# Patient Record
Sex: FEMALE | Race: WHITE | NOT HISPANIC OR LATINO | URBAN - METROPOLITAN AREA
[De-identification: names, ages, dates, MRNs, and addresses within clinical notes are randomized per-mention and may not be internally consistent; named-entity substitution may affect disease eponyms.]

---

## 2017-06-25 ENCOUNTER — INPATIENT (INPATIENT)
Facility: HOSPITAL | Age: 82
LOS: 8 days | Discharge: HOME | DRG: 280 | End: 2017-07-04
Attending: INTERNAL MEDICINE | Admitting: INTERNAL MEDICINE
Payer: MEDICARE

## 2017-06-25 VITALS
SYSTOLIC BLOOD PRESSURE: 142 MMHG | RESPIRATION RATE: 24 BRPM | OXYGEN SATURATION: 92 % | DIASTOLIC BLOOD PRESSURE: 65 MMHG | HEART RATE: 82 BPM | TEMPERATURE: 98 F

## 2017-06-25 LAB
ALBUMIN SERPL ELPH-MCNC: 4.1 G/DL — SIGNIFICANT CHANGE UP (ref 3.3–5)
ALP SERPL-CCNC: 55 U/L — SIGNIFICANT CHANGE UP (ref 40–120)
ALT FLD-CCNC: 14 U/L RC — SIGNIFICANT CHANGE UP (ref 10–45)
ANION GAP SERPL CALC-SCNC: 13 MMOL/L — SIGNIFICANT CHANGE UP (ref 5–17)
APPEARANCE UR: CLEAR — SIGNIFICANT CHANGE UP
APTT BLD: 28 SEC — SIGNIFICANT CHANGE UP (ref 27.5–37.4)
AST SERPL-CCNC: 23 U/L — SIGNIFICANT CHANGE UP (ref 10–40)
BACTERIA # UR AUTO: ABNORMAL /HPF
BASOPHILS # BLD AUTO: 0 K/UL — SIGNIFICANT CHANGE UP (ref 0–0.2)
BASOPHILS NFR BLD AUTO: 0.1 % — SIGNIFICANT CHANGE UP (ref 0–2)
BILIRUB SERPL-MCNC: 0.3 MG/DL — SIGNIFICANT CHANGE UP (ref 0.2–1.2)
BILIRUB UR-MCNC: NEGATIVE — SIGNIFICANT CHANGE UP
BUN SERPL-MCNC: 19 MG/DL — SIGNIFICANT CHANGE UP (ref 7–23)
CALCIUM SERPL-MCNC: 9.2 MG/DL — SIGNIFICANT CHANGE UP (ref 8.4–10.5)
CHLORIDE SERPL-SCNC: 90 MMOL/L — LOW (ref 96–108)
CK MB CFR SERPL CALC: 7 NG/ML — HIGH (ref 0–3.8)
CK SERPL-CCNC: 86 U/L — SIGNIFICANT CHANGE UP (ref 25–170)
CO2 SERPL-SCNC: 29 MMOL/L — SIGNIFICANT CHANGE UP (ref 22–31)
COLOR SPEC: SIGNIFICANT CHANGE UP
CREAT SERPL-MCNC: 1.31 MG/DL — HIGH (ref 0.5–1.3)
DIFF PNL FLD: NEGATIVE — SIGNIFICANT CHANGE UP
EOSINOPHIL # BLD AUTO: 0 K/UL — SIGNIFICANT CHANGE UP (ref 0–0.5)
EOSINOPHIL NFR BLD AUTO: 0.2 % — SIGNIFICANT CHANGE UP (ref 0–6)
GAS PNL BLDV: SIGNIFICANT CHANGE UP
GLUCOSE SERPL-MCNC: 109 MG/DL — HIGH (ref 70–99)
GLUCOSE UR QL: NEGATIVE — SIGNIFICANT CHANGE UP
HCT VFR BLD CALC: 23.4 % — LOW (ref 34.5–45)
HGB BLD-MCNC: 8 G/DL — LOW (ref 11.5–15.5)
INR BLD: 0.91 RATIO — SIGNIFICANT CHANGE UP (ref 0.88–1.16)
KETONES UR-MCNC: NEGATIVE — SIGNIFICANT CHANGE UP
LEUKOCYTE ESTERASE UR-ACNC: ABNORMAL
LYMPHOCYTES # BLD AUTO: 0.9 K/UL — LOW (ref 1–3.3)
LYMPHOCYTES # BLD AUTO: 7 % — LOW (ref 13–44)
MCHC RBC-ENTMCNC: 26.7 PG — LOW (ref 27–34)
MCHC RBC-ENTMCNC: 34.1 GM/DL — SIGNIFICANT CHANGE UP (ref 32–36)
MCV RBC AUTO: 78.3 FL — LOW (ref 80–100)
MONOCYTES # BLD AUTO: 1 K/UL — HIGH (ref 0–0.9)
MONOCYTES NFR BLD AUTO: 7.9 % — SIGNIFICANT CHANGE UP (ref 2–14)
NEUTROPHILS # BLD AUTO: 11.2 K/UL — HIGH (ref 1.8–7.4)
NEUTROPHILS NFR BLD AUTO: 84.8 % — HIGH (ref 43–77)
NITRITE UR-MCNC: NEGATIVE — SIGNIFICANT CHANGE UP
PH UR: 7 — SIGNIFICANT CHANGE UP (ref 5–8)
PLATELET # BLD AUTO: 64 K/UL — LOW (ref 150–400)
POTASSIUM SERPL-MCNC: 5.2 MMOL/L — SIGNIFICANT CHANGE UP (ref 3.5–5.3)
POTASSIUM SERPL-SCNC: 5.2 MMOL/L — SIGNIFICANT CHANGE UP (ref 3.5–5.3)
PROT SERPL-MCNC: 6.8 G/DL — SIGNIFICANT CHANGE UP (ref 6–8.3)
PROT UR-MCNC: SIGNIFICANT CHANGE UP
PROTHROM AB SERPL-ACNC: 9.8 SEC — SIGNIFICANT CHANGE UP (ref 9.8–12.7)
RBC # BLD: 2.99 M/UL — LOW (ref 3.8–5.2)
RBC # FLD: 13.7 % — SIGNIFICANT CHANGE UP (ref 10.3–14.5)
RBC CASTS # UR COMP ASSIST: SIGNIFICANT CHANGE UP /HPF (ref 0–2)
SODIUM SERPL-SCNC: 132 MMOL/L — LOW (ref 135–145)
SP GR SPEC: 1.01 — SIGNIFICANT CHANGE UP (ref 1.01–1.02)
TROPONIN T SERPL-MCNC: 0.16 NG/ML — HIGH (ref 0–0.06)
UROBILINOGEN FLD QL: NEGATIVE — SIGNIFICANT CHANGE UP
WBC # BLD: 13.1 K/UL — HIGH (ref 3.8–10.5)
WBC # FLD AUTO: 13.1 K/UL — HIGH (ref 3.8–10.5)
WBC UR QL: SIGNIFICANT CHANGE UP /HPF (ref 0–5)

## 2017-06-25 PROCEDURE — 71010: CPT | Mod: 26

## 2017-06-25 PROCEDURE — 93010 ELECTROCARDIOGRAM REPORT: CPT

## 2017-06-25 PROCEDURE — 99284 EMERGENCY DEPT VISIT MOD MDM: CPT | Mod: 25,GC

## 2017-06-25 RX ORDER — ACETAMINOPHEN 500 MG
1000 TABLET ORAL ONCE
Qty: 0 | Refills: 0 | Status: COMPLETED | OUTPATIENT
Start: 2017-06-25 | End: 2017-06-25

## 2017-06-25 RX ADMIN — Medication 400 MILLIGRAM(S): at 22:06

## 2017-06-25 NOTE — ED PROVIDER NOTE - OBJECTIVE STATEMENT
89 year old female, past medical history A-fib (not on AC), HTN, Intubated in past for pna, SBO s/p resection, s/p cholecystectomy, s/p hysterectomy, presents to the ED for shortness of breath for 1 day. She had 2 episodes of shortness of breath, lasted 20-40 seconds, improved after taking Proventil. Occurred while at rest. No chest pain. No cough, congestion, rhinorrhea. Patient also reports abdominal pain for 5 months, on left side, non-radiating, 5/10, took 2 Advil which helped. No diarrhea, constipation, hematochezia, melena.   No fevers, chills, nausea, vomiting, hematemesis. As per aide patient not urinating as much as usual.     primary medical doctor and cardio: Eliot     speaks Farsi: declined  for son

## 2017-06-25 NOTE — ED PROVIDER NOTE - MEDICAL DECISION MAKING DETAILS
89 year old female, past medical history A-fib (not on AC), HTN, Intubated in past for pna, SBO s/p resection, s/p cholecystectomy, s/p hysterectomy, presents to the ED for shortness of breath for 1 day. endorses orthopnea. Rales on bilateral LL. Possible congestive heart failure. Will obtain labwork, EKG, CXR, urinalysis, bladder scan for retention given reported decreased urine.

## 2017-06-25 NOTE — ED ADULT NURSE NOTE - PLAN OF CARE
Position of comfort/Side rails/Call bell/Bedside visitors/Fall precautions/Explanation of exam/test/NPO

## 2017-06-25 NOTE — ED PROVIDER NOTE - PROGRESS NOTE DETAILS
AR[employer]:  Pt seems somewhat confused re: reason for presentation.  She denies abdominal pain to this provider but is insistent that she's had periods of SOB today.  PT has low level troponin.  Denies CP.  EKG unchanged.  Chief complaint is SOB.  On full exam pt has reproducible R calf TTP.  She is not anticoagulated.  CTA ordered for PE eval.  Pt is not in acute resp distress.  Planned CTA for PE eval, CT abd/pelvis for diffuse abdominal TTP on resident eval.  PT denies chest pain or any abdominal pain radiating to back.  Stable at time of signout pending CT's and probable admission for asthma exacerbation/NSTEMI if no PE findings. Spoke with on call doctor for Dr. Salcedo, admit to Dr. bolanos Patient with NSTEMI, shortness of breath now, wheezing on exam, will give duonebs, will call radiology to expedite ct reads. patient to be admitted to CCU2

## 2017-06-25 NOTE — ED PROVIDER NOTE - ATTENDING CONTRIBUTION TO CARE
Attending MD Clements.  Pt is a 89 yr old female with hx of afib not on anticoagulation and hx of intubation for PNA.  Pt had 2 episodes of mild SOB today that improved after taking Proventil.  Pt also endorses 5 mos L sided abdominal pian that is 5/10.  She took 2 advil which helped.  No diarrhea, constipation, fevers, chills.  No blood in stool/black stools.  Aide endorses reduced urination lately.  On exam pt is well appearing, elderly.  Vague bilateral rales to lower lung fields.  Diffusely tender abdomen without rebound/guarding.  PT is a non-smoker.  PT is on COPD medication (Proventil/montelukasT). Concern for CHF vs. asthma exacerbation. No sig LE edema.  No recent hosp/surg hx.  No CP/pleuritic CP/O2 sats 92% on room air.  No resp distress.

## 2017-06-25 NOTE — ED ADULT NURSE NOTE - OBJECTIVE STATEMENT
89 year old female presents to the ED complaining of SOB at rest that is relieved with Proventil for the last 1 day. Pt has Hx of afib and is not on anticoagulation. Pt denies CP, NVD. crackles auscultated bilaterally, IV placed, labs drawn, EKG completed and given to MD, Pt placed on cardiac monitor. Pt is full care, able to make needs known. Pt states she's had lower abdominal pain for the last few weeks that is relieved with Advil. Pt denies fever, chills, NVD, changes in bowel or bladder habits. Pt does not appear to be in respiratory distress though breathing is labored, VSS with 93% on RA, Pt placed on 3L O2, on pulse ox monitoring. Pt speaking clearly.

## 2017-06-25 NOTE — ED ADULT NURSE NOTE - PMH
Atrial fibrillation and flutter  No A/C  during hospitalization in april 2014  Cataract  right eye  Dysphagia    HTN - Hypertension    PNA (pneumonia)    Respiratory failure  in april 2014 was intubated  Small bowel obstruction  s/p resection in 2010  Thrombocytopenia  ITP

## 2017-06-26 DIAGNOSIS — I10 ESSENTIAL (PRIMARY) HYPERTENSION: ICD-10-CM

## 2017-06-26 DIAGNOSIS — D72.829 ELEVATED WHITE BLOOD CELL COUNT, UNSPECIFIED: ICD-10-CM

## 2017-06-26 DIAGNOSIS — D69.6 THROMBOCYTOPENIA, UNSPECIFIED: ICD-10-CM

## 2017-06-26 DIAGNOSIS — I48.91 UNSPECIFIED ATRIAL FIBRILLATION: ICD-10-CM

## 2017-06-26 DIAGNOSIS — R06.02 SHORTNESS OF BREATH: ICD-10-CM

## 2017-06-26 DIAGNOSIS — R13.10 DYSPHAGIA, UNSPECIFIED: ICD-10-CM

## 2017-06-26 DIAGNOSIS — E87.1 HYPO-OSMOLALITY AND HYPONATREMIA: ICD-10-CM

## 2017-06-26 DIAGNOSIS — E87.0 HYPEROSMOLALITY AND HYPERNATREMIA: ICD-10-CM

## 2017-06-26 DIAGNOSIS — K86.9 DISEASE OF PANCREAS, UNSPECIFIED: ICD-10-CM

## 2017-06-26 DIAGNOSIS — D64.9 ANEMIA, UNSPECIFIED: ICD-10-CM

## 2017-06-26 LAB
ALBUMIN SERPL ELPH-MCNC: 4.2 G/DL — SIGNIFICANT CHANGE UP (ref 3.3–5)
ALP SERPL-CCNC: 67 U/L — SIGNIFICANT CHANGE UP (ref 40–120)
ALT FLD-CCNC: 23 U/L RC — SIGNIFICANT CHANGE UP (ref 10–45)
ANION GAP SERPL CALC-SCNC: 11 MMOL/L — SIGNIFICANT CHANGE UP (ref 5–17)
ANION GAP SERPL CALC-SCNC: 17 MMOL/L — SIGNIFICANT CHANGE UP (ref 5–17)
APTT BLD: 48.9 SEC — HIGH (ref 27.5–37.4)
AST SERPL-CCNC: 33 U/L — SIGNIFICANT CHANGE UP (ref 10–40)
BASOPHILS # BLD AUTO: 0 K/UL — SIGNIFICANT CHANGE UP (ref 0–0.2)
BASOPHILS NFR BLD AUTO: 0 % — SIGNIFICANT CHANGE UP (ref 0–2)
BILIRUB SERPL-MCNC: 0.4 MG/DL — SIGNIFICANT CHANGE UP (ref 0.2–1.2)
BLD GP AB SCN SERPL QL: NEGATIVE — SIGNIFICANT CHANGE UP
BUN SERPL-MCNC: 17 MG/DL — SIGNIFICANT CHANGE UP (ref 7–23)
BUN SERPL-MCNC: 17 MG/DL — SIGNIFICANT CHANGE UP (ref 7–23)
CALCIUM SERPL-MCNC: 8.8 MG/DL — SIGNIFICANT CHANGE UP (ref 8.4–10.5)
CALCIUM SERPL-MCNC: 9.5 MG/DL — SIGNIFICANT CHANGE UP (ref 8.4–10.5)
CHLORIDE SERPL-SCNC: 86 MMOL/L — LOW (ref 96–108)
CHLORIDE SERPL-SCNC: 88 MMOL/L — LOW (ref 96–108)
CHLORIDE UR-SCNC: 92 MMOL/L — SIGNIFICANT CHANGE UP
CHOLEST SERPL-MCNC: 206 MG/DL — HIGH (ref 10–199)
CK MB CFR SERPL CALC: 6.7 NG/ML — HIGH (ref 0–3.8)
CK SERPL-CCNC: 92 U/L — SIGNIFICANT CHANGE UP (ref 25–170)
CO2 SERPL-SCNC: 29 MMOL/L — SIGNIFICANT CHANGE UP (ref 22–31)
CO2 SERPL-SCNC: 29 MMOL/L — SIGNIFICANT CHANGE UP (ref 22–31)
CREAT SERPL-MCNC: 1.16 MG/DL — SIGNIFICANT CHANGE UP (ref 0.5–1.3)
CREAT SERPL-MCNC: 1.18 MG/DL — SIGNIFICANT CHANGE UP (ref 0.5–1.3)
EOSINOPHIL # BLD AUTO: 0 K/UL — SIGNIFICANT CHANGE UP (ref 0–0.5)
EOSINOPHIL NFR BLD AUTO: 0.3 % — SIGNIFICANT CHANGE UP (ref 0–6)
FERRITIN SERPL-MCNC: 10 NG/ML — LOW (ref 15–150)
FOLATE SERPL-MCNC: >20 NG/ML — SIGNIFICANT CHANGE UP (ref 4.8–24.2)
GLUCOSE SERPL-MCNC: 109 MG/DL — HIGH (ref 70–99)
GLUCOSE SERPL-MCNC: 162 MG/DL — HIGH (ref 70–99)
HBA1C BLD-MCNC: 5.6 % — SIGNIFICANT CHANGE UP (ref 4–5.6)
HCT VFR BLD CALC: 25.3 % — LOW (ref 34.5–45)
HDLC SERPL-MCNC: 120 MG/DL — SIGNIFICANT CHANGE UP (ref 40–125)
HGB BLD-MCNC: 8.9 G/DL — LOW (ref 11.5–15.5)
INR BLD: 0.87 RATIO — LOW (ref 0.88–1.16)
IRON SATN MFR SERPL: 12 UG/DL — LOW (ref 30–160)
IRON SATN MFR SERPL: 4 % — LOW (ref 14–50)
LIPID PNL WITH DIRECT LDL SERPL: 76 MG/DL — SIGNIFICANT CHANGE UP
LYMPHOCYTES # BLD AUTO: 0.3 K/UL — LOW (ref 1–3.3)
LYMPHOCYTES # BLD AUTO: 2.6 % — LOW (ref 13–44)
MAGNESIUM SERPL-MCNC: 2.3 MG/DL — SIGNIFICANT CHANGE UP (ref 1.6–2.6)
MCHC RBC-ENTMCNC: 27.5 PG — SIGNIFICANT CHANGE UP (ref 27–34)
MCHC RBC-ENTMCNC: 35.1 GM/DL — SIGNIFICANT CHANGE UP (ref 32–36)
MCV RBC AUTO: 78.3 FL — LOW (ref 80–100)
MONOCYTES # BLD AUTO: 0.1 K/UL — SIGNIFICANT CHANGE UP (ref 0–0.9)
MONOCYTES NFR BLD AUTO: 0.9 % — LOW (ref 2–14)
NEUTROPHILS # BLD AUTO: 12.5 K/UL — HIGH (ref 1.8–7.4)
NEUTROPHILS NFR BLD AUTO: 96.2 % — HIGH (ref 43–77)
OSMOLALITY UR: 294 MOS/KG — LOW (ref 300–900)
PHOSPHATE SERPL-MCNC: 5.4 MG/DL — HIGH (ref 2.5–4.5)
PLATELET # BLD AUTO: 88 K/UL — LOW (ref 150–400)
POTASSIUM SERPL-MCNC: 4.2 MMOL/L — SIGNIFICANT CHANGE UP (ref 3.5–5.3)
POTASSIUM SERPL-MCNC: 5 MMOL/L — SIGNIFICANT CHANGE UP (ref 3.5–5.3)
POTASSIUM SERPL-SCNC: 4.2 MMOL/L — SIGNIFICANT CHANGE UP (ref 3.5–5.3)
POTASSIUM SERPL-SCNC: 5 MMOL/L — SIGNIFICANT CHANGE UP (ref 3.5–5.3)
PROT SERPL-MCNC: 7.4 G/DL — SIGNIFICANT CHANGE UP (ref 6–8.3)
PROTHROM AB SERPL-ACNC: 9.4 SEC — LOW (ref 9.8–12.7)
RBC # BLD: 3.23 M/UL — LOW (ref 3.8–5.2)
RBC # FLD: 14 % — SIGNIFICANT CHANGE UP (ref 10.3–14.5)
RH IG SCN BLD-IMP: POSITIVE — SIGNIFICANT CHANGE UP
SODIUM SERPL-SCNC: 128 MMOL/L — LOW (ref 135–145)
SODIUM SERPL-SCNC: 132 MMOL/L — LOW (ref 135–145)
SODIUM UR-SCNC: 81 MMOL/L — SIGNIFICANT CHANGE UP
TIBC SERPL-MCNC: 339 UG/DL — SIGNIFICANT CHANGE UP (ref 220–430)
TOTAL CHOLESTEROL/HDL RATIO MEASUREMENT: 1.7 RATIO — LOW (ref 3.3–7.1)
TRANSFERRIN SERPL-MCNC: 294 MG/DL — SIGNIFICANT CHANGE UP (ref 200–360)
TRIGL SERPL-MCNC: 49 MG/DL — SIGNIFICANT CHANGE UP (ref 10–149)
TROPONIN T SERPL-MCNC: 0.09 NG/ML — HIGH (ref 0–0.06)
TROPONIN T SERPL-MCNC: 0.13 NG/ML — HIGH (ref 0–0.06)
TSH SERPL-MCNC: 1.29 UIU/ML — SIGNIFICANT CHANGE UP (ref 0.27–4.2)
UIBC SERPL-MCNC: 327 UG/DL — SIGNIFICANT CHANGE UP (ref 110–370)
VIT B12 SERPL-MCNC: 665 PG/ML — SIGNIFICANT CHANGE UP (ref 243–894)
WBC # BLD: 13 K/UL — HIGH (ref 3.8–10.5)
WBC # FLD AUTO: 13 K/UL — HIGH (ref 3.8–10.5)

## 2017-06-26 PROCEDURE — 74177 CT ABD & PELVIS W/CONTRAST: CPT | Mod: 26

## 2017-06-26 PROCEDURE — 93306 TTE W/DOPPLER COMPLETE: CPT | Mod: 26

## 2017-06-26 PROCEDURE — 71275 CT ANGIOGRAPHY CHEST: CPT | Mod: 26

## 2017-06-26 PROCEDURE — 93010 ELECTROCARDIOGRAM REPORT: CPT

## 2017-06-26 PROCEDURE — 99223 1ST HOSP IP/OBS HIGH 75: CPT

## 2017-06-26 RX ORDER — HEPARIN SODIUM 5000 [USP'U]/ML
INJECTION INTRAVENOUS; SUBCUTANEOUS
Qty: 25000 | Refills: 0 | Status: DISCONTINUED | OUTPATIENT
Start: 2017-06-26 | End: 2017-06-26

## 2017-06-26 RX ORDER — HEPARIN SODIUM 5000 [USP'U]/ML
2700 INJECTION INTRAVENOUS; SUBCUTANEOUS EVERY 6 HOURS
Qty: 0 | Refills: 0 | Status: DISCONTINUED | OUTPATIENT
Start: 2017-06-26 | End: 2017-06-26

## 2017-06-26 RX ORDER — HEPARIN SODIUM 5000 [USP'U]/ML
2900 INJECTION INTRAVENOUS; SUBCUTANEOUS EVERY 6 HOURS
Qty: 0 | Refills: 0 | Status: DISCONTINUED | OUTPATIENT
Start: 2017-06-26 | End: 2017-06-26

## 2017-06-26 RX ORDER — PANTOPRAZOLE SODIUM 20 MG/1
40 TABLET, DELAYED RELEASE ORAL
Qty: 0 | Refills: 0 | Status: DISCONTINUED | OUTPATIENT
Start: 2017-06-26 | End: 2017-07-04

## 2017-06-26 RX ORDER — IPRATROPIUM/ALBUTEROL SULFATE 18-103MCG
3 AEROSOL WITH ADAPTER (GRAM) INHALATION ONCE
Qty: 0 | Refills: 0 | Status: COMPLETED | OUTPATIENT
Start: 2017-06-26 | End: 2017-06-26

## 2017-06-26 RX ORDER — ASPIRIN/CALCIUM CARB/MAGNESIUM 324 MG
325 TABLET ORAL ONCE
Qty: 0 | Refills: 0 | Status: DISCONTINUED | OUTPATIENT
Start: 2017-06-26 | End: 2017-06-26

## 2017-06-26 RX ORDER — FUROSEMIDE 40 MG
40 TABLET ORAL DAILY
Qty: 0 | Refills: 0 | Status: DISCONTINUED | OUTPATIENT
Start: 2017-06-26 | End: 2017-06-26

## 2017-06-26 RX ORDER — HEPARIN SODIUM 5000 [USP'U]/ML
2900 INJECTION INTRAVENOUS; SUBCUTANEOUS ONCE
Qty: 0 | Refills: 0 | Status: DISCONTINUED | OUTPATIENT
Start: 2017-06-26 | End: 2017-06-26

## 2017-06-26 RX ORDER — FUROSEMIDE 40 MG
40 TABLET ORAL
Qty: 0 | Refills: 0 | Status: DISCONTINUED | OUTPATIENT
Start: 2017-06-26 | End: 2017-06-28

## 2017-06-26 RX ORDER — HEPARIN SODIUM 5000 [USP'U]/ML
2700 INJECTION INTRAVENOUS; SUBCUTANEOUS ONCE
Qty: 0 | Refills: 0 | Status: COMPLETED | OUTPATIENT
Start: 2017-06-26 | End: 2017-06-26

## 2017-06-26 RX ORDER — PIPERACILLIN AND TAZOBACTAM 4; .5 G/20ML; G/20ML
2.25 INJECTION, POWDER, LYOPHILIZED, FOR SOLUTION INTRAVENOUS ONCE
Qty: 0 | Refills: 0 | Status: COMPLETED | OUTPATIENT
Start: 2017-06-26 | End: 2017-06-26

## 2017-06-26 RX ORDER — SUCRALFATE 1 G
1 TABLET ORAL THREE TIMES A DAY
Qty: 0 | Refills: 0 | Status: DISCONTINUED | OUTPATIENT
Start: 2017-06-26 | End: 2017-07-04

## 2017-06-26 RX ADMIN — Medication 125 MILLIGRAM(S): at 01:04

## 2017-06-26 RX ADMIN — Medication 40 MILLIGRAM(S): at 01:05

## 2017-06-26 RX ADMIN — HEPARIN SODIUM 500 UNIT(S)/HR: 5000 INJECTION INTRAVENOUS; SUBCUTANEOUS at 01:23

## 2017-06-26 RX ADMIN — Medication 40 MILLIGRAM(S): at 19:05

## 2017-06-26 RX ADMIN — Medication 1000 MILLIGRAM(S): at 01:06

## 2017-06-26 RX ADMIN — PIPERACILLIN AND TAZOBACTAM 200 GRAM(S): 4; .5 INJECTION, POWDER, LYOPHILIZED, FOR SOLUTION INTRAVENOUS at 13:32

## 2017-06-26 RX ADMIN — HEPARIN SODIUM 2700 UNIT(S): 5000 INJECTION INTRAVENOUS; SUBCUTANEOUS at 01:20

## 2017-06-26 RX ADMIN — Medication 3 MILLILITER(S): at 00:57

## 2017-06-26 RX ADMIN — Medication 40 MILLIGRAM(S): at 06:25

## 2017-06-26 RX ADMIN — Medication 1 GRAM(S): at 18:59

## 2017-06-26 NOTE — CONSULT NOTE ADULT - ASSESSMENT
89 F with hx of hyponatremia presenting with SOB and hyponatremia - creat 1.3 - now 1.7  suspect element of hypervolemic hyponatremia as dyspnea sig improved overnight  cant r/o SIADH  renal function improved despite IVF and IV contrast  SNa  stable  check urine lytes and osm  check TSH  fluid restrict 1 L  monitor I&O's, electrolytes and renal function

## 2017-06-26 NOTE — CHART NOTE - NSCHARTNOTEFT_GEN_A_CORE
====================  CCU NP note  ====================    SALTANAT JEWELLOOB  [id number]    ====================  SUMMARY: 89 yo F A fib/A flutter (not on AC), ? hx CHF, HTN, dysphagia (previously refused PEG), multiple admissions for PNA  (?aspiration related, req intubation for hypercapnic resp failure 2014), SBO (resection 2010), ITP on chronic prednisone, pancreatic lesion (prev declined further w/u), ? hx asthma, non ambulatory @ baseline, lives @ home w/ 24 HHA, per daughter forgetful (A, A, O 1-2 @ baseline), DNR/DNI presented to ED w/ SOB. CTA w/ pulm edema/pleural effusions and no PE. In ED, initially in no acute distress, respiratory status worsened w/ SOB/wheezing and patient placed on bipap, given duoneb x 3, 125 mg IVP solu medrol, 40 mg IVP lasix. w/ unmeasured UOP and improvement of symptoms and transferred to CCU2 for further management.  ====================    ====================  NEW EVENTS: In CCU2, placed on 40 mg IVP Lasix BID. Given 1 dose Zosyn given hx dysphagia/aspiration and elevated WBC (no infiltrate on CT chest). UA reviewed, BC ordered. Weaned off BiPAP sPO2 100% on nasal cannula.  TTE done this am and BLE dopplers ordered for RLE pain given chronic non-ambulatory state. Patient  w/ improving NEWTON in ED, suspect s/t ADHF. Patient noted to by hyponatremic, suspected s/t ADHF but urine studies ordered. Patient also noted to have microcytic anemia w/out s/s bleeding, anemia panel ordered.   GOC reviewed w/ family, daughter at bedside confirmed patient is DNR/DNI.   Pt transferred to [address] [place] [address] under Dr Robert Diaz.    ====================        ====================  VITALS (Last 12 hrs):  ====================    T(C): 36.4, Max: 36.7 (06-26 @ 04:48)  HR: 94 (81 - 101)  BP: 103/76 (103/76 - 176/70)  BP(mean): 85 (85 - 119)  RR: 18 (14 - 24)  SpO2: 100% (96% - 100%)  Wt(kg): --    TELEMETRY:    I&O's Summary  I & Os for 24h ending 26 Jun 2017 07:00  =============================================  IN: 0 ml / OUT: 550 ml / NET: -550 ml    I & Os for current day (as of 26 Jun 2017 13:08)  =============================================  IN: 0 ml / OUT: 700 ml / NET: -700 ml      ====================  PLAN:  ====================    HEALTH ISSUES - PROBLEM Dx:  Pancreatic mass: Pancreatic mass  Hyponatremia: Hyponatremia  Anemia: Anemia  Thrombocytopenia: Thrombocytopenia  Dysphagia: Dysphagia  Hypertension: Hypertension  Leukocytosis: Leukocytosis  Hypernatremia: Hypernatremia  Atrial fibrillation and flutter: Atrial fibrillation and flutter  Shortness of breath: Shortness of breath        HEALTH ISSUES - R/O PROBLEM Dx: ====================  CCU NP note  ====================    CARLOSANAT KATI  [id number]    ====================  SUMMARY: 89 yo F A fib/A flutter (not on AC), ? hx CHF, HTN, dysphagia (previously refused PEG), multiple admissions for PNA  (?aspiration related, req intubation for hypercapnic resp failure 2014), SBO (resection 2010), ITP on chronic prednisone, pancreatic lesion (prev declined further w/u), ? hx asthma, non ambulatory @ baseline, lives @ home w/ 24 HHA, per daughter forgetful (A, A, O 1-2 @ baseline), DNR/DNI presented to ED w/ SOB. CTA w/ pulm edema/pleural effusions and no PE. In ED, initially in no acute distress, respiratory status worsened w/ SOB/wheezing and patient placed on bipap, given duoneb x 3, 125 mg IVP solu medrol, 40 mg IVP lasix. w/ unmeasured UOP and improvement of symptoms and transferred to CCU2 for further management.  ====================    ====================  NEW EVENTS: In CCU2, GOC reviewed w/ family, daughter at bedside confirmed patient is DNR/DNI.   placed on 40 mg IVP Lasix BID. Given 1 dose Zosyn given hx dysphagia/aspiration and elevated WBC (no infiltrate on CT chest). UA reviewed, BC ordered. Weaned off BiPAP sPO2 100% on nasal cannula.  TTE done this am and BLE dopplers ordered for RLE pain given chronic non-ambulatory state. Patient  w/ improving NEWTON in ED, suspect s/t ADHF. Patient noted to by hyponatremic, suspected s/t ADHF but urine studies ordered. Patient also noted to have microcytic anemia w/out s/s bleeding, anemia panel ordered.   Pt transferred to [address] [place] [address] report to hospitalist - Dr Robert Diaz.    ====================    ====================  VITALS (Last 12 hrs):  ====================    T(C): 36.4, Max: 36.7 (06-26 @ 04:48)  HR: 94 (81 - 101)  BP: 103/76 (103/76 - 176/70)  BP(mean): 85 (85 - 119)  RR: 18 (14 - 24)  SpO2: 100% (96% - 100%)  Wt(kg): --    TELEMETRY: afib no events    I&O's Summary  I & Os for 24h ending 26 Jun 2017 07:00  =============================================  IN: 0 ml / OUT: 550 ml / NET: -550 ml    I & Os for current day (as of 26 Jun 2017 13:08)  =============================================  IN: 0 ml / OUT: 700 ml / NET: -700 ml      HEALTH ISSUES - PROBLEM Dx:  Pancreatic mass: Pancreatic mass  Hyponatremia: Hyponatremia  Anemia: Anemia  Thrombocytopenia: Thrombocytopenia  Dysphagia: Dysphagia  Hypertension: Hypertension  Leukocytosis: Leukocytosis  Hypernatremia: Hypernatremia  Atrial fibrillation and flutter: Atrial fibrillation and flutter  Shortness of breath: Shortness of breath        HEALTH ISSUES - R/O PROBLEM Dx:

## 2017-06-26 NOTE — PROGRESS NOTE ADULT - ATTENDING COMMENTS
Briefly,   89 yo F - h.o. ITP on Prednisone, HTN, Dsyphagia Refused PEG, SBO s/p Resection, Multiple Admissions for PNA, Advanced Dementia AOx1-2, AF/Flutter not on AC, CHF - p/w SOB found to be volume overloaded and improving with diuresis.    Problems  1. Advanced Dementia  2. AF/Flutter  3. CHF  4. SOB/Hypoxia    Recommendations  - Continue Lasix - likely transition to PO tomorrow   - TTE pending  - Monitor Lytes  - Holding AC given advanced dementia  - Ensure aspiration precautions - may be mild viral URI given multiple PNA admissions     Agree with above. IV diuresis. Likely able to transition to PO tomorrow.

## 2017-06-26 NOTE — ED ADULT NURSE REASSESSMENT NOTE - NS ED NURSE REASSESS COMMENT FT1
Pt placed on bipap, tolerating well. Heparin started with 2 RNs present. Pt medicated as ordered, + JVD.

## 2017-06-26 NOTE — CONSULT NOTE ADULT - SUBJECTIVE AND OBJECTIVE BOX
Patient is a 89y Female  being evaluated for                     HPI:  89 yo F A fib/A flutter, HTN, ITP on chronic prednisone,admitted with sob  Denies CP, palpitations, increased fluid intake, orthopnea, PND, syncope, near syncope, lightheadness, dizziness. Denies N/V/D, melena, hematemesis, hematochezia, coffee ground emesis, dysphagia.     Currently lying flat in bed no dyspnea or other complaints      PAST MEDICAL & SURGICAL HISTORY:  PNA (pneumonia)  Dysphagia  Thrombocytopenia: ITP  Atrial fibrillation and flutter: No A/C  during hospitalization in 2014  Respiratory failure: in 2014 was intubated  Cataract: right eye  Small bowel obstruction: s/p resection in   HTN - Hypertension  S/P cholecystectomy  H/O: Hysterectomy  S/P Small Bowel Resection      Allergies    eggs (Rash)  no drug allergy (Unknown)    Intolerances    vinegar sensitivity    family states that the patient shakes when she ingests vinegar (Other)      Social History:    FAMILY HISTORY:  No pertinent family history in first degree relatives  No significant family history      PHYSICAL EXAM:  T(F): 97.5, Max: 98.4 ( @ 20:13)  HR: 87  BP: 144/71  BP(mean): 92  RR: 16  SpO2: 96%  Wt(kg): --    Constitutional: no acute distress, thin elederly F  Head: NC AT  Mouth/Throat : clear and moist   Eyes: PERRL, no discharge, no icterus  Neck: No JVD, bruit, adenopathy   Respiratory: few rhonchi, no wheezes, rales, nl effort  Cardiovascular: regular rate, S1 and S2 no rub or gallop  Abd: : +BS, soft, NT , no rebound or guarding, no bruits  Musculoskeletal: tr edema no tenderness  Extremities: tr edema or cyanosis, palpable pulses      I and O's:  I & Os for 24h ending  @ 07:00  =============================================  IN: 0 ml / OUT: 550 ml / NET: -550 ml    I & Os for current day (as of  @ 10:07)  =============================================  IN: 0 ml / OUT: 500 ml / NET: -500 ml        Weight (kg): 42.9 ( @ 01:14)      REVIEW OF SYSTEMS  CONSTITUTIONAL: No weakness, fevers or chills  HENT : no sore throat  Eyes: no blurred vision or photphobia  RESPIRATORY: No cough, wheezing, hemoptysis; No shortness of breath  CARDIOVASCULAR: No chest pain or palpitations, orthopnea, PND  GI : no abd pains, nausea or vomiting, rectal bleeding, constipation, diarrhea, GERD, melena, n/v  GENITOURINARY: No dysuria, frequency or hematuria, flank pain, urgency  Musculoskeletal:  No back pain, joint pain, myalgias  Skin : no itching or rash  Neuro: No dizziness, focal weakness, HA  Endo/Heme: No polydipsia. No easy bruising  Psychiatric: No depression or memory loss  All other review of systems is negative unless indicated above.      MEDICATIONS  (STANDING):  piperacillin/tazobactam IVPB. 2.25Gram(s) IV Intermittent once  furosemide   Injectable 40milliGRAM(s) IV Push two times a day      LABS:  I & Os for 24h ending  @ 07:00  =============================================  IN: 0 ml / OUT: 550 ml / NET: -550 ml    I & Os for current day (as of  @ 10:07)  =============================================  IN: 0 ml / OUT: 500 ml / NET: -500 ml      Weight (kg): 42.9 ( @ 01:14)            CBC Full  -  ( 2017 06:51 )  WBC Count : 13.0 K/uL  Hemoglobin : 8.9 g/dL  Hematocrit : 25.3 %  Platelet Count - Automated : 88 K/uL  Mean Cell Volume : 78.3 fl  Mean Cell Hemoglobin : 27.5 pg  Mean Cell Hemoglobin Concentration : 35.1 gm/dL  Auto Neutrophil # : 12.5 K/uL  Auto Lymphocyte # : 0.3 K/uL  Auto Monocyte # : 0.1 K/uL  Auto Eosinophil # : 0.0 K/uL  Auto Basophil # : 0.0 K/uL  Auto Neutrophil % : 96.2 %  Auto Lymphocyte % : 2.6 %  Auto Monocyte % : 0.9 %  Auto Eosinophil % : 0.3 %  Auto Basophil % : 0.0 %        132<L>  |  86<L>  |  17  ----------------------------<  162<H>  4.2   |  29  |  1.18    Ca    9.5      2017 06:51  Phos  5.4       Mg     2.3         TPro  7.4  /  Alb  4.2  /  TBili  0.4  /  DBili  x   /  AST  33  /  ALT  23  /  AlkPhos  67        Urine Studies:  Urinalysis Basic - ( 2017 22:31 )    Color: PL Yellow / Appearance: Clear / S.015 / pH: x  Gluc: x / Ketone: Negative  / Bili: Negative / Urobili: Negative   Blood: x / Protein: Trace / Nitrite: Negative   Leuk Esterase: Trace / RBC: 0-2 /HPF / WBC 3-5 /HPF   Sq Epi: x / Non Sq Epi: x / Bacteria: Few /HPF

## 2017-06-26 NOTE — H&P ADULT - NSHPSOCIALHISTORY_GEN_ALL_CORE
, lives w/ 24 hr HHA   no reported ETOH or illicit drug use   bed/wheelchair bound, non ambulatory , lives w/ 24 hr HHA   no reported ETOH or illicit drug use   bed/wheelchair bound, non ambulatory    discussed code status w/ daughter at bedside. patient is DNR/DNI

## 2017-06-26 NOTE — H&P ADULT - PROBLEM SELECTOR PLAN 1
suspect ADHF. patient w/ pulm edema on CTA, clinically appears overloaded, BNP elevated. C/w diuresis w/ IV lasix, c/w bipap and wean as tolerated. Suspect troponin leak and mildly elevated CKMB d/t ADHF rather than ACS, trop down trending, hold off on hep gtt, DAPT @ this time.   w/ NEWTON, suspect s/t ADHF. Monitor Cr/strict I&Os w/ diuresis, UA unremarkable. Improved on repeat BMP in ED.   ? component of asthma, c/w duonebs as needed, clarify home asthma meds   patient w/ leukocytosis, but afebrile, no reports of cough, check RVP, given hx of dysphagia, will place on aspiration precautions, NPO while on Bipap, give 1 dose zosyn and check blood cultures.  will check dopplers as patient w/ RLE pain, bedbound. no PE on CTA suspect ADHF. patient w/ pulm edema on CTA, clinically appears overloaded, BNP elevated. C/w diuresis w/ IV lasix, c/w bipap and wean as tolerated. Suspect troponin leak and mildly elevated CKMB d/t ADHF rather than ACS, trop down trending, hold off on hep gtt, DAPT @ this time.   w/ NEWTON, suspect s/t ADHF. Monitor Cr/strict I&Os w/ diuresis, UA unremarkable. Improved on repeat BMP in ED.   ? component of asthma, c/w duonebs as needed, clarify home asthma meds   patient w/ leukocytosis, but afebrile, no reports of cough, given hx of dysphagia, will place on aspiration precautions, NPO while on Bipap, give 1 dose zosyn and check blood cultures.  will check dopplers as patient w/ RLE pain, bedbound. no PE on CTA Appears to be in ADHF, clinically appears overloaded, BNP elevated. C/w diuresis w/ IV lasix, c/w bipap and wean as tolerated. TTE in AM. Suspect troponin leak and mildly elevated CKMB d/t ADHF rather than ACS, trop down trending, hold off on hep gtt, DAPT @ this time.   w/ NEWTON, suspect s/t ADHF. Monitor Cr/strict I&Os w/ diuresis  ? component of asthma, c/w duonebs as needed, clarify home asthma meds   patient w/ leukocytosis, but afebrile, no reports of cough, given hx of dysphagia, will place on aspiration precautions, NPO while on Bipap, give 1 dose zosyn and check blood cultures.  will check dopplers as patient w/ RLE pain, bedbound. no PE on CTA

## 2017-06-26 NOTE — CONSULT NOTE ADULT - ASSESSMENT
pt w/ chf  hpervolemic hyponatremia  ckd  renal/ cards f/u pt w/ chf  hpervolemic hyponatremia  ckd  renal/ cards f/u  f/u labs  dvt proph  dnr/

## 2017-06-26 NOTE — CONSULT NOTE ADULT - SUBJECTIVE AND OBJECTIVE BOX
Patient is a 89y old  Female who presents with a chief complaint of SOB (2017 03:21)      INTERVAL HPI/OVERNIGHT EVENTS:    Medications:MEDICATIONS  (STANDING):  furosemide   Injectable 40milliGRAM(s) IV Push two times a day  diltiazem    Tablet 60milliGRAM(s) Oral every 8 hours  pantoprazole    Tablet 40milliGRAM(s) Oral before breakfast  sucralfate suspension 1Gram(s) Oral three times a day    MEDICATIONS  (PRN):      Allergies: Allergies    eggs (Rash)  no drug allergy (Unknown)    Intolerances    vinegar sensitivity    family states that the patient shakes when she ingests vinegar (Other)        FAMILY HISTORY:  No pertinent family history in first degree relatives  No significant family history        PAST MEDICAL & SURGICAL HISTORY:  PNA (pneumonia)  Dysphagia  Thrombocytopenia: ITP  Atrial fibrillation and flutter: No A/C  during hospitalization in 2014  Respiratory failure: in 2014 was intubated  Cataract: right eye  Small bowel obstruction: s/p resection in   HTN - Hypertension  S/P cholecystectomy  H/O: Hysterectomy  S/P Small Bowel Resection      REVIEW OF SYSTEMS:  CONSTITUTIONAL: No fever, weight loss, or fatigue  EYES: No eye pain, visual disturbances, or discharge  ENMT:  No difficulty hearing, tinnitus, vertigo; No sinus or throat pain  NECK: No pain or stiffness  BREASTS: No pain, masses, or nipple discharge  RESPIRATORY: No cough, wheezing, chills or hemoptysis; No shortness of breath  CARDIOVASCULAR: No chest pain, palpitations, dizziness, or leg swelling  GASTROINTESTINAL: No abdominal or epigastric pain. No nausea, vomiting, or hematemesis; No diarrhea or constipation. No melena or hematochezia.  GENITOURINARY: No dysuria, frequency, hematuria, or incontinence  NEUROLOGICAL: No headaches, memory loss, loss of strength, numbness, or tremors  SKIN: No itching, burning, rashes, or lesions   LYMPH NODES: No enlarged glands  ENDOCRINE: No heat or cold intolerance; No hair loss  MUSCULOSKELETAL: No joint pain or swelling; No muscle, back, or extremity pain  PSYCHIATRIC: No depression, anxiety, mood swings, or difficulty sleeping  HEME/LYMPH: No easy bruising, or bleeding gums  ALLERY AND IMMUNOLOGIC: No hives or eczema    T(C): 36.4, Max: 36.9 (06-25 @ 20:13)  HR: 93 (81 - 101)  BP: 129/72 (103/76 - 176/70)  RR: 18 (14 - 24)  SpO2: 100% (92% - 100%)  Wt(kg): --Vital Signs Last 24 Hrs  T(C): 36.4, Max: 36.9 (- @ 20:13)  T(F): 97.5, Max: 98.4 (-25 @ 20:13)  HR: 93 (81 - 101)  BP: 129/72 (103/76 - 176/70)  BP(mean): 90 (85 - 119)  RR: 18 (14 - 24)  SpO2: 100% (92% - 100%)  I&O's Summary  I & Os for 24h ending 2017 07:00  =============================================  IN: 0 ml / OUT: 550 ml / NET: -550 ml    I & Os for current day (as of 2017 13:44)  =============================================  IN: 100 ml / OUT: 700 ml / NET: -600 ml      PHYSICAL EXAM:  GENERAL: NAD, well-groomed, well-developed  HEAD:  Atraumatic, Normocephalic  EYES: EOMI, PERRLA, conjunctiva and sclera clear  ENMT: No tonsillar erythema, exudates, or enlargement; Moist mucous membranes, Good dentition, No lesions  NECK: Supple, No JVD, Normal thyroid  NERVOUS SYSTEM:  cn grossly intact  CHEST/LUNG: dec bs ;few rales  HEART: Regular rate and rhythm; No murmurs, rubs, or gallops  ABDOMEN: Soft, Nontender, Nondistended; Bowel sounds present  EXTREMITIES:  2+ Peripheral Pulses, No clubbing, cyanosis, or edema  LYMPH: No lymphadenopathy noted  SKIN: No rashes or lesions    Consultant(s) Notes Reviewed:  [x ] YES  [ ] NO  Care Discussed with Consultants/Other Providerscpk [ x] YES  [ ] NO    LABS:                    CBC Full  -  ( 2017 06:51 )  WBC Count : 13.0 K/uL  Hemoglobin : 8.9 g/dL  Hematocrit : 25.3 %  Platelet Count - Automated : 88 K/uL  Mean Cell Volume : 78.3 fl  Mean Cell Hemoglobin : 27.5 pg  Mean Cell Hemoglobin Concentration : 35.1 gm/dL  Auto Neutrophil # : 12.5 K/uL  Auto Lymphocyte # : 0.3 K/uL  Auto Monocyte # : 0.1 K/uL  Auto Eosinophil # : 0.0 K/uL  Auto Basophil # : 0.0 K/uL  Auto Neutrophil % : 96.2 %  Auto Lymphocyte % : 2.6 %  Auto Monocyte % : 0.9 %  Auto Eosinophil % : 0.3 %  Auto Basophil % : 0.0 %          132<L>  |  86<L>  |  17  ----------------------------<  162<H>  4.2   |  29  |  1.18    Ca    9.5      2017 06:51  Phos  5.4       Mg     2.3         TPro  7.4  /  Alb  4.2  /  TBili  0.4  /  DBili  x   /  AST  33  /  ALT  23  /  AlkPhos  67        Urinalysis Basic - ( 2017 22:31 )    Color: PL Yellow / Appearance: Clear / S.015 / pH: x  Gluc: x / Ketone: Negative  / Bili: Negative / Urobili: Negative   Blood: x / Protein: Trace / Nitrite: Negative   Leuk Esterase: Trace / RBC: 0-2 /HPF / WBC 3-5 /HPF   Sq Epi: x / Non Sq Epi: x / Bacteria: Few /HPF        PT/INR - ( 2017 06:51 )   PT: 9.4 sec;   INR: 0.87 ratio         PTT - ( 2017 06:51 )  PTT:48.9 sec  RADIOLOGY & ADDITIONAL TESTS:    Imaging Personally Reviewed:  [ ] YES  [ ] NO Patient is a 89y old  Female who presents with a chief complaint of SOB (2017 03:21)  pt improved from admission     INTERVAL HPI/OVERNIGHT EVENTS:    Medications:MEDICATIONS  (STANDING):  furosemide   Injectable 40milliGRAM(s) IV Push two times a day  diltiazem    Tablet 60milliGRAM(s) Oral every 8 hours  pantoprazole    Tablet 40milliGRAM(s) Oral before breakfast  sucralfate suspension 1Gram(s) Oral three times a day    MEDICATIONS  (PRN):      Allergies: Allergies    eggs (Rash)  no drug allergy (Unknown)    Intolerances    vinegar sensitivity    family states that the patient shakes when she ingests vinegar (Other)        FAMILY HISTORY:  No pertinent family history in first degree relatives  No significant family history        PAST MEDICAL & SURGICAL HISTORY:  PNA (pneumonia)  Dysphagia  Thrombocytopenia: ITP  Atrial fibrillation and flutter: No A/C  during hospitalization in 2014  Respiratory failure: in 2014 was intubated  Cataract: right eye  Small bowel obstruction: s/p resection in   HTN - Hypertension  S/P cholecystectomy  H/O: Hysterectomy  S/P Small Bowel Resection      REVIEW OF SYSTEMS:  CONSTITUTIONAL: No fever, weight loss, or fatigue  EYES: No eye pain, visual disturbances, or discharge  ENMT:  No difficulty hearing, tinnitus, vertigo; No sinus or throat pain  NECK: No pain or stiffness  BREASTS: No pain, masses, or nipple discharge  RESPIRATORY: dec    CARDIOVASCULAR: No chest pain, palpitations, dizziness, or leg swelling  GASTROINTESTINAL: No abdominal or epigastric pain. No nausea, vomiting, or hematemesis; No diarrhea or constipation. No melena or hematochezia.  GENITOURINARY: No dysuria, frequency, hematuria, or incontinence  NEUROLOGICAL: No headaches, memory loss, loss of strength, numbness, or tremors  SKIN: No itching, burning, rashes, or lesions   LYMPH NODES: No enlarged glands  ENDOCRINE: No heat or cold intolerance; No hair loss  MUSCULOSKELETAL: No joint pain or swelling; No muscle, back, or extremity pain  PSYCHIATRIC: No depression, anxiety, mood swings, or difficulty sleeping  HEME/LYMPH: No easy bruising, or bleeding gums  ALLERY AND IMMUNOLOGIC: No hives or eczema    T(C): 36.4, Max: 36.9 (06-25 @ 20:13)  HR: 93 (81 - 101)  BP: 129/72 (103/76 - 176/70)  RR: 18 (14 - 24)  SpO2: 100% (92% - 100%)  Wt(kg): --Vital Signs Last 24 Hrs  T(C): 36.4, Max: 36.9 (- @ 20:13)  T(F): 97.5, Max: 98.4 (- @ 20:13)  HR: 93 (81 - 101)  BP: 129/72 (103/76 - 176/70)  BP(mean): 90 (85 - 119)  RR: 18 (14 - 24)  SpO2: 100% (92% - 100%)  I&O's Summary  I & Os for 24h ending 2017 07:00  =============================================  IN: 0 ml / OUT: 550 ml / NET: -550 ml    I & Os for current day (as of 2017 13:44)  =============================================  IN: 100 ml / OUT: 700 ml / NET: -600 ml      PHYSICAL EXAM:  GENERAL: NAD, well-groomed, well-developed  HEAD:  Atraumatic, Normocephalic  EYES: EOMI, PERRLA, conjunctiva and sclera clear  ENMT: No tonsillar erythema, exudates, or enlargement; Moist mucous membranes, Good dentition, No lesions  NECK: Supple, No JVD, Normal thyroid  NERVOUS SYSTEM:  cn grossly intact  CHEST/LUNG: dec bs ;few rales  HEART: Regular rate and rhythm; No murmurs, rubs, or gallops  ABDOMEN: Soft, Nontender, Nondistended; Bowel sounds present  EXTREMITIES:  2+ Peripheral Pulses, No clubbing, cyanosis, or edema  LYMPH: No lymphadenopathy noted  SKIN: No rashes or lesions    Consultant(s) Notes Reviewed:  [x ] YES  [ ] NO  Care Discussed with Consultants/Other Providerscpk [ x] YES  [ ] NO    LABS:                    CBC Full  -  ( 2017 06:51 )  WBC Count : 13.0 K/uL  Hemoglobin : 8.9 g/dL  Hematocrit : 25.3 %  Platelet Count - Automated : 88 K/uL  Mean Cell Volume : 78.3 fl  Mean Cell Hemoglobin : 27.5 pg  Mean Cell Hemoglobin Concentration : 35.1 gm/dL  Auto Neutrophil # : 12.5 K/uL  Auto Lymphocyte # : 0.3 K/uL  Auto Monocyte # : 0.1 K/uL  Auto Eosinophil # : 0.0 K/uL  Auto Basophil # : 0.0 K/uL  Auto Neutrophil % : 96.2 %  Auto Lymphocyte % : 2.6 %  Auto Monocyte % : 0.9 %  Auto Eosinophil % : 0.3 %  Auto Basophil % : 0.0 %          132<L>  |  86<L>  |  17  ----------------------------<  162<H>  4.2   |  29  |  1.18    Ca    9.5      2017 06:51  Phos  5.4       Mg     2.3         TPro  7.4  /  Alb  4.2  /  TBili  0.4  /  DBili  x   /  AST  33  /  ALT  23  /  AlkPhos  67        Urinalysis Basic - ( 2017 22:31 )    Color: PL Yellow / Appearance: Clear / S.015 / pH: x  Gluc: x / Ketone: Negative  / Bili: Negative / Urobili: Negative   Blood: x / Protein: Trace / Nitrite: Negative   Leuk Esterase: Trace / RBC: 0-2 /HPF / WBC 3-5 /HPF   Sq Epi: x / Non Sq Epi: x / Bacteria: Few /HPF        PT/INR - ( 2017 06:51 )   PT: 9.4 sec;   INR: 0.87 ratio         PTT - ( 2017 06:51 )  PTT:48.9 sec  RADIOLOGY & ADDITIONAL TESTS:    Imaging Personally Reviewed:  [ ] YES  [ ] NO

## 2017-06-26 NOTE — H&P ADULT - PROBLEM SELECTOR PLAN 2
currently rate controlled  per previous documentation not on AC after discussion risks/benefits w/ family   clarify home medications

## 2017-06-26 NOTE — PROGRESS NOTE ADULT - PROBLEM SELECTOR PLAN 1
C/w diuresis w/ IV lasix,   c/w bipap and wean as tolerated.   TTE done f/u results  Monitor Creat on diuretics   Monitor I&Os

## 2017-06-26 NOTE — H&P ADULT - NSHPREVIEWOFSYSTEMS_GEN_ALL_CORE
General:   HEENT:   Cardiovascular:   Respiratory:  Gastrointestinal:   Genitourinary:   Integumentary:   Muscoloskeletal:   Hematologic/Lymphatic:   Endocrine:   Neurological:   Psychiatric: General: no weight changes, sick contacts, recent illnesses   HEENT: no sore throats, rhinorrhea, sinus congestion  Cardiovascular: see HPI. no hx CAD  Respiratory: no chronic dyspnea, no cough,no productive cough   Gastrointestinal: no N/V/D  Genitourinary: no dysuria, hesitancy. per aide, decreased UOP  Integumentary: chronic BLE discoloration   Muscoloskeletal: chronic arthralgias d/t OA   Hematologic/Lymphatic: ITP hx, chronic thrombocytopenia   Endocrine: no hx DM   Neurological: no lightheadedness/dizziness/syncope/near syncope, no changes in vision or hearing   Psychiatric:no history anxiety/depression

## 2017-06-26 NOTE — H&P ADULT - PROBLEM SELECTOR PLAN 6
NPO while on bipap  place on dysphagia diet/aspiration precautions NPO while on bipap  place on dysphagia diet/aspiration precautions  per previous documentation, patient's family accepted aspiration risk and wished to comfort feed patient

## 2017-06-26 NOTE — ED ADULT NURSE REASSESSMENT NOTE - NS ED NURSE REASSESS COMMENT FT1
Pt to be transferred to the PICU, assisted Pt with bedpan, VSS, afebrile, report given to Shekhar MANJARREZ.

## 2017-06-26 NOTE — H&P ADULT - ASSESSMENT
89 yo F A fib/A flutter (not on AC), ? hx CHF (no previous echo in [place]/[employer] but treated in past for ADHF), HTN, dysphagia (previously refused PEG), multiple admissions for PNA  (?aspiration related, req intubation for hypercapnic resp failure 2014), SBO (resection 2010), ITP on chronic prednisone, pancreatic lesion (prev declined further w/u), ? hx asthma, non ambulatory @ baseline (wheel chair/bed bound), lives @ home w/ 24 HHA, per daughter forgetful (A, A, O 1-2 @ baseline) p/w SOB, appears to be in ADHF, ? component of asthma. 89 yo F A fib/A flutter (not on AC), ? hx CHF (no previous echo in [place]/[facility] but treated in past for ADHF), HTN, dysphagia (previously refused PEG), multiple admissions for PNA  (?aspiration related, req intubation for hypercapnic resp failure 2014), SBO (resection 2010), ITP on chronic prednisone, pancreatic lesion (prev declined further w/u), ? hx asthma, non ambulatory @ baseline (wheel chair/bed bound), lives @ home w/ 24 HHA, per daughter forgetful (A, A, O 1-2 @ baseline) p/w SOB, appears to be in ADHF, ? component of asthma. Placed on Bipap in ED as respiratory status worsened. Currently denies SOB 89 yo F A fib/A flutter, ? hx CHF, HTN, dysphagia, multiple admissions for PNA, SBO (resection 2010), ITP on chronic prednisone, pancreatic lesion, ? hx asthma, non ambulatory @ baseline, per daughter forgetful (A, A, O 1-2 @ baseline) p/w SOB/ADHF, ? component of asthma. Placed on Bipap in ED d/t worsened respiratory status. Improved after 40 mg IVP lasix w/ unmeasured UOP. Currently denies SOB

## 2017-06-26 NOTE — PROGRESS NOTE ADULT - SUBJECTIVE AND OBJECTIVE BOX
Admission date:  CHIEF COMPLAINT:  HPI:  89 yo F A fib/A flutter (not on AC), ? hx CHF (no previous echo in [place]/[facility] but treated in past for ADHF), HTN, dysphagia (previously refused PEG), multiple admissions for PNA  (?aspiration related, req intubation for hypercapnic resp failure ), SBO (resection ), ITP on chronic prednisone, pancreatic lesion (prev declined further w/u), ? hx asthma, non ambulatory @ baseline (wheel chair/bed bound), lives @ home w/ 24 HHA, per daughter forgetful (A, A, O 1-2 @ baseline). Patient is poor historian. History obtained from patient, daughter, chart review, speaking w/ ER provider. P/w 2 episodes of SOB @ rest today lasting 20-40 sec, better w/ proventil. Per aide, patient noted to be urinating less. Patient reports L sided abd pain 5/10 x 5 mos, better today w/ advil.  Denies CP, palpitations, increased fluid intake, orthopnea, PND, syncope, near syncope, lightheadness, dizziness. Denies N/V/D, melena, hematemesis, hematochezia, coffee ground emesis, dysphagia.     In ED, initially in no acute distress, respiratory status worsened w/ SOB/wheezing and patient placed on bipap, given duoneb x 3, 125 mg IVP solu medrol, 40 mg IVP lasix. At time of my exam, patient appears comfortable but volume overloaded on exam. VSS (2017 03:21)    INTERVAL HISTORY:    REVIEW OF SYSTEMS: Denies xxxxxx; all others negative    MEDICATIONS  (STANDING):  piperacillin/tazobactam IVPB. 2.25Gram(s) IV Intermittent once  furosemide   Injectable 40milliGRAM(s) IV Push two times a day    MEDICATIONS  (PRN):      Objective:  ICU Vital Signs Last 24 Hrs  T(C): 36.7, Max: 36.9 (- @ 20:13)  T(F): 98, Max: 98.4 (- @ 20:13)  HR: 81 (81 - 92)  BP: 135/78 (135/78 - 176/70)  BP(mean): 95 (95 - 119)  RR: 22 (22 - 24)  SpO2: 100% (92% - 100%)      I & Os for current day (as of  @ 08:00)  =============================================  IN: 0 ml / OUT: 550 ml / NET: -550 ml    Daily     Daily     PHYSICAL EXAM:      Constitutional:    HEENT:    Respiratory:    Cardiovascular:    Gastrointestinal:    Genitourinary:    Extremities:    Vascular:    Neurological:    Skin:    Lymph Nodes:    Musculoskeletal:    Psychiatric:          TELEMETRY:     EKG:     IMAGIN.9    13.0  )-----------( 88       ( 2017 06:51 )             25.3         132<L>  |  86<L>  |  17  ----------------------------<  162<H>  4.2   |  29  |  1.18    Ca    9.5      2017 06:51  Phos  5.4       Mg     2.3         TPro  7.4  /  Alb  4.2  /  TBili  0.4  /  DBili  x   /  AST  33  /  ALT  23  /  AlkPhos  67      LIVER FUNCTIONS - ( 2017 06:51 )  Alb: 4.2 g/dL / Pro: 7.4 g/dL / ALK PHOS: 67 U/L / ALT: 23 U/L RC / AST: 33 U/L / GGT: x           PT/INR - ( 2017 06:51 )   PT: 9.4 sec;   INR: 0.87 ratio         PTT - ( 2017 06:51 )  PTT:48.9 sec  Creatine Kinase, Serum: 92 U/L ( @ 06:51)  Troponin T, Serum: 0.13 ng/mL ( @ 01:14)  Creatine Kinase, Serum: 86 U/L ( @ 21:47)  CKMB Units: 7.0 ng/mL ( @ 21:47)  Troponin T, Serum: 0.16 ng/mL ( @ 21:47)    Urinalysis Basic - ( 2017 22:31 )    Color: PL Yellow / Appearance: Clear / S.015 / pH: x  Gluc: x / Ketone: Negative  / Bili: Negative / Urobili: Negative   Blood: x / Protein: Trace / Nitrite: Negative   Leuk Esterase: Trace / RBC: 0-2 /HPF / WBC 3-5 /HPF   Sq Epi: x / Non Sq Epi: x / Bacteria: Few /HPF        HEALTH ISSUES - PROBLEM Dx:  Pancreatic mass: Pancreatic mass  Hyponatremia: Hyponatremia  Anemia: Anemia  Thrombocytopenia: Thrombocytopenia  Dysphagia: Dysphagia  Hypertension: Hypertension  Leukocytosis: Leukocytosis  Hypernatremia: Hypernatremia  Atrial fibrillation and flutter: Atrial fibrillation and flutter  Shortness of breath: Shortness of breath Admission date:  CHIEF COMPLAINT:  HPI:  89 yo F A fib/A flutter (not on AC), ? hx CHF (no previous echo in [place]/[facility] but treated in past for ADHF), HTN, dysphagia (previously refused PEG), multiple admissions for PNA  (?aspiration related, req intubation for hypercapnic resp failure ), SBO (resection ), ITP on chronic prednisone, pancreatic lesion (prev declined further w/u), ? hx asthma, non ambulatory @ baseline (wheel chair/bed bound), lives @ home w/ 24 HHA, per daughter forgetful (A, A, O 1-2 @ baseline). Patient is poor historian. History obtained from patient, daughter, chart review, speaking w/ ER provider. P/w 2 episodes of SOB @ rest today lasting 20-40 sec, better w/ proventil. Per aide, patient noted to be urinating less. Patient reports L sided abd pain 5/10 x 5 mos, better today w/ advil.  Denies CP, palpitations, increased fluid intake, orthopnea, PND, syncope, near syncope, lightheadness, dizziness. Denies N/V/D, melena, hematemesis, hematochezia, coffee ground emesis, dysphagia.     In ED, initially in no acute distress, respiratory status worsened w/ SOB/wheezing and patient placed on bipap, given duoneb x 3, 125 mg IVP solu medrol, 40 mg IVP lasix. At time of my exam, patient appears comfortable but volume overloaded on exam. VSS (2017 03:21)    INTERVAL HISTORY:    REVIEW OF SYSTEMS: Denies xxxxxx; all others negative    MEDICATIONS  (STANDING):  piperacillin/tazobactam IVPB. 2.25Gram(s) IV Intermittent once  furosemide   Injectable 40milliGRAM(s) IV Push two times a day    MEDICATIONS  (PRN):      Objective:  ICU Vital Signs Last 24 Hrs  T(C): 36.7, Max: 36.9 (- @ 20:13)  T(F): 98, Max: 98.4 (- @ 20:13)  HR: 81 (81 - 92)  BP: 135/78 (135/78 - 176/70)  BP(mean): 95 (95 - 119)  RR: 22 (22 - 24)  SpO2: 100% (92% - 100%)      I & Os for current day (as of  @ 08:00)  =============================================  IN: 0 ml / OUT: 550 ml / NET: -550 ml    Daily     Daily     PHYSICAL EXAM:    Constitutional: frail thin BiPAP initially then removed NAD on nasal cannula    HEENT: NC /AT; moist oral mucosa    Respiratory: regular unlabored; crackles bases bilat    Cardiovascular: irreg/irreg; S1, S2 sys murmur; trace bilat edema LE    Gastrointestinal: soft ND NT + bowel sounds    Genitourinary: voiding on own    Extremities: SMYTH equally, RLE tender to palpation    Vascular: warm peripherally; + pulses    Neurological: A & O; mood and affect appropriate    Skin: no cyanosis or rash      TELEMETRY: a fib    EKG:     IMAGIN.9    13.0  )-----------( 88       ( 2017 06:51 )             25.3         132<L>  |  86<L>  |  17  ----------------------------<  162<H>  4.2   |  29  |  1.18    Ca    9.5      2017 06:51  Phos  5.4       Mg     2.3         TPro  7.4  /  Alb  4.2  /  TBili  0.4  /  DBili  x   /  AST  33  /  ALT  23  /  AlkPhos  67      LIVER FUNCTIONS - ( 2017 06:51 )  Alb: 4.2 g/dL / Pro: 7.4 g/dL / ALK PHOS: 67 U/L / ALT: 23 U/L RC / AST: 33 U/L / GGT: x           PT/INR - ( 2017 06:51 )   PT: 9.4 sec;   INR: 0.87 ratio         PTT - ( 2017 06:51 )  PTT:48.9 sec  Creatine Kinase, Serum: 92 U/L ( @ 06:51)  Troponin T, Serum: 0.13 ng/mL ( @ 01:14)  Creatine Kinase, Serum: 86 U/L ( @ 21:47)  CKMB Units: 7.0 ng/mL ( @ 21:47)  Troponin T, Serum: 0.16 ng/mL ( @ 21:47)    Urinalysis Basic - ( 2017 22:31 )    Color: PL Yellow / Appearance: Clear / S.015 / pH: x  Gluc: x / Ketone: Negative  / Bili: Negative / Urobili: Negative   Blood: x / Protein: Trace / Nitrite: Negative   Leuk Esterase: Trace / RBC: 0-2 /HPF / WBC 3-5 /HPF   Sq Epi: x / Non Sq Epi: x / Bacteria: Few /HPF        HEALTH ISSUES - PROBLEM Dx:  Pancreatic mass: Pancreatic mass  Hyponatremia: Hyponatremia  Anemia: Anemia  Thrombocytopenia: Thrombocytopenia  Dysphagia: Dysphagia  Hypertension: Hypertension  Leukocytosis: Leukocytosis  Hypernatremia: Hypernatremia  Atrial fibrillation and flutter: Atrial fibrillation and flutter  Shortness of breath: Shortness of breath Admission date:  CHIEF COMPLAINT:  HPI:  89 yo F A fib/A flutter (not on AC), ? hx CHF (no previous echo in [place]/[facility] but treated in past for ADHF), HTN, dysphagia (previously refused PEG), multiple admissions for PNA  (?aspiration related, req intubation for hypercapnic resp failure ), SBO (resection ), ITP on chronic prednisone, pancreatic lesion (prev declined further w/u), ? hx asthma, non ambulatory @ baseline (wheel chair/bed bound), lives @ home w/ 24 HHA, per daughter forgetful (A, A, O 1-2 @ baseline). Patient is poor historian. History obtained from patient, daughter, chart review, speaking w/ ER provider. P/w 2 episodes of SOB @ rest today lasting 20-40 sec, better w/ proventil. Per aide, patient noted to be urinating less. Patient reports L sided abd pain 5/10 x 5 mos, better today w/ advil.  Denies CP, palpitations, increased fluid intake, orthopnea, PND, syncope, near syncope, lightheadness, dizziness. Denies N/V/D, melena, hematemesis, hematochezia, coffee ground emesis, dysphagia.     In ED, initially in no acute distress, respiratory status worsened w/ SOB/wheezing and patient placed on bipap, given duoneb x 3, 125 mg IVP solu medrol, 40 mg IVP lasix. At time of my exam, patient appears comfortable but volume overloaded on exam. VSS (2017 03:21)    INTERVAL HISTORY: On BiPAP initially weaned to nasal cannula SpO2 100% Resp reg unlabored    REVIEW OF SYSTEMS: Denies chest pain, palpitations, N, V; all others negative    MEDICATIONS  (STANDING):  piperacillin/tazobactam IVPB. 2.25Gram(s) IV Intermittent once  furosemide   Injectable 40milliGRAM(s) IV Push two times a day    MEDICATIONS  (PRN):      Objective:  ICU Vital Signs Last 24 Hrs  T(C): 36.7, Max: 36.9 (06- @ 20:13)  T(F): 98, Max: 98.4 (- @ 20:13)  HR: 81 (81 - 92)  BP: 135/78 (135/78 - 176/70)  BP(mean): 95 (95 - 119)  RR: 22 (22 - 24)  SpO2: 100% (92% - 100%)      I & Os for current day (as of  @ 08:00)  =============================================  IN: 0 ml / OUT: 550 ml / NET: -550 ml    Daily     Daily     PHYSICAL EXAM:    Constitutional: frail thin BiPAP initially then removed NAD on nasal cannula    HEENT: NC /AT; moist oral mucosa    Respiratory: regular unlabored; crackles bases bilat    Cardiovascular: irreg/irreg; S1, S2 sys murmur; trace bilat edema LE    Gastrointestinal: soft ND NT + bowel sounds    Genitourinary: voiding on own    Extremities: SMYTH equally, RLE tender to palpation    Vascular: warm peripherally; + pulses    Neurological: A & O; mood and affect appropriate    Skin: no cyanosis or rash      TELEMETRY: a fib                            8.9    13.0  )-----------( 88       ( 2017 06:51 )             25.3         132<L>  |  86<L>  |  17  ----------------------------<  162<H>  4.2   |  29  |  1.18    Ca    9.5      2017 06:51  Phos  5.4       Mg     2.3         TPro  7.4  /  Alb  4.2  /  TBili  0.4  /  DBili  x   /  AST  33  /  ALT  23  /  AlkPhos  67      LIVER FUNCTIONS - ( 2017 06:51 )  Alb: 4.2 g/dL / Pro: 7.4 g/dL / ALK PHOS: 67 U/L / ALT: 23 U/L RC / AST: 33 U/L / GGT: x           PT/INR - ( 2017 06:51 )   PT: 9.4 sec;   INR: 0.87 ratio         PTT - ( 2017 06:51 )  PTT:48.9 sec  Creatine Kinase, Serum: 92 U/L ( @ 06:51)  Troponin T, Serum: 0.13 ng/mL ( @ 01:14)  Creatine Kinase, Serum: 86 U/L ( @ 21:47)  CKMB Units: 7.0 ng/mL ( @ 21:47)  Troponin T, Serum: 0.16 ng/mL ( @ 21:47)    Urinalysis Basic - ( 2017 22:31 )    Color: PL Yellow / Appearance: Clear / S.015 / pH: x  Gluc: x / Ketone: Negative  / Bili: Negative / Urobili: Negative   Blood: x / Protein: Trace / Nitrite: Negative   Leuk Esterase: Trace / RBC: 0-2 /HPF / WBC 3-5 /HPF   Sq Epi: x / Non Sq Epi: x / Bacteria: Few /HPF        HEALTH ISSUES - PROBLEM Dx:  Pancreatic mass: Pancreatic mass  Hyponatremia: Hyponatremia  Anemia: Anemia  Thrombocytopenia: Thrombocytopenia  Dysphagia: Dysphagia  Hypertension: Hypertension  Leukocytosis: Leukocytosis  Hypernatremia: Hypernatremia  Atrial fibrillation and flutter: Atrial fibrillation and flutter  Shortness of breath: Shortness of breath Admission date:   CHIEF COMPLAINT: SOB  HPI:  89 yo F A fib/A flutter (not on AC), ? hx CHF (no previous echo in [place]/[facility] but treated in past for ADHF), HTN, dysphagia (previously refused PEG), multiple admissions for PNA  (?aspiration related, req intubation for hypercapnic resp failure ), SBO (resection ), ITP on chronic prednisone, pancreatic lesion (prev declined further w/u), ? hx asthma, non ambulatory @ baseline (wheel chair/bed bound), lives @ home w/ 24 HHA, per daughter forgetful (A, A, O 1-2 @ baseline). Patient is poor historian. History obtained from patient, daughter, chart review, speaking w/ ER provider. P/w 2 episodes of SOB @ rest today lasting 20-40 sec, better w/ proventil. Per aide, patient noted to be urinating less. Patient reports L sided abd pain 5/10 x 5 mos, better today w/ advil.  Denies CP, palpitations, increased fluid intake, orthopnea, PND, syncope, near syncope, lightheadness, dizziness. Denies N/V/D, melena, hematemesis, hematochezia, coffee ground emesis, dysphagia.     In ED, initially in no acute distress, respiratory status worsened w/ SOB/wheezing and patient placed on bipap, given duoneb x 3, 125 mg IVP solu medrol, 40 mg IVP lasix. At time of my exam, patient appears comfortable but volume overloaded on exam. VSS (2017 03:21)    INTERVAL HISTORY: On BiPAP initially weaned to nasal cannula SpO2 100% Resp reg unlabored    REVIEW OF SYSTEMS: Denies chest pain, palpitations, N, V; all others negative    MEDICATIONS  (STANDING):  piperacillin/tazobactam IVPB. 2.25Gram(s) IV Intermittent once  furosemide   Injectable 40milliGRAM(s) IV Push two times a day    MEDICATIONS  (PRN):      Objective:  ICU Vital Signs Last 24 Hrs  T(C): 36.7, Max: 36.9 (- @ 20:13)  T(F): 98, Max: 98.4 (- @ 20:13)  HR: 81 (81 - 92)  BP: 135/78 (135/78 - 176/70)  BP(mean): 95 (95 - 119)  RR: 22 (22 - 24)  SpO2: 100% (92% - 100%)      I & Os for current day (as of  @ 08:00)  =============================================  IN: 0 ml / OUT: 550 ml / NET: -550 ml    Daily     Daily     PHYSICAL EXAM:    Constitutional: frail thin BiPAP initially then removed NAD on nasal cannula    HEENT: NC /AT; moist oral mucosa    Respiratory: regular unlabored; crackles bases bilat    Cardiovascular: irreg/irreg; S1, S2 sys murmur; trace bilat edema LE    Gastrointestinal: soft ND NT + bowel sounds    Genitourinary: voiding on own    Extremities: SMYTH equally, RLE tender to palpation    Vascular: warm peripherally; + pulses    Neurological: A & O; mood and affect appropriate    Skin: no cyanosis or rash      TELEMETRY: a fib                            8.9    13.0  )-----------( 88       ( 2017 06:51 )             25.3         132<L>  |  86<L>  |  17  ----------------------------<  162<H>  4.2   |  29  |  1.18    Ca    9.5      2017 06:51  Phos  5.4       Mg     2.3         TPro  7.4  /  Alb  4.2  /  TBili  0.4  /  DBili  x   /  AST  33  /  ALT  23  /  AlkPhos  67      LIVER FUNCTIONS - ( 2017 06:51 )  Alb: 4.2 g/dL / Pro: 7.4 g/dL / ALK PHOS: 67 U/L / ALT: 23 U/L RC / AST: 33 U/L / GGT: x           PT/INR - ( 2017 06:51 )   PT: 9.4 sec;   INR: 0.87 ratio         PTT - ( 2017 06:51 )  PTT:48.9 sec  Creatine Kinase, Serum: 92 U/L ( @ 06:51)  Troponin T, Serum: 0.13 ng/mL ( @ 01:14)  Creatine Kinase, Serum: 86 U/L ( @ 21:47)  CKMB Units: 7.0 ng/mL ( @ 21:47)  Troponin T, Serum: 0.16 ng/mL ( @ 21:47)    Urinalysis Basic - ( 2017 22:31 )    Color: PL Yellow / Appearance: Clear / S.015 / pH: x  Gluc: x / Ketone: Negative  / Bili: Negative / Urobili: Negative   Blood: x / Protein: Trace / Nitrite: Negative   Leuk Esterase: Trace / RBC: 0-2 /HPF / WBC 3-5 /HPF   Sq Epi: x / Non Sq Epi: x / Bacteria: Few /HPF        HEALTH ISSUES - PROBLEM Dx:  Pancreatic mass: Pancreatic mass  Hyponatremia: Hyponatremia  Anemia: Anemia  Thrombocytopenia: Thrombocytopenia  Dysphagia: Dysphagia  Hypertension: Hypertension  Leukocytosis: Leukocytosis  Hypernatremia: Hypernatremia  Atrial fibrillation and flutter: Atrial fibrillation and flutter  Shortness of breath: Shortness of breath

## 2017-06-26 NOTE — H&P ADULT - PROBLEM SELECTOR PLAN 3
microcytic anemia  appears worsened from labs March 2016, no s/s active bleeding, trend H&H, anemia panel

## 2017-06-26 NOTE — PROGRESS NOTE ADULT - ASSESSMENT
89 yo F A fib/A flutter (not on AC), hx CHF, HTN, dysphagia (previously refused PEG), multiple admissions for PNA  SBO (resection 2010), ITP on chronic prednisone, pancreatic lesion (prev declined further w/u), ? hx asthma, non ambulatory @ baseline (wheel chair/bed bound), lives @ home w/ 24 HHA, per daughter forgetful (A, A, O 1-2 @ baseline). P/w 2 episodes of SOB @ rest today lasting 20-40 sec, better w/ proventil. Per aide, patient noted to be urinating less.

## 2017-06-26 NOTE — H&P ADULT - NSHPLABSRESULTS_GEN_ALL_CORE
Vital Signs Last 24 Hrs  T(C): 36.9, Max: 36.9 (06-25 @ 20:13)  T(F): 98.4, Max: 98.4 (06-25 @ 20:13)  HR: 87 (82 - 87)  BP: 165/81 (142/65 - 165/81)  BP(mean): --  RR: 22 (22 - 24)  SpO2: 100% (92% - 100%) (92% RA, 100% on Bipap)    EKG: Vital Signs Last 24 Hrs  T(C): 36.9, Max: 36.9 (06-25 @ 20:13)  T(F): 98.4, Max: 98.4 (06-25 @ 20:13)  HR: 87 (82 - 87)  BP: 165/81 (142/65 - 165/81)  BP(mean): --  RR: 22 (22 - 24)  SpO2: 100% (92% - 100%) (92% RA, 100% on Bipap)    EKG:     TELEMETRY:    CTA CAP 6/26: No pulmonary embolism. Limited evaluation of   the segmental and subsegmental pulmonary arteries in the lower lobes   bilaterally.  Mild pulmonary edema and small bilateral pleural effusions. Nodular   branching opacities in the periphery of the upper lobes and right middle   lobe likely representing mucoid impacted small airways.  Severe atrophy of the pancreatic body and tail with interval increase in   the size of a peripherally calcified, lobulated cystic lesion, which may   represent a mucinous cystic neoplasm, IPMN, and less likely serous   cystadenoma. MRI of the pancreas may be obtained for further evaluation. Vital Signs Last 24 Hrs  T(C): 36.9, Max: 36.9 (06-25 @ 20:13)  T(F): 98.4, Max: 98.4 (06-25 @ 20:13)  HR: 87 (82 - 87)  BP: 165/81 (142/65 - 165/81)  BP(mean): --  RR: 22 (22 - 24)  SpO2: 100% (92% - 100%) (92% RA, 100% on Bipap)    EKG: sinus, 1st degree    CTA CAP 6/26: No pulmonary embolism. Limited evaluation of   the segmental and subsegmental pulmonary arteries in the lower lobes   bilaterally.  Mild pulmonary edema and small bilateral pleural effusions. Nodular   branching opacities in the periphery of the upper lobes and right middle   lobe likely representing mucoid impacted small airways.  Severe atrophy of the pancreatic body and tail with interval increase in   the size of a peripherally calcified, lobulated cystic lesion, which may   represent a mucinous cystic neoplasm, IPMN, and less likely serous   cystadenoma. MRI of the pancreas may be obtained for further evaluation.

## 2017-06-26 NOTE — H&P ADULT - HISTORY OF PRESENT ILLNESS
89 yo F A fib/A flutter (not on AC), ? hx CHF (no previous echo in [place]/[facility] but treated in past for ADHF), HTN, dysphagia (previously refused PEG), multiple admissions for PNA  (?aspiration related, req intubation for hypercapnic resp failure 2014), SBO (resection 2010), ITP on chronic prednisone, pancreatic lesion (prev declined further w/u), ? hx asthma, non ambulatory @ baseline (wheel chair/bed bound), lives @ home w/ 24 HHA, per daughter forgetful (A, A, O 1-2 @ baseline). P/w 2 episodes of SOB @ rest today lasting 20-40 sec, better w/ proventil. Per aide, patient noted to be urinating less. Patient reports L sided abd pain 5/10 x 5 mos, better today w/ advil.  Denies CP, palpitations, increased fluid intake, orthopnea, PND, syncope, near syncope, lightheadness, dizziness. Denies N/V/D, melena, hematemesis, hematochezia, coffee ground emesis, dysphagia.     In ED, initially in no acute distress, respiratory status worsened and patient placed on bipap, given duoneb x 3, 125 mg IVP solu medrol, 40 mg IVP lasix. At time of my exam, patient appears comfortable but volume overloaded on exam. 89 yo F A fib/A flutter (not on AC), ? hx CHF (no previous echo in [place]/[facility] but treated in past for ADHF), HTN, dysphagia (previously refused PEG), multiple admissions for PNA  (?aspiration related, req intubation for hypercapnic resp failure 2014), SBO (resection 2010), ITP on chronic prednisone, pancreatic lesion (prev declined further w/u), ? hx asthma, non ambulatory @ baseline (wheel chair/bed bound), lives @ home w/ 24 HHA, per daughter forgetful (A, A, O 1-2 @ baseline). Patient is poor historian, daughter appears to be inconsistent historian as well. History obtained from patient, daughter, chart review, speaking w/ ER provider. P/w 2 episodes of SOB @ rest today lasting 20-40 sec, better w/ proventil. Per aide, patient noted to be urinating less. Patient reports L sided abd pain 5/10 x 5 mos, better today w/ advil.  Denies CP, palpitations, increased fluid intake, orthopnea, PND, syncope, near syncope, lightheadness, dizziness. Denies N/V/D, melena, hematemesis, hematochezia, coffee ground emesis, dysphagia.     In ED, initially in no acute distress, respiratory status worsened and patient placed on bipap, given duoneb x 3, 125 mg IVP solu medrol, 40 mg IVP lasix. At time of my exam, patient appears comfortable but volume overloaded on exam. VSS 89 yo F A fib/A flutter (not on AC), ? hx CHF (no previous echo in [place]/[facility] but treated in past for ADHF), HTN, dysphagia (previously refused PEG), multiple admissions for PNA  (?aspiration related, req intubation for hypercapnic resp failure 2014), SBO (resection 2010), ITP on chronic prednisone, pancreatic lesion (prev declined further w/u), ? hx asthma, non ambulatory @ baseline (wheel chair/bed bound), lives @ home w/ 24 HHA, per daughter forgetful (A, A, O 1-2 @ baseline). Patient is poor historian, daughter appears to be inconsistent historian as well. History obtained from patient, daughter, chart review, speaking w/ ER provider. P/w 2 episodes of SOB @ rest today lasting 20-40 sec, better w/ proventil. Per aide, patient noted to be urinating less. Patient reports L sided abd pain 5/10 x 5 mos, better today w/ advil.  Denies CP, palpitations, increased fluid intake, orthopnea, PND, syncope, near syncope, lightheadness, dizziness. Denies N/V/D, melena, hematemesis, hematochezia, coffee ground emesis, dysphagia.     In ED, initially in no acute distress, respiratory status worsened w/ SOB/wheezing and patient placed on bipap, given duoneb x 3, 125 mg IVP solu medrol, 40 mg IVP lasix. At time of my exam, patient appears comfortable but volume overloaded on exam. VSS 89 yo F A fib/A flutter (not on AC), ? hx CHF (no previous echo in [place]/[facility] but treated in past for ADHF), HTN, dysphagia (previously refused PEG), multiple admissions for PNA  (?aspiration related, req intubation for hypercapnic resp failure 2014), SBO (resection 2010), ITP on chronic prednisone, pancreatic lesion (prev declined further w/u), ? hx asthma, non ambulatory @ baseline (wheel chair/bed bound), lives @ home w/ 24 HHA, per daughter forgetful (A, A, O 1-2 @ baseline). Patient is poor historian. History obtained from patient, daughter, chart review, speaking w/ ER provider. P/w 2 episodes of SOB @ rest today lasting 20-40 sec, better w/ proventil. Per aide, patient noted to be urinating less. Patient reports L sided abd pain 5/10 x 5 mos, better today w/ advil.  Denies CP, palpitations, increased fluid intake, orthopnea, PND, syncope, near syncope, lightheadness, dizziness. Denies N/V/D, melena, hematemesis, hematochezia, coffee ground emesis, dysphagia.     In ED, initially in no acute distress, respiratory status worsened w/ SOB/wheezing and patient placed on bipap, given duoneb x 3, 125 mg IVP solu medrol, 40 mg IVP lasix. At time of my exam, patient appears comfortable but volume overloaded on exam. VSS

## 2017-06-26 NOTE — H&P ADULT - NSHPPHYSICALEXAM_GEN_ALL_CORE
General:   HEENT:   Cardiovascular:   Respiratory:  Gastrointestinal:   Genitourinary:   Integumentary:   Muscoloskeletal:   Hematologic/Lymphatic:   Endocrine:   Neurological:   Psychiatric: General: frail appearing, NAD   HEENT: oral mucosa moist. head atraumatic/normocephalic. trachea midline.  Cardiovascular: S1, S2, systolic murmur. irregularly irregular. pulses 2+ upper and lower extremities bilaterally. trace BLE edema. + JVD.   Respiratory: b/l rales at bases. Good rate and effort. no distress   Gastrointestinal: soft, non tender, non distended, normoactive bowel sounds all 4 quadrants   Genitourinary: no CV angle tenderness. Void in ED not saved   Integumentary: chronic discoloration BLE  Muscoloskeletal: capable of moving all 4 extremities, RLE tender to palpation   Neurological: PERRL, answers questions, no focal neurological deficits   Psychiatric: mood and affect appropriate

## 2017-06-27 ENCOUNTER — TRANSCRIPTION ENCOUNTER (OUTPATIENT)
Age: 82
End: 2017-06-27

## 2017-06-27 LAB
-  AMIKACIN: SIGNIFICANT CHANGE UP
-  AMPICILLIN/SULBACTAM: SIGNIFICANT CHANGE UP
-  AMPICILLIN: SIGNIFICANT CHANGE UP
-  AZTREONAM: SIGNIFICANT CHANGE UP
-  CEFAZOLIN: SIGNIFICANT CHANGE UP
-  CEFEPIME: SIGNIFICANT CHANGE UP
-  CEFOXITIN: SIGNIFICANT CHANGE UP
-  CEFTAZIDIME: SIGNIFICANT CHANGE UP
-  CEFTRIAXONE: SIGNIFICANT CHANGE UP
-  CIPROFLOXACIN: SIGNIFICANT CHANGE UP
-  ERTAPENEM: SIGNIFICANT CHANGE UP
-  GENTAMICIN: SIGNIFICANT CHANGE UP
-  IMIPENEM: SIGNIFICANT CHANGE UP
-  LEVOFLOXACIN: SIGNIFICANT CHANGE UP
-  MEROPENEM: SIGNIFICANT CHANGE UP
-  NITROFURANTOIN: SIGNIFICANT CHANGE UP
-  PIPERACILLIN/TAZOBACTAM: SIGNIFICANT CHANGE UP
-  TOBRAMYCIN: SIGNIFICANT CHANGE UP
-  TRIMETHOPRIM/SULFAMETHOXAZOLE: SIGNIFICANT CHANGE UP
ALBUMIN SERPL ELPH-MCNC: 3.7 G/DL — SIGNIFICANT CHANGE UP (ref 3.3–5)
ALP SERPL-CCNC: 63 U/L — SIGNIFICANT CHANGE UP (ref 40–120)
ALT FLD-CCNC: 13 U/L — SIGNIFICANT CHANGE UP (ref 10–45)
ANION GAP SERPL CALC-SCNC: 15 MMOL/L — SIGNIFICANT CHANGE UP (ref 5–17)
AST SERPL-CCNC: 26 U/L — SIGNIFICANT CHANGE UP (ref 10–40)
BILIRUB SERPL-MCNC: 0.4 MG/DL — SIGNIFICANT CHANGE UP (ref 0.2–1.2)
BUN SERPL-MCNC: 25 MG/DL — HIGH (ref 7–23)
CALCIUM SERPL-MCNC: 8.6 MG/DL — SIGNIFICANT CHANGE UP (ref 8.4–10.5)
CHLORIDE SERPL-SCNC: 86 MMOL/L — LOW (ref 96–108)
CO2 SERPL-SCNC: 32 MMOL/L — HIGH (ref 22–31)
CREAT SERPL-MCNC: 1.17 MG/DL — SIGNIFICANT CHANGE UP (ref 0.5–1.3)
CULTURE RESULTS: SIGNIFICANT CHANGE UP
GLUCOSE SERPL-MCNC: 122 MG/DL — HIGH (ref 70–99)
HCT VFR BLD CALC: 22.6 % — LOW (ref 34.5–45)
HGB BLD-MCNC: 8.3 G/DL — LOW (ref 11.5–15.5)
MAGNESIUM SERPL-MCNC: 2.1 MG/DL — SIGNIFICANT CHANGE UP (ref 1.6–2.6)
MCHC RBC-ENTMCNC: 30.6 PG — SIGNIFICANT CHANGE UP (ref 27–34)
MCHC RBC-ENTMCNC: 36.7 GM/DL — HIGH (ref 32–36)
MCV RBC AUTO: 83.4 FL — SIGNIFICANT CHANGE UP (ref 80–100)
METHOD TYPE: SIGNIFICANT CHANGE UP
ORGANISM # SPEC MICROSCOPIC CNT: SIGNIFICANT CHANGE UP
ORGANISM # SPEC MICROSCOPIC CNT: SIGNIFICANT CHANGE UP
PHOSPHATE SERPL-MCNC: 4.5 MG/DL — SIGNIFICANT CHANGE UP (ref 2.5–4.5)
PLATELET # BLD AUTO: 57 K/UL — LOW (ref 150–400)
POTASSIUM SERPL-MCNC: 3.6 MMOL/L — SIGNIFICANT CHANGE UP (ref 3.5–5.3)
POTASSIUM SERPL-SCNC: 3.6 MMOL/L — SIGNIFICANT CHANGE UP (ref 3.5–5.3)
PROT SERPL-MCNC: 6.8 G/DL — SIGNIFICANT CHANGE UP (ref 6–8.3)
RBC # BLD: 2.71 M/UL — LOW (ref 3.8–5.2)
RBC # FLD: 15.7 % — HIGH (ref 10.3–14.5)
SODIUM SERPL-SCNC: 133 MMOL/L — LOW (ref 135–145)
SPECIMEN SOURCE: SIGNIFICANT CHANGE UP
WBC # BLD: 20.85 K/UL — HIGH (ref 3.8–10.5)
WBC # FLD AUTO: 20.85 K/UL — HIGH (ref 3.8–10.5)

## 2017-06-27 RX ORDER — CEFTRIAXONE 500 MG/1
1 INJECTION, POWDER, FOR SOLUTION INTRAMUSCULAR; INTRAVENOUS EVERY 24 HOURS
Qty: 0 | Refills: 0 | Status: DISCONTINUED | OUTPATIENT
Start: 2017-06-28 | End: 2017-06-30

## 2017-06-27 RX ORDER — CEFTRIAXONE 500 MG/1
INJECTION, POWDER, FOR SOLUTION INTRAMUSCULAR; INTRAVENOUS
Qty: 0 | Refills: 0 | Status: DISCONTINUED | OUTPATIENT
Start: 2017-06-27 | End: 2017-06-30

## 2017-06-27 RX ORDER — ACETAMINOPHEN 500 MG
650 TABLET ORAL ONCE
Qty: 0 | Refills: 0 | Status: COMPLETED | OUTPATIENT
Start: 2017-06-27 | End: 2017-06-27

## 2017-06-27 RX ORDER — CEFTRIAXONE 500 MG/1
1 INJECTION, POWDER, FOR SOLUTION INTRAMUSCULAR; INTRAVENOUS ONCE
Qty: 0 | Refills: 0 | Status: COMPLETED | OUTPATIENT
Start: 2017-06-27 | End: 2017-06-27

## 2017-06-27 RX ORDER — HEPARIN SODIUM 5000 [USP'U]/ML
5000 INJECTION INTRAVENOUS; SUBCUTANEOUS EVERY 8 HOURS
Qty: 0 | Refills: 0 | Status: DISCONTINUED | OUTPATIENT
Start: 2017-06-27 | End: 2017-06-27

## 2017-06-27 RX ORDER — IPRATROPIUM/ALBUTEROL SULFATE 18-103MCG
3 AEROSOL WITH ADAPTER (GRAM) INHALATION EVERY 6 HOURS
Qty: 0 | Refills: 0 | Status: DISCONTINUED | OUTPATIENT
Start: 2017-06-27 | End: 2017-07-04

## 2017-06-27 RX ADMIN — Medication 7.5 MILLIGRAM(S): at 05:17

## 2017-06-27 RX ADMIN — Medication 1 GRAM(S): at 22:46

## 2017-06-27 RX ADMIN — Medication 40 MILLIGRAM(S): at 05:16

## 2017-06-27 RX ADMIN — Medication 650 MILLIGRAM(S): at 08:00

## 2017-06-27 RX ADMIN — Medication 40 MILLIGRAM(S): at 18:01

## 2017-06-27 RX ADMIN — Medication 1 GRAM(S): at 05:17

## 2017-06-27 RX ADMIN — CEFTRIAXONE 100 GRAM(S): 500 INJECTION, POWDER, FOR SOLUTION INTRAMUSCULAR; INTRAVENOUS at 18:08

## 2017-06-27 RX ADMIN — Medication 650 MILLIGRAM(S): at 07:33

## 2017-06-27 RX ADMIN — Medication 1 GRAM(S): at 13:11

## 2017-06-27 RX ADMIN — PANTOPRAZOLE SODIUM 40 MILLIGRAM(S): 20 TABLET, DELAYED RELEASE ORAL at 05:33

## 2017-06-27 NOTE — PROGRESS NOTE ADULT - ASSESSMENT
.89 yo F as above:  1. SOB - likely multifactorial, CHF + COPD, improving, c/w Lasix per Cardio, I/O, monitor Cr, Duoneb PRN, PT  2. A fib - rate controlled, not on AC  3. NEWTON - resolved  4. Hyponatremia - improving, plan per Renal  5. Pancreatic mass  - known to family

## 2017-06-27 NOTE — PROGRESS NOTE ADULT - ASSESSMENT
89 F with hx of hyponatremia presenting with SOB and hyponatremia  suspect element of hypervolemic hyponatremia / sodium improving   elevated creatinine on admission- improving  monitor renal function after IV contrast study, non olgiuric   continue lasix, change to po as per medicine  monitor lytes/ renal function   avoid hypotonic IV fluids/ fluid restrict 1 L/ day

## 2017-06-27 NOTE — DISCHARGE NOTE ADULT - CONDITION (STATED IN TERMS THAT PERMIT A SPECIFIC MEASURABLE COMPARISON WITH CONDITION ON ADMISSION):
Pt stable for discharge home. Presenting conditions resolved with treatment; medically stable for discharge home

## 2017-06-27 NOTE — DISCHARGE NOTE ADULT - CARE PROVIDER_API CALL
Jens Salcedo), Internal Medicine; Nephrology  [address] [facility] [facility] [address]  [place] [address]  Phone: [phone]  Fax: [phone] Jens Salcedo), Internal Medicine; Nephrology  [address] [facility] [address]  [place] [address]  Phone: [phone]  Fax: [phone]    Mejia Crook), Hematology; Internal Medicine; Medical Oncology  [address] [facility] [address]  [place] [address]  Phone: [phone]  Fax: [phone]

## 2017-06-27 NOTE — DISCHARGE NOTE ADULT - PATIENT PORTAL LINK FT
“You can access the [employer] Patient Portal, offered by [facility], by registering with the following website: [email or web address]”

## 2017-06-27 NOTE — DISCHARGE NOTE ADULT - ADDITIONAL INSTRUCTIONS
Followup with your Primary care doctor within one week Followup with your Primary Care MD, Doctor Salcedo within one week after discharge from hospital.  Gavin to schedule appointment. Follow up with your Primary Care MD, Doctor Salcedo within one week after discharge from hospital.  Follow up with Dr. Crook  within one week after discharge from hospital. You will need Complete Blood Count (CBC) every week to monitor your Platelet.   Call  to schedule appointments. Follow up with Dr. Crook  in one week for repeat blood work.   Follow up with your primary healthcare provider(s) as instructed above.

## 2017-06-27 NOTE — DISCHARGE NOTE ADULT - PLAN OF CARE
Weigh yourself daily.  If you gain 3lbs in 3 days, or 5lbs in a week call your Health Care Provider.  Do not eat or drink foods containing more than 2000mg of salt (sodium) in your diet every day.  Call your Health Care Provider if you have any swelling or increased swelling in your feet, ankles, and/or stomach.  Take all of your medication as directed.  If you become dizzy call your Health Care Provider. Continue with Cardizem as prescribed Condition resolved History of ITP- continue with steroids Condition ---------------------------------- Resolved. HOME CARE INSTRUCTIONS  f you were prescribed antibiotics, take them exactly as your caregiver instructs you. Finish the medication even if you feel better after you have only taken some of the medication.  Drink enough water and fluids to keep your urine clear or pale yellow.  Avoid caffeine, tea, and carbonated beverages. They tend to irritate your bladder.  Empty your bladder often. Avoid holding urine for long periods of time.  Empty your bladder before and after sexual intercourse.  After a bowel movement, women should cleanse from front to back. Use each tissue only once.  SEEK MEDICAL CARE IF:  You have back pain.  You develop a fever.  Your symptoms do not begin to resolve within 3 days.  SEEK IMMEDIATE MEDICAL CARE IF:  You have severe back pain or lower abdominal pain.  You develop chills.  You have nausea or vomiting.  You have continued burning or discomfort with urination. Atrial fibrillation is the most common heart rhythm problem.  The condition puts you at risk for has stroke and heart attack  It helps if you control your blood pressure, not drink more than 1-2 alcohol drinks per day, cut down on caffeine, getting treatment for over active thyroid gland, and get regular exercise  Call your doctor if you feel your heart racing or beating unusually, chest tightness or pain, lightheaded, faint, shortness of breath especially with exercise  It is important to take your heart medication as prescribed  You may be on anticoagulation which is very important to take as directed - you may need blood work to monitor drug levels  Call your Cardiologist for any bleeding or call 911 to go to the closest Emergency Room for severe bleeding.  Continue with Cardizem as prescribed History of ITP- continue with steroids. Condition improving.   Follow up with your PMD within 1 week after discharge. Symptoms resolved. Antibiotics on hold - No further signs of UTI.   Drink enough water and fluids to keep your urine clear or pale yellow.  Avoid caffeine, tea, and carbonated beverages. They tend to irritate your bladder.  Empty your bladder often. Avoid holding urine for long periods of time.  After a bowel movement, women should cleanse from front to back. Use each tissue only once.  SEEK MEDICAL CARE IF:  You have back pain.  You develop a fever.  Your symptoms do not begin to resolve within 3 days.  SEEK IMMEDIATE MEDICAL CARE IF:  You have severe back pain or lower abdominal pain.  You develop chills.  You have nausea or vomiting.  You have continued burning or discomfort with urination. History of ITP- continue with steroids.  Follow up with Dr. Crook within 1 week to have CBC blood work scheduled every week. Weigh yourself daily.  If you gain 3lbs in 3 days, or 5lbs in a week call your Health Care Provider.  Do not eat or drink foods containing more than 2000mg of salt (sodium) in your diet every day.  Call your Health Care Provider if you have any swelling or increased swelling in your feet, ankles, and/or stomach.  Take all of your medication as directed.  If you become dizzy call your Health Care Provider.  Use your Oxygen when necessary if you become short of breath. Condition resolved. Continue Cardizem as prescribed to control your heart rate.  Atrial fibrillation is the most common heart rhythm problem that puts you at risk for a stroke or heart attack  It helps if you control your blood pressure, not drink more than 1-2 alcohol drinks per day, cut down on caffeine, getting treatment for over active thyroid gland, and get regular exercise  Call your doctor if you feel your heart racing or beating unusually, chest tightness or pain, lightheaded, faint, shortness of breath especially with exercise  It is important to take your heart medication as prescribed  Follow up with your primary healthcare provider for a 1-2 week follow up appointment. Adequate cardiac output and able to breathe comfortably You have completed antibiotics treatment.   Follow up with your healthcare provider in one week. Call for an appointment.   Drink at least 1-2 liters of water daily.  If you develop a fever, burning on urination, difficulty voiding, call your healthcare provider. Continue with steroid as prescribed.  Follow up with Dr. Crook within 1 week to have your blood count done.  Call for appointment. Condition resolved but continue fluid restriction of 1 Liter daily. You have completed antibiotics treatment.   Follow up with your healthcare provider in one week. Call for an appointment.   If you develop a fever, burning on urination, difficulty voiding, call your healthcare provider.

## 2017-06-27 NOTE — DISCHARGE NOTE ADULT - SECONDARY DIAGNOSIS.
UTI (urinary tract infection) Hyponatremia Atrial fibrillation and flutter Thrombocytopenia NEWTON (acute kidney injury)

## 2017-06-27 NOTE — DISCHARGE NOTE ADULT - MEDICATION SUMMARY - MEDICATIONS TO TAKE
I will START or STAY ON the medications listed below when I get home from the hospital:    predniSONE 2.5 mg oral tablet  -- 3 tab(s) by mouth once a day  -- Indication: For Ideopathic Tthrombocytopenia Purpura    acetaminophen 325 mg oral tablet  -- 2 tab(s) by mouth every 6 hours, As needed, Mild Pain (1 - 3)  -- Indication: For Pain    dilTIAZem 60 mg oral tablet  -- 1 tab(s) by mouth every 8 hours  -- Indication: For Atrial fibrillation and flutter    furosemide 20 mg oral tablet  -- 1 tab(s) by mouth once a day  -- Indication: For Shortness of breath    dicyclomine 10 mg oral capsule  -- 1 cap(s) by mouth 2 times a day (before meals)  -- Indication: For Irritable Bowel     ferrous sulfate 325 mg oral tablet  -- 1 tab(s) by mouth 3 times a day  -- Check with your doctor before becoming pregnant.  Do not chew, break, or crush.  May discolor urine or feces.    -- Indication: For Supplement    montelukast 5 mg oral tablet, chewable  -- 1 tab(s) by mouth once a day (at bedtime)  -- Indication: For Shortness of breath    sucralfate 1 g/10 mL oral suspension  -- 10 milliliter(s) by mouth 3 times a day  -- Indication: For Stomach acid    pantoprazole 40 mg oral delayed release tablet  -- 1 tab(s) by mouth once a day (before a meal)  -- Indication: For Stomach acid I will START or STAY ON the medications listed below when I get home from the hospital:    predniSONE 2.5 mg oral tablet  -- 3 tab(s) by mouth once a day  -- Indication: For Ideopathic Tthrombocytopenia Purpura    acetaminophen 325 mg oral tablet  -- 2 tab(s) by mouth every 6 hours, As needed, Mild Pain (1 - 3)  -- Indication: For Pain    dilTIAZem 60 mg oral tablet  -- 1 tab(s) by mouth every 8 hours  -- Indication: For Atrial fibrillation and flutter    furosemide 20 mg oral tablet  -- 1 tab(s) by mouth once a day  -- Indication: For CHF (congestive heart failure)    dicyclomine 10 mg oral capsule  -- 1 cap(s) by mouth 2 times a day (before meals)  -- Indication: For Prevents or treats ulcer    ferrous sulfate 325 mg oral tablet  -- 1 tab(s) by mouth 3 times a day  -- Check with your doctor before becoming pregnant.  Do not chew, break, or crush.  May discolor urine or feces.    -- Indication: For Supplement    montelukast 5 mg oral tablet, chewable  -- 1 tab(s) by mouth once a day (at bedtime)  -- Indication: For Seasonal allergies/Asthma    sucralfate 1 g/10 mL oral suspension  -- 10 milliliter(s) by mouth 3 times a day  -- Indication: For Prevent or treat stomach ulcer    pantoprazole 40 mg oral delayed release tablet  -- 1 tab(s) by mouth once a day (before a meal)  -- Indication: For Reflux/Indigestion

## 2017-06-27 NOTE — PROGRESS NOTE ADULT - SUBJECTIVE AND OBJECTIVE BOX
Patient is a 89y Female  being evaluated for                 who reports no complaints overnight.        PHYSICAL EXAM:  Vitals:  T(F): 98 (17 @ 08:02), Max: 98.1 (17 @ 14:01)  HR: 80 (17 @ 08:02)  BP: 104/60 (17 @ 08:02)  BP(mean): 90 (17 @ 13:00)  RR: 18 (17 @ 08:02)  SpO2: 94% (17 @ 08:02)  Wt(kg): --  Constitutional: no acute distress  HEENT:  NC, ext ears nl, oropharynx clear,  nose nl  Neck: No JVD, bruit, adenopathy or thyromegaly  Eyes: PERRL, no discharge, no icterus  Respiratory: cta/p bilat, no wheezes, rales, nl effort  Cardiovascular: regular rate, S1 and S2 no rub or gallop  Abd: : BS+, soft, NT , no rebound or guarding, no bruits  Extremities: no edema or cyanosis, palpable pulses  Neurological: A/O x 3, nl coordination and tone    I and O's:     @ 07:01  -   @ 07:00  --------------------------------------------------------  IN: 780 mL / OUT: 1275 mL / NET: -495 mL          Weight (kg): 42.9 ( @ 01:14)    REVIEW OF SYSTEMS:  CONSTITUTIONAL: No weakness, fevers or chills  RESPIRATORY: No cough, wheezing, hemoptysis; No shortness of breath  CARDIOVASCULAR: No chest pain or palpitations  GI : no abd pains, nausea or vomiting  GENITOURINARY: No dysuria, frequency or hematuria  All other review of systems is negative unless indicated above.    Allergies    eggs (Rash)  no drug allergy (Unknown)    Intolerances    vinegar sensitivity    family states that the patient shakes when she ingests vinegar (Other)      MEDICATIONS  (STANDING):  furosemide   Injectable 40 milliGRAM(s) IV Push two times a day  diltiazem    Tablet 60 milliGRAM(s) Oral every 8 hours  pantoprazole    Tablet 40 milliGRAM(s) Oral before breakfast  predniSONE   Tablet 7.5 milliGRAM(s) Oral daily  sucralfate suspension 1 Gram(s) Oral three times a day      LABS:  CBC Full  -  ( 2017 06:51 )  WBC Count : 13.0 K/uL  Hemoglobin : 8.9 g/dL  Hematocrit : 25.3 %  Platelet Count - Automated : 88 K/uL  Mean Cell Volume : 78.3 fl  Mean Cell Hemoglobin : 27.5 pg  Mean Cell Hemoglobin Concentration : 35.1 gm/dL  Auto Neutrophil # : 12.5 K/uL  Auto Lymphocyte # : 0.3 K/uL  Auto Monocyte # : 0.1 K/uL  Auto Eosinophil # : 0.0 K/uL  Auto Basophil # : 0.0 K/uL  Auto Neutrophil % : 96.2 %  Auto Lymphocyte % : 2.6 %  Auto Monocyte % : 0.9 %  Auto Eosinophil % : 0.3 %  Auto Basophil % : 0.0 %        132<L>  |  86<L>  |  17  ----------------------------<  162<H>  4.2   |  29  |  1.18    Ca    9.5      2017 06:51  Phos  5.4       Mg     2.3         TPro  7.4  /  Alb  4.2  /  TBili  0.4  /  DBili  x   /  AST  33  /  ALT  23  /  AlkPhos  67        Urine Studies:  Urinalysis Basic - ( 2017 22:31 )    Color: x / Appearance: Clear / S.015 / pH: x  Gluc: x / Ketone: Negative  / Bili: Negative / Urobili: Negative   Blood: x / Protein: Trace / Nitrite: Negative   Leuk Esterase: Trace / RBC: 0-2 /HPF / WBC 3-5 /HPF   Sq Epi: x / Non Sq Epi: x / Bacteria: Few /HPF        Urine chemistry:   Urine Na: Sodium, Random Urine: 81 mmol/L ( @ 11:59)    Urine Creatinine:   Urine Protein/Cr ratio:  Urine K:   Urine Osm: Osmolality, Random Urine: 294 mos/kg ( @ 11:59)          Imp:  89y Female Patient is a 89y Female  being evaluated for  hyponatremia  no specific complaints  less SOB        PHYSICAL EXAM:  Vitals:  T(F): 98 (17 @ 08:02), Max: 98.1 (17 @ 14:01)  HR: 80 (17 @ 08:02)  BP: 104/60 (17 @ 08:02)  BP(mean): 90 (17 @ 13:00)  RR: 18 (17 @ 08:02)  SpO2: 94% (17 @ 08:02)  Wt(kg): --  Constitutional: no acute distress  HEENT:  NC, ext ears nl, oropharynx clear,  nose nl  Neck: No JVD, bruit, adenopathy or thyromegaly  Eyes: PERRL, no discharge, no icterus  Respiratory: cta/p bilat, no wheezes, rales, nl effort  Cardiovascular: regular rate, S1 and S2 no rub or gallop  Abd: : BS+, soft, NT , no rebound or guarding, no bruits  Extremities: no edema or cyanosis, palpable pulses  Neurological:  nl coordination and tone    I and O's:     @ 07:01  -   @ 07:00  --------------------------------------------------------  IN: 780 mL / OUT: 1275 mL / NET: -495 mL          Weight (kg): 42.9 ( @ 01:14)    REVIEW OF SYSTEMS:  CONSTITUTIONAL: No weakness, fevers or chills  RESPIRATORY: No cough, wheezing, hemoptysis; No shortness of breath  CARDIOVASCULAR: No chest pain or palpitations  GI : no abd pains, nausea or vomiting  GENITOURINARY: No dysuria, frequency or hematuria  All other review of systems is negative unless indicated above.    Allergies    eggs (Rash)  no drug allergy (Unknown)    Intolerances    vinegar sensitivity    family states that the patient shakes when she ingests vinegar (Other)      MEDICATIONS  (STANDING):  furosemide   Injectable 40 milliGRAM(s) IV Push two times a day  diltiazem    Tablet 60 milliGRAM(s) Oral every 8 hours  pantoprazole    Tablet 40 milliGRAM(s) Oral before breakfast  predniSONE   Tablet 7.5 milliGRAM(s) Oral daily  sucralfate suspension 1 Gram(s) Oral three times a day      LABS:  CBC Full  -  ( 2017 06:51 )  WBC Count : 13.0 K/uL  Hemoglobin : 8.9 g/dL  Hematocrit : 25.3 %  Platelet Count - Automated : 88 K/uL  Mean Cell Volume : 78.3 fl  Mean Cell Hemoglobin : 27.5 pg  Mean Cell Hemoglobin Concentration : 35.1 gm/dL  Auto Neutrophil # : 12.5 K/uL  Auto Lymphocyte # : 0.3 K/uL  Auto Monocyte # : 0.1 K/uL  Auto Eosinophil # : 0.0 K/uL  Auto Basophil # : 0.0 K/uL  Auto Neutrophil % : 96.2 %  Auto Lymphocyte % : 2.6 %  Auto Monocyte % : 0.9 %  Auto Eosinophil % : 0.3 %  Auto Basophil % : 0.0 %        132<L>  |  86<L>  |  17  ----------------------------<  162<H>  4.2   |  29  |  1.18    Ca    9.5      2017 06:51  Phos  5.4       Mg     2.3         TPro  7.4  /  Alb  4.2  /  TBili  0.4  /  DBili  x   /  AST  33  /  ALT  23  /  AlkPhos  67        Urine Studies:  Urinalysis Basic - ( 2017 22:31 )    Color: x / Appearance: Clear / S.015 / pH: x  Gluc: x / Ketone: Negative  / Bili: Negative / Urobili: Negative   Blood: x / Protein: Trace / Nitrite: Negative   Leuk Esterase: Trace / RBC: 0-2 /HPF / WBC 3-5 /HPF   Sq Epi: x / Non Sq Epi: x / Bacteria: Few /HPF        Urine chemistry:   Urine Na: Sodium, Random Urine: 81 mmol/L ( @ 11:59)    Urine Creatinine:   Urine Protein/Cr ratio:  Urine K:   Urine Osm: Osmolality, Random Urine: 294 mos/kg ( @ 11:59)          Imp:  89y Female

## 2017-06-27 NOTE — DISCHARGE NOTE ADULT - CARE PLAN
Principal Discharge DX:	CHF (congestive heart failure)  Instructions for follow-up, activity and diet:	Weigh yourself daily.  If you gain 3lbs in 3 days, or 5lbs in a week call your Health Care Provider.  Do not eat or drink foods containing more than 2000mg of salt (sodium) in your diet every day.  Call your Health Care Provider if you have any swelling or increased swelling in your feet, ankles, and/or stomach.  Take all of your medication as directed.  If you become dizzy call your Health Care Provider.  Secondary Diagnosis:	UTI (urinary tract infection)  Secondary Diagnosis:	Hyponatremia  Secondary Diagnosis:	Atrial fibrillation and flutter  Instructions for follow-up, activity and diet:	Continue with Cardizem as prescribed  Secondary Diagnosis:	Thrombocytopenia  Secondary Diagnosis:	NEWTON (acute kidney injury)  Instructions for follow-up, activity and diet:	Condition resolved Principal Discharge DX:	CHF (congestive heart failure)  Instructions for follow-up, activity and diet:	Weigh yourself daily.  If you gain 3lbs in 3 days, or 5lbs in a week call your Health Care Provider.  Do not eat or drink foods containing more than 2000mg of salt (sodium) in your diet every day.  Call your Health Care Provider if you have any swelling or increased swelling in your feet, ankles, and/or stomach.  Take all of your medication as directed.  If you become dizzy call your Health Care Provider.  Secondary Diagnosis:	UTI (urinary tract infection)  Secondary Diagnosis:	Hyponatremia  Secondary Diagnosis:	Atrial fibrillation and flutter  Instructions for follow-up, activity and diet:	Continue with Cardizem as prescribed  Secondary Diagnosis:	Thrombocytopenia  Instructions for follow-up, activity and diet:	History of ITP- continue with steroids  Secondary Diagnosis:	NEWTON (acute kidney injury)  Instructions for follow-up, activity and diet:	Condition ---------------------------------- Principal Discharge DX:	CHF (congestive heart failure)  Instructions for follow-up, activity and diet:	Weigh yourself daily.  If you gain 3lbs in 3 days, or 5lbs in a week call your Health Care Provider.  Do not eat or drink foods containing more than 2000mg of salt (sodium) in your diet every day.  Call your Health Care Provider if you have any swelling or increased swelling in your feet, ankles, and/or stomach.  Take all of your medication as directed.  If you become dizzy call your Health Care Provider.  Secondary Diagnosis:	UTI (urinary tract infection)  Goal:	Resolved.  Instructions for follow-up, activity and diet:	HOME CARE INSTRUCTIONS  f you were prescribed antibiotics, take them exactly as your caregiver instructs you. Finish the medication even if you feel better after you have only taken some of the medication.  Drink enough water and fluids to keep your urine clear or pale yellow.  Avoid caffeine, tea, and carbonated beverages. They tend to irritate your bladder.  Empty your bladder often. Avoid holding urine for long periods of time.  Empty your bladder before and after sexual intercourse.  After a bowel movement, women should cleanse from front to back. Use each tissue only once.  SEEK MEDICAL CARE IF:  You have back pain.  You develop a fever.  Your symptoms do not begin to resolve within 3 days.  SEEK IMMEDIATE MEDICAL CARE IF:  You have severe back pain or lower abdominal pain.  You develop chills.  You have nausea or vomiting.  You have continued burning or discomfort with urination.  Secondary Diagnosis:	Hyponatremia  Goal:	Resolved.  Secondary Diagnosis:	Atrial fibrillation and flutter  Instructions for follow-up, activity and diet:	Atrial fibrillation is the most common heart rhythm problem.  The condition puts you at risk for has stroke and heart attack  It helps if you control your blood pressure, not drink more than 1-2 alcohol drinks per day, cut down on caffeine, getting treatment for over active thyroid gland, and get regular exercise  Call your doctor if you feel your heart racing or beating unusually, chest tightness or pain, lightheaded, faint, shortness of breath especially with exercise  It is important to take your heart medication as prescribed  You may be on anticoagulation which is very important to take as directed - you may need blood work to monitor drug levels  Call your Cardiologist for any bleeding or call 911 to go to the closest Emergency Room for severe bleeding.  Continue with Cardizem as prescribed  Secondary Diagnosis:	Thrombocytopenia  Instructions for follow-up, activity and diet:	History of ITP- continue with steroids.  Secondary Diagnosis:	NEWTON (acute kidney injury)  Instructions for follow-up, activity and diet:	Condition improving.   Follow up with your PMD within 1 week after discharge. Principal Discharge DX:	CHF (congestive heart failure)  Goal:	Symptoms resolved.  Instructions for follow-up, activity and diet:	Weigh yourself daily.  If you gain 3lbs in 3 days, or 5lbs in a week call your Health Care Provider.  Do not eat or drink foods containing more than 2000mg of salt (sodium) in your diet every day.  Call your Health Care Provider if you have any swelling or increased swelling in your feet, ankles, and/or stomach.  Take all of your medication as directed.  If you become dizzy call your Health Care Provider.  Secondary Diagnosis:	UTI (urinary tract infection)  Goal:	Resolved.  Instructions for follow-up, activity and diet:	Antibiotics on hold - No further signs of UTI.   Drink enough water and fluids to keep your urine clear or pale yellow.  Avoid caffeine, tea, and carbonated beverages. They tend to irritate your bladder.  Empty your bladder often. Avoid holding urine for long periods of time.  After a bowel movement, women should cleanse from front to back. Use each tissue only once.  SEEK MEDICAL CARE IF:  You have back pain.  You develop a fever.  Your symptoms do not begin to resolve within 3 days.  SEEK IMMEDIATE MEDICAL CARE IF:  You have severe back pain or lower abdominal pain.  You develop chills.  You have nausea or vomiting.  You have continued burning or discomfort with urination.  Secondary Diagnosis:	Hyponatremia  Goal:	Resolved.  Secondary Diagnosis:	Atrial fibrillation and flutter  Instructions for follow-up, activity and diet:	Atrial fibrillation is the most common heart rhythm problem.  The condition puts you at risk for has stroke and heart attack  It helps if you control your blood pressure, not drink more than 1-2 alcohol drinks per day, cut down on caffeine, getting treatment for over active thyroid gland, and get regular exercise  Call your doctor if you feel your heart racing or beating unusually, chest tightness or pain, lightheaded, faint, shortness of breath especially with exercise  It is important to take your heart medication as prescribed  You may be on anticoagulation which is very important to take as directed - you may need blood work to monitor drug levels  Call your Cardiologist for any bleeding or call 911 to go to the closest Emergency Room for severe bleeding.  Continue with Cardizem as prescribed  Secondary Diagnosis:	Thrombocytopenia  Instructions for follow-up, activity and diet:	History of ITP- continue with steroids.  Follow up with Dr. Crook within 1 week to have CBC blood work scheduled every week.  Secondary Diagnosis:	NEWTON (acute kidney injury)  Instructions for follow-up, activity and diet:	Condition improving.   Follow up with your PMD within 1 week after discharge. Principal Discharge DX:	CHF (congestive heart failure)  Goal:	Symptoms resolved.  Instructions for follow-up, activity and diet:	Weigh yourself daily.  If you gain 3lbs in 3 days, or 5lbs in a week call your Health Care Provider.  Do not eat or drink foods containing more than 2000mg of salt (sodium) in your diet every day.  Call your Health Care Provider if you have any swelling or increased swelling in your feet, ankles, and/or stomach.  Take all of your medication as directed.  If you become dizzy call your Health Care Provider.  Use your Oxygen when necessary if you become short of breath.  Secondary Diagnosis:	UTI (urinary tract infection)  Goal:	Resolved.  Instructions for follow-up, activity and diet:	Antibiotics on hold - No further signs of UTI.   Drink enough water and fluids to keep your urine clear or pale yellow.  Avoid caffeine, tea, and carbonated beverages. They tend to irritate your bladder.  Empty your bladder often. Avoid holding urine for long periods of time.  After a bowel movement, women should cleanse from front to back. Use each tissue only once.  SEEK MEDICAL CARE IF:  You have back pain.  You develop a fever.  Your symptoms do not begin to resolve within 3 days.  SEEK IMMEDIATE MEDICAL CARE IF:  You have severe back pain or lower abdominal pain.  You develop chills.  You have nausea or vomiting.  You have continued burning or discomfort with urination.  Secondary Diagnosis:	Hyponatremia  Goal:	Resolved.  Secondary Diagnosis:	Atrial fibrillation and flutter  Instructions for follow-up, activity and diet:	Atrial fibrillation is the most common heart rhythm problem.  The condition puts you at risk for has stroke and heart attack  It helps if you control your blood pressure, not drink more than 1-2 alcohol drinks per day, cut down on caffeine, getting treatment for over active thyroid gland, and get regular exercise  Call your doctor if you feel your heart racing or beating unusually, chest tightness or pain, lightheaded, faint, shortness of breath especially with exercise  It is important to take your heart medication as prescribed  You may be on anticoagulation which is very important to take as directed - you may need blood work to monitor drug levels  Call your Cardiologist for any bleeding or call 911 to go to the closest Emergency Room for severe bleeding.  Continue with Cardizem as prescribed  Secondary Diagnosis:	Thrombocytopenia  Instructions for follow-up, activity and diet:	History of ITP- continue with steroids.  Follow up with Dr. Crook within 1 week to have CBC blood work scheduled every week.  Secondary Diagnosis:	NEWTON (acute kidney injury)  Instructions for follow-up, activity and diet:	Condition improving.   Follow up with your PMD within 1 week after discharge. Principal Discharge DX:	CHF (congestive heart failure)  Goal:	Adequate cardiac output and able to breathe comfortably  Instructions for follow-up, activity and diet:	Weigh yourself daily.  If you gain 3lbs in 3 days, or 5lbs in a week call your Health Care Provider.  Do not eat or drink foods containing more than 2000mg of salt (sodium) in your diet every day.  Call your Health Care Provider if you have any swelling or increased swelling in your feet, ankles, and/or stomach.  Take all of your medication as directed.  If you become dizzy call your Health Care Provider.  Use your Oxygen when necessary if you become short of breath.  Secondary Diagnosis:	UTI (urinary tract infection)  Instructions for follow-up, activity and diet:	You have completed antibiotics treatment.   Follow up with your healthcare provider in one week. Call for an appointment.   Drink at least 1-2 liters of water daily.  If you develop a fever, burning on urination, difficulty voiding, call your healthcare provider.  Secondary Diagnosis:	Hyponatremia  Instructions for follow-up, activity and diet:	Condition resolved.  Secondary Diagnosis:	Atrial fibrillation and flutter  Instructions for follow-up, activity and diet:	Continue Cardizem as prescribed to control your heart rate.  Atrial fibrillation is the most common heart rhythm problem that puts you at risk for a stroke or heart attack  It helps if you control your blood pressure, not drink more than 1-2 alcohol drinks per day, cut down on caffeine, getting treatment for over active thyroid gland, and get regular exercise  Call your doctor if you feel your heart racing or beating unusually, chest tightness or pain, lightheaded, faint, shortness of breath especially with exercise  It is important to take your heart medication as prescribed  Follow up with your primary healthcare provider for a 1-2 week follow up appointment.  Secondary Diagnosis:	Thrombocytopenia  Instructions for follow-up, activity and diet:	Continue with steroid as prescribed.  Follow up with Dr. Crook within 1 week to have your blood count done.  Call for appointment.  Secondary Diagnosis:	NEWTON (acute kidney injury)  Instructions for follow-up, activity and diet:	Condition resolved. Principal Discharge DX:	CHF (congestive heart failure)  Goal:	Adequate cardiac output and able to breathe comfortably  Instructions for follow-up, activity and diet:	Weigh yourself daily.  If you gain 3lbs in 3 days, or 5lbs in a week call your Health Care Provider.  Do not eat or drink foods containing more than 2000mg of salt (sodium) in your diet every day.  Call your Health Care Provider if you have any swelling or increased swelling in your feet, ankles, and/or stomach.  Take all of your medication as directed.  If you become dizzy call your Health Care Provider.  Use your Oxygen when necessary if you become short of breath.  Secondary Diagnosis:	UTI (urinary tract infection)  Instructions for follow-up, activity and diet:	You have completed antibiotics treatment.   Follow up with your healthcare provider in one week. Call for an appointment.   If you develop a fever, burning on urination, difficulty voiding, call your healthcare provider.  Secondary Diagnosis:	Hyponatremia  Instructions for follow-up, activity and diet:	Condition resolved but continue fluid restriction of 1 Liter daily.  Secondary Diagnosis:	Atrial fibrillation and flutter  Instructions for follow-up, activity and diet:	Continue Cardizem as prescribed to control your heart rate.  Atrial fibrillation is the most common heart rhythm problem that puts you at risk for a stroke or heart attack  It helps if you control your blood pressure, not drink more than 1-2 alcohol drinks per day, cut down on caffeine, getting treatment for over active thyroid gland, and get regular exercise  Call your doctor if you feel your heart racing or beating unusually, chest tightness or pain, lightheaded, faint, shortness of breath especially with exercise  It is important to take your heart medication as prescribed  Follow up with your primary healthcare provider for a 1-2 week follow up appointment.  Secondary Diagnosis:	Thrombocytopenia  Instructions for follow-up, activity and diet:	Continue with steroid as prescribed.  Follow up with Dr. Crook within 1 week to have your blood count done.  Call for appointment.  Secondary Diagnosis:	NEWTON (acute kidney injury)  Instructions for follow-up, activity and diet:	Condition resolved.

## 2017-06-27 NOTE — DISCHARGE NOTE ADULT - INSTRUCTIONS
[place]  Regular diet  1000ml fluid restriction [place]  Regular diet No restrictions 1 Liter fluid restriction daily

## 2017-06-27 NOTE — DISCHARGE NOTE ADULT - HOME CARE AGENCY
HOME [employer]  RESUMPTION OF CARE 7/4 11A.M  HOME AIDE TO MEET PATIENT IN HOSPITAL AND TRANSPORT WITH PATIENT

## 2017-06-27 NOTE — PROGRESS NOTE ADULT - SUBJECTIVE AND OBJECTIVE BOX
CHIEF COMPLAINT:Patient is a 89y old  Female who presents with a chief complaint of SOB (26 Jun 2017 03:21)    	  Interval history: no new complaints      Allergies:  eggs (Rash)  no drug allergy (Unknown)  vinegar sensitivity    family states that the patient shakes when she ingests vinegar (Other)      PAST MEDICAL & SURGICAL HISTORY:  PNA (pneumonia)  Dysphagia  Thrombocytopenia: ITP  Atrial fibrillation and flutter: No A/C  during hospitalization in april 2014  Respiratory failure: in april 2014 was intubated  Cataract: right eye  Small bowel obstruction: s/p resection in 2010  HTN - Hypertension  S/P cholecystectomy  H/O: Hysterectomy  S/P Small Bowel Resection      FAMILY HISTORY:  No pertinent family history in first degree relatives      REVIEW OF SYSTEMS:  CONSTITUTIONAL: No fever, weight loss, or fatigue  EYES: No eye pain, visual disturbances, or discharge  NECK: No pain or stiffness  RESPIRATORY: No cough or wheezing, no shortness of breath  CARDIOVASCULAR: No chest pain, palpitations, dizziness, or leg swelling  GASTROINTESTINAL: No abdominal or epigastric pain. No nausea, vomiting, diarrhea or constipation  GENITOURINARY: No dysuria, urinary frequency or urgency, no hematuria  NEUROLOGICAL: No headaches, memory loss, loss of strength, numbness, or tremors  SKIN: No itching, burning, rashes, or lesions   MUSCULOSKELETAL: chronic diffuse body pains    Medications:  MEDICATIONS  (STANDING):  furosemide   Injectable 40 milliGRAM(s) IV Push two times a day  diltiazem    Tablet 60 milliGRAM(s) Oral every 8 hours  pantoprazole    Tablet 40 milliGRAM(s) Oral before breakfast  predniSONE   Tablet 7.5 milliGRAM(s) Oral daily  sucralfate suspension 1 Gram(s) Oral three times a day    MEDICATIONS  (PRN):    	    PHYSICAL EXAM:  T(C): 36.7 (06-27-17 @ 08:02), Max: 36.7 (06-26-17 @ 16:28)  HR: 80 (06-27-17 @ 08:02) (78 - 93)  BP: 104/60 (06-27-17 @ 08:02) (100/60 - 120/71)  RR: 18 (06-27-17 @ 08:02) (16 - 18)  SpO2: 96% (06-27-17 @ 14:09) (94% - 98%)  Wt(kg): --  I&O's Summary    26 Jun 2017 07:01  -  27 Jun 2017 07:00  --------------------------------------------------------  IN: 780 mL / OUT: 1275 mL / NET: -495 mL    27 Jun 2017 07:01  -  27 Jun 2017 14:13  --------------------------------------------------------  IN: 360 mL / OUT: 150 mL / NET: 210 mL        Appearance: NAD, frail	  HEENT:   NCAT, PERRL, EOMI	  Lymphatic: No lymphadenopathy  Cardiovascular: Normal S1 S2, RRR  Respiratory: minimal basilar crackles  Psychiatry: A & O x 3, Mood & affect appropriate  Gastrointestinal:  Soft, Non-tender, + BS  Skin: No rashes, No ecchymoses, No cyanosis	  Neurologic: Non-focal  Extremities: Normal range of motion, No clubbing, cyanosis or edema    	  LABS:	 	    CARDIAC MARKERS:  CARDIAC MARKERS ( 26 Jun 2017 06:51 )  x     / 0.09 ng/mL / 92 U/L / x     / 6.7 ng/mL  CARDIAC MARKERS ( 26 Jun 2017 01:14 )  x     / 0.13 ng/mL / x     / x     / x      CARDIAC MARKERS ( 25 Jun 2017 21:47 )  x     / 0.16 ng/mL / 86 U/L / x     / 7.0 ng/mL                                8.3    20.85 )-----------( 57       ( 27 Jun 2017 07:41 )             22.6     06-27    133<L>  |  86<L>  |  25<H>  ----------------------------<  122<H>  3.6   |  32<H>  |  1.17    Ca    8.6      27 Jun 2017 07:21  Phos  4.5     06-27  Mg     2.1     06-27    TPro  6.8  /  Alb  3.7  /  TBili  0.4  /  DBili  x   /  AST  26  /  ALT  13  /  AlkPhos  63  06-27    proBNP:   Lipid Profile:   HgA1c:   TSH:

## 2017-06-27 NOTE — DISCHARGE NOTE ADULT - HOSPITAL COURSE
md Attending MD to complete. *** TO BE COMPLETED BY ATTENDING PHYSICIAN *** 89 yo F A fib/A flutter (not on AC), ? hx CHF, HTN, dysphagia (previously refused PEG), multiple admissions for PNA  (?aspiration related, req intubation for hypercapnic resp failure 2014), SBO (resection 2010), ITP on chronic prednisone, pancreatic lesion (prev declined further w/u), ? hx asthma, non ambulatory @ baseline, lives @ home w/ 24 HHA, per daughter forgetful (A, A, O 1-2 @ baseline), DNR/DNI presented to ED w/ SOB. CTA w/ pulm edema/pleural effusions and no PE. In ED, initially in no acute distress, respiratory status worsened w/ SOB/wheezing and patient placed on bipap, given duoneb x 3, 125 mg IVP solu medrol, 40 mg IVP lasix. w/ unmeasured UOP and improvement of symptoms and transferred to CCU2 for further management.    6/27: cont /w prednisone 7.5 daily          seen by renal for hpervolemic hyponatremia- na improving(133) today          cont furosemide for chf          plt- 57           urine cx: ecoli- ceftriaxone started  6/28: Plt 25- h/o ITP- on chronic prednisone- seen by heme - ARCELIA- iron started           Cr trending up- furosemide dose changed           Duplex:     6/29	* BLE venous doppler negative   	* C/w ceftriaxone for uti  6/30	d/c Abx as per ID  7/3: 	For d/c home on 7/4 with 24 hr HHA.

## 2017-06-28 DIAGNOSIS — D64.9 ANEMIA, UNSPECIFIED: ICD-10-CM

## 2017-06-28 LAB
ANION GAP SERPL CALC-SCNC: 14 MMOL/L — SIGNIFICANT CHANGE UP (ref 5–17)
ANISOCYTOSIS BLD QL: SLIGHT — SIGNIFICANT CHANGE UP
BUN SERPL-MCNC: 45 MG/DL — HIGH (ref 7–23)
CALCIUM SERPL-MCNC: 8.8 MG/DL — SIGNIFICANT CHANGE UP (ref 8.4–10.5)
CHLORIDE SERPL-SCNC: 90 MMOL/L — LOW (ref 96–108)
CO2 SERPL-SCNC: 32 MMOL/L — HIGH (ref 22–31)
CREAT SERPL-MCNC: 1.55 MG/DL — HIGH (ref 0.5–1.3)
ELLIPTOCYTES BLD QL SMEAR: SIGNIFICANT CHANGE UP
EOSINOPHIL NFR BLD AUTO: 3 % — SIGNIFICANT CHANGE UP (ref 0–6)
GLUCOSE SERPL-MCNC: 94 MG/DL — SIGNIFICANT CHANGE UP (ref 70–99)
HCT VFR BLD CALC: 23.4 % — LOW (ref 34.5–45)
HGB BLD-MCNC: 8.5 G/DL — LOW (ref 11.5–15.5)
HYPOCHROMIA BLD QL: SLIGHT — SIGNIFICANT CHANGE UP
LYMPHOCYTES # BLD AUTO: 3 % — LOW (ref 13–44)
MCHC RBC-ENTMCNC: 29.6 PG — SIGNIFICANT CHANGE UP (ref 27–34)
MCHC RBC-ENTMCNC: 36.3 GM/DL — HIGH (ref 32–36)
MCV RBC AUTO: 81.5 FL — SIGNIFICANT CHANGE UP (ref 80–100)
MONOCYTES NFR BLD AUTO: 3 % — SIGNIFICANT CHANGE UP (ref 2–14)
NEUTROPHILS NFR BLD AUTO: 91 % — HIGH (ref 43–77)
PLAT MORPH BLD: NORMAL — SIGNIFICANT CHANGE UP
PLATELET # BLD AUTO: 25 K/UL — LOW (ref 150–400)
POIKILOCYTOSIS BLD QL AUTO: SIGNIFICANT CHANGE UP
POLYCHROMASIA BLD QL SMEAR: SLIGHT — SIGNIFICANT CHANGE UP
POTASSIUM SERPL-MCNC: 3.1 MMOL/L — LOW (ref 3.5–5.3)
POTASSIUM SERPL-SCNC: 3.1 MMOL/L — LOW (ref 3.5–5.3)
RBC # BLD: 2.87 M/UL — LOW (ref 3.8–5.2)
RBC # FLD: 15.1 % — HIGH (ref 10.3–14.5)
RBC BLD AUTO: ABNORMAL
ROULEAUX BLD QL SMEAR: PRESENT
SCHISTOCYTES BLD QL AUTO: SLIGHT — SIGNIFICANT CHANGE UP
SODIUM SERPL-SCNC: 136 MMOL/L — SIGNIFICANT CHANGE UP (ref 135–145)
WBC # BLD: 17.45 K/UL — HIGH (ref 3.8–10.5)
WBC # FLD AUTO: 17.45 K/UL — HIGH (ref 3.8–10.5)

## 2017-06-28 RX ORDER — FERROUS SULFATE 325(65) MG
325 TABLET ORAL DAILY
Qty: 0 | Refills: 0 | Status: DISCONTINUED | OUTPATIENT
Start: 2017-06-28 | End: 2017-07-04

## 2017-06-28 RX ORDER — FUROSEMIDE 40 MG
40 TABLET ORAL DAILY
Qty: 0 | Refills: 0 | Status: DISCONTINUED | OUTPATIENT
Start: 2017-06-28 | End: 2017-07-02

## 2017-06-28 RX ORDER — ACETAMINOPHEN 500 MG
650 TABLET ORAL EVERY 6 HOURS
Qty: 0 | Refills: 0 | Status: DISCONTINUED | OUTPATIENT
Start: 2017-06-28 | End: 2017-07-04

## 2017-06-28 RX ORDER — POTASSIUM CHLORIDE 20 MEQ
40 PACKET (EA) ORAL ONCE
Qty: 0 | Refills: 0 | Status: COMPLETED | OUTPATIENT
Start: 2017-06-28 | End: 2017-06-28

## 2017-06-28 RX ADMIN — Medication 40 MILLIGRAM(S): at 06:59

## 2017-06-28 RX ADMIN — Medication 650 MILLIGRAM(S): at 11:30

## 2017-06-28 RX ADMIN — Medication 1 GRAM(S): at 06:59

## 2017-06-28 RX ADMIN — PANTOPRAZOLE SODIUM 40 MILLIGRAM(S): 20 TABLET, DELAYED RELEASE ORAL at 06:59

## 2017-06-28 RX ADMIN — CEFTRIAXONE 100 GRAM(S): 500 INJECTION, POWDER, FOR SOLUTION INTRAMUSCULAR; INTRAVENOUS at 17:21

## 2017-06-28 RX ADMIN — Medication 1 GRAM(S): at 13:58

## 2017-06-28 RX ADMIN — Medication 40 MILLIEQUIVALENT(S): at 10:45

## 2017-06-28 RX ADMIN — Medication 7.5 MILLIGRAM(S): at 06:59

## 2017-06-28 RX ADMIN — Medication 325 MILLIGRAM(S): at 13:58

## 2017-06-28 RX ADMIN — Medication 1 GRAM(S): at 22:08

## 2017-06-28 RX ADMIN — Medication 650 MILLIGRAM(S): at 12:00

## 2017-06-28 NOTE — PROGRESS NOTE ADULT - SUBJECTIVE AND OBJECTIVE BOX
Patient is a 89y Female  being evaluated for  hyponatremia  no specific complaints          PHYSICAL EXAM:  Vital Signs Last 24 Hrs  T(C): 36.8 (2017 06:54), Max: 36.8 (2017 06:54)  T(F): 98.2 (2017 06:54), Max: 98.2 (2017 06:54)  HR: 81 (2017 06:54) (80 - 98)  BP: 100/64 (2017 06:54) (100/64 - 133/74)  BP(mean): --  RR: 18 (2017 06:54) (18 - 18)  SpO2: 94% (2017 06:54) (94% - 99%)  Constitutional: no acute distress  Respiratory: cta/p bilat, no wheezes, rales, nl effort  Cardiovascular: regular rate, S1 and S2 no rub or gallop  Abd: : BS+, soft, NT , no rebound or guarding, no bruits  Extremities: no edema or cyanosis, palpable pulses      I and O's:     @ 07:01  -   @ 07:00  --------------------------------------------------------  IN: 780 mL / OUT: 1275 mL / NET: -495 mL          Weight (kg): 42.9 ( @ 01:14)    REVIEW OF SYSTEMS:  CONSTITUTIONAL: No weakness, fevers or chills  RESPIRATORY: No cough, wheezing, hemoptysis; No shortness of breath  CARDIOVASCULAR: No chest pain or palpitations  GI : no abd pains, nausea or vomiting  GENITOURINARY: No dysuria, frequency or hematuria  All other review of systems is negative unless indicated above.    Allergies    eggs (Rash)  no drug allergy (Unknown)    Intolerances    vinegar sensitivity    family states that the patient shakes when she ingests vinegar (Other)      MEDICATIONS  (STANDING):  furosemide   Injectable 40 milliGRAM(s) IV Push two times a day  diltiazem    Tablet 60 milliGRAM(s) Oral every 8 hours  pantoprazole    Tablet 40 milliGRAM(s) Oral before breakfast  predniSONE   Tablet 7.5 milliGRAM(s) Oral daily  sucralfate suspension 1 Gram(s) Oral three times a day      LABS:  CBC Full  -  ( 2017 06:51 )  WBC Count : 13.0 K/uL  Hemoglobin : 8.9 g/dL  Hematocrit : 25.3 %  Platelet Count - Automated : 88 K/uL  Mean Cell Volume : 78.3 fl  Mean Cell Hemoglobin : 27.5 pg  Mean Cell Hemoglobin Concentration : 35.1 gm/dL  Auto Neutrophil # : 12.5 K/uL  Auto Lymphocyte # : 0.3 K/uL  Auto Monocyte # : 0.1 K/uL  Auto Eosinophil # : 0.0 K/uL  Auto Basophil # : 0.0 K/uL  Auto Neutrophil % : 96.2 %  Auto Lymphocyte % : 2.6 %  Auto Monocyte % : 0.9 %  Auto Eosinophil % : 0.3 %  Auto Basophil % : 0.0 %        132<L>  |  86<L>  |  17  ----------------------------<  162<H>  4.2   |  29  |  1.18    Ca    9.5      2017 06:51  Phos  5.4       Mg     2.3         TPro  7.4  /  Alb  4.2  /  TBili  0.4  /  DBili  x   /  AST  33  /  ALT  23  /  AlkPhos  67        Urine Studies:  Urinalysis Basic - ( 2017 22:31 )    Color: x / Appearance: Clear / S.015 / pH: x  Gluc: x / Ketone: Negative  / Bili: Negative / Urobili: Negative   Blood: x / Protein: Trace / Nitrite: Negative   Leuk Esterase: Trace / RBC: 0-2 /HPF / WBC 3-5 /HPF   Sq Epi: x / Non Sq Epi: x / Bacteria: Few /HPF        Urine chemistry:   Urine Na: Sodium, Random Urine: 81 mmol/L ( @ 11:59)    Urine Creatinine:   Urine Protein/Cr ratio:  Urine K:   Urine Osm: Osmolality, Random Urine: 294 mos/kg ( @ 11:59)        136  |  90<L>  |  45<H>  ----------------------------<  94  3.1<L>   |  32<H>  |  1.55<H>    Ca    8.8      2017 06:04  Phos  4.5       Mg     2.1         TPro  6.8  /  Alb  3.7  /  TBili  0.4  /  DBili  x   /  AST  26  /  ALT  13  /  AlkPhos  63          Imp:  89y Female

## 2017-06-28 NOTE — PROGRESS NOTE ADULT - SUBJECTIVE AND OBJECTIVE BOX
CHIEF COMPLAINT:Patient is a 89y old  Female who presents with a chief complaint of SOB (26 Jun 2017 03:21)    	  Interval history: no new complaints      Allergies:  eggs (Rash)  no drug allergy (Unknown)  vinegar sensitivity    family states that the patient shakes when she ingests vinegar (Other)      PAST MEDICAL & SURGICAL HISTORY:  PNA (pneumonia)  Dysphagia  Thrombocytopenia: ITP  Atrial fibrillation and flutter: No A/C  during hospitalization in april 2014  Respiratory failure: in april 2014 was intubated  Cataract: right eye  Small bowel obstruction: s/p resection in 2010  HTN - Hypertension  S/P cholecystectomy  H/O: Hysterectomy  S/P Small Bowel Resection      FAMILY HISTORY:  No pertinent family history in first degree relatives      REVIEW OF SYSTEMS:  CONSTITUTIONAL: No fever, weight loss, or fatigue  EYES: No eye pain, visual disturbances, or discharge  NECK: No pain or stiffness  RESPIRATORY: No cough or wheezing, no shortness of breath  CARDIOVASCULAR: No chest pain, palpitations, dizziness, or leg swelling  GASTROINTESTINAL: No abdominal or epigastric pain. No nausea, vomiting, diarrhea or constipation  GENITOURINARY: No dysuria, urinary frequency or urgency, no hematuria  NEUROLOGICAL: No headaches, memory loss, loss of strength, numbness, or tremors  SKIN: No itching, burning, rashes, or lesions   MUSCULOSKELETAL: chronic diffuse body pains    Medications:  MEDICATIONS  (STANDING):  diltiazem    Tablet 60 milliGRAM(s) Oral every 8 hours  pantoprazole    Tablet 40 milliGRAM(s) Oral before breakfast  predniSONE   Tablet 7.5 milliGRAM(s) Oral daily  sucralfate suspension 1 Gram(s) Oral three times a day  cefTRIAXone   IVPB   IV Intermittent   cefTRIAXone   IVPB 1 Gram(s) IV Intermittent every 24 hours  furosemide    Tablet 40 milliGRAM(s) Oral daily  ferrous    sulfate 325 milliGRAM(s) Oral daily    MEDICATIONS  (PRN):  ALBUTerol/ipratropium for Nebulization 3 milliLiter(s) Nebulizer every 6 hours PRN Shortness of Breath and/or Wheezing  acetaminophen   Tablet. 650 milliGRAM(s) Oral every 6 hours PRN Mild Pain (1 - 3)    	    PHYSICAL EXAM:  T(C): 36.7 (06-28-17 @ 09:36), Max: 36.8 (06-28-17 @ 06:54)  HR: 85 (06-28-17 @ 10:54) (80 - 98)  BP: 101/62 (06-28-17 @ 09:36) (100/64 - 133/74)  RR: 18 (06-28-17 @ 09:36) (18 - 18)  SpO2: 96% (06-28-17 @ 10:54) (94% - 99%)  Wt(kg): --  I&O's Summary    27 Jun 2017 07:01  -  28 Jun 2017 07:00  --------------------------------------------------------  IN: 890 mL / OUT: 1350 mL / NET: -460 mL    28 Jun 2017 07:01  -  28 Jun 2017 14:30  --------------------------------------------------------  IN: 240 mL / OUT: 400 mL / NET: -160 mL      Appearance: NAD, frail	  HEENT:   NCAT, PERRL, EOMI	  Lymphatic: No lymphadenopathy  Cardiovascular: Normal S1 S2, RRR  Respiratory: CTA BL  Psychiatry: A & O x 2-3, Mood & affect appropriate  Gastrointestinal:  Soft, Non-tender, + BS  Skin: No rashes, No ecchymoses, No cyanosis	  Neurologic: Non-focal  Extremities: Normal range of motion, No clubbing, cyanosis or edema    	  LABS:	 	                          8.5    17.45 )-----------( 25       ( 28 Jun 2017 07:17 )             23.4     06-28    136  |  90<L>  |  45<H>  ----------------------------<  94  3.1<L>   |  32<H>  |  1.55<H>    Ca    8.8      28 Jun 2017 06:04  Phos  4.5     06-27  Mg     2.1     06-27    TPro  6.8  /  Alb  3.7  /  TBili  0.4  /  DBili  x   /  AST  26  /  ALT  13  /  AlkPhos  63  06-27

## 2017-06-28 NOTE — PROGRESS NOTE ADULT - ASSESSMENT
89 F with hx of hyponatremia presenting with SOB and hyponatremia  suspect element of hypervolemic hyponatremia / sodium improving   elevated creatinine on admission- improving  monitor renal function after IV contrast study, non olgiuric   continue current rx per medicine

## 2017-06-28 NOTE — CONSULT NOTE ADULT - PROBLEM SELECTOR RECOMMENDATION 9
chronic ITP - called core lab for smear review to make sure no clumping   ct current dose of prednisone  daily cbc monitoring - prn increase prednisone dose if platelets ct to drop  prn platelet transfusion if overt bleeding

## 2017-06-28 NOTE — CONSULT NOTE ADULT - SUBJECTIVE AND OBJECTIVE BOX
Patient is a 89y old  Female who presents with a chief complaint of SOB (27 Jun 2017 18:03)      HPI:  89 yo F A fib/A flutter (not on AC), ? hx CHF (no previous echo in [place]/[employer] but treated in past for ADHF), HTN, dysphagia (previously refused PEG), multiple admissions for PNA  (?aspiration related, req intubation for hypercapnic resp failure 2014), SBO (resection 2010), ITP on chronic prednisone, pancreatic lesion (prev declined further w/u), ? hx asthma, non ambulatory @ baseline (wheel chair/bed bound), lives @ home w/ 24 HHA, per daughter forgetful (A, A, O 1-2 @ baseline). Patient is poor historian. History obtained from patient, daughter, chart review, speaking w/ ER provider. P/w 2 episodes of SOB @ rest today lasting 20-40 sec, better w/ proventil. Per aide, patient noted to be urinating less. Patient reports L sided abd pain 5/10 x 5 mos, better today w/ advil.  Denies CP, palpitations, increased fluid intake, orthopnea, PND, syncope, near syncope, lightheadness, dizziness. Denies N/V/D, melena, hematemesis, hematochezia, coffee ground emesis, dysphagia.     In ED, initially in no acute distress, respiratory status worsened w/ SOB/wheezing and patient placed on bipap, given duoneb x 3, 125 mg IVP solu medrol, 40 mg IVP lasix. At time of my exam, patient appears comfortable but volume overloaded on exam. VSS (26 Jun 2017 03:21)       ROS:  Negative except for:    PAST MEDICAL & SURGICAL HISTORY:  PNA (pneumonia)  Dysphagia  Thrombocytopenia: ITP  Atrial fibrillation and flutter: No A/C  during hospitalization in april 2014  Respiratory failure: in april 2014 was intubated  Cataract: right eye  Small bowel obstruction: s/p resection in 2010  HTN - Hypertension  S/P cholecystectomy  H/O: Hysterectomy  S/P Small Bowel Resection      SOCIAL HISTORY:    FAMILY HISTORY:  No pertinent family history in first degree relatives      MEDICATIONS  (STANDING):  diltiazem    Tablet 60 milliGRAM(s) Oral every 8 hours  pantoprazole    Tablet 40 milliGRAM(s) Oral before breakfast  predniSONE   Tablet 7.5 milliGRAM(s) Oral daily  sucralfate suspension 1 Gram(s) Oral three times a day  cefTRIAXone   IVPB   IV Intermittent   cefTRIAXone   IVPB 1 Gram(s) IV Intermittent every 24 hours  furosemide    Tablet 40 milliGRAM(s) Oral daily    MEDICATIONS  (PRN):  ALBUTerol/ipratropium for Nebulization 3 milliLiter(s) Nebulizer every 6 hours PRN Shortness of Breath and/or Wheezing  acetaminophen   Tablet. 650 milliGRAM(s) Oral every 6 hours PRN Mild Pain (1 - 3)      Allergies    eggs (Rash)  no drug allergy (Unknown)    Intolerances    vinegar sensitivity    family states that the patient shakes when she ingests vinegar (Other)      Vital Signs Last 24 Hrs  T(C): 36.7 (28 Jun 2017 09:36), Max: 36.8 (28 Jun 2017 06:54)  T(F): 98 (28 Jun 2017 09:36), Max: 98.2 (28 Jun 2017 06:54)  HR: 85 (28 Jun 2017 09:36) (80 - 98)  BP: 101/62 (28 Jun 2017 09:36) (100/64 - 133/74)  BP(mean): --  RR: 18 (28 Jun 2017 09:36) (18 - 18)  SpO2: 95% (28 Jun 2017 09:36) (94% - 99%)    REVIEW OF SYSTEMS:    CONSTITUTIONAL: No weakness, fevers or chills  EYES/ENT: No visual changes;  No vertigo or throat pain   NECK: No pain or stiffness  RESPIRATORY: No cough, wheezing, hemoptysis; No shortness of breath  CARDIOVASCULAR: No chest pain or palpitations  GASTROINTESTINAL: No abdominal or epigastric pain. No nausea, vomiting, or hematemesis; No diarrhea or constipation. No melena or hematochezia.  GENITOURINARY: No dysuria, frequency or hematuria  NEUROLOGICAL: No numbness or weakness  SKIN: No itching, burning, rashes, or lesions     PHYSICAL EXAM  General: adult in NAD  HEENT: clear oropharynx, anicteric sclera, pink conjunctiva  Neck: supple  CV: normal S1/S2 with no murmur rubs or gallops  Lungs: positive air movement b/l ant lungs,clear to auscultation, no wheezes, no rales  Abdomen: soft non-tender non-distended, no hepatosplenomegaly  Ext: no clubbing cyanosis or edema  Skin: no rashes and no petechiae  Neuro: alert and oriented X 4, no focal deficits      LABS:                          8.5    17.45 )-----------( 25       ( 28 Jun 2017 07:17 )             23.4         Mean Cell Volume : 81.5 fl  Mean Cell Hemoglobin : 29.6 pg  Mean Cell Hemoglobin Concentration : 36.3 gm/dL      Iron - Total Binding Capacity.: 339 ug/dL (06-26 @ 07:32)  Ferritin, Serum: 10.0 ng/mL (06-26 @ 07:29)  Folate, Serum: >20.0 ng/mL (06-26 @ 07:29)  Vitamin B12, Serum: 665 pg/mL (06-26 @ 07:29)    06-28    136  |  90<L>  |  45<H>  ----------------------------<  94  3.1<L>   |  32<H>  |  1.55<H>    Ca    8.8      28 Jun 2017 06:04  Phos  4.5     06-27  Mg     2.1     06-27    TPro  6.8  /  Alb  3.7  /  TBili  0.4  /  DBili  x   /  AST  26  /  ALT  13  /  AlkPhos  63  06-27      < from: CT Angio Chest w/ IV Cont (06.26.17 @ 00:08) >    EXAM:  CT ABDOMEN AND PELVIS OC IC                          EXAM:  CT ANGIO CHEST (W)AW IC                            PROCEDURE DATE:  06/26/2017            INTERPRETATION:  CLINICAL INFORMATION: Shortness of breath and diffuse   abdominal pain.    COMPARISON: CTA chest performed March 14, 2016. CT abdomen pelvis   performed January 21, 2016.    PROCEDURE:   CT Angiography of the chest followed by CT of the abdomen and pelvis.  90 ml of Omnipaque 350 was injected intravenously. 10 ml were discarded.  Sagittal and coronal reformats were performed as well as 3D (MIP)   reconstructions.    FINDINGS:    CHEST:     LUNGS AND LARGE AIRWAYS: Evaluation is limited secondary to respiratory   motion artifact. Patent central airways. There are ground glass opacities   in the right lower lobe. Smooth intralobular septal thickening reflective   of interstitial edema. Nodular branching opacities in the peripheral   upper and right middle lobes likely represent mucoid impacted small   airways. A rightupper lobe calcified granuloma is unchanged.  PLEURA: Small bilateral pleural effusions.  VESSELS: There is no intraluminal filling defect in the central, main,   lobar, and segmental pulmonary arteries to suggest pulmonary embolism.   There is limited evaluation of the segmental and subsegmental pulmonary   arteries in the lower lobes bilaterally. Atherosclerotic calcifications.   < from: Transthoracic Echocardiogram (06.26.17 @ 10:04) >  Normal caliber thoracic aorta.  HEART: Heart size is enlarged. Aortic valve and coronary artery   calcifications. Nopericardial effusion.  MEDIASTINUM AND CLEM: No lymphadenopathy.  CHEST WALL AND LOWER NECK: Within normal limits.    ABDOMEN AND PELVIS:    Multiple images are degraded secondary to motion.    LIVER: Within normal limits.  BILE DUCTS: Normal caliber.  GALLBLADDER: Cholecystectomy.  SPLEEN: Within normal limits.  PANCREAS: Severe atrophy of the distal body and tail the pancreas with   interval increase in the size of a peripherally calcified, macrocystic,   lobulated lesion which measures approximately 7.7 x 2.6 cm (3, 29).  ADRENALS: Within normal limits.  KIDNEYS/URETERS: Left renal cysts and additional bilateral subcentimeter   hypodensities which are too small to characterize.  Culture - Blood (06.26.17 @ 07:50)    Specimen Source: .Blood Blood    Culture Results:   No growth to date.      BLADDER: Within normal limits.  REPRODUCTIVE ORGANS: Hysterectomy.    BOWEL: No bowel obstruction or bowel wall thickening.. Colonic   diverticulosis.  PERITONEUM: No ascites ascites or pneumoperitoneum. No mesenteric   adenopathy.  VESSELS:  Severe atherosclerotic calcifications. Normal caliber abdominal   aorta.  RETROPERITONEUM: No lymphadenopathy.    ABDOMINAL WALL: Within normal limits.  BONES: Degenerative changes an S-shaped scoliosis of the spine. Status   post L5 kyphoplasty. Unchanged compression fracture deformity of L3.    IMPRESSION:    Motion degraded examination. No pulmonary embolism. Limited evaluation of   the segmental and subsegmental pulmonary arteries in the lower lobes   bilaterally.    Mild pulmonary edema and small bilateral pleural effusions. Nodular   branching opacities in the periphery of the upper lobes and right middle   lobe likely representing mucoid impacted small airways.    Severe atrophy of the pancreatic body and tail with interval increase in   the size of a peripherally calcified, lobulated cystic lesion, which may   represent a mucinous cystic neoplasm, IPMN, and less likely serous   cystadenoma. MRI of the pancreas may be obtained for further evaluation.      Transthoracic Echocardiogram (06.26.17 @ 10:04) >    Patient name: CARLOS PARIKHMARTA  Conclusions:  Technically difficult study.  1. Aortic valve not well visualized; appears calcified.  Peak transaortic valve gradient equals 49 mm Hg, mean  transaortic valve gradient equals 27 mm Hg, aortic valve  velocity time integral equals 62 cm, consistent with  moderate aortic stenosis. Suboptimal imaging precludes  accurate assessment of LVOT diameter. Unable to accurately  calculate aortic valve area by continuity. No aortic valve  regurgitation seen.  2. Normal left ventricular internal dimensions and wall  thicknesses.  3. Endocardium not well visualized; grossly hyperdynamic  left ventricular systolic function.  4. The right ventricle is not well visualized.    Culture - Blood (06.26.17 @ 07:50)    Specimen Source: .Blood Blood    Culture Results:   No growth to date.    Culture - Urine (06.26.17 @ 00:58)    -  Amikacin: S <=8    -  Ampicillin: S 4    -  Ampicillin/Sulbactam: S <=4/2    -  Ertapenem: S <=0.5    -  Tobramycin: S <=2    -  Cefazolin: S <=2    -  Ceftriaxone: S <=1    -  Gentamicin: S <=1    -  Nitrofurantoin: S <=32    -  Piperacillin/Tazobactam: S <=8    -  Aztreonam: S <=4    -  Cefepime: S <=2    -  Ceftazidime: S <=1    -  Ciprofloxacin: S <=0.5    -  Cefoxitin: S <=4    -  Imipenem: S <=1    -  Levofloxacin: S <=1    -  Meropenem: S <=1    -  Trimethoprim/Sulfamethoxazole: S <=0.5/9.5    Specimen Source: .Urine Catheterized    Culture Results:   >100,000 CFU/ml Escherichia coli    Organism Identification: Escherichia coli    Organism: Escherichia coli    Method Type: RACHEL    Serum Pro-Brain Natriuretic Peptide (06.25.17 @ 21:47)    Serum Pro-Brain Natriuretic Peptide: 4195 Patient is a 89y old  Female who presents with a chief complaint of SOB (27 Jun 2017 18:03)      HPI:  89 yo F A fib/A flutter , ? hx CHF , HTN, dysphagia (previously refused PEG), multiple admissions for PNA  (?aspiration related, req intubation for hypercapnic resp failure 2014), SBO (resection 2010), ITP on chronic prednisone, pancreatic lesion (prev declined further w/u),  non ambulatory @ baseline (wheel chair/bed bound), lives @ home w/ 24 HHA, per daughter forgetful (A, A, O 1-2 @ baseline). Patient is poor historian. Patient admitted following 2 episodes of SOB, urinating less, L sided abd pain 5/10 .In ED, initially in no acute distress, respiratory status worsened w/ SOB/wheezing and patient placed on bipap, given duoneb x 3, 125 mg IVP solu medrol, 40 mg IVP lasix. CBC in er showed wbc 13.1,  Hgb 8 and platelet count 64K - labs today -  wbc 17.4,  Hgb 8.5 and platelet count 27K. Patient placed on ceftriaxone for E.Coli UTI and is being treated for fluid overload   Denies CP, palpitations, increased fluid intake, orthopnea, PND, syncope, near syncope, lightheadness, dizziness. Denies N/V/D, melena, hematemesis, hematochezia, coffee ground emesis, dysphagia.          ROS:  Negative except for:    PAST MEDICAL & SURGICAL HISTORY:  PNA (pneumonia)  Dysphagia  Thrombocytopenia: ITP  Atrial fibrillation and flutter: No A/C  during hospitalization in april 2014  Respiratory failure: in april 2014 was intubated  Cataract: right eye  Small bowel obstruction: s/p resection in 2010  HTN - Hypertension  S/P cholecystectomy  H/O: Hysterectomy  S/P Small Bowel Resection  iron deficiency  pancreatic mass  H.pylori  LSCS    SOCIAL HISTORY:lives with daughter, no substanse abuse    FAMILY HISTORY:  Mother dm, asthma, father - asthma      MEDICATIONS  (STANDING):  diltiazem    Tablet 60 milliGRAM(s) Oral every 8 hours  pantoprazole    Tablet 40 milliGRAM(s) Oral before breakfast  predniSONE   Tablet 7.5 milliGRAM(s) Oral daily  sucralfate suspension 1 Gram(s) Oral three times a day  cefTRIAXone   IVPB   IV Intermittent   cefTRIAXone   IVPB 1 Gram(s) IV Intermittent every 24 hours  furosemide    Tablet 40 milliGRAM(s) Oral daily    MEDICATIONS  (PRN):  ALBUTerol/ipratropium for Nebulization 3 milliLiter(s) Nebulizer every 6 hours PRN Shortness of Breath and/or Wheezing  acetaminophen   Tablet. 650 milliGRAM(s) Oral every 6 hours PRN Mild Pain (1 - 3)      Allergies    eggs (Rash)  no drug allergy (Unknown)    Intolerances    vinegar sensitivity    family states that the patient shakes when she ingests vinegar (Other)      Vital Signs Last 24 Hrs  T(C): 36.7 (28 Jun 2017 09:36), Max: 36.8 (28 Jun 2017 06:54)  T(F): 98 (28 Jun 2017 09:36), Max: 98.2 (28 Jun 2017 06:54)  HR: 85 (28 Jun 2017 09:36) (80 - 98)  BP: 101/62 (28 Jun 2017 09:36) (100/64 - 133/74)  BP(mean): --  RR: 18 (28 Jun 2017 09:36) (18 - 18)  SpO2: 95% (28 Jun 2017 09:36) (94% - 99%)    REVIEW OF SYSTEMS:    CONSTITUTIONAL: No weakness, fevers or chills  EYES/ENT: No visual changes;  No vertigo or throat pain   NECK: No pain or stiffness  RESPIRATORY: No cough, wheezing, hemoptysis; No shortness of breath  CARDIOVASCULAR: No chest pain or palpitations  GASTROINTESTINAL: No abdominal or epigastric pain. No nausea, vomiting, or hematemesis; No diarrhea or constipation. No melena or hematochezia.  GENITOURINARY: No dysuria, frequency or hematuria  NEUROLOGICAL: No numbness or weakness  SKIN: No itching, burning, rashes, or lesions     PHYSICAL EXAM  General: adult in NAD  HEENT: clear oropharynx, anicteric sclera, pink conjunctiva  Neck: supple  CV: normal S1/S2 with no murmur rubs or gallops  Lungs: positive air movement b/l ant lungs,clear to auscultation, no wheezes, no rales  Abdomen: soft non-tender non-distended, no hepatosplenomegaly  Ext: no clubbing cyanosis or edema  Skin: no rashes and no petechiae  Neuro: alert and oriented X 4, no focal deficits      LABS:                          8.5    17.45 )-----------( 25       ( 28 Jun 2017 07:17 )             23.4         Mean Cell Volume : 81.5 fl  Mean Cell Hemoglobin : 29.6 pg  Mean Cell Hemoglobin Concentration : 36.3 gm/dL      Iron - Total Binding Capacity.: 339 ug/dL (06-26 @ 07:32)  Ferritin, Serum: 10.0 ng/mL (06-26 @ 07:29)  Folate, Serum: >20.0 ng/mL (06-26 @ 07:29)  Vitamin B12, Serum: 665 pg/mL (06-26 @ 07:29)    06-28    136  |  90<L>  |  45<H>  ----------------------------<  94  3.1<L>   |  32<H>  |  1.55<H>    Ca    8.8      28 Jun 2017 06:04  Phos  4.5     06-27  Mg     2.1     06-27    TPro  6.8  /  Alb  3.7  /  TBili  0.4  /  DBili  x   /  AST  26  /  ALT  13  /  AlkPhos  63  06-27      < from: CT Angio Chest w/ IV Cont (06.26.17 @ 00:08) >    EXAM:  CT ABDOMEN AND PELVIS OC IC                          EXAM:  CT ANGIO CHEST (W)AW IC                            PROCEDURE DATE:  06/26/2017            INTERPRETATION:  CLINICAL INFORMATION: Shortness of breath and diffuse   abdominal pain.    COMPARISON: CTA chest performed March 14, 2016. CT abdomen pelvis   performed January 21, 2016.    PROCEDURE:   CT Angiography of the chest followed by CT of the abdomen and pelvis.  90 ml of Omnipaque 350 was injected intravenously. 10 ml were discarded.  Sagittal and coronal reformats were performed as well as 3D (MIP)   reconstructions.    FINDINGS:    CHEST:     LUNGS AND LARGE AIRWAYS: Evaluation is limited secondary to respiratory   motion artifact. Patent central airways. There are ground glass opacities   in the right lower lobe. Smooth intralobular septal thickening reflective   of interstitial edema. Nodular branching opacities in the peripheral   upper and right middle lobes likely represent mucoid impacted small   airways. A rightupper lobe calcified granuloma is unchanged.  PLEURA: Small bilateral pleural effusions.  VESSELS: There is no intraluminal filling defect in the central, main,   lobar, and segmental pulmonary arteries to suggest pulmonary embolism.   There is limited evaluation of the segmental and subsegmental pulmonary   arteries in the lower lobes bilaterally. Atherosclerotic calcifications.   < from: Transthoracic Echocardiogram (06.26.17 @ 10:04) >  Normal caliber thoracic aorta.  HEART: Heart size is enlarged. Aortic valve and coronary artery   calcifications. Nopericardial effusion.  MEDIASTINUM AND CLEM: No lymphadenopathy.  CHEST WALL AND LOWER NECK: Within normal limits.    ABDOMEN AND PELVIS:    Multiple images are degraded secondary to motion.    LIVER: Within normal limits.  BILE DUCTS: Normal caliber.  GALLBLADDER: Cholecystectomy.  SPLEEN: Within normal limits.  PANCREAS: Severe atrophy of the distal body and tail the pancreas with   interval increase in the size of a peripherally calcified, macrocystic,   lobulated lesion which measures approximately 7.7 x 2.6 cm (3, 29).  ADRENALS: Within normal limits.  KIDNEYS/URETERS: Left renal cysts and additional bilateral subcentimeter   hypodensities which are too small to characterize.  Culture - Blood (06.26.17 @ 07:50)    Specimen Source: .Blood Blood    Culture Results:   No growth to date.      BLADDER: Within normal limits.  REPRODUCTIVE ORGANS: Hysterectomy.    BOWEL: No bowel obstruction or bowel wall thickening.. Colonic   diverticulosis.  PERITONEUM: No ascites ascites or pneumoperitoneum. No mesenteric   adenopathy.  VESSELS:  Severe atherosclerotic calcifications. Normal caliber abdominal   aorta.  RETROPERITONEUM: No lymphadenopathy.    ABDOMINAL WALL: Within normal limits.  BONES: Degenerative changes an S-shaped scoliosis of the spine. Status   post L5 kyphoplasty. Unchanged compression fracture deformity of L3.    IMPRESSION:    Motion degraded examination. No pulmonary embolism. Limited evaluation of   the segmental and subsegmental pulmonary arteries in the lower lobes   bilaterally.    Mild pulmonary edema and small bilateral pleural effusions. Nodular   branching opacities in the periphery of the upper lobes and right middle   lobe likely representing mucoid impacted small airways.    Severe atrophy of the pancreatic body and tail with interval increase in   the size of a peripherally calcified, lobulated cystic lesion, which may   represent a mucinous cystic neoplasm, IPMN, and less likely serous   cystadenoma. MRI of the pancreas may be obtained for further evaluation.      Transthoracic Echocardiogram (06.26.17 @ 10:04) >    Patient name: BILL PARIKH  Conclusions:  Technically difficult study.  1. Aortic valve not well visualized; appears calcified.  Peak transaortic valve gradient equals 49 mm Hg, mean  transaortic valve gradient equals 27 mm Hg, aortic valve  velocity time integral equals 62 cm, consistent with  moderate aortic stenosis. Suboptimal imaging precludes  accurate assessment of LVOT diameter. Unable to accurately  calculate aortic valve area by continuity. No aortic valve  regurgitation seen.  2. Normal left ventricular internal dimensions and wall  thicknesses.  3. Endocardium not well visualized; grossly hyperdynamic  left ventricular systolic function.  4. The right ventricle is not well visualized.    Culture - Blood (06.26.17 @ 07:50)    Specimen Source: .Blood Blood    Culture Results:   No growth to date.    Culture - Urine (06.26.17 @ 00:58)    -  Amikacin: S <=8    -  Ampicillin: S 4    -  Ampicillin/Sulbactam: S <=4/2    -  Ertapenem: S <=0.5    -  Tobramycin: S <=2    -  Cefazolin: S <=2    -  Ceftriaxone: S <=1    -  Gentamicin: S <=1    -  Nitrofurantoin: S <=32    -  Piperacillin/Tazobactam: S <=8    -  Aztreonam: S <=4    -  Cefepime: S <=2    -  Ceftazidime: S <=1    -  Ciprofloxacin: S <=0.5    -  Cefoxitin: S <=4    -  Imipenem: S <=1    -  Levofloxacin: S <=1    -  Meropenem: S <=1    -  Trimethoprim/Sulfamethoxazole: S <=0.5/9.5    Specimen Source: .Urine Catheterized    Culture Results:   >100,000 CFU/ml Escherichia coli    Organism Identification: Escherichia coli    Organism: Escherichia coli    Method Type: RACHEL    Serum Pro-Brain Natriuretic Peptide (06.25.17 @ 21:47)    Serum Pro-Brain Natriuretic Peptide: 4195 Patient is a 89y old  Female who presents with a chief complaint of SOB (27 Jun 2017 18:03)      HPI:  89 yo F A fib/A flutter , ? hx CHF , HTN, dysphagia (previously refused PEG), multiple admissions for PNA  (?aspiration related, req intubation for hypercapnic resp failure 2014), SBO (resection 2010), ITP on chronic prednisone, pancreatic lesion (prev declined further w/u),  non ambulatory @ baseline (wheel chair/bed bound), lives @ home w/ 24 HHA, per daughter forgetful (A, A, O 1-2 @ baseline). Patient is poor historian. Patient admitted following 2 episodes of SOB, urinating less, L sided abd pain 5/10 .In ED, initially in no acute distress, respiratory status worsened w/ SOB/wheezing and patient placed on bipap, given duoneb x 3, 125 mg IVP solu medrol, 40 mg IVP lasix. CBC in er showed wbc 13.1,  Hgb 8 and platelet count 64K - labs today -  wbc 17.4,  Hgb 8.5 and platelet count 27K. Patient placed on ceftriaxone for E.Coli UTI and is being treated for fluid overload.   Patient c/o mild epigastric discomfort. C/o heaviness on breathing. No expectoration.   Denies CP, palpitations, increased fluid intake, orthopnea,  syncope, near syncope, lightheadness, dizziness. Denies N/V/D, melena, hematemesis, hematochezia, coffee ground emesis, dysphagia.        REVIEW OF SYSTEMS:    CONSTITUTIONAL: No  fevers or chills. weakness +  EYES/ENT: No visual changes;  No vertigo or throat pain   NECK: No pain or stiffness  RESPIRATORY: No wheezing, hemoptysis; Cough, shortness of breath +  CARDIOVASCULAR: No chest pain or palpitations  GASTROINTESTINAL:  No nausea, vomiting, or hematemesis; No diarrhea or constipation. No melena or hematochezia.abdominal, epigastric pain +  GENITOURINARY: No dysuria, frequency or hematuria  NEUROLOGICAL: No numbness or weakness  SKIN: No itching, burning, rashes, or lesions         PAST MEDICAL & SURGICAL HISTORY:  PNA (pneumonia)  Dysphagia  Thrombocytopenia: ITP  Atrial fibrillation and flutter: No A/C  during hospitalization in april 2014  Respiratory failure: in april 2014 was intubated  Cataract: right eye  Small bowel obstruction: s/p resection in 2010  HTN - Hypertension  S/P cholecystectomy  H/O: Hysterectomy  S/P Small Bowel Resection  iron deficiency  pancreatic mass  H.pylori  LSCS    SOCIAL HISTORY:lives with daughter, no substanse abuse    FAMILY HISTORY:  Mother dm, asthma, father - asthma      MEDICATIONS  (STANDING):  diltiazem    Tablet 60 milliGRAM(s) Oral every 8 hours  pantoprazole    Tablet 40 milliGRAM(s) Oral before breakfast  predniSONE   Tablet 7.5 milliGRAM(s) Oral daily  sucralfate suspension 1 Gram(s) Oral three times a day  cefTRIAXone   IVPB   IV Intermittent   cefTRIAXone   IVPB 1 Gram(s) IV Intermittent every 24 hours  furosemide    Tablet 40 milliGRAM(s) Oral daily    MEDICATIONS  (PRN):  ALBUTerol/ipratropium for Nebulization 3 milliLiter(s) Nebulizer every 6 hours PRN Shortness of Breath and/or Wheezing  acetaminophen   Tablet. 650 milliGRAM(s) Oral every 6 hours PRN Mild Pain (1 - 3)      Allergies    eggs (Rash)  no drug allergy (Unknown)    Intolerances    vinegar sensitivity    family states that the patient shakes when she ingests vinegar (Other)      Vital Signs Last 24 Hrs  T(C): 36.7 (28 Jun 2017 09:36), Max: 36.8 (28 Jun 2017 06:54)  T(F): 98 (28 Jun 2017 09:36), Max: 98.2 (28 Jun 2017 06:54)  HR: 85 (28 Jun 2017 09:36) (80 - 98)  BP: 101/62 (28 Jun 2017 09:36) (100/64 - 133/74)  BP(mean): --  RR: 18 (28 Jun 2017 09:36) (18 - 18)  SpO2: 95% (28 Jun 2017 09:36) (94% - 99%)      PHYSICAL EXAM  General: adult in NAD  HEENT: clear oropharynx, anicteric sclera, pink conjunctiva  Neck: supple  CV: normal S1/S2 with no murmur rubs or gallops  Lungs: positive air movement b/l ant lungs,clear to auscultation, no wheezes, no rales  Abdomen: soft non-tender non-distended, no hepatosplenomegaly  Ext: no clubbing cyanosis or edema  Skin: no rashes and no petechiae  Neuro: alert and oriented X 4, no focal deficits      LABS:                          8.5    17.45 )-----------( 25       ( 28 Jun 2017 07:17 )             23.4         Mean Cell Volume : 81.5 fl  Mean Cell Hemoglobin : 29.6 pg  Mean Cell Hemoglobin Concentration : 36.3 gm/dL      Iron - Total Binding Capacity.: 339 ug/dL (06-26 @ 07:32)  Ferritin, Serum: 10.0 ng/mL (06-26 @ 07:29)  Folate, Serum: >20.0 ng/mL (06-26 @ 07:29)  Vitamin B12, Serum: 665 pg/mL (06-26 @ 07:29)    06-28    136  |  90<L>  |  45<H>  ----------------------------<  94  3.1<L>   |  32<H>  |  1.55<H>    Ca    8.8      28 Jun 2017 06:04  Phos  4.5     06-27  Mg     2.1     06-27    TPro  6.8  /  Alb  3.7  /  TBili  0.4  /  DBili  x   /  AST  26  /  ALT  13  /  AlkPhos  63  06-27      < from: CT Angio Chest w/ IV Cont (06.26.17 @ 00:08) >    EXAM:  CT ABDOMEN AND PELVIS OC IC                          EXAM:  CT ANGIO CHEST (W)AW IC                            PROCEDURE DATE:  06/26/2017            INTERPRETATION:  CLINICAL INFORMATION: Shortness of breath and diffuse   abdominal pain.    COMPARISON: CTA chest performed March 14, 2016. CT abdomen pelvis   performed January 21, 2016.    PROCEDURE:   CT Angiography of the chest followed by CT of the abdomen and pelvis.  90 ml of Omnipaque 350 was injected intravenously. 10 ml were discarded.  Sagittal and coronal reformats were performed as well as 3D (MIP)   reconstructions.    FINDINGS:    CHEST:     LUNGS AND LARGE AIRWAYS: Evaluation is limited secondary to respiratory   motion artifact. Patent central airways. There are ground glass opacities   in the right lower lobe. Smooth intralobular septal thickening reflective   of interstitial edema. Nodular branching opacities in the peripheral   upper and right middle lobes likely represent mucoid impacted small   airways. A rightupper lobe calcified granuloma is unchanged.  PLEURA: Small bilateral pleural effusions.  VESSELS: There is no intraluminal filling defect in the central, main,   lobar, and segmental pulmonary arteries to suggest pulmonary embolism.   There is limited evaluation of the segmental and subsegmental pulmonary   arteries in the lower lobes bilaterally. Atherosclerotic calcifications.   < from: Transthoracic Echocardiogram (06.26.17 @ 10:04) >  Normal caliber thoracic aorta.  HEART: Heart size is enlarged. Aortic valve and coronary artery   calcifications. Nopericardial effusion.  MEDIASTINUM AND CLEM: No lymphadenopathy.  CHEST WALL AND LOWER NECK: Within normal limits.    ABDOMEN AND PELVIS:    Multiple images are degraded secondary to motion.    LIVER: Within normal limits.  BILE DUCTS: Normal caliber.  GALLBLADDER: Cholecystectomy.  SPLEEN: Within normal limits.  PANCREAS: Severe atrophy of the distal body and tail the pancreas with   interval increase in the size of a peripherally calcified, macrocystic,   lobulated lesion which measures approximately 7.7 x 2.6 cm (3, 29).  ADRENALS: Within normal limits.  KIDNEYS/URETERS: Left renal cysts and additional bilateral subcentimeter   hypodensities which are too small to characterize.  Culture - Blood (06.26.17 @ 07:50)    Specimen Source: .Blood Blood    Culture Results:   No growth to date.      BLADDER: Within normal limits.  REPRODUCTIVE ORGANS: Hysterectomy.    BOWEL: No bowel obstruction or bowel wall thickening.. Colonic   diverticulosis.  PERITONEUM: No ascites ascites or pneumoperitoneum. No mesenteric   adenopathy.  VESSELS:  Severe atherosclerotic calcifications. Normal caliber abdominal   aorta.  RETROPERITONEUM: No lymphadenopathy.    ABDOMINAL WALL: Within normal limits.  BONES: Degenerative changes an S-shaped scoliosis of the spine. Status   post L5 kyphoplasty. Unchanged compression fracture deformity of L3.    IMPRESSION:    Motion degraded examination. No pulmonary embolism. Limited evaluation of   the segmental and subsegmental pulmonary arteries in the lower lobes   bilaterally.    Mild pulmonary edema and small bilateral pleural effusions. Nodular   branching opacities in the periphery of the upper lobes and right middle   lobe likely representing mucoid impacted small airways.    Severe atrophy of the pancreatic body and tail with interval increase in   the size of a peripherally calcified, lobulated cystic lesion, which may   represent a mucinous cystic neoplasm, IPMN, and less likely serous   cystadenoma. MRI of the pancreas may be obtained for further evaluation.      Transthoracic Echocardiogram (06.26.17 @ 10:04) >    Patient name: BILL PARIKH  Conclusions:  Technically difficult study.  1. Aortic valve not well visualized; appears calcified.  Peak transaortic valve gradient equals 49 mm Hg, mean  transaortic valve gradient equals 27 mm Hg, aortic valve  velocity time integral equals 62 cm, consistent with  moderate aortic stenosis. Suboptimal imaging precludes  accurate assessment of LVOT diameter. Unable to accurately  calculate aortic valve area by continuity. No aortic valve  regurgitation seen.  2. Normal left ventricular internal dimensions and wall  thicknesses.  3. Endocardium not well visualized; grossly hyperdynamic  left ventricular systolic function.  4. The right ventricle is not well visualized.    Culture - Blood (06.26.17 @ 07:50)    Specimen Source: .Blood Blood    Culture Results:   No growth to date.    Culture - Urine (06.26.17 @ 00:58)    -  Amikacin: S <=8    -  Ampicillin: S 4    -  Ampicillin/Sulbactam: S <=4/2    -  Ertapenem: S <=0.5    -  Tobramycin: S <=2    -  Cefazolin: S <=2    -  Ceftriaxone: S <=1    -  Gentamicin: S <=1    -  Nitrofurantoin: S <=32    -  Piperacillin/Tazobactam: S <=8    -  Aztreonam: S <=4    -  Cefepime: S <=2    -  Ceftazidime: S <=1    -  Ciprofloxacin: S <=0.5    -  Cefoxitin: S <=4    -  Imipenem: S <=1    -  Levofloxacin: S <=1    -  Meropenem: S <=1    -  Trimethoprim/Sulfamethoxazole: S <=0.5/9.5    Specimen Source: .Urine Catheterized    Culture Results:   >100,000 CFU/ml Escherichia coli    Organism Identification: Escherichia coli    Organism: Escherichia coli    Method Type: RACHEL    Serum Pro-Brain Natriuretic Peptide (06.25.17 @ 21:47)    Serum Pro-Brain Natriuretic Peptide: 4195

## 2017-06-28 NOTE — PROVIDER CONTACT NOTE (OTHER) - ACTION/TREATMENT ORDERED:
NP to review patients chart. NP to review patients chart.  pt K replaced during the day. Provider addressed PLT, see provider note.

## 2017-06-28 NOTE — CONSULT NOTE ADULT - PROBLEM SELECTOR RECOMMENDATION 3
chronic iron deficiency - was on po iron in past - may resume to improve hgb and help with chf c/o  other option is to give iv venofer in hospital

## 2017-06-28 NOTE — PROGRESS NOTE ADULT - ASSESSMENT
89 yo F as above:  1. SOB - likely multifactorial, CHF + COPD, improved, change Lasix to PO, f/u Cardio, I/O, monitor Cr, Duoneb PRN, PT  2. A fib - rate controlled, not on AC  3. NEWTON - Cr bump likely 2/2 diuresis, will change to PO Lasix  4. Hyponatremia - improving, plan per Renal  5. Pancreatic mass  - known to family, no intervention  6. Chronic anemia - restart PO FeSO4  7. ITP - daily CBC, c/w current dose steroids, f/u Heme  8. UTI - pan-sensitive E Coli, c/w Ceftriaxone for now, will switch to PO upon discharge, total 7 day course

## 2017-06-29 DIAGNOSIS — N39.0 URINARY TRACT INFECTION, SITE NOT SPECIFIED: ICD-10-CM

## 2017-06-29 LAB
ANION GAP SERPL CALC-SCNC: 10 MMOL/L — SIGNIFICANT CHANGE UP (ref 5–17)
BUN SERPL-MCNC: 52 MG/DL — HIGH (ref 7–23)
CALCIUM SERPL-MCNC: 9.1 MG/DL — SIGNIFICANT CHANGE UP (ref 8.4–10.5)
CHLORIDE SERPL-SCNC: 96 MMOL/L — SIGNIFICANT CHANGE UP (ref 96–108)
CO2 SERPL-SCNC: 33 MMOL/L — HIGH (ref 22–31)
CREAT SERPL-MCNC: 1.41 MG/DL — HIGH (ref 0.5–1.3)
GLUCOSE SERPL-MCNC: 96 MG/DL — SIGNIFICANT CHANGE UP (ref 70–99)
HCT VFR BLD CALC: 23.7 % — LOW (ref 34.5–45)
HGB BLD-MCNC: 8.4 G/DL — LOW (ref 11.5–15.5)
MAGNESIUM SERPL-MCNC: 2.4 MG/DL — SIGNIFICANT CHANGE UP (ref 1.6–2.6)
MCHC RBC-ENTMCNC: 30 PG — SIGNIFICANT CHANGE UP (ref 27–34)
MCHC RBC-ENTMCNC: 35.4 GM/DL — SIGNIFICANT CHANGE UP (ref 32–36)
MCV RBC AUTO: 84.6 FL — SIGNIFICANT CHANGE UP (ref 80–100)
PLATELET # BLD AUTO: 22 K/UL — LOW (ref 150–400)
POTASSIUM SERPL-MCNC: 3.6 MMOL/L — SIGNIFICANT CHANGE UP (ref 3.5–5.3)
POTASSIUM SERPL-SCNC: 3.6 MMOL/L — SIGNIFICANT CHANGE UP (ref 3.5–5.3)
RBC # BLD: 2.8 M/UL — LOW (ref 3.8–5.2)
RBC # FLD: 15.6 % — HIGH (ref 10.3–14.5)
SODIUM SERPL-SCNC: 139 MMOL/L — SIGNIFICANT CHANGE UP (ref 135–145)
WBC # BLD: 14.63 K/UL — HIGH (ref 3.8–10.5)
WBC # FLD AUTO: 14.63 K/UL — HIGH (ref 3.8–10.5)

## 2017-06-29 PROCEDURE — 93970 EXTREMITY STUDY: CPT | Mod: 26

## 2017-06-29 RX ADMIN — Medication 650 MILLIGRAM(S): at 18:54

## 2017-06-29 RX ADMIN — Medication 7.5 MILLIGRAM(S): at 05:24

## 2017-06-29 RX ADMIN — Medication 40 MILLIGRAM(S): at 05:24

## 2017-06-29 RX ADMIN — CEFTRIAXONE 100 GRAM(S): 500 INJECTION, POWDER, FOR SOLUTION INTRAMUSCULAR; INTRAVENOUS at 17:49

## 2017-06-29 RX ADMIN — Medication 325 MILLIGRAM(S): at 12:54

## 2017-06-29 RX ADMIN — PANTOPRAZOLE SODIUM 40 MILLIGRAM(S): 20 TABLET, DELAYED RELEASE ORAL at 05:24

## 2017-06-29 RX ADMIN — Medication 1 GRAM(S): at 21:46

## 2017-06-29 RX ADMIN — Medication 650 MILLIGRAM(S): at 19:10

## 2017-06-29 RX ADMIN — Medication 1 GRAM(S): at 13:56

## 2017-06-29 RX ADMIN — Medication 1 GRAM(S): at 05:24

## 2017-06-29 NOTE — PROGRESS NOTE ADULT - SUBJECTIVE AND OBJECTIVE BOX
Patient is a 89y Female  being evaluated for   NEWTON, hyponatremia  awake and responsive, no distress      PHYSICAL EXAM:  Vitals:T(C): 36.5 (06-29-17 @ 05:16), Max: 36.7 (06-28-17 @ 09:36)  HR: 76 (06-29-17 @ 05:16) (70 - 85)  BP: 106/65 (06-29-17 @ 05:16) (101/62 - 116/58)  RR: 18 (06-29-17 @ 05:16) (18 - 18)  SpO2: 95% (06-29-17 @ 05:16) (95% - 99%)  Wt(kg): --    General: no acute distress  HEENT:  NC, ext ears nl, oropharynx clear,  nose nl  Neck: No JVD, bruit, adenopathy or thyromegaly  Eyes: PERRL, no discharge, no icterus  Respiratory: dec bs bases bilat, no wheezes, nl effort  Cardiovascular: regular rate, S1 and S2 no rub or gallop  Abd: : BS+, soft, NT , no rebound or guarding, no bruits  Extremities: no edema, no cyanosis, palpable pulses    I and O's:  06-28 @ 07:01  -  06-29 @ 07:00  --------------------------------------------------------  IN: 730 mL / OUT: 900 mL / NET: -170 mL      Allergies    eggs (Rash)  no drug allergy (Unknown)    Intolerances    vinegar sensitivity    family states that the patient shakes when she ingests vinegar (Other)        MEDICATIONS  (STANDING):  diltiazem    Tablet 60 milliGRAM(s) Oral every 8 hours  pantoprazole    Tablet 40 milliGRAM(s) Oral before breakfast  predniSONE   Tablet 7.5 milliGRAM(s) Oral daily  sucralfate suspension 1 Gram(s) Oral three times a day  cefTRIAXone   IVPB   IV Intermittent   cefTRIAXone   IVPB 1 Gram(s) IV Intermittent every 24 hours  furosemide    Tablet 40 milliGRAM(s) Oral daily  ferrous    sulfate 325 milliGRAM(s) Oral daily      Sodium,Serum 139    06-29 @ 05:45  Sodium,Serum 136    06-28 @ 06:04  Sodium,Serum 133    06-27 @ 07:21  Sodium,Serum 132    06-26 @ 06:51  Sodium,Serum 128    06-26 @ 01:14  Sodium,Serum 132    06-25 @ 21:47    Potassium,Serum 3.6    06-29 @ 05:45  Potassium,Serum 3.1    06-28 @ 06:04  Potassium,Serum 3.6    06-27 @ 07:21  Potassium,Serum 4.2    06-26 @ 06:51  Potassium,Serum 5.0    06-26 @ 01:14  Potassium,Serum 5.2    06-25 @ 21:47    Chloride,Serum 96    06-29 @ 05:45  Chloride,Serum 90    06-28 @ 06:04  Chloride,Serum 86    06-27 @ 07:21  Chloride,Serum 86    06-26 @ 06:51  Chloride,Serum 88    06-26 @ 01:14  Chloride,Serum 90    06-25 @ 21:47    CO2, Serum 33    06-29 @ 05:45  CO2, Serum 32    06-28 @ 06:04  CO2, Serum 32    06-27 @ 07:21  CO2, Serum 29    06-26 @ 06:51  CO2, Serum 29    06-26 @ 01:14  CO2, Serum 29    06-25 @ 21:47    BUN 52    06-29 @ 05:45  BUN 45    06-28 @ 06:04  BUN 25    06-27 @ 07:21  BUN 17    06-26 @ 06:51  BUN 17    06-26 @ 01:14  BUN 19    06-25 @ 21:47    Creatinine, Serum 1.41    06-29 @ 05:45  Creatinine, Serum 1.55    06-28 @ 06:04  Creatinine, Serum 1.17    06-27 @ 07:21  Creatinine, Serum 1.18    06-26 @ 06:51  Creatinine, Serum 1.16    06-26 @ 01:14  Creatinine, Serum 1.31    06-25 @ 21:47      06-29    139  |  96  |  52<H>  ----------------------------<  96  3.6   |  33<H>  |  1.41<H>    Ca    9.1      29 Jun 2017 05:45  Mg     2.4     06-29                              8.4    14.63 )-----------( 22       ( 29 Jun 2017 07:08 )             23.7

## 2017-06-29 NOTE — PROGRESS NOTE ADULT - SUBJECTIVE AND OBJECTIVE BOX
Patient is a 89y old  Female who presents with a chief complaint of SOB (27 Jun 2017 18:03)      HPI:  89 yo F A fib/A flutter , ? hx CHF , HTN, dysphagia (previously refused PEG), multiple admissions for PNA  (?aspiration related, req intubation for hypercapnic resp failure 2014), SBO (resection 2010), ITP on chronic prednisone, pancreatic lesion (prev declined further w/u),  non ambulatory @ baseline (wheel chair/bed bound), lives @ home w/ 24 HHA, per daughter forgetful (A, A, O 1-2 @ baseline). Patient is poor historian. Patient admitted following 2 episodes of SOB, urinating less, L sided abd pain 5/10 .In ED, initially in no acute distress, respiratory status worsened w/ SOB/wheezing and patient placed on bipap, given duoneb x 3, 125 mg IVP solu medrol, 40 mg IVP lasix. CBC in er showed wbc 13.1,  Hgb 8 and platelet count 64K - labs today -  wbc 17.4,  Hgb 8.5 and platelet count 27K. Patient placed on ceftriaxone for E.Coli UTI and is being treated for fluid overload.   Patient c/o epigastric discomfort better. C/o heaviness on breathing better. No expectoration. Appetite stable. No overt bleeding c/o   Denies CP, palpitations, increased fluid intake, orthopnea,  syncope, near syncope, lightheadness, dizziness. Denies N/V/D, melena, hematemesis, hematochezia, coffee ground emesis, dysphagia.       REVIEW OF SYSTEMS:    CONSTITUTIONAL: No  fevers or chills. weakness +  EYES/ENT: No visual changes;  No vertigo or throat pain   NECK: No pain or stiffness  RESPIRATORY: No wheezing, hemoptysis; Cough, shortness of breath +  CARDIOVASCULAR: No chest pain or palpitations  GASTROINTESTINAL:  No nausea, vomiting, or hematemesis; No diarrhea or constipation. No melena or hematochezia.abdominal, epigastric pain +  GENITOURINARY: No dysuria, frequency or hematuria  NEUROLOGICAL: No numbness or weakness  SKIN: No itching, burning, rashes, or lesions . Bruising +      PHYSICAL EXAM  General: adult in NAD  HEENT: clear oropharynx, anicteric sclera, pink conjunctiva  Neck: supple  CV: normal S1/S2 with no murmur rubs or gallops  Lungs: decreased BS b/l on auscultation, no wheezes, no rales  Abdomen: soft non-tender non-distended, no hepatosplenomegaly  Ext: no clubbing cyanosis or edema  Skin: no rashes and no petechiae, bruising on extremities +  Neuro: alert and oriented X 4, no focal deficits        MEDICATIONS  (STANDING):  diltiazem    Tablet 60 milliGRAM(s) Oral every 8 hours  pantoprazole    Tablet 40 milliGRAM(s) Oral before breakfast  predniSONE   Tablet 7.5 milliGRAM(s) Oral daily  sucralfate suspension 1 Gram(s) Oral three times a day  cefTRIAXone   IVPB   IV Intermittent   cefTRIAXone   IVPB 1 Gram(s) IV Intermittent every 24 hours  furosemide    Tablet 40 milliGRAM(s) Oral daily  ferrous    sulfate 325 milliGRAM(s) Oral daily    MEDICATIONS  (PRN):  ALBUTerol/ipratropium for Nebulization 3 milliLiter(s) Nebulizer every 6 hours PRN Shortness of Breath and/or Wheezing  acetaminophen   Tablet. 650 milliGRAM(s) Oral every 6 hours PRN Mild Pain (1 - 3)      Allergies    eggs (Rash)  no drug allergy (Unknown)    Intolerances    vinegar sensitivity    family states that the patient shakes when she ingests vinegar (Other)      Vital Signs Last 24 Hrs  T(C): 36.6 (29 Jun 2017 08:26), Max: 36.6 (28 Jun 2017 16:50)  T(F): 97.8 (29 Jun 2017 08:26), Max: 97.9 (28 Jun 2017 16:50)  HR: 70 (29 Jun 2017 08:26) (70 - 81)  BP: 105/61 (29 Jun 2017 08:26) (102/67 - 116/58)  BP(mean): --  RR: 18 (29 Jun 2017 08:26) (18 - 18)  SpO2: 96% (29 Jun 2017 08:26) (95% - 99%)      LABS:                          8.4    14.63 )-----------( 22       ( 29 Jun 2017 07:08 )             23.7         Mean Cell Volume : 84.6 fl  Mean Cell Hemoglobin : 30.0 pg  Mean Cell Hemoglobin Concentration : 35.4 gm/dL    Serial CBC's  06-29 @ 07:08  Hct-23.7 / Hgb-8.4 / Plat-22 / RBC-2.80 / WBC-14.63      06-29    139  |  96  |  52<H>  ----------------------------<  96  3.6   |  33<H>  |  1.41<H>    Ca    9.1      29 Jun 2017 05:45  Mg     2.4     06-29            WBC Count: 14.63 K/uL (06-29 @ 07:08)  Hemoglobin: 8.4 g/dL (06-29 @ 07:08)  Hematocrit: 23.7 % (06-29 @ 07:08)  Platelet Count - Automated: 22 K/uL (06-29 @ 07:08)  WBC Count: 17.45 K/uL (06-28 @ 07:17)  Hemoglobin: 8.5 g/dL (06-28 @ 07:17)    < from: CT Angio Chest w/ IV Cont (06.26.17 @ 00:08) >    EXAM:  CT ABDOMEN AND PELVIS OC IC                          EXAM:  CT ANGIO CHEST (W)AW IC                            PROCEDURE DATE:  06/26/2017            INTERPRETATION:  CLINICAL INFORMATION: Shortness of breath and diffuse   abdominal pain.    COMPARISON: CTA chest performed March 14, 2016. CT abdomen pelvis   performed January 21, 2016.    PROCEDURE:   CT Angiography of the chest followed by CT of the abdomen and pelvis.  90 ml of Omnipaque 350 was injected intravenously. 10 ml were discarded.  Sagittal and coronal reformats were performed as well as 3D (MIP)   reconstructions.    FINDINGS:    CHEST:     LUNGS AND LARGE AIRWAYS: Evaluation is limited secondary to respiratory   motion artifact. Patent central airways. There are ground glass opacities   in the right lower lobe. Smooth intralobular septal thickening reflective   of interstitial edema. Nodular branching opacities in the peripheral   upper and right middle lobes likely represent mucoid impacted small   airways. A rightupper lobe calcified granuloma is unchanged.  PLEURA: Small bilateral pleural effusions.  VESSELS: There is no intraluminal filling defect in the central, main,   lobar, and segmental pulmonary arteries to suggest pulmonary embolism.   There is limited evaluation of the segmental and subsegmental pulmonary   arteries in the lower lobes bilaterally. Atherosclerotic calcifications.   < from: Transthoracic Echocardiogram (06.26.17 @ 10:04) >  Normal caliber thoracic aorta.  HEART: Heart size is enlarged. Aortic valve and coronary artery   calcifications. Nopericardial effusion.  MEDIASTINUM AND CLEM: No lymphadenopathy.  CHEST WALL AND LOWER NECK: Within normal limits.    ABDOMEN AND PELVIS:    Multiple images are degraded secondary to motion.    LIVER: Within normal limits.  BILE DUCTS: Normal caliber.  GALLBLADDER: Cholecystectomy.  SPLEEN: Within normal limits.  PANCREAS: Severe atrophy of the distal body and tail the pancreas with   interval increase in the size of a peripherally calcified, macrocystic,   lobulated lesion which measures approximately 7.7 x 2.6 cm (3, 29).  ADRENALS: Within normal limits.  KIDNEYS/URETERS: Left renal cysts and additional bilateral subcentimeter   hypodensities which are too small to characterize.  Culture - Blood (06.26.17 @ 07:50)    Specimen Source: .Blood Blood    Culture Results:   No growth to date.      BLADDER: Within normal limits.  REPRODUCTIVE ORGANS: Hysterectomy.    BOWEL: No bowel obstruction or bowel wall thickening.. Colonic   diverticulosis.  PERITONEUM: No ascites ascites or pneumoperitoneum. No mesenteric   adenopathy.  VESSELS:  Severe atherosclerotic calcifications. Normal caliber abdominal   aorta.  RETROPERITONEUM: No lymphadenopathy.    ABDOMINAL WALL: Within normal limits.  BONES: Degenerative changes an S-shaped scoliosis of the spine. Status   post L5 kyphoplasty. Unchanged compression fracture deformity of L3.    IMPRESSION:    Motion degraded examination. No pulmonary embolism. Limited evaluation of   the segmental and subsegmental pulmonary arteries in the lower lobes   bilaterally.    Mild pulmonary edema and small bilateral pleural effusions. Nodular   branching opacities in the periphery of the upper lobes and right middle   lobe likely representing mucoid impacted small airways.    Severe atrophy of the pancreatic body and tail with interval increase in   the size of a peripherally calcified, lobulated cystic lesion, which may   represent a mucinous cystic neoplasm, IPMN, and less likely serous   cystadenoma. MRI of the pancreas may be obtained for further evaluation.      Transthoracic Echocardiogram (06.26.17 @ 10:04) >    Patient name: BILL PARIKH  Conclusions:  Technically difficult study.  1. Aortic valve not well visualized; appears calcified.  Peak transaortic valve gradient equals 49 mm Hg, mean  transaortic valve gradient equals 27 mm Hg, aortic valve  velocity time integral equals 62 cm, consistent with  moderate aortic stenosis. Suboptimal imaging precludes  accurate assessment of LVOT diameter. Unable to accurately  calculate aortic valve area by continuity. No aortic valve  regurgitation seen.  2. Normal left ventricular internal dimensions and wall  thicknesses.  3. Endocardium not well visualized; grossly hyperdynamic  left ventricular systolic function.  4. The right ventricle is not well visualized.    Culture - Blood (06.26.17 @ 07:50)    Specimen Source: .Blood Blood    Culture Results:   No growth to date.    Culture - Urine (06.26.17 @ 00:58)    -  Amikacin: S <=8    -  Ampicillin: S 4    -  Ampicillin/Sulbactam: S <=4/2    -  Ertapenem: S <=0.5    -  Tobramycin: S <=2    -  Cefazolin: S <=2    -  Ceftriaxone: S <=1    -  Gentamicin: S <=1    -  Nitrofurantoin: S <=32    -  Piperacillin/Tazobactam: S <=8    -  Aztreonam: S <=4    -  Cefepime: S <=2    -  Ceftazidime: S <=1    -  Ciprofloxacin: S <=0.5    -  Cefoxitin: S <=4    -  Imipenem: S <=1    -  Levofloxacin: S <=1    -  Meropenem: S <=1    -  Trimethoprim/Sulfamethoxazole: S <=0.5/9.5    Specimen Source: .Urine Catheterized    Culture Results:   >100,000 CFU/ml Escherichia coli    Organism Identification: Escherichia coli    Organism: Escherichia coli    Method Type: RACHEL    Serum Pro-Brain Natriuretic Peptide (06.25.17 @ 21:47)    Serum Pro-Brain Natriuretic Peptide: 4195

## 2017-06-29 NOTE — PROGRESS NOTE ADULT - ASSESSMENT
Imp-  89y Female 89 F with hx of hyponatremia   Na imrpoving   NEWTON likely due to contrast and diuretics  monitor I&O's, electrolytes and renal function with lasix

## 2017-06-29 NOTE — PROGRESS NOTE ADULT - SUBJECTIVE AND OBJECTIVE BOX
CHIEF COMPLAINT:Patient is a 89y old  Female who presents with a chief complaint of SOB (26 Jun 2017 03:21)    	  Interval history: no new complaints, SOB resolved      Allergies:  eggs (Rash)  no drug allergy (Unknown)  vinegar sensitivity    family states that the patient shakes when she ingests vinegar (Other)      PAST MEDICAL & SURGICAL HISTORY:  PNA (pneumonia)  Dysphagia  Thrombocytopenia: ITP  Atrial fibrillation and flutter: No A/C  during hospitalization in april 2014  Respiratory failure: in april 2014 was intubated  Cataract: right eye  Small bowel obstruction: s/p resection in 2010  HTN - Hypertension  S/P cholecystectomy  H/O: Hysterectomy  S/P Small Bowel Resection      FAMILY HISTORY:  No pertinent family history in first degree relatives      REVIEW OF SYSTEMS:  CONSTITUTIONAL: No fever, weight loss, or fatigue  EYES: No eye pain, visual disturbances, or discharge  NECK: No pain or stiffness  RESPIRATORY: No cough or wheezing, no shortness of breath  CARDIOVASCULAR: No chest pain, palpitations, dizziness, or leg swelling  GASTROINTESTINAL: No abdominal or epigastric pain. No nausea, vomiting, diarrhea or constipation  GENITOURINARY: No dysuria, urinary frequency or urgency, no hematuria  NEUROLOGICAL: No headaches, memory loss, loss of strength, numbness, or tremors  SKIN: No itching, burning, rashes, or lesions   MUSCULOSKELETAL: chronic diffuse body pains  Medications:  MEDICATIONS  (STANDING):  diltiazem    Tablet 60 milliGRAM(s) Oral every 8 hours  pantoprazole    Tablet 40 milliGRAM(s) Oral before breakfast  predniSONE   Tablet 7.5 milliGRAM(s) Oral daily  sucralfate suspension 1 Gram(s) Oral three times a day  cefTRIAXone   IVPB   IV Intermittent   cefTRIAXone   IVPB 1 Gram(s) IV Intermittent every 24 hours  furosemide    Tablet 40 milliGRAM(s) Oral daily  ferrous    sulfate 325 milliGRAM(s) Oral daily    MEDICATIONS  (PRN):  ALBUTerol/ipratropium for Nebulization 3 milliLiter(s) Nebulizer every 6 hours PRN Shortness of Breath and/or Wheezing  acetaminophen   Tablet. 650 milliGRAM(s) Oral every 6 hours PRN Mild Pain (1 - 3)    	    PHYSICAL EXAM:  T(C): 36.6 (06-29-17 @ 08:26), Max: 36.6 (06-28-17 @ 16:50)  HR: 70 (06-29-17 @ 08:26) (70 - 81)  BP: 105/61 (06-29-17 @ 08:26) (102/67 - 116/58)  RR: 18 (06-29-17 @ 08:26) (18 - 18)  SpO2: 96% (06-29-17 @ 08:26) (95% - 99%)  Wt(kg): --  I&O's Summary    28 Jun 2017 07:01  -  29 Jun 2017 07:00  --------------------------------------------------------  IN: 730 mL / OUT: 900 mL / NET: -170 mL    29 Jun 2017 07:01  -  29 Jun 2017 14:14  --------------------------------------------------------  IN: 120 mL / OUT: 250 mL / NET: -130 mL        Appearance: NAD, frail	  HEENT:   NCAT, PERRL, EOMI	  Lymphatic: No lymphadenopathy  Cardiovascular: Normal S1 S2, RRR  Respiratory: CTA BL  Psychiatry: A & O x 2-3, Mood & affect appropriate  Gastrointestinal:  Soft, Non-tender, + BS  Skin: No rashes, No ecchymoses, No cyanosis	  Neurologic: Non-focal  Extremities: Normal range of motion, No clubbing, cyanosis or edema      	  LABS:	 	    CARDIAC MARKERS:                                8.4    14.63 )-----------( 22       ( 29 Jun 2017 07:08 )             23.7     06-29    139  |  96  |  52<H>  ----------------------------<  96  3.6   |  33<H>  |  1.41<H>    Ca    9.1      29 Jun 2017 05:45  Mg     2.4     06-29      proBNP:   Lipid Profile:   HgA1c:   TSH:

## 2017-06-29 NOTE — PROGRESS NOTE ADULT - ASSESSMENT
89 yo F as above:  1. SOB - likely multifactorial, CHF + COPD, resolved now, c/w PO Lasix, f/u Cardio, I/O, monitor Cr, Duoneb PRN, PT  2. A fib - rate controlled, not on AC  3. NEWTON - Cr bump likely 2/2 diuresis, now improving  4. Hyponatremia - improving, plan per Renal  5. Pancreatic mass  - known to family, no intervention  6. Chronic anemia - restart PO FeSO4  7. ITP - daily CBC, increase steroids as plt thending down  8. UTI - pan-sensitive E Coli, c/w Ceftriaxone for now, will switch to PO upon discharge, total 7 day course 89 yo F as above:  1. SOB - likely multifactorial, CHF + COPD, resolved now, c/w PO Lasix, f/u Cardio, I/O, monitor Cr, Duoneb PRN, PT  2. A fib - rate controlled, not on AC  3. NEWTON - Cr bump likely 2/2 diuresis, now improving  4. Hyponatremia - improving, plan per Renal  5. Pancreatic mass  - known to family, no intervention  6. Chronic anemia - restart PO FeSO4  7. ITP - daily CBC, increase steroids if plt thending down tomorrow AM  8. UTI - pan-sensitive E Coli, c/w Ceftriaxone for now, will switch to PO upon discharge, total 7 day course

## 2017-06-29 NOTE — PROGRESS NOTE ADULT - PROBLEM SELECTOR PLAN 1
Mild decline in platelet count  Ct to monitor on current prednisone dose in absence of active bleeding  Prn platelet transfusion if ative bleeding  if platelet count cts to decline in am - will increase prednisone dose

## 2017-06-30 DIAGNOSIS — J96.01 ACUTE RESPIRATORY FAILURE WITH HYPOXIA: ICD-10-CM

## 2017-06-30 DIAGNOSIS — R82.79 OTHER ABNORMAL FINDINGS ON MICROBIOLOGICAL EXAMINATION OF URINE: ICD-10-CM

## 2017-06-30 LAB
ANION GAP SERPL CALC-SCNC: 14 MMOL/L — SIGNIFICANT CHANGE UP (ref 5–17)
BUN SERPL-MCNC: 55 MG/DL — HIGH (ref 7–23)
CALCIUM SERPL-MCNC: 9.4 MG/DL — SIGNIFICANT CHANGE UP (ref 8.4–10.5)
CHLORIDE SERPL-SCNC: 96 MMOL/L — SIGNIFICANT CHANGE UP (ref 96–108)
CO2 SERPL-SCNC: 31 MMOL/L — SIGNIFICANT CHANGE UP (ref 22–31)
CREAT SERPL-MCNC: 1.35 MG/DL — HIGH (ref 0.5–1.3)
GLUCOSE SERPL-MCNC: 101 MG/DL — HIGH (ref 70–99)
HCT VFR BLD CALC: 24.7 % — LOW (ref 34.5–45)
HGB BLD-MCNC: 8.9 G/DL — LOW (ref 11.5–15.5)
MCHC RBC-ENTMCNC: 30.6 PG — SIGNIFICANT CHANGE UP (ref 27–34)
MCHC RBC-ENTMCNC: 36 GM/DL — SIGNIFICANT CHANGE UP (ref 32–36)
MCV RBC AUTO: 84.9 FL — SIGNIFICANT CHANGE UP (ref 80–100)
PLATELET # BLD AUTO: 35 K/UL — LOW (ref 150–400)
POTASSIUM SERPL-MCNC: 3.7 MMOL/L — SIGNIFICANT CHANGE UP (ref 3.5–5.3)
POTASSIUM SERPL-SCNC: 3.7 MMOL/L — SIGNIFICANT CHANGE UP (ref 3.5–5.3)
RBC # BLD: 2.91 M/UL — LOW (ref 3.8–5.2)
RBC # FLD: 15.8 % — HIGH (ref 10.3–14.5)
SODIUM SERPL-SCNC: 141 MMOL/L — SIGNIFICANT CHANGE UP (ref 135–145)
WBC # BLD: 15.27 K/UL — HIGH (ref 3.8–10.5)
WBC # FLD AUTO: 15.27 K/UL — HIGH (ref 3.8–10.5)

## 2017-06-30 RX ADMIN — Medication 1 GRAM(S): at 13:53

## 2017-06-30 RX ADMIN — Medication 40 MILLIGRAM(S): at 05:41

## 2017-06-30 RX ADMIN — Medication 1 GRAM(S): at 21:33

## 2017-06-30 RX ADMIN — Medication 1 GRAM(S): at 05:41

## 2017-06-30 RX ADMIN — PANTOPRAZOLE SODIUM 40 MILLIGRAM(S): 20 TABLET, DELAYED RELEASE ORAL at 05:42

## 2017-06-30 RX ADMIN — Medication 325 MILLIGRAM(S): at 13:52

## 2017-06-30 RX ADMIN — Medication 7.5 MILLIGRAM(S): at 05:42

## 2017-06-30 NOTE — PROGRESS NOTE ADULT - ASSESSMENT
Imp-  89y Female 89 F with hx of hyponatremia   Na improving   NEWTON likely due to contrast and diuretics  monitor I&O's, electrolytes and renal function with lasix

## 2017-06-30 NOTE — CONSULT NOTE ADULT - SUBJECTIVE AND OBJECTIVE BOX
Consultation Requested by: Robert bolanos  Reason for consultation: UTI    Patient is a 89y old  Female who presents with a chief complaint of SOB (27 Jun 2017 18:03)    HPI:  89 yo F A fib/A flutter (not on AC), ? hx CHF (no previous echo in [place]/[facility] but treated in past for ADHF), HTN, dysphagia (previously refused PEG), multiple admissions for PNA  (?aspiration related, req intubation for hypercapnic resp failure 2014), SBO (resection 2010), ITP on chronic prednisone, pancreatic lesion (prev declined further w/u), ? hx asthma, non ambulatory @ baseline (wheel chair/bed bound), lives @ home w/ 24 HHA, per daughter forgetful (A, A, O 1-2 @ baseline). Patient is poor historian. History obtained from patient, daughter, chart review, speaking w/ ER provider. P/w  SOB @ rest which improved  w/ proventil. Per aide, patient noted to be urinating less. Patient reports L sided abd pain 5/10l.  Denies CP, palpitations, increased fluid intake, orthopnea, PND, syncope, near syncope, lightheadness, dizziness. Denies N/V/D, melena, hematemesis, hematochezia, coffee ground emesis, dysphagia.     In ED, initially in no acute distress, respiratory status worsened w/ SOB/wheezing and patient placed on bipap, given duoneb x 3, 125 mg IVP solu medrol, 40 mg IVP lasix.    Patient admitted for Hypoxic resp failure likely 2/2 CHF. Ct chest/abd pelvis was suggestive of Mucoid impaction of RML and large pancreatic cystic lesion likely IPMN. Was started on treatment for UTI with IV CTX as urine cultures positive for pansensitive Klebsiella      REVIEW OF SYSTEMS  All review of systems negative, except for those marked:  General:           [x] Abnormal: soreness of whole body  	            [] Night Sweats		[] Fever		[] Weight Loss  Pulmonary/Cough:	[x] Abnormal: SOB, improved  Cardiac/Chest Pain:	[] Abnormal:  Gastrointestinal:	[] Abnormal:  Eyes:		[] Abnormal:  ENT:		[] Abnormal:  :		[] Abnormal:  Musculoskeletal	[] Abnormal:  Endocrine:	[] Abnormal:  Lymph Nodes:	[] Abnormal:  Neuro:		[] Abnormal:  Skin:		[x] Abnormal: Leg swelling  Allergy/Immune:	[] Abnormal:  Psychiatric:	[] Abnormal:  [] All other review of systems negative  [] Unable to obtain (explain):    Recent Ill Contacts:	[] No	[] Yes:  Recent Travel History:	[] No	[] Yes:  Recent Animal/Insect Exposure/Tick Bites:	[] No	[] Yes:    Allergies    eggs (Rash)  no drug allergy (Unknown)    Intolerances    vinegar sensitivity    family states that the patient shakes when she ingests vinegar (Other)    Antimicrobials:  cefTRIAXone   IVPB   IV Intermittent   cefTRIAXone   IVPB 1 Gram(s) IV Intermittent every 24 hours      Other Medications:  diltiazem    Tablet 60 milliGRAM(s) Oral every 8 hours  pantoprazole    Tablet 40 milliGRAM(s) Oral before breakfast  predniSONE   Tablet 7.5 milliGRAM(s) Oral daily  sucralfate suspension 1 Gram(s) Oral three times a day  ALBUTerol/ipratropium for Nebulization 3 milliLiter(s) Nebulizer every 6 hours PRN  furosemide    Tablet 40 milliGRAM(s) Oral daily  acetaminophen   Tablet. 650 milliGRAM(s) Oral every 6 hours PRN  ferrous    sulfate 325 milliGRAM(s) Oral daily      FAMILY HISTORY:  No pertinent family history in first degree relatives    PAST MEDICAL & SURGICAL HISTORY:  PNA (pneumonia)  Dysphagia  Thrombocytopenia: ITP  Atrial fibrillation and flutter: No A/C  during hospitalization in april 2014  Respiratory failure: in april 2014 was intubated  Cataract: right eye  Small bowel obstruction: s/p resection in 2010  HTN - Hypertension  S/P cholecystectomy  H/O: Hysterectomy  S/P Small Bowel Resection    SOCIAL HISTORY:  Wheel chair bound  Non smoker    Vital Signs Last 24 Hrs  T(C): 36.6 (30 Jun 2017 11:03), Max: 36.7 (29 Jun 2017 19:47)  T(F): 97.8 (30 Jun 2017 11:03), Max: 98.1 (29 Jun 2017 19:47)  HR: 59 (30 Jun 2017 11:03) (59 - 82)  BP: 124/53 (30 Jun 2017 11:03) (100/63 - 146/81)  BP(mean): --  RR: 18 (30 Jun 2017 11:03) (18 - 18)  SpO2: 99% (30 Jun 2017 11:03) (92% - 100%)    PHYSICAL EXAM  All physical exam findings normal, except for those marked:  General:            Normal: alert, no respiratory distress  .		[x] Abnormal: thin built  Eyes		Normal: no conjunctival injection, no discharge,  .		[] Abnormal:  ENT:		Normal: no pharyngeal erythema or exudates  .		[] Abnormal:  Neck		Normal: supple, full range of motion, no nuchal rigidity  .		[] Abnormal:  Lymph Nodes	Normal: normal size and consistency, non-tender  .		[] Abnormal:  Cardiovascular	Normal: regular rate and variability; Normal S1, S2; No murmur  .		[] Abnormal:  Respiratory	Normal: no wheezing or crackles, bilateral audible breath sounds  .		[x] Abnormal: Minimal wheezing, on nasal canula for oxygenation  Abdominal	Normal: soft; non-distended; non-tender; no hepatosplenomegaly or masses  .		[] Abnormal:  		Normal: normal external genitalia, no rash  .		[] Abnormal:  Extremities	Normal: FROM x4, no cyanosis or edema, symmetric pulses  .		[] Abnormal:  Skin		Normal: skin intact and not indurated; no rash, no desquamation  .		[x] Abnormal: mild pitting edema  Neurologic	 Normal: alert, oriented as age-appropriate, moves all extremities,  .		[] Abnormal:  Musculoskeletal	Normal: no joint swelling, erythema, or tenderness;  .		[] Abnormal    Lab Results:                        8.9    15.27 )-----------( 35       ( 30 Jun 2017 07:20 )             24.7     06-30    141  |  96  |  55<H>  ----------------------------<  101<H>  3.7   |  31  |  1.35<H>    Ca    9.4      30 Jun 2017 06:02  Mg     2.4     06-29    RADIOLOGY  CT CHEST/ABD/PELVIS  Motion degraded examination. No pulmonary embolism. Limited evaluation of   the segmental and subsegmental pulmonary arteries in the lower lobes   bilaterally.    Mild pulmonary edema and small bilateral pleural effusions. Nodular   branching opacities in the periphery of the upper lobes and right middle   lobe likely representing mucoid impacted small airways.    Severe atrophy of the pancreatic body and tail with interval increase in   the size of a peripherally calcified, lobulated cystic lesion, which may   represent a mucinous cystic neoplasm, IPMN, and less likely serous   cystadenoma. MRI of the pancreas may be obtained for further evaluation          MICROBIOLOGY    Culture - Blood (06.26.17 @ 07:50)  Specimen Source: .Blood Blood  Culture Results: No growth to date.      Culture - Urine (06.26.17 @ 00:58)  E coli--pan sensitive Consultation Requested by: Robert bolanos  Reason for consultation: UTI    Patient is a 89y old  Female who presents with a chief complaint of SOB (27 Jun 2017 18:03)    HPI:  89 yo F A fib/A flutter (not on AC),  CHF (no previous echo in [place]/[facility] but treated in past for ADHF), HTN, dysphagia (previously refused PEG), multiple admissions for PNA  (?aspiration related, req intubation for hypercapnic resp failure 2014), SBO (resection 2010), ITP on chronic prednisone, pancreatic lesion (prev declined further w/u), ? hx asthma, non ambulatory @ baseline (wheel chair/bed bound), lives @ home w/ 24 HHA, per daughter forgetful (A, A, O 1-2 @ baseline). Patient is poor historian. History obtained from patient, daughter, chart review, speaking w/ ER provider. P/w  SOB @ rest which improved  w/ proventil. Per aide, patient noted to be urinating less. Patient reports L sided abd pain 5/10l.  Denies CP, palpitations, increased fluid intake, orthopnea, PND, syncope, near syncope, lightheadness, dizziness. Denies N/V/D, melena, hematemesis, hematochezia, coffee ground emesis, dysphagia.     In ED, initially in no acute distress, respiratory status worsened w/ SOB/wheezing and patient placed on bipap, given duoneb x 3, 125 mg IVP solu medrol, 40 mg IVP lasix.    Patient admitted for Hypoxic resp failure likely 2/2 CHF. Ct chest/abd pelvis was suggestive of Mucoid impaction of RML and large pancreatic cystic lesion likely IPMN. Was started on treatment for UTI with IV CTX as urine cultures positive for pansensitive Klebsiella      REVIEW OF SYSTEMS  All review of systems negative, except for those marked:  General:           [x] Abnormal: soreness of whole body  	            [] Night Sweats		[] Fever		[] Weight Loss  Pulmonary/Cough:	[x] Abnormal: SOB, improved  Cardiac/Chest Pain:	[] Abnormal:  Gastrointestinal:	[] Abnormal:  Eyes:		[] Abnormal:  ENT:		[] Abnormal:  :		[] Abnormal:  Musculoskeletal	[] Abnormal:  Endocrine:	[] Abnormal:  Lymph Nodes:	[] Abnormal:  Neuro:		[] Abnormal:  Skin:		[x] Abnormal: Leg swelling  Allergy/Immune:	[] Abnormal:  Psychiatric:	[] Abnormal:  [] All other review of systems negative  [] Unable to obtain (explain):    Recent Ill Contacts:	[] No	[] Yes:  Recent Travel History:	[] No	[] Yes:  Recent Animal/Insect Exposure/Tick Bites:	[] No	[] Yes:    Allergies    eggs (Rash)  no drug allergy (Unknown)    Intolerances    vinegar sensitivity    family states that the patient shakes when she ingests vinegar (Other)    Antimicrobials:  cefTRIAXone   IVPB   IV Intermittent   cefTRIAXone   IVPB 1 Gram(s) IV Intermittent every 24 hours      Other Medications:  diltiazem    Tablet 60 milliGRAM(s) Oral every 8 hours  pantoprazole    Tablet 40 milliGRAM(s) Oral before breakfast  predniSONE   Tablet 7.5 milliGRAM(s) Oral daily  sucralfate suspension 1 Gram(s) Oral three times a day  ALBUTerol/ipratropium for Nebulization 3 milliLiter(s) Nebulizer every 6 hours PRN  furosemide    Tablet 40 milliGRAM(s) Oral daily  acetaminophen   Tablet. 650 milliGRAM(s) Oral every 6 hours PRN  ferrous    sulfate 325 milliGRAM(s) Oral daily      FAMILY HISTORY:  No pertinent family history in first degree relatives    PAST MEDICAL & SURGICAL HISTORY:  PNA (pneumonia)  Dysphagia  Thrombocytopenia: ITP  Atrial fibrillation and flutter: No A/C  during hospitalization in april 2014  Respiratory failure: in april 2014 was intubated  Cataract: right eye  Small bowel obstruction: s/p resection in 2010  HTN - Hypertension  S/P cholecystectomy  H/O: Hysterectomy  S/P Small Bowel Resection    SOCIAL HISTORY:  Wheel chair bound  Non smoker    Vital Signs Last 24 Hrs  T(C): 36.6 (30 Jun 2017 11:03), Max: 36.7 (29 Jun 2017 19:47)  T(F): 97.8 (30 Jun 2017 11:03), Max: 98.1 (29 Jun 2017 19:47)  HR: 59 (30 Jun 2017 11:03) (59 - 82)  BP: 124/53 (30 Jun 2017 11:03) (100/63 - 146/81)  BP(mean): --  RR: 18 (30 Jun 2017 11:03) (18 - 18)  SpO2: 99% (30 Jun 2017 11:03) (92% - 100%)    PHYSICAL EXAM  All physical exam findings normal, except for those marked:  General:            Normal: alert, no respiratory distress  .		[x] Abnormal: thin built  Eyes		Normal: no conjunctival injection, no discharge,  .		[] Abnormal:  ENT:		Normal: no pharyngeal erythema or exudates  .		[] Abnormal:  Neck		Normal: supple, full range of motion, no nuchal rigidity  .		[] Abnormal:  Lymph Nodes	Normal: normal size and consistency, non-tender  .		[] Abnormal:  Cardiovascular	Normal: regular rate and variability; Normal S1, S2; No murmur  .		[] Abnormal:  Respiratory	Normal: no wheezing or crackles, bilateral audible breath sounds  .		[x] Abnormal: Minimal wheezing, on nasal canula for oxygenation  Abdominal	Normal: soft; non-distended; non-tender; no hepatosplenomegaly or masses  .		[] Abnormal:  		Normal: normal external genitalia, no rash  .		[] Abnormal:  Extremities	Normal: FROM x4, no cyanosis or edema, symmetric pulses  .		[] Abnormal:  Skin		Normal: skin intact and not indurated; no rash, no desquamation  .		[x] Abnormal: mild pitting edema  Neurologic	 Normal: alert, oriented as age-appropriate, moves all extremities,  .		[] Abnormal:  Musculoskeletal	Normal: no joint swelling, erythema, or tenderness;  .		[] Abnormal    Lab Results:                        8.9    15.27 )-----------( 35       ( 30 Jun 2017 07:20 )             24.7     06-30    141  |  96  |  55<H>  ----------------------------<  101<H>  3.7   |  31  |  1.35<H>    Ca    9.4      30 Jun 2017 06:02  Mg     2.4     06-29    RADIOLOGY  CT CHEST/ABD/PELVIS  Motion degraded examination. No pulmonary embolism. Limited evaluation of   the segmental and subsegmental pulmonary arteries in the lower lobes   bilaterally.    Mild pulmonary edema and small bilateral pleural effusions. Nodular   branching opacities in the periphery of the upper lobes and right middle   lobe likely representing mucoid impacted small airways.    Severe atrophy of the pancreatic body and tail with interval increase in   the size of a peripherally calcified, lobulated cystic lesion, which may   represent a mucinous cystic neoplasm, IPMN, and less likely serous   cystadenoma. MRI of the pancreas may be obtained for further evaluation          MICROBIOLOGY    Culture - Blood (06.26.17 @ 07:50)  Specimen Source: .Blood Blood  Culture Results: No growth to date.      Culture - Urine (06.26.17 @ 00:58)  E coli--pan sensitive Consultation Requested by: Robert bolanos  Reason for consultation: UTI    Patient is a 89y old  Female who presents with a chief complaint of SOB (27 Jun 2017 18:03)    HPI:  89 yo F A fib/A flutter (not on AC),  CHF (no previous echo in [place]/[facility] but treated in past for ADHF), HTN, dysphagia (previously refused PEG), multiple admissions for PNA  (?aspiration related, req intubation for hypercapnic resp failure 2014), SBO (resection 2010), ITP on chronic prednisone, pancreatic lesion (prev declined further w/u), ? hx asthma, non ambulatory @ baseline (wheel chair/bed bound), lives @ home w/ 24 HHA, per daughter forgetful (A, A, O 1-2 @ baseline). Patient is poor historian. History obtained from patient, daughter, chart review, speaking w/ ER provider. P/w  SOB @ rest which improved  w/ proventil. Per aide, patient noted to be urinating less. Patient reports L sided abd pain 5/10l.  Denies CP, palpitations, increased fluid intake, orthopnea, PND, syncope, near syncope, lightheadness, dizziness. Denies N/V/D, melena, hematemesis, hematochezia, coffee ground emesis, dysphagia.     In ED, initially in no acute distress, respiratory status worsened w/ SOB/wheezing and patient placed on bipap, given duoneb x 3, 125 mg IVP solu medrol, 40 mg IVP lasix.    Patient admitted for Hypoxic resp failure likely 2/2 CHF. Ct chest/abd pelvis was suggestive of Mucoid impaction of RML and large pancreatic cystic lesion likely IPMN. Was started on treatment for UTI with IV CTX as urine cultures positive for pansensitive Klebsiella      REVIEW OF SYSTEMS  All review of systems negative, except for those marked:  General:           [x] Abnormal: soreness of whole body  	            [] Night Sweats		[] Fever		[] Weight Loss  Pulmonary        [x] Abnormal: SOB, improved  Cardiac  	[] Abnormal:  Gastrointestinal:	[] Abnormal:  Eyes:		[] Abnormal:  ENT:		[] Abnormal:  :		[] Abnormal:  Musculoskeletal	[] Abnormal:  Endocrine:	[] Abnormal:  Lymph Nodes:	[] Abnormal:  Neuro:		[] Abnormal:  Skin:		[x] Abnormal: Leg swelling  Allergy/Immune:	[] Abnormal:  Psychiatric:	[] Abnormal:  [] All other review of systems negative  [] Unable to obtain (explain):    Recent Ill Contacts:	[] No	[] Yes:  Recent Travel History:	[] No	[] Yes:  Recent Animal/Insect Exposure/Tick Bites:	[] No	[] Yes:    Allergies    eggs (Rash)  no drug allergy (Unknown)    Intolerances    vinegar sensitivity family states that the patient shakes when she ingests vinegar (Other)    Antimicrobials:  cefTRIAXone   IVPB   IV Intermittent   cefTRIAXone   IVPB 1 Gram(s) IV Intermittent every 24 hours      Other Medications:  diltiazem    Tablet 60 milliGRAM(s) Oral every 8 hours  pantoprazole    Tablet 40 milliGRAM(s) Oral before breakfast  predniSONE   Tablet 7.5 milliGRAM(s) Oral daily  sucralfate suspension 1 Gram(s) Oral three times a day  ALBUTerol/ipratropium for Nebulization 3 milliLiter(s) Nebulizer every 6 hours PRN  furosemide    Tablet 40 milliGRAM(s) Oral daily  acetaminophen   Tablet. 650 milliGRAM(s) Oral every 6 hours PRN  ferrous    sulfate 325 milliGRAM(s) Oral daily      FAMILY HISTORY:  No pertinent family history in first degree relatives    PAST MEDICAL & SURGICAL HISTORY:  PNA (pneumonia)  Dysphagia  Thrombocytopenia: ITP  Atrial fibrillation and flutter: No A/C  during hospitalization in april 2014  Respiratory failure: in april 2014 was intubated  Cataract: right eye  Small bowel obstruction: s/p resection in 2010  HTN - Hypertension  S/P cholecystectomy  H/O: Hysterectomy  S/P Small Bowel Resection    SOCIAL HISTORY:  Wheel chair bound  Non smoker    Vital Signs Last 24 Hrs  T(C): 36.6 (30 Jun 2017 11:03), Max: 36.7 (29 Jun 2017 19:47)  T(F): 97.8 (30 Jun 2017 11:03), Max: 98.1 (29 Jun 2017 19:47)  HR: 59 (30 Jun 2017 11:03) (59 - 82)  BP: 124/53 (30 Jun 2017 11:03) (100/63 - 146/81)  BP(mean): --  RR: 18 (30 Jun 2017 11:03) (18 - 18)  SpO2: 99% (30 Jun 2017 11:03) (92% - 100%)    PHYSICAL EXAM  All physical exam findings normal, except for those marked:  General:            Normal: alert, no respiratory distress  .		[x] Abnormal: thin built  Eyes		Normal: no conjunctival injection, no discharge,  .		[] Abnormal:  ENT:		Normal: no pharyngeal erythema or exudates  .		[] Abnormal:  Neck		Normal: supple, full range of motion, no nuchal rigidity  .		[] Abnormal:  Lymph Nodes	Normal: normal size and consistency, non-tender  .		[] Abnormal:  Cardiovascular	Normal: regular rate and variability; Normal S1, S2; No murmur  .		[] Abnormal:  Respiratory	Normal: no wheezing or crackles, bilateral audible breath sounds  .		[x] Abnormal: Minimal wheezing, on nasal canula for oxygenation  Abdominal	Normal: soft; non-distended; non-tender; no hepatosplenomegaly or masses  .		[] Abnormal:  		Normal: normal external genitalia, no rash  .		[] Abnormal:  Extremities	Normal: FROM x4, no cyanosis or edema, symmetric pulses  .		[] Abnormal:  Skin		Normal: skin intact and not indurated; no rash, no desquamation  .		[x] Abnormal: mild pitting edema  Neurologic	 Normal: alert, oriented as age-appropriate, moves all extremities,  .		[] Abnormal:  Musculoskeletal	Normal: no joint swelling, erythema, or tenderness;  .		[] Abnormal    Lab Results:                        8.9    15.27 )-----------( 35       ( 30 Jun 2017 07:20 )             24.7     06-30    141  |  96  |  55<H>  ----------------------------<  101<H>  3.7   |  31  |  1.35<H>    Ca    9.4      30 Jun 2017 06:02  Mg     2.4     06-29    RADIOLOGY  CT CHEST/ABD/PELVIS  Motion degraded examination. No pulmonary embolism. Limited evaluation of   the segmental and subsegmental pulmonary arteries in the lower lobes   bilaterally.    Mild pulmonary edema and small bilateral pleural effusions. Nodular   branching opacities in the periphery of the upper lobes and right middle   lobe likely representing mucoid impacted small airways.    Severe atrophy of the pancreatic body and tail with interval increase in   the size of a peripherally calcified, lobulated cystic lesion, which may   represent a mucinous cystic neoplasm, IPMN, and less likely serous   cystadenoma. MRI of the pancreas may be obtained for further evaluation          MICROBIOLOGY    Culture - Blood (06.26.17 @ 07:50)  Specimen Source: .Blood Blood  Culture Results: No growth to date.      Culture - Urine (06.26.17 @ 00:58)  E coli--pan sensitive

## 2017-06-30 NOTE — PROGRESS NOTE ADULT - ASSESSMENT
89 yo F A fib/A flutter (not on AC), CHF , HTN, dysphagia (previously refused PEG), multiple admissions for PNA  (?aspiration related, req intubation for hypercapnic resp failure), SBO (resection 2010), ITP on chronic prednisone p/w few hours of SOB. Was noted to be in resp failure requiring BIPAP likely 2/2 CHF exacerbation. CXR and CT chest more concerning for CHF. Improving on diuresis for CHF. Had leukocytosis up to 20, which likely was related to steroid use and is already trending down. Also getting treated for positive urine culture for E coli, although UA was not suggestive of infection with <10 WBC in urine and trace LE. Doubt she has UTI 89 yo F A fib/A flutter (not on AC), CHF , HTN, dysphagia (previously refused PEG), multiple admissions for PNA  (?aspiration related, req intubation for hypercapnic resp failure), SBO (resection 2010), ITP on chronic prednisone p/w few hours of SOB. Was noted to be in resp failure requiring BIPAP likely 2/2 CHF exacerbation. CXR and CT chest more concerning for CHF. Improving on diuresis for CHF. Had leukocytosis up to 20, which likely was related to steroid use and is already trending down. Also getting treated for positive urine culture for E coli, although UA was not suggestive of infection with <10 WBC in urine and trace LE. Doubt she has UTI. Has developed mild NEWTON likely 2/2 contrast use and diuresis.

## 2017-06-30 NOTE — PROGRESS NOTE ADULT - PROBLEM SELECTOR PLAN 1
Mild improvement in platelet count  Ct to monitor on current prednisone dose in absence of active bleeding  Prn platelet transfusion if active bleeding

## 2017-06-30 NOTE — PROGRESS NOTE ADULT - ASSESSMENT
89 yo Female  as above:  1. SOB - likely multifactorial, CHF + COPD, resolved now, c/w PO Lasix, f/u Cardio, I/O, monitor Cr, Duoneb PRN, PT  2. A fib - rate controlled, not on AC  3. NEWTON - Cr bump likely 2/2 diuresis, now improving  4. Hyponatremia - improving, plan per Renal  5. Pancreatic mass  - known to family, no intervention  6. Chronic anemia - restart PO FeSO4  7. ITP - daily CBC, increase steroids if plt trending down   8. UTI - pan-sensitive E Coli, c/w Ceftriaxone for now, id eval  pt

## 2017-06-30 NOTE — PROGRESS NOTE ADULT - SUBJECTIVE AND OBJECTIVE BOX
Patient is a 89y old  Female who presents with a chief complaint of SOB (27 Jun 2017 18:03)      HPI:  89 yo F A fib/A flutter , ? hx CHF , HTN, dysphagia (previously refused PEG), multiple admissions for PNA  (?aspiration related, req intubation for hypercapnic resp failure 2014), SBO (resection 2010), ITP on chronic prednisone, pancreatic lesion (prev declined further w/u),  non ambulatory @ baseline (wheel chair/bed bound), lives @ home w/ 24 HHA, per daughter forgetful (A, A, O 1-2 @ baseline). Patient is poor historian. Patient admitted following 2 episodes of SOB, urinating less, L sided abd pain 5/10 .In ED, initially in no acute distress, respiratory status worsened w/ SOB/wheezing and patient placed on bipap, given duoneb x 3, 125 mg IVP solu medrol, 40 mg IVP lasix. CBC in er showed wbc 13.1,  Hgb 8 and platelet count 64K - labs today -  wbc 17.4,  Hgb 8.5 and platelet count 27K. Patient placed on ceftriaxone for E.Coli UTI and is being treated for fluid overload.   Patient c/o epigastric discomfort better. C/o heaviness on breathing better. c/o discomfort rt LE .No expectoration. Appetite stable. No overt bleeding c/o   Denies CP, palpitations, increased fluid intake, orthopnea,  syncope, near syncope, lightheadness, dizziness. Denies N/V/D, melena, hematemesis, hematochezia, coffee ground emesis, dysphagia.       REVIEW OF SYSTEMS:    CONSTITUTIONAL: No  fevers or chills. weakness +  EYES/ENT: No visual changes;  No vertigo or throat pain   NECK: No pain or stiffness  RESPIRATORY: No wheezing, hemoptysis; Cough, shortness of breath +  CARDIOVASCULAR: No chest pain or palpitations  GASTROINTESTINAL:  No nausea, vomiting, or hematemesis; No diarrhea or constipation. No melena or hematochezia.abdominal, epigastric pain +  GENITOURINARY: No dysuria, frequency or hematuria  NEUROLOGICAL: No numbness or weakness  SKIN: No itching, burning, rashes, or lesions . Bruising +      PHYSICAL EXAM  General: adult in NAD  HEENT: clear oropharynx, anicteric sclera, pink conjunctiva  Neck: supple  CV: normal S1/S2 with no murmur rubs or gallops  Lungs: decreased BS b/l on auscultation, no wheezes, no rales  Abdomen: soft non-tender non-distended, no hepatosplenomegaly  Ext: no clubbing cyanosis or edema  Skin: no rashes and no petechiae, bruising on extremities +  Neuro: alert and oriented X 4, no focal deficits        MEDICATIONS  (STANDING):  diltiazem    Tablet 60 milliGRAM(s) Oral every 8 hours  pantoprazole    Tablet 40 milliGRAM(s) Oral before breakfast  predniSONE   Tablet 7.5 milliGRAM(s) Oral daily  sucralfate suspension 1 Gram(s) Oral three times a day  furosemide    Tablet 40 milliGRAM(s) Oral daily  ferrous    sulfate 325 milliGRAM(s) Oral daily    MEDICATIONS  (PRN):  ALBUTerol/ipratropium for Nebulization 3 milliLiter(s) Nebulizer every 6 hours PRN Shortness of Breath and/or Wheezing  acetaminophen   Tablet. 650 milliGRAM(s) Oral every 6 hours PRN Mild Pain (1 - 3)      Allergies    eggs (Rash)  no drug allergy (Unknown)    Intolerances    vinegar sensitivity    family states that the patient shakes when she ingests vinegar (Other)      Vital Signs Last 24 Hrs  T(C): 37.3 (30 Jun 2017 12:27), Max: 37.3 (30 Jun 2017 12:27)  T(F): 99.1 (30 Jun 2017 12:27), Max: 99.1 (30 Jun 2017 12:27)  HR: 76 (30 Jun 2017 12:27) (59 - 82)  BP: 109/51 (30 Jun 2017 12:27) (109/51 - 146/81)  BP(mean): --  RR: 18 (30 Jun 2017 12:27) (18 - 18)  SpO2: 98% (30 Jun 2017 12:27) (92% - 100%)      LABS:                          8.9    15.27 )-----------( 35       ( 30 Jun 2017 07:20 )             24.7         Mean Cell Volume : 84.9 fl  Mean Cell Hemoglobin : 30.6 pg  Mean Cell Hemoglobin Concentration : 36.0 gm/dL      06-30    141  |  96  |  55<H>  ----------------------------<  101<H>  3.7   |  31  |  1.35<H>    Ca    9.4      30 Jun 2017 06:02  Mg     2.4     06-29      < from: CT Angio Chest w/ IV Cont (06.26.17 @ 00:08) >    EXAM:  CT ABDOMEN AND PELVIS OC IC                          EXAM:  CT ANGIO CHEST (W)AW IC                            PROCEDURE DATE:  06/26/2017            INTERPRETATION:  CLINICAL INFORMATION: Shortness of breath and diffuse   abdominal pain.    COMPARISON: CTA chest performed March 14, 2016. CT abdomen pelvis   performed January 21, 2016.    PROCEDURE:   CT Angiography of the chest followed by CT of the abdomen and pelvis.  90 ml of Omnipaque 350 was injected intravenously. 10 ml were discarded.  Sagittal and coronal reformats were performed as well as 3D (MIP)   reconstructions.    FINDINGS:    CHEST:     LUNGS AND LARGE AIRWAYS: Evaluation is limited secondary to respiratory   motion artifact. Patent central airways. There are ground glass opacities   in the right lower lobe. Smooth intralobular septal thickening reflective   of interstitial edema. Nodular branching opacities in the peripheral   upper and right middle lobes likely represent mucoid impacted small   airways. A rightupper lobe calcified granuloma is unchanged.  PLEURA: Small bilateral pleural effusions.  VESSELS: There is no intraluminal filling defect in the central, main,   lobar, and segmental pulmonary arteries to suggest pulmonary embolism.   There is limited evaluation of the segmental and subsegmental pulmonary   arteries in the lower lobes bilaterally. Atherosclerotic calcifications.   < from: Transthoracic Echocardiogram (06.26.17 @ 10:04) >  Normal caliber thoracic aorta.  HEART: Heart size is enlarged. Aortic valve and coronary artery   calcifications. Nopericardial effusion.  MEDIASTINUM AND CLEM: No lymphadenopathy.  CHEST WALL AND LOWER NECK: Within normal limits.    ABDOMEN AND PELVIS:    Multiple images are degraded secondary to motion.    LIVER: Within normal limits.  BILE DUCTS: Normal caliber.  GALLBLADDER: Cholecystectomy.  SPLEEN: Within normal limits.  PANCREAS: Severe atrophy of the distal body and tail the pancreas with   interval increase in the size of a peripherally calcified, macrocystic,   lobulated lesion which measures approximately 7.7 x 2.6 cm (3, 29).  ADRENALS: Within normal limits.  KIDNEYS/URETERS: Left renal cysts and additional bilateral subcentimeter   hypodensities which are too small to characterize.  Culture - Blood (06.26.17 @ 07:50)    Specimen Source: .Blood Blood    Culture Results:   No growth to date.      BLADDER: Within normal limits.  REPRODUCTIVE ORGANS: Hysterectomy.    BOWEL: No bowel obstruction or bowel wall thickening.. Colonic   diverticulosis.  PERITONEUM: No ascites ascites or pneumoperitoneum. No mesenteric   adenopathy.  VESSELS:  Severe atherosclerotic calcifications. Normal caliber abdominal   aorta.  RETROPERITONEUM: No lymphadenopathy.    ABDOMINAL WALL: Within normal limits.  BONES: Degenerative changes an S-shaped scoliosis of the spine. Status   post L5 kyphoplasty. Unchanged compression fracture deformity of L3.    IMPRESSION:    Motion degraded examination. No pulmonary embolism. Limited evaluation of   the segmental and subsegmental pulmonary arteries in the lower lobes   bilaterally.    Mild pulmonary edema and small bilateral pleural effusions. Nodular   branching opacities in the periphery of the upper lobes and right middle   lobe likely representing mucoid impacted small airways.    Severe atrophy of the pancreatic body and tail with interval increase in   the size of a peripherally calcified, lobulated cystic lesion, which may   represent a mucinous cystic neoplasm, IPMN, and less likely serous   cystadenoma. MRI of the pancreas may be obtained for further evaluation.      Transthoracic Echocardiogram (06.26.17 @ 10:04) >    Patient name: BILL PARIKH  Conclusions:  Technically difficult study.  1. Aortic valve not well visualized; appears calcified.  Peak transaortic valve gradient equals 49 mm Hg, mean  transaortic valve gradient equals 27 mm Hg, aortic valve  velocity time integral equals 62 cm, consistent with  moderate aortic stenosis. Suboptimal imaging precludes  accurate assessment of LVOT diameter. Unable to accurately  calculate aortic valve area by continuity. No aortic valve  regurgitation seen.  2. Normal left ventricular internal dimensions and wall  thicknesses.  3. Endocardium not well visualized; grossly hyperdynamic  left ventricular systolic function.  4. The right ventricle is not well visualized.    Culture - Blood (06.26.17 @ 07:50)    Specimen Source: .Blood Blood    Culture Results:   No growth to date.    Culture - Urine (06.26.17 @ 00:58)    -  Amikacin: S <=8    -  Ampicillin: S 4    -  Ampicillin/Sulbactam: S <=4/2    -  Ertapenem: S <=0.5    -  Tobramycin: S <=2    -  Cefazolin: S <=2    -  Ceftriaxone: S <=1    -  Gentamicin: S <=1    -  Nitrofurantoin: S <=32    -  Piperacillin/Tazobactam: S <=8    -  Aztreonam: S <=4    -  Cefepime: S <=2    -  Ceftazidime: S <=1    -  Ciprofloxacin: S <=0.5    -  Cefoxitin: S <=4    -  Imipenem: S <=1    -  Levofloxacin: S <=1    -  Meropenem: S <=1    -  Trimethoprim/Sulfamethoxazole: S <=0.5/9.5    Specimen Source: .Urine Catheterized    Culture Results:   >100,000 CFU/ml Escherichia coli    Organism Identification: Escherichia coli    Organism: Escherichia coli    Method Type: RACHEL    Serum Pro-Brain Natriuretic Peptide (06.25.17 @ 21:47)    Serum Pro-Brain Natriuretic Peptide: 4195

## 2017-06-30 NOTE — PROGRESS NOTE ADULT - PROBLEM SELECTOR PLAN 1
-Fu blood cultures  -Patient w/o clinical signs/symptoms of sepsis  -Would not treat for asymptomatic bacteruria  -May Dc IV abx -Fu blood cultures  -Patient w/o clinical signs/symptoms of sepsis  -Would not treat for asymptomatic bacteruria  -May Dc IV abx  -Leukocytosis is trending down--is likely related to steroid use

## 2017-06-30 NOTE — PHYSICAL THERAPY INITIAL EVALUATION ADULT - PERTINENT HX OF CURRENT PROBLEM, REHAB EVAL
89 yo F A fib/A flutter, ? hx CHF, HTN, dysphagia, multiple admissions for PNA, SBO (resection 2010), ITP on chronic prednisone, pancreatic lesion, ? hx asthma, non ambulatory @ baseline, per daughter forgetful (A, A, O 1-2 @ baseline) p/w SOB/ADHF, ? component of asthma. Placed on Bipap in ED d/t worsened respiratory status. Improved after 40 mg IVP lasix w/ unmeasured UOP. Currently denies SOB

## 2017-06-30 NOTE — CONSULT NOTE ADULT - PROBLEM SELECTOR RECOMMENDATION 2
-Likely related to CHF and COPD/asthma  -CT non revealing for pneumonia  -Would hold off on further abx.
ct cardiac mangement

## 2017-06-30 NOTE — PROGRESS NOTE ADULT - SUBJECTIVE AND OBJECTIVE BOX
Patient is a 89y Female       PHYSICAL EXAM:  Vital Signs Last 24 Hrs  T(C): 36.6 (30 Jun 2017 04:46), Max: 36.7 (29 Jun 2017 19:47)  T(F): 97.8 (30 Jun 2017 04:46), Max: 98.1 (29 Jun 2017 19:47)  HR: 69 (30 Jun 2017 04:46) (66 - 82)  BP: 122/71 (30 Jun 2017 04:46) (100/63 - 146/81)  BP(mean): --  RR: 18 (30 Jun 2017 04:46) (18 - 18)  SpO2: 99% (30 Jun 2017 04:46) (92% - 100%)  General: no acute distress  HEENT:  NC, ext ears nl, oropharynx clear,  nose nl  Neck: No JVD, bruit, adenopathy or thyromegaly  Eyes: PERRL, no discharge, no icterus  Respiratory: dec bs bases bilat, no wheezes, nl effort  Cardiovascular: regular rate, S1 and S2 no rub or gallop  Abd: : BS+, soft, NT , no rebound or guarding, no bruits  Extremities: no edema, no cyanosis, palpable pulses    I and O's:  06-28 @ 07:01  -  06-29 @ 07:00  --------------------------------------------------------  IN: 730 mL / OUT: 900 mL / NET: -170 mL      Allergies    eggs (Rash)  no drug allergy (Unknown)    Intolerances    vinegar sensitivity    family states that the patient shakes when she ingests vinegar (Other)        MEDICATIONS  (STANDING):  diltiazem    Tablet 60 milliGRAM(s) Oral every 8 hours  pantoprazole    Tablet 40 milliGRAM(s) Oral before breakfast  predniSONE   Tablet 7.5 milliGRAM(s) Oral daily  sucralfate suspension 1 Gram(s) Oral three times a day  cefTRIAXone   IVPB   IV Intermittent   cefTRIAXone   IVPB 1 Gram(s) IV Intermittent every 24 hours  furosemide    Tablet 40 milliGRAM(s) Oral daily  ferrous    sulfate 325 milliGRAM(s) Oral daily      Sodium,Serum 139    06-29 @ 05:45  Sodium,Serum 136    06-28 @ 06:04  Sodium,Serum 133    06-27 @ 07:21  Sodium,Serum 132    06-26 @ 06:51  Sodium,Serum 128    06-26 @ 01:14  Sodium,Serum 132    06-25 @ 21:47    Potassium,Serum 3.6    06-29 @ 05:45  Potassium,Serum 3.1    06-28 @ 06:04  Potassium,Serum 3.6    06-27 @ 07:21  Potassium,Serum 4.2    06-26 @ 06:51  Potassium,Serum 5.0    06-26 @ 01:14  Potassium,Serum 5.2    06-25 @ 21:47    Chloride,Serum 96    06-29 @ 05:45  Chloride,Serum 90    06-28 @ 06:04  Chloride,Serum 86    06-27 @ 07:21  Chloride,Serum 86    06-26 @ 06:51  Chloride,Serum 88    06-26 @ 01:14  Chloride,Serum 90    06-25 @ 21:47    CO2, Serum 33    06-29 @ 05:45  CO2, Serum 32    06-28 @ 06:04  CO2, Serum 32    06-27 @ 07:21  CO2, Serum 29    06-26 @ 06:51  CO2, Serum 29    06-26 @ 01:14  CO2, Serum 29    06-25 @ 21:47    BUN 52    06-29 @ 05:45  BUN 45    06-28 @ 06:04  BUN 25    06-27 @ 07:21  BUN 17    06-26 @ 06:51  BUN 17    06-26 @ 01:14  BUN 19    06-25 @ 21:47    Creatinine, Serum 1.41    06-29 @ 05:45  Creatinine, Serum 1.55    06-28 @ 06:04  Creatinine, Serum 1.17    06-27 @ 07:21  Creatinine, Serum 1.18    06-26 @ 06:51  Creatinine, Serum 1.16    06-26 @ 01:14  Creatinine, Serum 1.31    06-25 @ 21:47      06-29    139  |  96  |  52<H>  ----------------------------<  96  3.6   |  33<H>  |  1.41<H>    Ca    9.1      29 Jun 2017 05:45  Mg     2.4     06-29 06-30    141  |  96  |  55<H>  ----------------------------<  101<H>  3.7   |  31  |  1.35<H>    Ca    9.4      30 Jun 2017 06:02  Mg     2.4     06-29                              8.4    14.63 )-----------( 22       ( 29 Jun 2017 07:08 )             23.7

## 2017-06-30 NOTE — CONSULT NOTE ADULT - ASSESSMENT
89 yo F A fib/A flutter (not on AC), CHF , HTN, dysphagia (previously refused PEG), multiple admissions for PNA  (?aspiration related, req intubation for hypercapnic resp failure), SBO (resection 2010), ITP on chronic prednisone p/w few hours of SOB. Was noted to be in resp failure requiring BIPAP likely 2/2 CHF exacerbation. CXR and CT chest more concerning for CHF. Improving on diuresis for CHF. Had leukocytosis up to 20, which likely was related to steroid use and is already trending down. Also getting treated for positive urine culture for E coli, although UA was not suggestive of infection with <10 WBC in urine and trace LE. Doubt she has UTI. Has developed mild NEWTON likely 2/2 contrast use and diuresis.

## 2017-06-30 NOTE — PROGRESS NOTE ADULT - SUBJECTIVE AND OBJECTIVE BOX
CHIEF COMPLAINT:Patient is a 89y old  Female who presents with a chief complaint of SOB (27 Jun 2017 18:03)    	  pt c/o generalized aches       PAST MEDICAL & SURGICAL HISTORY:  PNA (pneumonia)  Dysphagia  Thrombocytopenia: ITP  Atrial fibrillation and flutter: No A/C  during hospitalization in april 2014  Respiratory failure: in april 2014 was intubated  Cataract: right eye  Small bowel obstruction: s/p resection in 2010  HTN - Hypertension  S/P cholecystectomy  H/O: Hysterectomy  S/P Small Bowel Resection          REVIEW OF SYSTEMS:  CONSTITUTIONAL: some fatigue  EYES: No eye pain, visual disturbances, or discharge  NECK: No pain or stiffness  RESPIRATORY: No cough, wheezing, chills or hemoptysis; No Shortness of Breath  CARDIOVASCULAR: No chest pain, palpitations, passing out, dizziness, or leg swelling  GASTROINTESTINAL: No abdominal or epigastric pain. No nausea, vomiting, or hematemesis; No diarrhea or constipation. No melena or hematochezia.  GENITOURINARY: No dysuria, frequency, hematuria, or incontinence  NEUROLOGICAL: No headaches, memory loss, loss of strength, numbness, or tremors  SKIN: No itching, burning, rashes, or lesions   LYMPH Nodes: No enlarged glands  ENDOCRINE: No heat or cold intolerance; No hair loss  MUSCULOSKELETAL: No joint pain or swelling; No muscle, back, or extremity pain    Medications:  MEDICATIONS  (STANDING):  diltiazem    Tablet 60 milliGRAM(s) Oral every 8 hours  pantoprazole    Tablet 40 milliGRAM(s) Oral before breakfast  predniSONE   Tablet 7.5 milliGRAM(s) Oral daily  sucralfate suspension 1 Gram(s) Oral three times a day  cefTRIAXone   IVPB   IV Intermittent   cefTRIAXone   IVPB 1 Gram(s) IV Intermittent every 24 hours  furosemide    Tablet 40 milliGRAM(s) Oral daily  ferrous    sulfate 325 milliGRAM(s) Oral daily    MEDICATIONS  (PRN):  ALBUTerol/ipratropium for Nebulization 3 milliLiter(s) Nebulizer every 6 hours PRN Shortness of Breath and/or Wheezing  acetaminophen   Tablet. 650 milliGRAM(s) Oral every 6 hours PRN Mild Pain (1 - 3)    	    PHYSICAL EXAM:  T(C): 36.6 (06-30-17 @ 11:03), Max: 36.7 (06-29-17 @ 19:47)  HR: 59 (06-30-17 @ 11:03) (59 - 82)  BP: 124/53 (06-30-17 @ 11:03) (100/63 - 146/81)  RR: 18 (06-30-17 @ 11:03) (18 - 18)  SpO2: 99% (06-30-17 @ 11:03) (92% - 100%)  Wt(kg): --  I&O's Summary    29 Jun 2017 07:01  -  30 Jun 2017 07:00  --------------------------------------------------------  IN: 600 mL / OUT: 550 mL / NET: 50 mL    30 Jun 2017 07:01  -  30 Jun 2017 11:26  --------------------------------------------------------  IN: 240 mL / OUT: 400 mL / NET: -160 mL        Appearance: Normal	  HEENT:   Normal oral mucosa, PERRL, EOMI	  Lymphatic: No lymphadenopathy  Cardiovascular: Normal S1 S2, No JVD, No murmurs, No edema  Respiratory: Lungs clear to auscultation	  Psychiatry: A & O x 3, Mood & affect appropriate  Gastrointestinal:  Soft, Non-tender, + BS	  Skin: No rashes, No ecchymoses, No cyanosis	  Neurologic: Non-focal from   Extremities: Normal range of motion, No clubbing, cyanosis or edema  Vascular: Peripheral pulses palpable 2+ bilaterally    TELEMETRY: 	    ECG:  	  RADIOLOGY:  OTHER: 	  	  LABS:	 	    CARDIAC MARKERS:                                8.9    15.27 )-----------( 35       ( 30 Jun 2017 07:20 )             24.7     06-30    141  |  96  |  55<H>  ----------------------------<  101<H>  3.7   |  31  |  1.35<H>    Ca    9.4      30 Jun 2017 06:02  Mg     2.4     06-29      proBNP:   Lipid Profile:   HgA1c:   TSH:

## 2017-06-30 NOTE — PROGRESS NOTE ADULT - SUBJECTIVE AND OBJECTIVE BOX
Consultation Requested by:    Patient is a 89y old  Female who presents with a chief complaint of SOB (27 Jun 2017 18:03)    HPI:  89 yo F A fib/A flutter (not on AC), ? hx CHF (no previous echo in [place]/[facility] but treated in past for ADHF), HTN, dysphagia (previously refused PEG), multiple admissions for PNA  (?aspiration related, req intubation for hypercapnic resp failure 2014), SBO (resection 2010), ITP on chronic prednisone, pancreatic lesion (prev declined further w/u), ? hx asthma, non ambulatory @ baseline (wheel chair/bed bound), lives @ home w/ 24 HHA, per daughter forgetful (A, A, O 1-2 @ baseline). Patient is poor historian. History obtained from patient, daughter, chart review, speaking w/ ER provider. P/w  SOB @ rest which improved  w/ proventil. Per aide, patient noted to be urinating less. Patient reports L sided abd pain 5/10l.  Denies CP, palpitations, increased fluid intake, orthopnea, PND, syncope, near syncope, lightheadness, dizziness. Denies N/V/D, melena, hematemesis, hematochezia, coffee ground emesis, dysphagia.     In ED, initially in no acute distress, respiratory status worsened w/ SOB/wheezing and patient placed on bipap, given duoneb x 3, 125 mg IVP solu medrol, 40 mg IVP lasix.    Patient admitted for Hypoxic resp failure likely 2/2 CHF. Ct chest/abd pelvis was suggestive of Mucoid impaction of RML and large pancreatic cystic lesion likely IPMN. Was started on treatment for UTI with IV CTX as urine cultures positive for pansensitive Klebsiella      REVIEW OF SYSTEMS  All review of systems negative, except for those marked:  General:           [] Abnormal:  	                    [] Night Sweats		[] Fever		[] Weight Loss  Pulmonary/Cough:	[] Abnormal:  Cardiac/Chest Pain:	[] Abnormal:  Gastrointestinal:	[] Abnormal:  Eyes:		[] Abnormal:  ENT:		[] Abnormal:  :		[] Abnormal:  Musculoskeletal	[] Abnormal:  Endocrine:	[] Abnormal:  Lymph Nodes:	[] Abnormal:  Neuro:		[] Abnormal:  Skin:		[] Abnormal:  Allergy/Immune:	[] Abnormal:  Psychiatric:	[] Abnormal:  [] All other review of systems negative  [] Unable to obtain (explain):    Recent Ill Contacts:	[] No	[] Yes:  Recent Travel History:	[] No	[] Yes:  Recent Animal/Insect Exposure/Tick Bites:	[] No	[] Yes:    Allergies    eggs (Rash)  no drug allergy (Unknown)    Intolerances    vinegar sensitivity    family states that the patient shakes when she ingests vinegar (Other)    Antimicrobials:  cefTRIAXone   IVPB   IV Intermittent   cefTRIAXone   IVPB 1 Gram(s) IV Intermittent every 24 hours      Other Medications:  diltiazem    Tablet 60 milliGRAM(s) Oral every 8 hours  pantoprazole    Tablet 40 milliGRAM(s) Oral before breakfast  predniSONE   Tablet 7.5 milliGRAM(s) Oral daily  sucralfate suspension 1 Gram(s) Oral three times a day  ALBUTerol/ipratropium for Nebulization 3 milliLiter(s) Nebulizer every 6 hours PRN  furosemide    Tablet 40 milliGRAM(s) Oral daily  acetaminophen   Tablet. 650 milliGRAM(s) Oral every 6 hours PRN  ferrous    sulfate 325 milliGRAM(s) Oral daily      FAMILY HISTORY:  No pertinent family history in first degree relatives    PAST MEDICAL & SURGICAL HISTORY:  PNA (pneumonia)  Dysphagia  Thrombocytopenia: ITP  Atrial fibrillation and flutter: No A/C  during hospitalization in april 2014  Respiratory failure: in april 2014 was intubated  Cataract: right eye  Small bowel obstruction: s/p resection in 2010  HTN - Hypertension  S/P cholecystectomy  H/O: Hysterectomy  S/P Small Bowel Resection    SOCIAL HISTORY:  Wheel chair bound      Vital Signs Last 24 Hrs  T(C): 36.6 (30 Jun 2017 11:03), Max: 36.7 (29 Jun 2017 19:47)  T(F): 97.8 (30 Jun 2017 11:03), Max: 98.1 (29 Jun 2017 19:47)  HR: 59 (30 Jun 2017 11:03) (59 - 82)  BP: 124/53 (30 Jun 2017 11:03) (100/63 - 146/81)  BP(mean): --  RR: 18 (30 Jun 2017 11:03) (18 - 18)  SpO2: 99% (30 Jun 2017 11:03) (92% - 100%)    PHYSICAL EXAM  All physical exam findings normal, except for those marked:  General:            Normal: alert, no respiratory distress  .		[] Abnormal:  Eyes		Normal: no conjunctival injection, no discharge,  .		[] Abnormal:  ENT:		Normal: no pharyngeal erythema or exudates  .		[] Abnormal:  Neck		Normal: supple, full range of motion, no nuchal rigidity  .		[] Abnormal:  Lymph Nodes	Normal: normal size and consistency, non-tender  .		[] Abnormal:  Cardiovascular	Normal: regular rate and variability; Normal S1, S2; No murmur  .		[] Abnormal:  Respiratory	Normal: no wheezing or crackles, bilateral audible breath sounds  .		[] Abnormal:  Abdominal	Normal: soft; non-distended; non-tender; no hepatosplenomegaly or masses  .		[] Abnormal:  		Normal: normal external genitalia, no rash  .		[] Abnormal:  Extremities	Normal: FROM x4, no cyanosis or edema, symmetric pulses  .		[] Abnormal:  Skin		Normal: skin intact and not indurated; no rash, no desquamation  .		[] Abnormal:  Neurologic	 Normal: alert, oriented as age-appropriate, moves all extremities,  .		[] Abnormal:  Musculoskeletal	Normal: no joint swelling, erythema, or tenderness;  .		[] Abnormal    Respiratory Support:		[] No	[] Yes:  Vasoactive medication infusion:	[] No	[] Yes:  Venous catheters:		[] No	[] Yes:  Bladder catheter:		[] No	[] Yes:  Other catheters or tubes:	[] No	[] Yes:    Lab Results:                        8.9    15.27 )-----------( 35       ( 30 Jun 2017 07:20 )             24.7     06-30    141  |  96  |  55<H>  ----------------------------<  101<H>  3.7   |  31  |  1.35<H>    Ca    9.4      30 Jun 2017 06:02  Mg     2.4     06-29    RADIOLOGY  CT CHEST/ABD/PELVIS  Motion degraded examination. No pulmonary embolism. Limited evaluation of   the segmental and subsegmental pulmonary arteries in the lower lobes   bilaterally.    Mild pulmonary edema and small bilateral pleural effusions. Nodular   branching opacities in the periphery of the upper lobes and right middle   lobe likely representing mucoid impacted small airways.    Severe atrophy of the pancreatic body and tail with interval increase in   the size of a peripherally calcified, lobulated cystic lesion, which may   represent a mucinous cystic neoplasm, IPMN, and less likely serous   cystadenoma. MRI of the pancreas may be obtained for further evaluation          MICROBIOLOGY    Culture - Blood (06.26.17 @ 07:50)  Specimen Source: .Blood Blood  Culture Results: No growth to date.      Culture - Urine (06.26.17 @ 00:58)  E coli--pan sensitive Consultation Requested by: Robert bolanos  Reason for consultation: UTI    Patient is a 89y old  Female who presents with a chief complaint of SOB (27 Jun 2017 18:03)    HPI:  89 yo F A fib/A flutter (not on AC), ? hx CHF (no previous echo in [place]/[facility] but treated in past for ADHF), HTN, dysphagia (previously refused PEG), multiple admissions for PNA  (?aspiration related, req intubation for hypercapnic resp failure 2014), SBO (resection 2010), ITP on chronic prednisone, pancreatic lesion (prev declined further w/u), ? hx asthma, non ambulatory @ baseline (wheel chair/bed bound), lives @ home w/ 24 HHA, per daughter forgetful (A, A, O 1-2 @ baseline). Patient is poor historian. History obtained from patient, daughter, chart review, speaking w/ ER provider. P/w  SOB @ rest which improved  w/ proventil. Per aide, patient noted to be urinating less. Patient reports L sided abd pain 5/10l.  Denies CP, palpitations, increased fluid intake, orthopnea, PND, syncope, near syncope, lightheadness, dizziness. Denies N/V/D, melena, hematemesis, hematochezia, coffee ground emesis, dysphagia.     In ED, initially in no acute distress, respiratory status worsened w/ SOB/wheezing and patient placed on bipap, given duoneb x 3, 125 mg IVP solu medrol, 40 mg IVP lasix.    Patient admitted for Hypoxic resp failure likely 2/2 CHF. Ct chest/abd pelvis was suggestive of Mucoid impaction of RML and large pancreatic cystic lesion likely IPMN. Was started on treatment for UTI with IV CTX as urine cultures positive for pansensitive Klebsiella      REVIEW OF SYSTEMS  All review of systems negative, except for those marked:  General:           [] Abnormal:  	                    [] Night Sweats		[] Fever		[] Weight Loss  Pulmonary/Cough:	[] Abnormal:  Cardiac/Chest Pain:	[] Abnormal:  Gastrointestinal:	[] Abnormal:  Eyes:		[] Abnormal:  ENT:		[] Abnormal:  :		[] Abnormal:  Musculoskeletal	[] Abnormal:  Endocrine:	[] Abnormal:  Lymph Nodes:	[] Abnormal:  Neuro:		[] Abnormal:  Skin:		[] Abnormal:  Allergy/Immune:	[] Abnormal:  Psychiatric:	[] Abnormal:  [] All other review of systems negative  [] Unable to obtain (explain):    Recent Ill Contacts:	[] No	[] Yes:  Recent Travel History:	[] No	[] Yes:  Recent Animal/Insect Exposure/Tick Bites:	[] No	[] Yes:    Allergies    eggs (Rash)  no drug allergy (Unknown)    Intolerances    vinegar sensitivity    family states that the patient shakes when she ingests vinegar (Other)    Antimicrobials:  cefTRIAXone   IVPB   IV Intermittent   cefTRIAXone   IVPB 1 Gram(s) IV Intermittent every 24 hours      Other Medications:  diltiazem    Tablet 60 milliGRAM(s) Oral every 8 hours  pantoprazole    Tablet 40 milliGRAM(s) Oral before breakfast  predniSONE   Tablet 7.5 milliGRAM(s) Oral daily  sucralfate suspension 1 Gram(s) Oral three times a day  ALBUTerol/ipratropium for Nebulization 3 milliLiter(s) Nebulizer every 6 hours PRN  furosemide    Tablet 40 milliGRAM(s) Oral daily  acetaminophen   Tablet. 650 milliGRAM(s) Oral every 6 hours PRN  ferrous    sulfate 325 milliGRAM(s) Oral daily      FAMILY HISTORY:  No pertinent family history in first degree relatives    PAST MEDICAL & SURGICAL HISTORY:  PNA (pneumonia)  Dysphagia  Thrombocytopenia: ITP  Atrial fibrillation and flutter: No A/C  during hospitalization in april 2014  Respiratory failure: in april 2014 was intubated  Cataract: right eye  Small bowel obstruction: s/p resection in 2010  HTN - Hypertension  S/P cholecystectomy  H/O: Hysterectomy  S/P Small Bowel Resection    SOCIAL HISTORY:  Wheel chair bound      Vital Signs Last 24 Hrs  T(C): 36.6 (30 Jun 2017 11:03), Max: 36.7 (29 Jun 2017 19:47)  T(F): 97.8 (30 Jun 2017 11:03), Max: 98.1 (29 Jun 2017 19:47)  HR: 59 (30 Jun 2017 11:03) (59 - 82)  BP: 124/53 (30 Jun 2017 11:03) (100/63 - 146/81)  BP(mean): --  RR: 18 (30 Jun 2017 11:03) (18 - 18)  SpO2: 99% (30 Jun 2017 11:03) (92% - 100%)    PHYSICAL EXAM  All physical exam findings normal, except for those marked:  General:            Normal: alert, no respiratory distress  .		[] Abnormal:  Eyes		Normal: no conjunctival injection, no discharge,  .		[] Abnormal:  ENT:		Normal: no pharyngeal erythema or exudates  .		[] Abnormal:  Neck		Normal: supple, full range of motion, no nuchal rigidity  .		[] Abnormal:  Lymph Nodes	Normal: normal size and consistency, non-tender  .		[] Abnormal:  Cardiovascular	Normal: regular rate and variability; Normal S1, S2; No murmur  .		[] Abnormal:  Respiratory	Normal: no wheezing or crackles, bilateral audible breath sounds  .		[] Abnormal:  Abdominal	Normal: soft; non-distended; non-tender; no hepatosplenomegaly or masses  .		[] Abnormal:  		Normal: normal external genitalia, no rash  .		[] Abnormal:  Extremities	Normal: FROM x4, no cyanosis or edema, symmetric pulses  .		[] Abnormal:  Skin		Normal: skin intact and not indurated; no rash, no desquamation  .		[] Abnormal:  Neurologic	 Normal: alert, oriented as age-appropriate, moves all extremities,  .		[] Abnormal:  Musculoskeletal	Normal: no joint swelling, erythema, or tenderness;  .		[] Abnormal    Respiratory Support:		[] No	[] Yes:  Vasoactive medication infusion:	[] No	[] Yes:  Venous catheters:		[] No	[] Yes:  Bladder catheter:		[] No	[] Yes:  Other catheters or tubes:	[] No	[] Yes:    Lab Results:                        8.9    15.27 )-----------( 35       ( 30 Jun 2017 07:20 )             24.7     06-30    141  |  96  |  55<H>  ----------------------------<  101<H>  3.7   |  31  |  1.35<H>    Ca    9.4      30 Jun 2017 06:02  Mg     2.4     06-29    RADIOLOGY  CT CHEST/ABD/PELVIS  Motion degraded examination. No pulmonary embolism. Limited evaluation of   the segmental and subsegmental pulmonary arteries in the lower lobes   bilaterally.    Mild pulmonary edema and small bilateral pleural effusions. Nodular   branching opacities in the periphery of the upper lobes and right middle   lobe likely representing mucoid impacted small airways.    Severe atrophy of the pancreatic body and tail with interval increase in   the size of a peripherally calcified, lobulated cystic lesion, which may   represent a mucinous cystic neoplasm, IPMN, and less likely serous   cystadenoma. MRI of the pancreas may be obtained for further evaluation          MICROBIOLOGY    Culture - Blood (06.26.17 @ 07:50)  Specimen Source: .Blood Blood  Culture Results: No growth to date.      Culture - Urine (06.26.17 @ 00:58)  E coli--pan sensitive Consultation Requested by: Robert bolanos  Reason for consultation: UTI    Patient is a 89y old  Female who presents with a chief complaint of SOB (27 Jun 2017 18:03)    HPI:  89 yo F A fib/A flutter (not on AC), ? hx CHF (no previous echo in [place]/[facility] but treated in past for ADHF), HTN, dysphagia (previously refused PEG), multiple admissions for PNA  (?aspiration related, req intubation for hypercapnic resp failure 2014), SBO (resection 2010), ITP on chronic prednisone, pancreatic lesion (prev declined further w/u), ? hx asthma, non ambulatory @ baseline (wheel chair/bed bound), lives @ home w/ 24 HHA, per daughter forgetful (A, A, O 1-2 @ baseline). Patient is poor historian. History obtained from patient, daughter, chart review, speaking w/ ER provider. P/w  SOB @ rest which improved  w/ proventil. Per aide, patient noted to be urinating less. Patient reports L sided abd pain 5/10l.  Denies CP, palpitations, increased fluid intake, orthopnea, PND, syncope, near syncope, lightheadness, dizziness. Denies N/V/D, melena, hematemesis, hematochezia, coffee ground emesis, dysphagia.     In ED, initially in no acute distress, respiratory status worsened w/ SOB/wheezing and patient placed on bipap, given duoneb x 3, 125 mg IVP solu medrol, 40 mg IVP lasix.    Patient admitted for Hypoxic resp failure likely 2/2 CHF. Ct chest/abd pelvis was suggestive of Mucoid impaction of RML and large pancreatic cystic lesion likely IPMN. Was started on treatment for UTI with IV CTX as urine cultures positive for pansensitive Klebsiella      REVIEW OF SYSTEMS  All review of systems negative, except for those marked:  General:           [x] Abnormal: soreness of whole body  	            [] Night Sweats		[] Fever		[] Weight Loss  Pulmonary/Cough:	[x] Abnormal: SOB, improved  Cardiac/Chest Pain:	[] Abnormal:  Gastrointestinal:	[] Abnormal:  Eyes:		[] Abnormal:  ENT:		[] Abnormal:  :		[] Abnormal:  Musculoskeletal	[] Abnormal:  Endocrine:	[] Abnormal:  Lymph Nodes:	[] Abnormal:  Neuro:		[] Abnormal:  Skin:		[x] Abnormal: Leg swelling  Allergy/Immune:	[] Abnormal:  Psychiatric:	[] Abnormal:  [] All other review of systems negative  [] Unable to obtain (explain):    Recent Ill Contacts:	[] No	[] Yes:  Recent Travel History:	[] No	[] Yes:  Recent Animal/Insect Exposure/Tick Bites:	[] No	[] Yes:    Allergies    eggs (Rash)  no drug allergy (Unknown)    Intolerances    vinegar sensitivity    family states that the patient shakes when she ingests vinegar (Other)    Antimicrobials:  cefTRIAXone   IVPB   IV Intermittent   cefTRIAXone   IVPB 1 Gram(s) IV Intermittent every 24 hours      Other Medications:  diltiazem    Tablet 60 milliGRAM(s) Oral every 8 hours  pantoprazole    Tablet 40 milliGRAM(s) Oral before breakfast  predniSONE   Tablet 7.5 milliGRAM(s) Oral daily  sucralfate suspension 1 Gram(s) Oral three times a day  ALBUTerol/ipratropium for Nebulization 3 milliLiter(s) Nebulizer every 6 hours PRN  furosemide    Tablet 40 milliGRAM(s) Oral daily  acetaminophen   Tablet. 650 milliGRAM(s) Oral every 6 hours PRN  ferrous    sulfate 325 milliGRAM(s) Oral daily      FAMILY HISTORY:  No pertinent family history in first degree relatives    PAST MEDICAL & SURGICAL HISTORY:  PNA (pneumonia)  Dysphagia  Thrombocytopenia: ITP  Atrial fibrillation and flutter: No A/C  during hospitalization in april 2014  Respiratory failure: in april 2014 was intubated  Cataract: right eye  Small bowel obstruction: s/p resection in 2010  HTN - Hypertension  S/P cholecystectomy  H/O: Hysterectomy  S/P Small Bowel Resection    SOCIAL HISTORY:  Wheel chair bound  Non smoker    Vital Signs Last 24 Hrs  T(C): 36.6 (30 Jun 2017 11:03), Max: 36.7 (29 Jun 2017 19:47)  T(F): 97.8 (30 Jun 2017 11:03), Max: 98.1 (29 Jun 2017 19:47)  HR: 59 (30 Jun 2017 11:03) (59 - 82)  BP: 124/53 (30 Jun 2017 11:03) (100/63 - 146/81)  BP(mean): --  RR: 18 (30 Jun 2017 11:03) (18 - 18)  SpO2: 99% (30 Jun 2017 11:03) (92% - 100%)    PHYSICAL EXAM  All physical exam findings normal, except for those marked:  General:            Normal: alert, no respiratory distress  .		[x] Abnormal: thin built  Eyes		Normal: no conjunctival injection, no discharge,  .		[] Abnormal:  ENT:		Normal: no pharyngeal erythema or exudates  .		[] Abnormal:  Neck		Normal: supple, full range of motion, no nuchal rigidity  .		[] Abnormal:  Lymph Nodes	Normal: normal size and consistency, non-tender  .		[] Abnormal:  Cardiovascular	Normal: regular rate and variability; Normal S1, S2; No murmur  .		[] Abnormal:  Respiratory	Normal: no wheezing or crackles, bilateral audible breath sounds  .		[x] Abnormal: Minimal wheezing, on nasal canula for oxygenation  Abdominal	Normal: soft; non-distended; non-tender; no hepatosplenomegaly or masses  .		[] Abnormal:  		Normal: normal external genitalia, no rash  .		[] Abnormal:  Extremities	Normal: FROM x4, no cyanosis or edema, symmetric pulses  .		[] Abnormal:  Skin		Normal: skin intact and not indurated; no rash, no desquamation  .		[x] Abnormal: mild pitting edema  Neurologic	 Normal: alert, oriented as age-appropriate, moves all extremities,  .		[] Abnormal:  Musculoskeletal	Normal: no joint swelling, erythema, or tenderness;  .		[] Abnormal    Lab Results:                        8.9    15.27 )-----------( 35       ( 30 Jun 2017 07:20 )             24.7     06-30    141  |  96  |  55<H>  ----------------------------<  101<H>  3.7   |  31  |  1.35<H>    Ca    9.4      30 Jun 2017 06:02  Mg     2.4     06-29    RADIOLOGY  CT CHEST/ABD/PELVIS  Motion degraded examination. No pulmonary embolism. Limited evaluation of   the segmental and subsegmental pulmonary arteries in the lower lobes   bilaterally.    Mild pulmonary edema and small bilateral pleural effusions. Nodular   branching opacities in the periphery of the upper lobes and right middle   lobe likely representing mucoid impacted small airways.    Severe atrophy of the pancreatic body and tail with interval increase in   the size of a peripherally calcified, lobulated cystic lesion, which may   represent a mucinous cystic neoplasm, IPMN, and less likely serous   cystadenoma. MRI of the pancreas may be obtained for further evaluation          MICROBIOLOGY    Culture - Blood (06.26.17 @ 07:50)  Specimen Source: .Blood Blood  Culture Results: No growth to date.      Culture - Urine (06.26.17 @ 00:58)  E coli--pan sensitive

## 2017-06-30 NOTE — CONSULT NOTE ADULT - PROBLEM SELECTOR RECOMMENDATION 9
-Fu blood cultures  -Patient w/o clinical signs/symptoms of sepsis  -Would not treat for asymptomatic bacteruria  -May Dc IV abx  -Leukocytosis is trending down--is likely related to steroid use -Fu blood cultures  -Patient w/o clinical signs/symptoms of sepsis  -Would not treat asymptomatic bacteruria  -May Dc IV abx  -Leukocytosis is trending down--is likely related to steroid use

## 2017-06-30 NOTE — CONSULT NOTE ADULT - ATTENDING COMMENTS
pt seen and examined and discussed with fellow  no evidence of pna and no symptoms of UTI despite culture   I think we can dc antibiotics and treat for mild chf.   thanks

## 2017-07-01 LAB
ANION GAP SERPL CALC-SCNC: 15 MMOL/L — SIGNIFICANT CHANGE UP (ref 5–17)
BUN SERPL-MCNC: 46 MG/DL — HIGH (ref 7–23)
CALCIUM SERPL-MCNC: 9.2 MG/DL — SIGNIFICANT CHANGE UP (ref 8.4–10.5)
CHLORIDE SERPL-SCNC: 97 MMOL/L — SIGNIFICANT CHANGE UP (ref 96–108)
CO2 SERPL-SCNC: 31 MMOL/L — SIGNIFICANT CHANGE UP (ref 22–31)
CREAT SERPL-MCNC: 1.37 MG/DL — HIGH (ref 0.5–1.3)
CULTURE RESULTS: SIGNIFICANT CHANGE UP
GLUCOSE SERPL-MCNC: 80 MG/DL — SIGNIFICANT CHANGE UP (ref 70–99)
HCT VFR BLD CALC: 22.5 % — LOW (ref 34.5–45)
HGB BLD-MCNC: 8.1 G/DL — LOW (ref 11.5–15.5)
MCHC RBC-ENTMCNC: 31.4 PG — SIGNIFICANT CHANGE UP (ref 27–34)
MCHC RBC-ENTMCNC: 36 GM/DL — SIGNIFICANT CHANGE UP (ref 32–36)
MCV RBC AUTO: 87.2 FL — SIGNIFICANT CHANGE UP (ref 80–100)
PLATELET # BLD AUTO: 44 K/UL — LOW (ref 150–400)
POTASSIUM SERPL-MCNC: 3.6 MMOL/L — SIGNIFICANT CHANGE UP (ref 3.5–5.3)
POTASSIUM SERPL-SCNC: 3.6 MMOL/L — SIGNIFICANT CHANGE UP (ref 3.5–5.3)
RBC # BLD: 2.58 M/UL — LOW (ref 3.8–5.2)
RBC # FLD: 16.3 % — HIGH (ref 10.3–14.5)
SODIUM SERPL-SCNC: 143 MMOL/L — SIGNIFICANT CHANGE UP (ref 135–145)
SPECIMEN SOURCE: SIGNIFICANT CHANGE UP
WBC # BLD: 13.19 K/UL — HIGH (ref 3.8–10.5)
WBC # FLD AUTO: 13.19 K/UL — HIGH (ref 3.8–10.5)

## 2017-07-01 RX ADMIN — Medication 40 MILLIGRAM(S): at 05:10

## 2017-07-01 RX ADMIN — Medication 325 MILLIGRAM(S): at 11:18

## 2017-07-01 RX ADMIN — Medication 1 GRAM(S): at 21:06

## 2017-07-01 RX ADMIN — PANTOPRAZOLE SODIUM 40 MILLIGRAM(S): 20 TABLET, DELAYED RELEASE ORAL at 05:10

## 2017-07-01 RX ADMIN — Medication 1 GRAM(S): at 05:10

## 2017-07-01 RX ADMIN — Medication 1 GRAM(S): at 13:26

## 2017-07-01 RX ADMIN — Medication 7.5 MILLIGRAM(S): at 05:10

## 2017-07-01 NOTE — PROGRESS NOTE ADULT - PROBLEM SELECTOR PLAN 1
continued improvement in platelet count  Ct to monitor on current prednisone dose in absence of active bleeding  Prn platelet transfusion if active bleeding

## 2017-07-01 NOTE — PROGRESS NOTE ADULT - SUBJECTIVE AND OBJECTIVE BOX
Patient is a 89y old  Female who presents with a chief complaint of SOB (27 Jun 2017 18:03)      HPI:  89 yo F A fib/A flutter , ? hx CHF , HTN, dysphagia (previously refused PEG), multiple admissions for PNA  (?aspiration related, req intubation for hypercapnic resp failure 2014), SBO (resection 2010), ITP on chronic prednisone, pancreatic lesion (prev declined further w/u),  non ambulatory @ baseline (wheel chair/bed bound), lives @ home w/ 24 HHA, per daughter forgetful (A, A, O 1-2 @ baseline). Patient is poor historian. Patient admitted following 2 episodes of SOB, urinating less, L sided abd pain 5/10 .In ED, initially in no acute distress, respiratory status worsened w/ SOB/wheezing and patient placed on bipap, given duoneb x 3, 125 mg IVP solu medrol, 40 mg IVP lasix. CBC in er showed wbc 13.1,  Hgb 8 and platelet count 64K - labs today -  wbc 17.4,  Hgb 8.5 and platelet count 27K. Patient placed on ceftriaxone for E.Coli UTI and is being treated for fluid overload.   Patient denies epigastric/abdominal discomfort. C/o heaviness on breathing better. c/o discomfort rt LE .No expectoration. Appetite stable. No overt bleeding c/o. No dysuria.   Denies CP, palpitations, increased fluid intake, orthopnea,  syncope, near syncope, lightheadness, dizziness. Denies N/V/D, melena, hematemesis, hematochezia, coffee ground emesis, dysphagia.       REVIEW OF SYSTEMS:    CONSTITUTIONAL: No  fevers or chills. weakness +  EYES/ENT: No visual changes;  No vertigo or throat pain   NECK: No pain or stiffness  RESPIRATORY: No wheezing, hemoptysis; Cough, shortness of breath +  CARDIOVASCULAR: No chest pain or palpitations  GASTROINTESTINAL:  No nausea, vomiting, or hematemesis; No diarrhea or constipation. No melena or hematochezia.abdominal, epigastric pain +  GENITOURINARY: No dysuria, frequency or hematuria  NEUROLOGICAL: No numbness or weakness  SKIN: No itching, burning, rashes, or lesions . Bruising +      PHYSICAL EXAM  General: adult in NAD  HEENT: clear oropharynx, anicteric sclera, pink conjunctiva  Neck: supple  CV: normal S1/S2 with no murmur rubs or gallops  Lungs: decreased BS b/l on auscultation, no wheezes, no rales  Abdomen: soft non-tender non-distended, no hepatosplenomegaly  Ext: no clubbing cyanosis or edema  Skin: no rashes and no petechiae, bruising on extremities +  Neuro: alert and oriented X 4, no focal deficits        MEDICATIONS  (STANDING):  diltiazem    Tablet 60 milliGRAM(s) Oral every 8 hours  pantoprazole    Tablet 40 milliGRAM(s) Oral before breakfast  predniSONE   Tablet 7.5 milliGRAM(s) Oral daily  sucralfate suspension 1 Gram(s) Oral three times a day  furosemide    Tablet 40 milliGRAM(s) Oral daily  ferrous    sulfate 325 milliGRAM(s) Oral daily    MEDICATIONS  (PRN):  ALBUTerol/ipratropium for Nebulization 3 milliLiter(s) Nebulizer every 6 hours PRN Shortness of Breath and/or Wheezing  acetaminophen   Tablet. 650 milliGRAM(s) Oral every 6 hours PRN Mild Pain (1 - 3)      Allergies    eggs (Rash)  no drug allergy (Unknown)    Intolerances    vinegar sensitivity    family states that the patient shakes when she ingests vinegar (Other)        MEDICATIONS  (STANDING):  diltiazem    Tablet 60 milliGRAM(s) Oral every 8 hours  pantoprazole    Tablet 40 milliGRAM(s) Oral before breakfast  predniSONE   Tablet 7.5 milliGRAM(s) Oral daily  sucralfate suspension 1 Gram(s) Oral three times a day  furosemide    Tablet 40 milliGRAM(s) Oral daily  ferrous    sulfate 325 milliGRAM(s) Oral daily    MEDICATIONS  (PRN):  ALBUTerol/ipratropium for Nebulization 3 milliLiter(s) Nebulizer every 6 hours PRN Shortness of Breath and/or Wheezing  acetaminophen   Tablet. 650 milliGRAM(s) Oral every 6 hours PRN Mild Pain (1 - 3)      Allergies    eggs (Rash)  no drug allergy (Unknown)    Intolerances    vinegar sensitivity    family states that the patient shakes when she ingests vinegar (Other)      Vital Signs Last 24 Hrs  T(C): 36.3 (01 Jul 2017 16:59), Max: 36.6 (30 Jun 2017 23:33)  T(F): 97.4 (01 Jul 2017 16:59), Max: 97.9 (30 Jun 2017 23:33)  HR: 70 (01 Jul 2017 16:59) (62 - 86)  BP: 98/55 (01 Jul 2017 16:59) (98/55 - 117/62)  BP(mean): --  RR: 18 (01 Jul 2017 12:02) (18 - 18)  SpO2: 95% (01 Jul 2017 12:02) (94% - 100%)      LABS:                          8.1    13.19 )-----------( 44       ( 01 Jul 2017 08:58 )             22.5         Mean Cell Volume : 87.2 fl  Mean Cell Hemoglobin : 31.4 pg  Mean Cell Hemoglobin Concentration : 36.0 gm/dL  Auto Neutrophil # : x  Auto Lymphocyte # : x  Auto Monocyte # : x  Auto Eosinophil # : x  Auto Basophil # : x  Auto Neutrophil % : x  Auto Lymphocyte % : x  Auto Monocyte % : x  Auto Eosinophil % : x  Auto Basophil % : x    Serial CBC's  07-01 @ 08:58  Hct-22.5 / Hgb-8.1 / Plat-44 / RBC-2.58 / WBC-13.19          Serial CBC's  06-30 @ 07:20  Hct-24.7 / Hgb-8.9 / Plat-35 / RBC-2.91 / WBC-15.27          Serial CBC's  06-29 @ 07:08  Hct-23.7 / Hgb-8.4 / Plat-22 / RBC-2.80 / WBC-14.63          Serial CBC's  06-28 @ 07:17  Hct-23.4 / Hgb-8.5 / Plat-25 / RBC-2.87 / WBC-17.45            07-01    143  |  97  |  46<H>  ----------------------------<  80  3.6   |  31  |  1.37<H>    Ca    9.2      01 Jul 2017 09:12    Culture - Blood (06.26.17 @ 07:50)    Specimen Source: .Blood Blood    Culture Results:   No growth at 5 days.      Culture - Urine (06.26.17 @ 00:58)    -  Amikacin: S <=8    -  Ampicillin: S 4    -  Ampicillin/Sulbactam: S <=4/2    -  Ertapenem: S <=0.5    -  Tobramycin: S <=2    -  Cefazolin: S <=2    -  Ceftriaxone: S <=1    -  Gentamicin: S <=1    -  Nitrofurantoin: S <=32    -  Piperacillin/Tazobactam: S <=8    -  Aztreonam: S <=4    -  Cefepime: S <=2    -  Ceftazidime: S <=1    -  Ciprofloxacin: S <=0.5    -  Cefoxitin: S <=4    -  Imipenem: S <=1    -  Levofloxacin: S <=1    -  Meropenem: S <=1    -  Trimethoprim/Sulfamethoxazole: S <=0.5/9.5    Specimen Source: .Urine Catheterized    Culture Results:   >100,000 CFU/ml Escherichia coli    Organism Identification: Escherichia coli    Organism: Escherichia coli    Method Type: RACHEL    Serum Pro-Brain Natriuretic Peptide (06.25.17 @ 21:47)    Serum Pro-Brain Natriuretic Peptide: 4195

## 2017-07-01 NOTE — PROGRESS NOTE ADULT - ASSESSMENT
89 yo Female  as above:  1. SOB - likely multifactorial, CHF + COPD, resolved now, c/w PO Lasix, f/u Cardio, I/O, monitor Cr, Duoneb PRN, PT  2. A fib - rate controlled, not on AC  3. NEWTON - Cr bump likely 2/2 diuresis, now improving  4. Hyponatremia - improving, plan per Renal  5. Pancreatic mass  - known to family, no intervention  6. Chronic anemia - restart PO FeSO4  7. ITP - daily CBC, increase steroids if plt trending down   asymptomatic bacturia no need for abs at present  pt

## 2017-07-01 NOTE — PROGRESS NOTE ADULT - SUBJECTIVE AND OBJECTIVE BOX
CHIEF COMPLAINT:Patient is a 89y old  Female who presents with a chief complaint of SOB (27 Jun 2017 18:03)    	  no new complaints       PAST MEDICAL & SURGICAL HISTORY:  PNA (pneumonia)  Dysphagia  Thrombocytopenia: ITP  Atrial fibrillation and flutter: No A/C  during hospitalization in april 2014  Respiratory failure: in april 2014 was intubated  Cataract: right eye  Small bowel obstruction: s/p resection in 2010  HTN - Hypertension  S/P cholecystectomy  H/O: Hysterectomy  S/P Small Bowel Resection          REVIEW OF SYSTEMS:  CONSTITUTIONAL: No fever, weight loss, or fatigue  EYES: No eye pain, visual disturbances, or discharge  NECK: No pain or stiffness  RESPIRATORY: No cough, wheezing, chills or hemoptysis; No Shortness of Breath  CARDIOVASCULAR: No chest pain, palpitations, passing out, dizziness, or leg swelling  GASTROINTESTINAL: No abdominal or epigastric pain. No nausea, vomiting, or hematemesis; No diarrhea or constipation. No melena or hematochezia.  GENITOURINARY: No dysuria, frequency, hematuria, or incontinence  NEUROLOGICAL: No headaches, memory loss, loss of strength, numbness, or tremors  SKIN: No itching, burning, rashes, or lesions   LYMPH Nodes: No enlarged glands  ENDOCRINE: No heat or cold intolerance; No hair loss  MUSCULOSKELETAL: No joint pain or swelling; No muscle, back, or extremity pain    Medications:  MEDICATIONS  (STANDING):  diltiazem    Tablet 60 milliGRAM(s) Oral every 8 hours  pantoprazole    Tablet 40 milliGRAM(s) Oral before breakfast  predniSONE   Tablet 7.5 milliGRAM(s) Oral daily  sucralfate suspension 1 Gram(s) Oral three times a day  furosemide    Tablet 40 milliGRAM(s) Oral daily  ferrous    sulfate 325 milliGRAM(s) Oral daily    MEDICATIONS  (PRN):  ALBUTerol/ipratropium for Nebulization 3 milliLiter(s) Nebulizer every 6 hours PRN Shortness of Breath and/or Wheezing  acetaminophen   Tablet. 650 milliGRAM(s) Oral every 6 hours PRN Mild Pain (1 - 3)    	    PHYSICAL EXAM:  T(C): 36.5 (07-01-17 @ 05:02), Max: 37.3 (06-30-17 @ 12:27)  HR: 78 (07-01-17 @ 05:02) (59 - 80)  BP: 111/63 (07-01-17 @ 05:02) (109/51 - 130/71)  RR: 18 (07-01-17 @ 05:02) (18 - 18)  SpO2: 94% (07-01-17 @ 05:02) (94% - 100%)  Wt(kg): --  I&O's Summary    30 Jun 2017 07:01  -  01 Jul 2017 07:00  --------------------------------------------------------  IN: 680 mL / OUT: 1350 mL / NET: -670 mL    01 Jul 2017 07:01  -  01 Jul 2017 10:06  --------------------------------------------------------  IN: 0 mL / OUT: 180 mL / NET: -180 mL        Appearance: Normal	  HEENT:   Normal oral mucosa, PERRL, EOMI	  Lymphatic: No lymphadenopathy  Cardiovascular: Normal S1 S2, No JVD, No murmurs, No edema  Respiratory: Lungs clear to auscultation	  Psychiatry: A & O x 3, Mood & affect appropriate  Gastrointestinal:  Soft, Non-tender, + BS	  Skin: No rashes, No ecchymoses, No cyanosis	  Neurologic: Non-focal  Extremities: Normal range of motion, No clubbing, cyanosis or edema  Vascular: Peripheral pulses palpable 2+ bilaterally    TELEMETRY: 	    ECG:  	  RADIOLOGY:  OTHER: 	  	  LABS:	 	    CARDIAC MARKERS:                                8.9    15.27 )-----------( 35       ( 30 Jun 2017 07:20 )             24.7     07-01    143  |  97  |  46<H>  ----------------------------<  80  3.6   |  31  |  1.37<H>    Ca    9.2      01 Jul 2017 09:12      proBNP:   Lipid Profile:   HgA1c:   TSH:

## 2017-07-02 LAB
ANION GAP SERPL CALC-SCNC: 16 MMOL/L — SIGNIFICANT CHANGE UP (ref 5–17)
BUN SERPL-MCNC: 60 MG/DL — HIGH (ref 7–23)
CALCIUM SERPL-MCNC: 9.3 MG/DL — SIGNIFICANT CHANGE UP (ref 8.4–10.5)
CHLORIDE SERPL-SCNC: 95 MMOL/L — LOW (ref 96–108)
CO2 SERPL-SCNC: 30 MMOL/L — SIGNIFICANT CHANGE UP (ref 22–31)
CREAT SERPL-MCNC: 1.78 MG/DL — HIGH (ref 0.5–1.3)
GLUCOSE SERPL-MCNC: 112 MG/DL — HIGH (ref 70–99)
HCT VFR BLD CALC: 23.4 % — LOW (ref 34.5–45)
HGB BLD-MCNC: 8.2 G/DL — LOW (ref 11.5–15.5)
MCHC RBC-ENTMCNC: 29.7 PG — SIGNIFICANT CHANGE UP (ref 27–34)
MCHC RBC-ENTMCNC: 35 GM/DL — SIGNIFICANT CHANGE UP (ref 32–36)
MCV RBC AUTO: 84.8 FL — SIGNIFICANT CHANGE UP (ref 80–100)
PLATELET # BLD AUTO: 35 K/UL — LOW (ref 150–400)
POTASSIUM SERPL-MCNC: 3.6 MMOL/L — SIGNIFICANT CHANGE UP (ref 3.5–5.3)
POTASSIUM SERPL-SCNC: 3.6 MMOL/L — SIGNIFICANT CHANGE UP (ref 3.5–5.3)
RBC # BLD: 2.76 M/UL — LOW (ref 3.8–5.2)
RBC # FLD: 15.3 % — HIGH (ref 10.3–14.5)
SODIUM SERPL-SCNC: 141 MMOL/L — SIGNIFICANT CHANGE UP (ref 135–145)
WBC # BLD: 14.79 K/UL — HIGH (ref 3.8–10.5)
WBC # FLD AUTO: 14.79 K/UL — HIGH (ref 3.8–10.5)

## 2017-07-02 RX ADMIN — Medication 7.5 MILLIGRAM(S): at 05:32

## 2017-07-02 RX ADMIN — PANTOPRAZOLE SODIUM 40 MILLIGRAM(S): 20 TABLET, DELAYED RELEASE ORAL at 05:32

## 2017-07-02 RX ADMIN — Medication 1 GRAM(S): at 05:32

## 2017-07-02 RX ADMIN — Medication 650 MILLIGRAM(S): at 01:14

## 2017-07-02 RX ADMIN — Medication 650 MILLIGRAM(S): at 02:14

## 2017-07-02 RX ADMIN — Medication 40 MILLIGRAM(S): at 05:32

## 2017-07-02 RX ADMIN — Medication 1 GRAM(S): at 21:24

## 2017-07-02 RX ADMIN — Medication 1 GRAM(S): at 13:04

## 2017-07-02 RX ADMIN — Medication 325 MILLIGRAM(S): at 11:51

## 2017-07-02 NOTE — PROGRESS NOTE ADULT - ASSESSMENT
89 yo Female  as above:  1. SOB - likely multifactorial, CHF + COPD, resolved now, c/w PO Lasix, f/u Cardio, I/O, monitor Cr, Duoneb PRN, PT  2. A fib - rate controlled, not on AC  3. NEWTON - Cr bump likely 2/2 diuresis, now improving  4. Hyponatremia - improving, plan per Renal  5. Pancreatic mass  - known to family, no intervention  6. Chronic anemia - restart PO FeSO4  7. ITP - daily CBC, increase steroids if plt trending down   asymptomatic bacturia no need for abs at present  pt  d/c planning

## 2017-07-02 NOTE — PROGRESS NOTE ADULT - PROBLEM SELECTOR PLAN 1
mild decline in platelet count  Ct to monitor on current prednisone dose in absence of active bleeding  Prn platelet transfusion if active bleeding

## 2017-07-02 NOTE — PROGRESS NOTE ADULT - ASSESSMENT
Imp-  89y Female with hx of hyponatremia   Na improved  rising BUN and creat may be due to diuretics  hold lasix  monitor I&O's, electrolytes and renal function

## 2017-07-02 NOTE — PROGRESS NOTE ADULT - SUBJECTIVE AND OBJECTIVE BOX
Patient is a 89y Female  being evaluated for  NEWTON/hyponatremia     who reports no newcomplaints overnight.        PHYSICAL EXAM:  Vitals:T(C): 36.9 (07-02-17 @ 11:53), Max: 36.9 (07-02-17 @ 11:53)  HR: 85 (07-02-17 @ 11:53) (70 - 86)  BP: 100/60 (07-02-17 @ 11:53) (98/55 - 114/57)  RR: 18 (07-02-17 @ 11:53) (18 - 18)  SpO2: 96% (07-02-17 @ 11:53) (96% - 100%)  Wt(kg): --    General: no acute distress  HEENT:  NC, ext ears nl, oropharynx clear,  nose nl  Neck: No JVD, bruit, adenopathy or thyromegaly  Eyes: PERRL, no discharge, no icterus  Respiratory: cta/p bilat, no wheezes, rales, nl effort  Cardiovascular: regular rate, S1 and S2 no rub or gallop  Abd: : BS+, soft, NT , no rebound or guarding, no bruits  Extremities: no edema, no cyanosis, palpable pulses      I and O's:  07-01 @ 07:01  -  07-02 @ 07:00  --------------------------------------------------------  IN: 917 mL / OUT: 890 mL / NET: 27 mL    07-02 @ 07:01  -  07-02 @ 16:11  --------------------------------------------------------  IN: 480 mL / OUT: 860 mL / NET: -380 mL              REVIEW OF SYSTEMS:  CONSTITUTIONAL: No weakness, fevers or chills  RESPIRATORY: No cough, wheezing, hemoptysis; No shortness of breath  CARDIOVASCULAR: No chest pain or palpitations  GI : no abd pain, nausea or vomiting  GENITOURINARY: No dysuria, frequency or hematuria  All other review of systems is negative unless indicated above.    Allergies    eggs (Rash)  no drug allergy (Unknown)    Intolerances    vinegar sensitivity    family states that the patient shakes when she ingests vinegar (Other)        MEDICATIONS  (STANDING):  diltiazem    Tablet 60 milliGRAM(s) Oral every 8 hours  pantoprazole    Tablet 40 milliGRAM(s) Oral before breakfast  predniSONE   Tablet 7.5 milliGRAM(s) Oral daily  sucralfate suspension 1 Gram(s) Oral three times a day  furosemide    Tablet 40 milliGRAM(s) Oral daily  ferrous    sulfate 325 milliGRAM(s) Oral daily      Sodium,Serum 141    07-02 @ 08:37  Sodium,Serum 143    07-01 @ 09:12  Sodium,Serum 141    06-30 @ 06:02  Sodium,Serum 139    06-29 @ 05:45    Potassium,Serum 3.6    07-02 @ 08:37  Potassium,Serum 3.6    07-01 @ 09:12  Potassium,Serum 3.7    06-30 @ 06:02  Potassium,Serum 3.6    06-29 @ 05:45    Chloride,Serum 95    07-02 @ 08:37  Chloride,Serum 97    07-01 @ 09:12  Chloride,Serum 96    06-30 @ 06:02  Chloride,Serum 96    06-29 @ 05:45    CO2, Serum 30    07-02 @ 08:37  CO2, Serum 31    07-01 @ 09:12  CO2, Serum 31    06-30 @ 06:02  CO2, Serum 33    06-29 @ 05:45    BUN 60    07-02 @ 08:37  BUN 46    07-01 @ 09:12  BUN 55    06-30 @ 06:02  BUN 52    06-29 @ 05:45    Creatinine, Serum 1.78    07-02 @ 08:37  Creatinine, Serum 1.37    07-01 @ 09:12  Creatinine, Serum 1.35    06-30 @ 06:02  Creatinine, Serum 1.41    06-29 @ 05:45      07-02    141  |  95<L>  |  60<H>  ----------------------------<  112<H>  3.6   |  30  |  1.78<H>    Ca    9.3      02 Jul 2017 08:37                              8.2    14.79 )-----------( 35       ( 02 Jul 2017 08:41 )             23.4

## 2017-07-02 NOTE — PROGRESS NOTE ADULT - SUBJECTIVE AND OBJECTIVE BOX
Patient is a 89y old  Female who presents with a chief complaint of SOB (27 Jun 2017 18:03)      HPI:  89 yo F A fib/A flutter , ? hx CHF , HTN, dysphagia (previously refused PEG), multiple admissions for PNA  (?aspiration related, req intubation for hypercapnic resp failure 2014), SBO (resection 2010), ITP on chronic prednisone, pancreatic lesion (prev declined further w/u),  non ambulatory @ baseline (wheel chair/bed bound), lives @ home w/ 24 HHA, per daughter forgetful (A, A, O 1-2 @ baseline). Patient is poor historian. Patient admitted following 2 episodes of SOB, urinating less, L sided abd pain 5/10 .In ED, initially in no acute distress, respiratory status worsened w/ SOB/wheezing and patient placed on bipap, given duoneb x 3, 125 mg IVP solu medrol, 40 mg IVP lasix. CBC in er showed wbc 13.1,  Hgb 8 and platelet count 64K - labs today -  wbc 17.4,  Hgb 8.5 and platelet count 27K. Patient placed on ceftriaxone for E.Coli UTI and is being treated for fluid overload.   Patient denies epigastric/abdominal discomfort. denies heaviness on breathing better. c/o discomfort rt LE . asking for bed pan. No expectoration. Appetite stable. No overt bleeding c/o. No dysuria.        REVIEW OF SYSTEMS:    CONSTITUTIONAL: No  fevers or chills. weakness +  EYES/ENT: No visual changes;  No vertigo or throat pain   NECK: No pain or stiffness  RESPIRATORY: No wheezing, hemoptysis; Cough, shortness of breath +  CARDIOVASCULAR: No chest pain or palpitations  GASTROINTESTINAL:  No nausea, vomiting, or hematemesis; No diarrhea or constipation. No melena or hematochezia.abdominal, epigastric pain +  GENITOURINARY: No dysuria, frequency or hematuria  NEUROLOGICAL: No numbness or weakness  SKIN: No itching, burning, rashes, or lesions . Bruising +    Vital Signs Last 24 Hrs  T(C): 36.9 (02 Jul 2017 11:53), Max: 36.9 (02 Jul 2017 11:53)  T(F): 98.5 (02 Jul 2017 11:53), Max: 98.5 (02 Jul 2017 11:53)  HR: 85 (02 Jul 2017 11:53) (70 - 86)  BP: 100/60 (02 Jul 2017 11:53) (98/55 - 114/57)  BP(mean): --  RR: 18 (02 Jul 2017 11:53) (18 - 18)  SpO2: 96% (02 Jul 2017 11:53) (96% - 100%)    PHYSICAL EXAM  General: adult chronically ill  HEENT: clear oropharynx, anicteric sclera, pink conjunctiva  Neck: supple  CV: normal S1/S2 with no murmur rubs or gallops  Lungs: decreased BS b/l on auscultation, no wheezes, no rales  Abdomen: soft non-tender non-distended, no hepatosplenomegaly  Ext: no clubbing cyanosis or edema  Skin: no rashes and no petechiae, bruising on extremities +  Neuro: alert and oriented X 4, no focal deficits        MEDICATIONS  (STANDING):  diltiazem    Tablet 60 milliGRAM(s) Oral every 8 hours  pantoprazole    Tablet 40 milliGRAM(s) Oral before breakfast  predniSONE   Tablet 7.5 milliGRAM(s) Oral daily  sucralfate suspension 1 Gram(s) Oral three times a day  furosemide    Tablet 40 milliGRAM(s) Oral daily  ferrous    sulfate 325 milliGRAM(s) Oral daily    MEDICATIONS  (PRN):  ALBUTerol/ipratropium for Nebulization 3 milliLiter(s) Nebulizer every 6 hours PRN Shortness of Breath and/or Wheezing  acetaminophen   Tablet. 650 milliGRAM(s) Oral every 6 hours PRN Mild Pain (1 - 3)      Allergies    eggs (Rash)  no drug allergy (Unknown)    Intolerances    vinegar sensitivity    family states that the patient shakes when she ingests vinegar (Other)      MEDICATIONS  (STANDING):  diltiazem    Tablet 60 milliGRAM(s) Oral every 8 hours  pantoprazole    Tablet 40 milliGRAM(s) Oral before breakfast  predniSONE   Tablet 7.5 milliGRAM(s) Oral daily  sucralfate suspension 1 Gram(s) Oral three times a day  furosemide    Tablet 40 milliGRAM(s) Oral daily  ferrous    sulfate 325 milliGRAM(s) Oral daily    MEDICATIONS  (PRN):  ALBUTerol/ipratropium for Nebulization 3 milliLiter(s) Nebulizer every 6 hours PRN Shortness of Breath and/or Wheezing  acetaminophen   Tablet. 650 milliGRAM(s) Oral every 6 hours PRN Mild Pain (1 - 3)      Allergies    eggs (Rash)  no drug allergy (Unknown)    Intolerances    vinegar sensitivity    family states that the patient shakes when she ingests vinegar (Other)      LABS:                          8.2    14.79 )-----------( 35       ( 02 Jul 2017 08:41 )             23.4         Mean Cell Volume : 84.8 fl  Mean Cell Hemoglobin : 29.7 pg  Mean Cell Hemoglobin Concentration : 35.0 gm/dL      07-02    141  |  95<L>  |  60<H>  ----------------------------<  112<H>  3.6   |  30  |  1.78<H>    Ca    9.3      02 Jul 2017 08:37              Culture - Blood (06.26.17 @ 07:50)    Specimen Source: .Blood Blood    Culture Results:   No growth at 5 days.      Culture - Urine (06.26.17 @ 00:58)    -  Amikacin: S <=8    -  Ampicillin: S 4    -  Ampicillin/Sulbactam: S <=4/2    -  Ertapenem: S <=0.5    -  Tobramycin: S <=2    -  Cefazolin: S <=2    -  Ceftriaxone: S <=1    -  Gentamicin: S <=1    -  Nitrofurantoin: S <=32    -  Piperacillin/Tazobactam: S <=8    -  Aztreonam: S <=4    -  Cefepime: S <=2    -  Ceftazidime: S <=1    -  Ciprofloxacin: S <=0.5    -  Cefoxitin: S <=4    -  Imipenem: S <=1    -  Levofloxacin: S <=1    -  Meropenem: S <=1    -  Trimethoprim/Sulfamethoxazole: S <=0.5/9.5    Specimen Source: .Urine Catheterized    Culture Results:   >100,000 CFU/ml Escherichia coli    Organism Identification: Escherichia coli    Organism: Escherichia coli    Method Type: RACHEL    Serum Pro-Brain Natriuretic Peptide (06.25.17 @ 21:47)    Serum Pro-Brain Natriuretic Peptide: [id number]

## 2017-07-02 NOTE — PROGRESS NOTE ADULT - SUBJECTIVE AND OBJECTIVE BOX
CHIEF COMPLAINT:Patient is a 89y old  Female who presents with a chief complaint of SOB (27 Jun 2017 18:03)    	        PAST MEDICAL & SURGICAL HISTORY:  PNA (pneumonia)  Dysphagia  Thrombocytopenia: ITP  Atrial fibrillation and flutter: No A/C  during hospitalization in april 2014  Respiratory failure: in april 2014 was intubated  Cataract: right eye  Small bowel obstruction: s/p resection in 2010  HTN - Hypertension  S/P cholecystectomy  H/O: Hysterectomy  S/P Small Bowel Resection          REVIEW OF SYSTEMS:  CONSTITUTIONAL: No fever, weight loss, or fatigue  EYES: No eye pain, visual disturbances, or discharge  NECK: No pain or stiffness  RESPIRATORY: No cough, wheezing, chills or hemoptysis; No Shortness of Breath  CARDIOVASCULAR: No chest pain, palpitations, passing out, dizziness, or leg swelling  GASTROINTESTINAL: No abdominal or epigastric pain. No nausea, vomiting, or hematemesis; No diarrhea or constipation. No melena or hematochezia.  GENITOURINARY: No dysuria, frequency, hematuria, or incontinence  NEUROLOGICAL: No headaches, memory loss, loss of strength, numbness, or tremors  SKIN: No itching, burning, rashes, or lesions   LYMPH Nodes: No enlarged glands  ENDOCRINE: No heat or cold intolerance; No hair loss  MUSCULOSKELETAL: No joint pain or swelling; No muscle, back, or extremity pain    Medications:  MEDICATIONS  (STANDING):  diltiazem    Tablet 60 milliGRAM(s) Oral every 8 hours  pantoprazole    Tablet 40 milliGRAM(s) Oral before breakfast  predniSONE   Tablet 7.5 milliGRAM(s) Oral daily  sucralfate suspension 1 Gram(s) Oral three times a day  furosemide    Tablet 40 milliGRAM(s) Oral daily  ferrous    sulfate 325 milliGRAM(s) Oral daily    MEDICATIONS  (PRN):  ALBUTerol/ipratropium for Nebulization 3 milliLiter(s) Nebulizer every 6 hours PRN Shortness of Breath and/or Wheezing  acetaminophen   Tablet. 650 milliGRAM(s) Oral every 6 hours PRN Mild Pain (1 - 3)    	    PHYSICAL EXAM:  T(C): 36.4 (07-02-17 @ 05:25), Max: 36.5 (07-02-17 @ 00:06)  HR: 70 (07-02-17 @ 05:25) (70 - 86)  BP: 106/66 (07-02-17 @ 05:25) (98/55 - 114/57)  RR: 18 (07-02-17 @ 05:25) (18 - 18)  SpO2: 100% (07-02-17 @ 05:25) (95% - 100%)  Wt(kg): --  I&O's Summary    01 Jul 2017 07:01  -  02 Jul 2017 07:00  --------------------------------------------------------  IN: 917 mL / OUT: 890 mL / NET: 27 mL        Appearance: Normal	  HEENT:   Normal oral mucosa, PERRL, EOMI	  Lymphatic: No lymphadenopathy  Cardiovascular: Normal S1 S2, No JVD, No murmurs, No edema  Respiratory: Lungs clear to auscultation	  Psychiatry: A & O x 3, Mood & affect appropriate  Gastrointestinal:  Soft, Non-tender, + BS	  Skin: No rashes, No ecchymoses, No cyanosis	  Neurologic: Non-focal  Extremities: Normal range of motion, No clubbing, cyanosis or edema  Vascular: Peripheral pulses palpable 2+ bilaterally    TELEMETRY: 	    ECG:  	  RADIOLOGY:  OTHER: 	  	  LABS:	 	    CARDIAC MARKERS:                                8.1    13.19 )-----------( 44       ( 01 Jul 2017 08:58 )             22.5     07-01    143  |  97  |  46<H>  ----------------------------<  80  3.6   |  31  |  1.37<H>    Ca    9.2      01 Jul 2017 09:12      proBNP:   Lipid Profile:   HgA1c:   TSH:

## 2017-07-03 LAB
ANION GAP SERPL CALC-SCNC: 14 MMOL/L — SIGNIFICANT CHANGE UP (ref 5–17)
BUN SERPL-MCNC: 62 MG/DL — HIGH (ref 7–23)
CALCIUM SERPL-MCNC: 9.4 MG/DL — SIGNIFICANT CHANGE UP (ref 8.4–10.5)
CHLORIDE SERPL-SCNC: 94 MMOL/L — LOW (ref 96–108)
CO2 SERPL-SCNC: 33 MMOL/L — HIGH (ref 22–31)
CREAT SERPL-MCNC: 1.38 MG/DL — HIGH (ref 0.5–1.3)
GLUCOSE SERPL-MCNC: 99 MG/DL — SIGNIFICANT CHANGE UP (ref 70–99)
HCT VFR BLD CALC: 22.8 % — LOW (ref 34.5–45)
HGB BLD-MCNC: 8.4 G/DL — LOW (ref 11.5–15.5)
MCHC RBC-ENTMCNC: 32.1 PG — SIGNIFICANT CHANGE UP (ref 27–34)
MCHC RBC-ENTMCNC: 36.8 GM/DL — HIGH (ref 32–36)
MCV RBC AUTO: 87 FL — SIGNIFICANT CHANGE UP (ref 80–100)
PLATELET # BLD AUTO: 34 K/UL — LOW (ref 150–400)
POTASSIUM SERPL-MCNC: 3.7 MMOL/L — SIGNIFICANT CHANGE UP (ref 3.5–5.3)
POTASSIUM SERPL-SCNC: 3.7 MMOL/L — SIGNIFICANT CHANGE UP (ref 3.5–5.3)
RBC # BLD: 2.62 M/UL — LOW (ref 3.8–5.2)
RBC # FLD: 15.8 % — HIGH (ref 10.3–14.5)
SODIUM SERPL-SCNC: 141 MMOL/L — SIGNIFICANT CHANGE UP (ref 135–145)
WBC # BLD: 15.79 K/UL — HIGH (ref 3.8–10.5)
WBC # FLD AUTO: 15.79 K/UL — HIGH (ref 3.8–10.5)

## 2017-07-03 RX ORDER — FUROSEMIDE 40 MG
20 TABLET ORAL DAILY
Qty: 0 | Refills: 0 | Status: DISCONTINUED | OUTPATIENT
Start: 2017-07-03 | End: 2017-07-04

## 2017-07-03 RX ORDER — PANTOPRAZOLE SODIUM 20 MG/1
1 TABLET, DELAYED RELEASE ORAL
Qty: 0 | Refills: 0 | COMMUNITY
Start: 2017-07-03

## 2017-07-03 RX ORDER — ACETAMINOPHEN 500 MG
2 TABLET ORAL
Qty: 0 | Refills: 0 | COMMUNITY
Start: 2017-07-03

## 2017-07-03 RX ORDER — ACETAMINOPHEN 500 MG
2 TABLET ORAL
Qty: 240 | Refills: 0 | OUTPATIENT
Start: 2017-07-03 | End: 2017-08-02

## 2017-07-03 RX ORDER — DILTIAZEM HCL 120 MG
1 CAPSULE, EXT RELEASE 24 HR ORAL
Qty: 0 | Refills: 0 | COMMUNITY
Start: 2017-07-03

## 2017-07-03 RX ORDER — IPRATROPIUM/ALBUTEROL SULFATE 18-103MCG
3 AEROSOL WITH ADAPTER (GRAM) INHALATION
Qty: 0 | Refills: 0 | COMMUNITY
Start: 2017-07-03

## 2017-07-03 RX ORDER — FUROSEMIDE 40 MG
1 TABLET ORAL
Qty: 30 | Refills: 0 | OUTPATIENT
Start: 2017-07-03 | End: 2017-08-02

## 2017-07-03 RX ORDER — FERROUS SULFATE 325(65) MG
1 TABLET ORAL
Qty: 90 | Refills: 0 | OUTPATIENT
Start: 2017-07-03 | End: 2017-08-02

## 2017-07-03 RX ORDER — SUCRALFATE 1 G
10 TABLET ORAL
Qty: 0 | Refills: 0 | COMMUNITY
Start: 2017-07-03

## 2017-07-03 RX ORDER — SUCRALFATE 1 G
10 TABLET ORAL
Qty: 900 | Refills: 0 | OUTPATIENT
Start: 2017-07-03 | End: 2017-08-02

## 2017-07-03 RX ORDER — PANTOPRAZOLE SODIUM 20 MG/1
1 TABLET, DELAYED RELEASE ORAL
Qty: 30 | Refills: 0 | OUTPATIENT
Start: 2017-07-03 | End: 2017-08-02

## 2017-07-03 RX ORDER — FUROSEMIDE 40 MG
1 TABLET ORAL
Qty: 0 | Refills: 0 | COMMUNITY
Start: 2017-07-03

## 2017-07-03 RX ORDER — DILTIAZEM HCL 120 MG
1 CAPSULE, EXT RELEASE 24 HR ORAL
Qty: 90 | Refills: 0 | OUTPATIENT
Start: 2017-07-03 | End: 2017-08-02

## 2017-07-03 RX ADMIN — Medication 7.5 MILLIGRAM(S): at 05:37

## 2017-07-03 RX ADMIN — Medication 20 MILLIGRAM(S): at 10:45

## 2017-07-03 RX ADMIN — Medication 1 GRAM(S): at 13:18

## 2017-07-03 RX ADMIN — Medication 650 MILLIGRAM(S): at 11:15

## 2017-07-03 RX ADMIN — PANTOPRAZOLE SODIUM 40 MILLIGRAM(S): 20 TABLET, DELAYED RELEASE ORAL at 05:37

## 2017-07-03 RX ADMIN — Medication 650 MILLIGRAM(S): at 10:45

## 2017-07-03 RX ADMIN — Medication 1 GRAM(S): at 05:37

## 2017-07-03 RX ADMIN — Medication 325 MILLIGRAM(S): at 10:44

## 2017-07-03 RX ADMIN — Medication 1 GRAM(S): at 21:35

## 2017-07-03 NOTE — PROGRESS NOTE ADULT - PROBLEM SELECTOR PROBLEM 2
Shortness of breath
Acute respiratory failure with hypoxia
Atrial fibrillation and flutter

## 2017-07-03 NOTE — PROGRESS NOTE ADULT - PROBLEM SELECTOR PROBLEM 4
UTI (urinary tract infection)
Hyponatremia
UTI (urinary tract infection)

## 2017-07-03 NOTE — PROGRESS NOTE ADULT - PROBLEM SELECTOR PLAN 2
ct cards management
-Likely related to CHF and COPD/asthma  -Ct non revealing for pneumonia  -Would hold off on further abx
rate controlled  Call pharmacy for home meds
ct cards management

## 2017-07-03 NOTE — PROGRESS NOTE ADULT - SUBJECTIVE AND OBJECTIVE BOX
Patient is a 89y old  Female who presents with a chief complaint of SOB (27 Jun 2017 18:03)      HPI:  89 yo F A fib/A flutter , ? hx CHF , HTN, dysphagia (previously refused PEG), multiple admissions for PNA  (?aspiration related, req intubation for hypercapnic resp failure 2014), SBO (resection 2010), ITP on chronic prednisone, pancreatic lesion (prev declined further w/u),  non ambulatory @ baseline (wheel chair/bed bound), lives @ home w/ 24 HHA, per daughter forgetful (A, A, O 1-2 @ baseline). Patient is poor historian. Patient admitted following 2 episodes of SOB, urinating less, L sided abd pain 5/10 .In ED, initially in no acute distress, respiratory status worsened w/ SOB/wheezing and patient placed on bipap, given duoneb x 3, 125 mg IVP solu medrol, 40 mg IVP lasix. CBC in er showed wbc 13.1,  Hgb 8 and platelet count 64K - labs today -  wbc 17.4,  Hgb 8.5 and platelet count 27K. Patient placed on ceftriaxone for E.Coli UTI and is being treated for fluid overload.   Patient denies epigastric/abdominal discomfort. denies heaviness on breathing stable. Still weak. c/o discomfort rt LE . asking for bed pan. No expectoration. Appetite stable. No overt bleeding c/o. No dysuria.        REVIEW OF SYSTEMS:    CONSTITUTIONAL: No  fevers or chills. weakness +  EYES/ENT: No visual changes;  No vertigo or throat pain   NECK: No pain or stiffness  RESPIRATORY: No wheezing, hemoptysis; Cough, shortness of breath +  CARDIOVASCULAR: No chest pain or palpitations  GASTROINTESTINAL:  No nausea, vomiting, or hematemesis; No diarrhea or constipation. No melena or hematochezia.abdominal, epigastric pain +  GENITOURINARY: No dysuria, frequency or hematuria  NEUROLOGICAL: No numbness or weakness  SKIN: No itching, burning, rashes, or lesions . Bruising +      Vital Signs Last 24 Hrs  T(C): 36.7 (03 Jul 2017 05:34), Max: 36.9 (02 Jul 2017 11:53)  T(F): 98 (03 Jul 2017 05:34), Max: 98.5 (02 Jul 2017 11:53)  HR: 80 (03 Jul 2017 05:34) (76 - 85)  BP: 130/64 (03 Jul 2017 05:34) (100/58 - 130/64)  BP(mean): --  RR: 18 (03 Jul 2017 05:34) (18 - 18)  SpO2: 100% (03 Jul 2017 05:34) (96% - 100%)    PHYSICAL EXAM  General: adult chronically ill  HEENT: clear oropharynx, anicteric sclera, pink conjunctiva  Neck: supple  CV: normal S1/S2 with no murmur rubs or gallops  Lungs: decreased BS b/l on auscultation, no wheezes, no rales  Abdomen: soft non-tender non-distended, no hepatosplenomegaly  Ext: no clubbing cyanosis or edema  Skin: no rashes and no petechiae, bruising on extremities +  Neuro: alert and oriented X 4, no focal deficits      MEDICATIONS  (STANDING):  diltiazem    Tablet 60 milliGRAM(s) Oral every 8 hours  pantoprazole    Tablet 40 milliGRAM(s) Oral before breakfast  predniSONE   Tablet 7.5 milliGRAM(s) Oral daily  sucralfate suspension 1 Gram(s) Oral three times a day  ferrous    sulfate 325 milliGRAM(s) Oral daily  furosemide    Tablet 20 milliGRAM(s) Oral daily    MEDICATIONS  (PRN):  ALBUTerol/ipratropium for Nebulization 3 milliLiter(s) Nebulizer every 6 hours PRN Shortness of Breath and/or Wheezing  acetaminophen   Tablet. 650 milliGRAM(s) Oral every 6 hours PRN Mild Pain (1 - 3)      Allergies    eggs (Rash)  no drug allergy (Unknown)    Intolerances    vinegar sensitivity    family states that the patient shakes when she ingests vinegar (Other)      LABS:                          8.4    15.79 )-----------( 34       ( 03 Jul 2017 07:15 )             22.8         Mean Cell Volume : 87.0 fl  Mean Cell Hemoglobin : 32.1 pg  Mean Cell Hemoglobin Concentration : 36.8 gm/dL    07-03    141  |  94<L>  |  62<H>  ----------------------------<  99  3.7   |  33<H>  |  1.38<H>    Ca    9.4      03 Jul 2017 06:26      Culture - Blood (06.26.17 @ 07:50)    Specimen Source: .Blood Blood    Culture Results:   No growth at 5 days.      Culture - Urine (06.26.17 @ 00:58)    -  Amikacin: S <=8    -  Ampicillin: S 4    -  Ampicillin/Sulbactam: S <=4/2    -  Ertapenem: S <=0.5    -  Tobramycin: S <=2    -  Cefazolin: S <=2    -  Ceftriaxone: S <=1    -  Gentamicin: S <=1    -  Nitrofurantoin: S <=32    -  Piperacillin/Tazobactam: S <=8    -  Aztreonam: S <=4    -  Cefepime: S <=2    -  Ceftazidime: S <=1    -  Ciprofloxacin: S <=0.5    -  Cefoxitin: S <=4    -  Imipenem: S <=1    -  Levofloxacin: S <=1    -  Meropenem: S <=1    -  Trimethoprim/Sulfamethoxazole: S <=0.5/9.5    Specimen Source: .Urine Catheterized    Culture Results:   >100,000 CFU/ml Escherichia coli    Organism Identification: Escherichia coli    Organism: Escherichia coli    Method Type: RACHEL    Serum Pro-Brain Natriuretic Peptide (06.25.17 @ 21:47)    Serum Pro-Brain Natriuretic Peptide: 4195

## 2017-07-03 NOTE — PROGRESS NOTE ADULT - PROBLEM SELECTOR PROBLEM 3
Anemia, unspecified type
Anemia

## 2017-07-03 NOTE — PROGRESS NOTE ADULT - ASSESSMENT
89 yo Female  as above:  1. SOB - likely multifactorial, CHF + COPD, resolved now, c/w PO Lasix, f/u Cardio, I/O, monitor Cr, Duoneb PRN, PT  2. A fib - rate controlled, not on AC  3. NEWTON - Cr bump likely 2/2 diuresis, now improving  4. Hyponatremia - improving, plan per Renal  5. Pancreatic mass  - known to family, no intervention  6. Chronic anemia - restart PO FeSO4  7. ITP - daily CBC, increase steroids if plt trending down  8. Asymptomatic bacteriuria - no need for abx, may d/c to [facility]

## 2017-07-03 NOTE — PROGRESS NOTE ADULT - SUBJECTIVE AND OBJECTIVE BOX
Patient is a 89y Female  being evaluated for  hyponatremia and NEWTON         who reports no complaints overnight.    awake in bed   denies sob    PHYSICAL EXAM:  Vitals:T(C): 36.7 (07-03-17 @ 05:34), Max: 36.9 (07-02-17 @ 11:53)  HR: 80 (07-03-17 @ 05:34) (76 - 85)  BP: 130/64 (07-03-17 @ 05:34) (100/58 - 130/64)  RR: 18 (07-03-17 @ 05:34) (18 - 18)  SpO2: 100% (07-03-17 @ 05:34) (96% - 100%)  Wt(kg): --    General: no acute distress  HEENT:  NC, ext ears nl, oropharynx clear,  nose nl  Neck: No JVD, bruit, adenopathy or thyromegaly  Eyes: PERRL, no discharge, no icterus  Respiratory: dec bs bases bilat, no wheezes, rales, nl effort  Cardiovascular: regular rate, S1 and S2 no rub or gallop  Abd: : BS+, soft, NT , no rebound or guarding, no bruits  Extremities: no edema, no cyanosis, palpable pulses      I and O's:  07-02 @ 07:01  -  07-03 @ 07:00  --------------------------------------------------------  IN: 957 mL / OUT: 1310 mL / NET: -353 mL              REVIEW OF SYSTEMS:  CONSTITUTIONAL: No weakness, fevers or chills  RESPIRATORY: No cough, wheezing, hemoptysis; No shortness of breath  CARDIOVASCULAR: No chest pain or palpitations  GI : no abd pain, nausea or vomiting  GENITOURINARY: No dysuria, frequency or hematuria  All other review of systems is negative unless indicated above.    Allergies    eggs (Rash)  no drug allergy (Unknown)    Intolerances    vinegar sensitivity    family states that the patient shakes when she ingests vinegar (Other)        MEDICATIONS  (STANDING):  diltiazem    Tablet 60 milliGRAM(s) Oral every 8 hours  pantoprazole    Tablet 40 milliGRAM(s) Oral before breakfast  predniSONE   Tablet 7.5 milliGRAM(s) Oral daily  sucralfate suspension 1 Gram(s) Oral three times a day  ferrous    sulfate 325 milliGRAM(s) Oral daily  furosemide    Tablet 20 milliGRAM(s) Oral daily      Sodium,Serum 141    07-03 @ 06:26  Sodium,Serum 141    07-02 @ 08:37  Sodium,Serum 143    07-01 @ 09:12  Sodium,Serum 141    06-30 @ 06:02    Potassium,Serum 3.7    07-03 @ 06:26  Potassium,Serum 3.6    07-02 @ 08:37  Potassium,Serum 3.6    07-01 @ 09:12  Potassium,Serum 3.7    06-30 @ 06:02    Chloride,Serum 94    07-03 @ 06:26  Chloride,Serum 95    07-02 @ 08:37  Chloride,Serum 97    07-01 @ 09:12  Chloride,Serum 96    06-30 @ 06:02    CO2, Serum 33    07-03 @ 06:26  CO2, Serum 30    07-02 @ 08:37  CO2, Serum 31    07-01 @ 09:12  CO2, Serum 31    06-30 @ 06:02    BUN 62    07-03 @ 06:26  BUN 60    07-02 @ 08:37  BUN 46    07-01 @ 09:12  BUN 55    06-30 @ 06:02    Creatinine, Serum 1.38    07-03 @ 06:26  Creatinine, Serum 1.78    07-02 @ 08:37  Creatinine, Serum 1.37    07-01 @ 09:12  Creatinine, Serum 1.35    06-30 @ 06:02      07-03    141  |  94<L>  |  62<H>  ----------------------------<  99  3.7   |  33<H>  |  1.38<H>    Ca    9.4      03 Jul 2017 06:26                              8.2    14.79 )-----------( 35       ( 02 Jul 2017 08:41 )             23.4

## 2017-07-03 NOTE — PROGRESS NOTE ADULT - PROBLEM SELECTOR PLAN 3
on po iron  prn iv venofer if unable to tolerate po iron
monitor H/H  no active sign of bleeding
on po iron  prn iv venofer if unable to tolerate po iron

## 2017-07-03 NOTE — PROGRESS NOTE ADULT - SUBJECTIVE AND OBJECTIVE BOX
CHIEF COMPLAINT:Patient is a 89y old  Female who presents with a chief complaint of Shortness of Breath. (27 Jun 2017 18:03)    	  Interval history: no new events      Allergies:  eggs (Rash)  no drug allergy (Unknown)  vinegar sensitivity    family states that the patient shakes when she ingests vinegar (Other)      PAST MEDICAL & SURGICAL HISTORY:  PNA (pneumonia)  Dysphagia  Thrombocytopenia: ITP  Atrial fibrillation and flutter: No A/C  during hospitalization in april 2014  Respiratory failure: in april 2014 was intubated  Cataract: right eye  Small bowel obstruction: s/p resection in 2010  HTN - Hypertension  S/P cholecystectomy  H/O: Hysterectomy  S/P Small Bowel Resection      FAMILY HISTORY:  No pertinent family history in first degree relatives      REVIEW OF SYSTEMS:  CONSTITUTIONAL: No fever, weight loss, or fatigue  EYES: No eye pain, visual disturbances, or discharge  NECK: No pain or stiffness  RESPIRATORY: No cough or wheezing, no shortness of breath  CARDIOVASCULAR: No chest pain, palpitations, dizziness, or leg swelling  GASTROINTESTINAL: No abdominal or epigastric pain. No nausea, vomiting, diarrhea or constipation  GENITOURINARY: No dysuria, urinary frequency or urgency, no hematuria  NEUROLOGICAL: No headaches, memory loss, loss of strength, numbness, or tremors  SKIN: No itching, burning, rashes, or lesions   MUSCULOSKELETAL: No joint pain or swelling; No muscle, back, or extremity pain    Medications:  MEDICATIONS  (STANDING):  diltiazem    Tablet 60 milliGRAM(s) Oral every 8 hours  pantoprazole    Tablet 40 milliGRAM(s) Oral before breakfast  predniSONE   Tablet 7.5 milliGRAM(s) Oral daily  sucralfate suspension 1 Gram(s) Oral three times a day  ferrous    sulfate 325 milliGRAM(s) Oral daily  furosemide    Tablet 20 milliGRAM(s) Oral daily    MEDICATIONS  (PRN):  ALBUTerol/ipratropium for Nebulization 3 milliLiter(s) Nebulizer every 6 hours PRN Shortness of Breath and/or Wheezing  acetaminophen   Tablet. 650 milliGRAM(s) Oral every 6 hours PRN Mild Pain (1 - 3)    	    PHYSICAL EXAM:  T(C): 36.8 (07-03-17 @ 12:58), Max: 36.8 (07-03-17 @ 12:58)  HR: 84 (07-03-17 @ 12:58) (76 - 84)  BP: 104/57 (07-03-17 @ 12:58) (100/58 - 130/64)  RR: 18 (07-03-17 @ 17:00) (18 - 18)  SpO2: 100% (07-03-17 @ 17:00) (97% - 100%)  Wt(kg): --  I&O's Summary    02 Jul 2017 07:01  -  03 Jul 2017 07:00  --------------------------------------------------------  IN: 957 mL / OUT: 1310 mL / NET: -353 mL    03 Jul 2017 07:01  -  03 Jul 2017 18:03  --------------------------------------------------------  IN: 440 mL / OUT: 1100 mL / NET: -660 mL        Appearance: Normal	  HEENT:   NCAT, PERRL, EOMI	  Lymphatic: No lymphadenopathy  Cardiovascular: Normal S1 S2, RRR  Respiratory: Lungs clear to auscultation BL  Psychiatry: A & O x 2-3, Mood & affect appropriate  Gastrointestinal:  Soft, Non-tender, + BS  Skin: No rashes, No ecchymoses, No cyanosis	  Neurologic: Non-focal  Extremities: Normal range of motion, No clubbing, cyanosis or edema    	  LABS:	 	    CARDIAC MARKERS:                                8.4    15.79 )-----------( 34       ( 03 Jul 2017 07:15 )             22.8     07-03    141  |  94<L>  |  62<H>  ----------------------------<  99  3.7   |  33<H>  |  1.38<H>    Ca    9.4      03 Jul 2017 06:26      proBNP:   Lipid Profile:   HgA1c:   TSH:

## 2017-07-03 NOTE — PROGRESS NOTE ADULT - PROBLEM SELECTOR PLAN 1
stable platelet count  Ct to monitor on current prednisone dose in absence of active bleeding  Prn platelet transfusion if active bleeding  D/w  - if d/c to rehab, cbc 1/week with results to  fax [phone]  Otherwise if d/winifred f/up with dr. canales in office [phone]

## 2017-07-03 NOTE — PROGRESS NOTE ADULT - PROBLEM SELECTOR PROBLEM 1
Thrombocytopenia
Shortness of breath
Urine culture positive

## 2017-07-03 NOTE — PROGRESS NOTE ADULT - PROBLEM SELECTOR PLAN 4
Abx on hold per ID  will ct to monitor for UTI c/o
Abx on hold per ID  will monitor for UTI c/o
monitor BMP  f/u urine lytes
ct ceftriaxone

## 2017-07-04 VITALS
SYSTOLIC BLOOD PRESSURE: 120 MMHG | RESPIRATION RATE: 18 BRPM | TEMPERATURE: 98 F | WEIGHT: 81.79 LBS | OXYGEN SATURATION: 94 % | DIASTOLIC BLOOD PRESSURE: 77 MMHG | HEART RATE: 79 BPM

## 2017-07-04 LAB
ANION GAP SERPL CALC-SCNC: 9 MMOL/L — SIGNIFICANT CHANGE UP (ref 5–17)
BUN SERPL-MCNC: 53 MG/DL — HIGH (ref 7–23)
CALCIUM SERPL-MCNC: 9 MG/DL — SIGNIFICANT CHANGE UP (ref 8.4–10.5)
CHLORIDE SERPL-SCNC: 94 MMOL/L — LOW (ref 96–108)
CO2 SERPL-SCNC: 36 MMOL/L — HIGH (ref 22–31)
CREAT SERPL-MCNC: 1.24 MG/DL — SIGNIFICANT CHANGE UP (ref 0.5–1.3)
GLUCOSE SERPL-MCNC: 100 MG/DL — HIGH (ref 70–99)
POTASSIUM SERPL-MCNC: 3.7 MMOL/L — SIGNIFICANT CHANGE UP (ref 3.5–5.3)
POTASSIUM SERPL-SCNC: 3.7 MMOL/L — SIGNIFICANT CHANGE UP (ref 3.5–5.3)
SODIUM SERPL-SCNC: 139 MMOL/L — SIGNIFICANT CHANGE UP (ref 135–145)

## 2017-07-04 PROCEDURE — 83605 ASSAY OF LACTIC ACID: CPT

## 2017-07-04 PROCEDURE — 94640 AIRWAY INHALATION TREATMENT: CPT

## 2017-07-04 PROCEDURE — 84443 ASSAY THYROID STIM HORMONE: CPT

## 2017-07-04 PROCEDURE — 93970 EXTREMITY STUDY: CPT

## 2017-07-04 PROCEDURE — 83550 IRON BINDING TEST: CPT

## 2017-07-04 PROCEDURE — 96376 TX/PRO/DX INJ SAME DRUG ADON: CPT

## 2017-07-04 PROCEDURE — 82947 ASSAY GLUCOSE BLOOD QUANT: CPT

## 2017-07-04 PROCEDURE — 81001 URINALYSIS AUTO W/SCOPE: CPT

## 2017-07-04 PROCEDURE — 94660 CPAP INITIATION&MGMT: CPT

## 2017-07-04 PROCEDURE — 82728 ASSAY OF FERRITIN: CPT

## 2017-07-04 PROCEDURE — 80053 COMPREHEN METABOLIC PANEL: CPT

## 2017-07-04 PROCEDURE — 93005 ELECTROCARDIOGRAM TRACING: CPT

## 2017-07-04 PROCEDURE — 99285 EMERGENCY DEPT VISIT HI MDM: CPT | Mod: 25

## 2017-07-04 PROCEDURE — 82607 VITAMIN B-12: CPT

## 2017-07-04 PROCEDURE — 82435 ASSAY OF BLOOD CHLORIDE: CPT

## 2017-07-04 PROCEDURE — 71045 X-RAY EXAM CHEST 1 VIEW: CPT

## 2017-07-04 PROCEDURE — 74177 CT ABD & PELVIS W/CONTRAST: CPT

## 2017-07-04 PROCEDURE — 85014 HEMATOCRIT: CPT

## 2017-07-04 PROCEDURE — 84300 ASSAY OF URINE SODIUM: CPT

## 2017-07-04 PROCEDURE — 86900 BLOOD TYPING SEROLOGIC ABO: CPT

## 2017-07-04 PROCEDURE — 82272 OCCULT BLD FECES 1-3 TESTS: CPT

## 2017-07-04 PROCEDURE — 82803 BLOOD GASES ANY COMBINATION: CPT

## 2017-07-04 PROCEDURE — 86850 RBC ANTIBODY SCREEN: CPT

## 2017-07-04 PROCEDURE — 82330 ASSAY OF CALCIUM: CPT

## 2017-07-04 PROCEDURE — 83735 ASSAY OF MAGNESIUM: CPT

## 2017-07-04 PROCEDURE — 85027 COMPLETE CBC AUTOMATED: CPT

## 2017-07-04 PROCEDURE — 71275 CT ANGIOGRAPHY CHEST: CPT

## 2017-07-04 PROCEDURE — 85610 PROTHROMBIN TIME: CPT

## 2017-07-04 PROCEDURE — 83880 ASSAY OF NATRIURETIC PEPTIDE: CPT

## 2017-07-04 PROCEDURE — 83935 ASSAY OF URINE OSMOLALITY: CPT

## 2017-07-04 PROCEDURE — 84295 ASSAY OF SERUM SODIUM: CPT

## 2017-07-04 PROCEDURE — 87186 SC STD MICRODIL/AGAR DIL: CPT

## 2017-07-04 PROCEDURE — 84100 ASSAY OF PHOSPHORUS: CPT

## 2017-07-04 PROCEDURE — 96375 TX/PRO/DX INJ NEW DRUG ADDON: CPT | Mod: XU

## 2017-07-04 PROCEDURE — 93306 TTE W/DOPPLER COMPLETE: CPT

## 2017-07-04 PROCEDURE — 82553 CREATINE MB FRACTION: CPT

## 2017-07-04 PROCEDURE — 80061 LIPID PANEL: CPT

## 2017-07-04 PROCEDURE — 84484 ASSAY OF TROPONIN QUANT: CPT

## 2017-07-04 PROCEDURE — 83036 HEMOGLOBIN GLYCOSYLATED A1C: CPT

## 2017-07-04 PROCEDURE — 87040 BLOOD CULTURE FOR BACTERIA: CPT

## 2017-07-04 PROCEDURE — 86901 BLOOD TYPING SEROLOGIC RH(D): CPT

## 2017-07-04 PROCEDURE — 82436 ASSAY OF URINE CHLORIDE: CPT

## 2017-07-04 PROCEDURE — 82746 ASSAY OF FOLIC ACID SERUM: CPT

## 2017-07-04 PROCEDURE — 87086 URINE CULTURE/COLONY COUNT: CPT

## 2017-07-04 PROCEDURE — 85730 THROMBOPLASTIN TIME PARTIAL: CPT

## 2017-07-04 PROCEDURE — 96374 THER/PROPH/DIAG INJ IV PUSH: CPT | Mod: XU

## 2017-07-04 PROCEDURE — 84132 ASSAY OF SERUM POTASSIUM: CPT

## 2017-07-04 PROCEDURE — 80048 BASIC METABOLIC PNL TOTAL CA: CPT

## 2017-07-04 PROCEDURE — 82550 ASSAY OF CK (CPK): CPT

## 2017-07-04 PROCEDURE — 84466 ASSAY OF TRANSFERRIN: CPT

## 2017-07-04 RX ADMIN — PANTOPRAZOLE SODIUM 40 MILLIGRAM(S): 20 TABLET, DELAYED RELEASE ORAL at 06:26

## 2017-07-04 RX ADMIN — Medication 7.5 MILLIGRAM(S): at 06:26

## 2017-07-04 RX ADMIN — Medication 1 GRAM(S): at 06:26

## 2017-07-04 RX ADMIN — Medication 20 MILLIGRAM(S): at 06:26

## 2017-07-04 NOTE — PROGRESS NOTE ADULT - SUBJECTIVE AND OBJECTIVE BOX
CHIEF COMPLAINT:Patient is a 89y old  Female who presents with a chief complaint of Shortness of Breath. (27 Jun 2017 18:03)    	  Interval history: no new events      Allergies:  eggs (Rash)  no drug allergy (Unknown)  vinegar sensitivity    family states that the patient shakes when she ingests vinegar (Other)      PAST MEDICAL & SURGICAL HISTORY:  PNA (pneumonia)  Dysphagia  Thrombocytopenia: ITP  Atrial fibrillation and flutter: No A/C  during hospitalization in april 2014  Respiratory failure: in april 2014 was intubated  Cataract: right eye  Small bowel obstruction: s/p resection in 2010  HTN - Hypertension  S/P cholecystectomy  H/O: Hysterectomy  S/P Small Bowel Resection      FAMILY HISTORY:  No pertinent family history in first degree relatives      REVIEW OF SYSTEMS:  CONSTITUTIONAL: No fever, weight loss, or fatigue  EYES: No eye pain, visual disturbances, or discharge  NECK: No pain or stiffness  RESPIRATORY: No cough or wheezing, no shortness of breath  CARDIOVASCULAR: No chest pain, palpitations, dizziness, or leg swelling  GASTROINTESTINAL: No abdominal or epigastric pain. No nausea, vomiting, diarrhea or constipation  GENITOURINARY: No dysuria, urinary frequency or urgency, no hematuria  NEUROLOGICAL: No headaches, memory loss, loss of strength, numbness, or tremors  SKIN: No itching, burning, rashes, or lesions   MUSCULOSKELETAL: No joint pain or swelling; No muscle, back, or extremity pain      Medications:  MEDICATIONS  (STANDING):  diltiazem    Tablet 60 milliGRAM(s) Oral every 8 hours  pantoprazole    Tablet 40 milliGRAM(s) Oral before breakfast  predniSONE   Tablet 7.5 milliGRAM(s) Oral daily  sucralfate suspension 1 Gram(s) Oral three times a day  ferrous    sulfate 325 milliGRAM(s) Oral daily  furosemide    Tablet 20 milliGRAM(s) Oral daily    MEDICATIONS  (PRN):  ALBUTerol/ipratropium for Nebulization 3 milliLiter(s) Nebulizer every 6 hours PRN Shortness of Breath and/or Wheezing  acetaminophen   Tablet. 650 milliGRAM(s) Oral every 6 hours PRN Mild Pain (1 - 3)    	    PHYSICAL EXAM:  T(C): 36.4 (07-04-17 @ 04:46), Max: 36.4 (07-03-17 @ 20:12)  HR: 79 (07-04-17 @ 04:46) (68 - 79)  BP: 120/77 (07-04-17 @ 04:46) (105/50 - 120/77)  RR: 18 (07-04-17 @ 04:46) (18 - 18)  SpO2: 94% (07-04-17 @ 04:46) (94% - 100%)  Wt(kg): --  I&O's Summary    03 Jul 2017 07:01  -  04 Jul 2017 07:00  --------------------------------------------------------  IN: 740 mL / OUT: 1600 mL / NET: -860 mL    04 Jul 2017 07:01  -  04 Jul 2017 14:33  --------------------------------------------------------  IN: 360 mL / OUT: 400 mL / NET: -40 mL      Appearance: Normal	  HEENT:   NCAT, PERRL, EOMI	  Lymphatic: No lymphadenopathy  Cardiovascular: Normal S1 S2, RRR  Respiratory: Lungs clear to auscultation BL  Psychiatry: A & O x 2-3, Mood & affect appropriate  Gastrointestinal:  Soft, Non-tender, + BS  Skin: No rashes, No ecchymoses, No cyanosis	  Neurologic: Non-focal  Extremities: Normal range of motion, No clubbing, cyanosis or edema      	  LABS:	 	    CARDIAC MARKERS:                                8.4    15.79 )-----------( 34       ( 03 Jul 2017 07:15 )             22.8     07-04    139  |  94<L>  |  53<H>  ----------------------------<  100<H>  3.7   |  36<H>  |  1.24    Ca    9.0      04 Jul 2017 07:04      proBNP:   Lipid Profile:   HgA1c:   TSH:

## 2017-07-04 NOTE — PROGRESS NOTE ADULT - ASSESSMENT
89y Female with hx of hyponatremia   Na remains wnl  creatinine improved, BUN still elevated  resume lasix at 20mg qd  cont fluid restriction  monitor I&O's, electrolytes and renal function/bicarbonate

## 2017-07-04 NOTE — PROGRESS NOTE ADULT - SUBJECTIVE AND OBJECTIVE BOX
Patient is a 89y Female  being evaluated for  hyponatremia and NEWTON              Vital Signs Last 24 Hrs  T(C): 36.4 (04 Jul 2017 04:46), Max: 36.8 (03 Jul 2017 12:58)  T(F): 97.6 (04 Jul 2017 04:46), Max: 98.3 (03 Jul 2017 12:58)  HR: 79 (04 Jul 2017 04:46) (68 - 84)  BP: 120/77 (04 Jul 2017 04:46) (104/57 - 120/77)  BP(mean): --  RR: 18 (04 Jul 2017 04:46) (18 - 18)  SpO2: 94% (04 Jul 2017 04:46) (94% - 100%)  General: no acute distress  Respiratory: dec bs bases bilat, no wheezes, rales, nl effort  Cardiovascular: regular rate, S1 and S2 no rub or gallop  Abd: : BS+, soft, NT , no rebound or guarding, no bruits  Extremities: no edema, no cyanosis, palpable pulses      I and O's:  07-02 @ 07:01  -  07-03 @ 07:00  --------------------------------------------------------  IN: 957 mL / OUT: 1310 mL / NET: -353 mL                    MEDICATIONS  (STANDING):  diltiazem    Tablet 60 milliGRAM(s) Oral every 8 hours  pantoprazole    Tablet 40 milliGRAM(s) Oral before breakfast  predniSONE   Tablet 7.5 milliGRAM(s) Oral daily  sucralfate suspension 1 Gram(s) Oral three times a day  ferrous    sulfate 325 milliGRAM(s) Oral daily  furosemide    Tablet 20 milliGRAM(s) Oral daily      Sodium,Serum 141    07-03 @ 06:26  Sodium,Serum 141    07-02 @ 08:37  Sodium,Serum 143    07-01 @ 09:12  Sodium,Serum 141    06-30 @ 06:02    Potassium,Serum 3.7    07-03 @ 06:26  Potassium,Serum 3.6    07-02 @ 08:37  Potassium,Serum 3.6    07-01 @ 09:12  Potassium,Serum 3.7    06-30 @ 06:02    Chloride,Serum 94    07-03 @ 06:26  Chloride,Serum 95    07-02 @ 08:37  Chloride,Serum 97    07-01 @ 09:12  Chloride,Serum 96    06-30 @ 06:02    CO2, Serum 33    07-03 @ 06:26  CO2, Serum 30    07-02 @ 08:37  CO2, Serum 31    07-01 @ 09:12  CO2, Serum 31    06-30 @ 06:02    BUN 62    07-03 @ 06:26  BUN 60    07-02 @ 08:37  BUN 46    07-01 @ 09:12  BUN 55    06-30 @ 06:02    Creatinine, Serum 1.38    07-03 @ 06:26  Creatinine, Serum 1.78    07-02 @ 08:37  Creatinine, Serum 1.37    07-01 @ 09:12  Creatinine, Serum 1.35    06-30 @ 06:02      07-03    141  |  94<L>  |  62<H>  ----------------------------<  99  3.7   |  33<H>  |  1.38<H>    Ca    9.4      03 Jul 2017 06:26  07-04    139  |  94<L>  |  53<H>  ----------------------------<  100<H>  3.7   |  36<H>  |  1.24    Ca    9.0      04 Jul 2017 07:04                                  8.2    14.79 )-----------( 35       ( 02 Jul 2017 08:41 )             23.4

## 2017-07-04 NOTE — PROGRESS NOTE ADULT - ASSESSMENT
89 yo Female  as above:  1. SOB - likely multifactorial, CHF + COPD, resolved now, c/w PO Lasix, f/u Cardio, I/O, monitor Cr, Duoneb PRN, PT  2. A fib - rate controlled, not on AC  3. NEWTON - Cr bump likely 2/2 diuresis, now improving  4. Hyponatremia - improving, plan per Renal  5. Pancreatic mass  - known to family, no intervention  6. Chronic anemia - restart PO FeSO4  7. ITP - daily CBC, increase steroids if plt trending down  8. Asymptomatic bacteriuria - no need for abx, may d/c home with family

## 2017-10-01 ENCOUNTER — OUTPATIENT (OUTPATIENT)
Dept: OUTPATIENT SERVICES | Facility: HOSPITAL | Age: 82
LOS: 1 days | End: 2017-10-01
Payer: MEDICAID

## 2017-10-01 ENCOUNTER — INPATIENT (INPATIENT)
Facility: HOSPITAL | Age: 82
LOS: 11 days | Discharge: NURSING, REHAB OR LONG-TERM CARE | DRG: 871 | End: 2017-10-13
Attending: INTERNAL MEDICINE | Admitting: INTERNAL MEDICINE
Payer: MEDICARE

## 2017-10-01 VITALS
HEART RATE: 83 BPM | SYSTOLIC BLOOD PRESSURE: 132 MMHG | OXYGEN SATURATION: 92 % | DIASTOLIC BLOOD PRESSURE: 79 MMHG | TEMPERATURE: 99 F | RESPIRATION RATE: 22 BRPM

## 2017-10-01 DIAGNOSIS — J96.01 ACUTE RESPIRATORY FAILURE WITH HYPOXIA: ICD-10-CM

## 2017-10-01 DIAGNOSIS — J18.9 PNEUMONIA, UNSPECIFIED ORGANISM: ICD-10-CM

## 2017-10-01 DIAGNOSIS — J81.0 ACUTE PULMONARY EDEMA: ICD-10-CM

## 2017-10-01 DIAGNOSIS — D69.6 THROMBOCYTOPENIA, UNSPECIFIED: ICD-10-CM

## 2017-10-01 DIAGNOSIS — N17.9 ACUTE KIDNEY FAILURE, UNSPECIFIED: ICD-10-CM

## 2017-10-01 DIAGNOSIS — E87.5 HYPERKALEMIA: ICD-10-CM

## 2017-10-01 DIAGNOSIS — R74.8 ABNORMAL LEVELS OF OTHER SERUM ENZYMES: ICD-10-CM

## 2017-10-01 DIAGNOSIS — Z71.89 OTHER SPECIFIED COUNSELING: ICD-10-CM

## 2017-10-01 DIAGNOSIS — D64.9 ANEMIA, UNSPECIFIED: ICD-10-CM

## 2017-10-01 DIAGNOSIS — E87.2 ACIDOSIS: ICD-10-CM

## 2017-10-01 DIAGNOSIS — E87.1 HYPO-OSMOLALITY AND HYPONATREMIA: ICD-10-CM

## 2017-10-01 DIAGNOSIS — N18.9 CHRONIC KIDNEY DISEASE, UNSPECIFIED: ICD-10-CM

## 2017-10-01 DIAGNOSIS — I50.9 HEART FAILURE, UNSPECIFIED: ICD-10-CM

## 2017-10-01 DIAGNOSIS — J96.22 ACUTE AND CHRONIC RESPIRATORY FAILURE WITH HYPERCAPNIA: ICD-10-CM

## 2017-10-01 LAB
ALBUMIN SERPL ELPH-MCNC: 3.9 G/DL — SIGNIFICANT CHANGE UP (ref 3.3–5)
ALP SERPL-CCNC: 73 U/L — SIGNIFICANT CHANGE UP (ref 40–120)
ALT FLD-CCNC: 27 U/L RC — SIGNIFICANT CHANGE UP (ref 10–45)
ANION GAP SERPL CALC-SCNC: 10 MMOL/L — SIGNIFICANT CHANGE UP (ref 5–17)
ANION GAP SERPL CALC-SCNC: 12 MMOL/L — SIGNIFICANT CHANGE UP (ref 5–17)
ANION GAP SERPL CALC-SCNC: 9 MMOL/L — SIGNIFICANT CHANGE UP (ref 5–17)
ANISOCYTOSIS BLD QL: SLIGHT — SIGNIFICANT CHANGE UP
APPEARANCE UR: CLEAR — SIGNIFICANT CHANGE UP
AST SERPL-CCNC: 35 U/L — SIGNIFICANT CHANGE UP (ref 10–40)
BASE EXCESS BLDV CALC-SCNC: 4.3 MMOL/L — HIGH (ref -2–2)
BASE EXCESS BLDV CALC-SCNC: 6 MMOL/L — HIGH (ref -2–2)
BASE EXCESS BLDV CALC-SCNC: 7.2 MMOL/L — HIGH (ref -2–2)
BASO STIPL BLD QL SMEAR: SLIGHT — SIGNIFICANT CHANGE UP
BASOPHILS # BLD AUTO: 0 K/UL — SIGNIFICANT CHANGE UP (ref 0–0.2)
BILIRUB SERPL-MCNC: 0.3 MG/DL — SIGNIFICANT CHANGE UP (ref 0.2–1.2)
BILIRUB UR-MCNC: NEGATIVE — SIGNIFICANT CHANGE UP
BUN SERPL-MCNC: 36 MG/DL — HIGH (ref 7–23)
BUN SERPL-MCNC: 36 MG/DL — HIGH (ref 7–23)
BUN SERPL-MCNC: 38 MG/DL — HIGH (ref 7–23)
CA-I SERPL-SCNC: 1.26 MMOL/L — SIGNIFICANT CHANGE UP (ref 1.12–1.3)
CA-I SERPL-SCNC: 1.32 MMOL/L — HIGH (ref 1.12–1.3)
CA-I SERPL-SCNC: 1.42 MMOL/L — HIGH (ref 1.12–1.3)
CALCIUM SERPL-MCNC: 10.5 MG/DL — SIGNIFICANT CHANGE UP (ref 8.4–10.5)
CALCIUM SERPL-MCNC: 9.5 MG/DL — SIGNIFICANT CHANGE UP (ref 8.4–10.5)
CALCIUM SERPL-MCNC: 9.8 MG/DL — SIGNIFICANT CHANGE UP (ref 8.4–10.5)
CHLORIDE BLDV-SCNC: 87 MMOL/L — LOW (ref 96–108)
CHLORIDE BLDV-SCNC: 88 MMOL/L — LOW (ref 96–108)
CHLORIDE BLDV-SCNC: 88 MMOL/L — LOW (ref 96–108)
CHLORIDE SERPL-SCNC: 88 MMOL/L — LOW (ref 96–108)
CO2 BLDV-SCNC: 38 MMOL/L — HIGH (ref 22–30)
CO2 BLDV-SCNC: 39 MMOL/L — HIGH (ref 22–30)
CO2 BLDV-SCNC: 40 MMOL/L — HIGH (ref 22–30)
CO2 SERPL-SCNC: 30 MMOL/L — SIGNIFICANT CHANGE UP (ref 22–31)
CO2 SERPL-SCNC: 32 MMOL/L — HIGH (ref 22–31)
CO2 SERPL-SCNC: 34 MMOL/L — HIGH (ref 22–31)
COLOR SPEC: COLORLESS — SIGNIFICANT CHANGE UP
CREAT SERPL-MCNC: 1.52 MG/DL — HIGH (ref 0.5–1.3)
CREAT SERPL-MCNC: 1.55 MG/DL — HIGH (ref 0.5–1.3)
CREAT SERPL-MCNC: 1.57 MG/DL — HIGH (ref 0.5–1.3)
DIFF PNL FLD: NEGATIVE — SIGNIFICANT CHANGE UP
ELLIPTOCYTES BLD QL SMEAR: SLIGHT — SIGNIFICANT CHANGE UP
EOSINOPHIL # BLD AUTO: 0.1 K/UL — SIGNIFICANT CHANGE UP (ref 0–0.5)
GAS PNL BLDV: 127 MMOL/L — LOW (ref 136–145)
GAS PNL BLDV: 128 MMOL/L — LOW (ref 136–145)
GAS PNL BLDV: 130 MMOL/L — LOW (ref 136–145)
GAS PNL BLDV: SIGNIFICANT CHANGE UP
GLUCOSE BLDV-MCNC: 145 MG/DL — HIGH (ref 70–99)
GLUCOSE BLDV-MCNC: 77 MG/DL — SIGNIFICANT CHANGE UP (ref 70–99)
GLUCOSE BLDV-MCNC: 87 MG/DL — SIGNIFICANT CHANGE UP (ref 70–99)
GLUCOSE SERPL-MCNC: 142 MG/DL — HIGH (ref 70–99)
GLUCOSE SERPL-MCNC: 157 MG/DL — HIGH (ref 70–99)
GLUCOSE SERPL-MCNC: 77 MG/DL — SIGNIFICANT CHANGE UP (ref 70–99)
GLUCOSE UR QL: NEGATIVE — SIGNIFICANT CHANGE UP
HCO3 BLDV-SCNC: 35 MMOL/L — HIGH (ref 21–29)
HCO3 BLDV-SCNC: 36 MMOL/L — HIGH (ref 21–29)
HCO3 BLDV-SCNC: 37 MMOL/L — HIGH (ref 21–29)
HCT VFR BLD CALC: 31.8 % — LOW (ref 34.5–45)
HCT VFR BLDA CALC: 31 % — LOW (ref 39–50)
HCT VFR BLDA CALC: 33 % — LOW (ref 39–50)
HCT VFR BLDA CALC: 34 % — LOW (ref 39–50)
HGB BLD CALC-MCNC: 10.7 G/DL — LOW (ref 11.5–15.5)
HGB BLD CALC-MCNC: 10.9 G/DL — LOW (ref 11.5–15.5)
HGB BLD CALC-MCNC: 9.9 G/DL — LOW (ref 11.5–15.5)
HGB BLD-MCNC: 10.8 G/DL — LOW (ref 11.5–15.5)
HOROWITZ INDEX BLDV+IHG-RTO: SIGNIFICANT CHANGE UP
HYALINE CASTS # UR AUTO: ABNORMAL
HYPOCHROMIA BLD QL: SLIGHT — SIGNIFICANT CHANGE UP
KETONES UR-MCNC: NEGATIVE — SIGNIFICANT CHANGE UP
LACTATE BLDV-MCNC: 1.5 MMOL/L — SIGNIFICANT CHANGE UP (ref 0.7–2)
LACTATE BLDV-MCNC: 1.6 MMOL/L — SIGNIFICANT CHANGE UP (ref 0.7–2)
LACTATE BLDV-MCNC: 2.8 MMOL/L — HIGH (ref 0.7–2)
LEUKOCYTE ESTERASE UR-ACNC: NEGATIVE — SIGNIFICANT CHANGE UP
LYMPHOCYTES # BLD AUTO: 1 K/UL — SIGNIFICANT CHANGE UP (ref 1–3.3)
LYMPHOCYTES # BLD AUTO: 3 % — LOW (ref 13–44)
MCHC RBC-ENTMCNC: 30 PG — SIGNIFICANT CHANGE UP (ref 27–34)
MCHC RBC-ENTMCNC: 34 GM/DL — SIGNIFICANT CHANGE UP (ref 32–36)
MCV RBC AUTO: 88.1 FL — SIGNIFICANT CHANGE UP (ref 80–100)
MICROCYTES BLD QL: SLIGHT — SIGNIFICANT CHANGE UP
MONOCYTES # BLD AUTO: 0.9 K/UL — SIGNIFICANT CHANGE UP (ref 0–0.9)
MONOCYTES NFR BLD AUTO: 7 % — SIGNIFICANT CHANGE UP (ref 2–14)
MYELOCYTES NFR BLD: 1 % — HIGH (ref 0–0)
NEUTROPHILS # BLD AUTO: 13.8 K/UL — HIGH (ref 1.8–7.4)
NEUTROPHILS NFR BLD AUTO: 83 % — HIGH (ref 43–77)
NITRITE UR-MCNC: NEGATIVE — SIGNIFICANT CHANGE UP
NT-PROBNP SERPL-SCNC: HIGH PG/ML (ref 0–300)
OVALOCYTES BLD QL SMEAR: SLIGHT — SIGNIFICANT CHANGE UP
PCO2 BLDV: 92 MMHG — HIGH (ref 35–50)
PCO2 BLDV: 94 MMHG — HIGH (ref 35–50)
PCO2 BLDV: 97 MMHG — HIGH (ref 35–50)
PH BLDV: 7.18 — CRITICAL LOW (ref 7.35–7.45)
PH BLDV: 7.22 — LOW (ref 7.35–7.45)
PH BLDV: 7.22 — LOW (ref 7.35–7.45)
PH UR: 6 — SIGNIFICANT CHANGE UP (ref 5–8)
PLAT MORPH BLD: NORMAL — SIGNIFICANT CHANGE UP
PLATELET # BLD AUTO: 23 K/UL — LOW (ref 150–400)
PO2 BLDV: 28 MMHG — SIGNIFICANT CHANGE UP (ref 25–45)
PO2 BLDV: 29 MMHG — SIGNIFICANT CHANGE UP (ref 25–45)
PO2 BLDV: 38 MMHG — SIGNIFICANT CHANGE UP (ref 25–45)
POIKILOCYTOSIS BLD QL AUTO: SLIGHT — SIGNIFICANT CHANGE UP
POLYCHROMASIA BLD QL SMEAR: SLIGHT — SIGNIFICANT CHANGE UP
POTASSIUM BLDV-SCNC: 5.4 MMOL/L — HIGH (ref 3.5–5)
POTASSIUM BLDV-SCNC: 5.6 MMOL/L — HIGH (ref 3.5–5)
POTASSIUM BLDV-SCNC: 7 MMOL/L — CRITICAL HIGH (ref 3.5–5)
POTASSIUM SERPL-MCNC: 5.9 MMOL/L — HIGH (ref 3.5–5.3)
POTASSIUM SERPL-MCNC: 6.2 MMOL/L — CRITICAL HIGH (ref 3.5–5.3)
POTASSIUM SERPL-MCNC: 7.3 MMOL/L — CRITICAL HIGH (ref 3.5–5.3)
POTASSIUM SERPL-SCNC: 5.9 MMOL/L — HIGH (ref 3.5–5.3)
POTASSIUM SERPL-SCNC: 6.2 MMOL/L — CRITICAL HIGH (ref 3.5–5.3)
POTASSIUM SERPL-SCNC: 7.3 MMOL/L — CRITICAL HIGH (ref 3.5–5.3)
PROT SERPL-MCNC: 7.3 G/DL — SIGNIFICANT CHANGE UP (ref 6–8.3)
PROT UR-MCNC: NEGATIVE — SIGNIFICANT CHANGE UP
RAPID RVP RESULT: SIGNIFICANT CHANGE UP
RBC # BLD: 3.61 M/UL — LOW (ref 3.8–5.2)
RBC # FLD: 15.3 % — HIGH (ref 10.3–14.5)
RBC BLD AUTO: ABNORMAL
RBC CASTS # UR COMP ASSIST: SIGNIFICANT CHANGE UP /HPF (ref 0–2)
SAO2 % BLDV: 41 % — LOW (ref 67–88)
SAO2 % BLDV: 42 % — LOW (ref 67–88)
SAO2 % BLDV: 62 % — LOW (ref 67–88)
SODIUM SERPL-SCNC: 129 MMOL/L — LOW (ref 135–145)
SODIUM SERPL-SCNC: 130 MMOL/L — LOW (ref 135–145)
SODIUM SERPL-SCNC: 132 MMOL/L — LOW (ref 135–145)
SP GR SPEC: 1.01 — SIGNIFICANT CHANGE UP (ref 1.01–1.02)
TROPONIN T SERPL-MCNC: 0.29 NG/ML — HIGH (ref 0–0.06)
UROBILINOGEN FLD QL: NEGATIVE — SIGNIFICANT CHANGE UP
VARIANT LYMPHS # BLD: 6 % — SIGNIFICANT CHANGE UP (ref 0–6)
WBC # BLD: 15.7 K/UL — HIGH (ref 3.8–10.5)
WBC # FLD AUTO: 15.7 K/UL — HIGH (ref 3.8–10.5)
WBC UR QL: SIGNIFICANT CHANGE UP /HPF (ref 0–5)

## 2017-10-01 PROCEDURE — 99292 CRITICAL CARE ADDL 30 MIN: CPT | Mod: 25

## 2017-10-01 PROCEDURE — 93010 ELECTROCARDIOGRAM REPORT: CPT

## 2017-10-01 PROCEDURE — 99291 CRITICAL CARE FIRST HOUR: CPT

## 2017-10-01 PROCEDURE — 99223 1ST HOSP IP/OBS HIGH 75: CPT

## 2017-10-01 PROCEDURE — 71010: CPT | Mod: 26

## 2017-10-01 PROCEDURE — G9001: CPT

## 2017-10-01 PROCEDURE — 99291 CRITICAL CARE FIRST HOUR: CPT | Mod: 25,GC

## 2017-10-01 RX ORDER — VANCOMYCIN HCL 1 G
1000 VIAL (EA) INTRAVENOUS ONCE
Qty: 0 | Refills: 0 | Status: DISCONTINUED | OUTPATIENT
Start: 2017-10-01 | End: 2017-10-01

## 2017-10-01 RX ORDER — DEXTROSE 50 % IN WATER 50 %
50 SYRINGE (ML) INTRAVENOUS ONCE
Qty: 0 | Refills: 0 | Status: COMPLETED | OUTPATIENT
Start: 2017-10-01 | End: 2017-10-01

## 2017-10-01 RX ORDER — INSULIN HUMAN 100 [IU]/ML
10 INJECTION, SOLUTION SUBCUTANEOUS ONCE
Qty: 0 | Refills: 0 | Status: COMPLETED | OUTPATIENT
Start: 2017-10-01 | End: 2017-10-01

## 2017-10-01 RX ORDER — IPRATROPIUM/ALBUTEROL SULFATE 18-103MCG
3 AEROSOL WITH ADAPTER (GRAM) INHALATION ONCE
Qty: 0 | Refills: 0 | Status: COMPLETED | OUTPATIENT
Start: 2017-10-01 | End: 2017-10-01

## 2017-10-01 RX ORDER — PIPERACILLIN AND TAZOBACTAM 4; .5 G/20ML; G/20ML
3.38 INJECTION, POWDER, LYOPHILIZED, FOR SOLUTION INTRAVENOUS ONCE
Qty: 0 | Refills: 0 | Status: COMPLETED | OUTPATIENT
Start: 2017-10-01 | End: 2017-10-01

## 2017-10-01 RX ORDER — ALBUTEROL 90 UG/1
2.5 AEROSOL, METERED ORAL ONCE
Qty: 0 | Refills: 0 | Status: DISCONTINUED | OUTPATIENT
Start: 2017-10-01 | End: 2017-10-01

## 2017-10-01 RX ORDER — ASPIRIN/CALCIUM CARB/MAGNESIUM 324 MG
324 TABLET ORAL DAILY
Qty: 0 | Refills: 0 | Status: DISCONTINUED | OUTPATIENT
Start: 2017-10-01 | End: 2017-10-01

## 2017-10-01 RX ORDER — FUROSEMIDE 40 MG
40 TABLET ORAL DAILY
Qty: 0 | Refills: 0 | Status: DISCONTINUED | OUTPATIENT
Start: 2017-10-02 | End: 2017-10-06

## 2017-10-01 RX ORDER — CALCIUM GLUCONATE 100 MG/ML
2 VIAL (ML) INTRAVENOUS ONCE
Qty: 0 | Refills: 0 | Status: COMPLETED | OUTPATIENT
Start: 2017-10-01 | End: 2017-10-01

## 2017-10-01 RX ORDER — HEPARIN SODIUM 5000 [USP'U]/ML
5000 INJECTION INTRAVENOUS; SUBCUTANEOUS EVERY 8 HOURS
Qty: 0 | Refills: 0 | Status: DISCONTINUED | OUTPATIENT
Start: 2017-10-01 | End: 2017-10-01

## 2017-10-01 RX ORDER — FUROSEMIDE 40 MG
40 TABLET ORAL ONCE
Qty: 0 | Refills: 0 | Status: COMPLETED | OUTPATIENT
Start: 2017-10-01 | End: 2017-10-01

## 2017-10-01 RX ORDER — PIPERACILLIN AND TAZOBACTAM 4; .5 G/20ML; G/20ML
3.38 INJECTION, POWDER, LYOPHILIZED, FOR SOLUTION INTRAVENOUS EVERY 12 HOURS
Qty: 0 | Refills: 0 | Status: DISCONTINUED | OUTPATIENT
Start: 2017-10-02 | End: 2017-10-04

## 2017-10-01 RX ORDER — DEXTROSE 50 % IN WATER 50 %
25 SYRINGE (ML) INTRAVENOUS ONCE
Qty: 0 | Refills: 0 | Status: DISCONTINUED | OUTPATIENT
Start: 2017-10-01 | End: 2017-10-01

## 2017-10-01 RX ORDER — VANCOMYCIN HCL 1 G
1000 VIAL (EA) INTRAVENOUS EVERY 24 HOURS
Qty: 0 | Refills: 0 | Status: DISCONTINUED | OUTPATIENT
Start: 2017-10-01 | End: 2017-10-02

## 2017-10-01 RX ADMIN — Medication 400 GRAM(S): at 22:31

## 2017-10-01 RX ADMIN — INSULIN HUMAN 10 UNIT(S): 100 INJECTION, SOLUTION SUBCUTANEOUS at 17:33

## 2017-10-01 RX ADMIN — Medication 3 MILLILITER(S): at 17:35

## 2017-10-01 RX ADMIN — Medication 50 MILLILITER(S): at 17:34

## 2017-10-01 RX ADMIN — Medication 40 MILLIGRAM(S): at 17:17

## 2017-10-01 RX ADMIN — INSULIN HUMAN 10 UNIT(S): 100 INJECTION, SOLUTION SUBCUTANEOUS at 22:30

## 2017-10-01 RX ADMIN — Medication 3 MILLILITER(S): at 17:17

## 2017-10-01 RX ADMIN — Medication 125 MILLIGRAM(S): at 17:17

## 2017-10-01 RX ADMIN — Medication 50 MILLILITER(S): at 22:30

## 2017-10-01 RX ADMIN — Medication 400 GRAM(S): at 17:34

## 2017-10-01 RX ADMIN — PIPERACILLIN AND TAZOBACTAM 200 GRAM(S): 4; .5 INJECTION, POWDER, LYOPHILIZED, FOR SOLUTION INTRAVENOUS at 23:25

## 2017-10-01 RX ADMIN — Medication 324 MILLIGRAM(S): at 18:15

## 2017-10-01 NOTE — H&P ADULT - PROBLEM SELECTOR PLAN 9
-Suspect driven from acute decompensated diastolic heart failure; sodium improving with diuresis. Will c/w IV diuresis.

## 2017-10-01 NOTE — H&P ADULT - ATTENDING COMMENTS
I personally provided 70 minutes of critical care time for respiratory failure & hyperkalemia evaluated with MICU team and treated with IV calcium gluconate x2, insulin 10units IV x2, duonebs x3, and BIPAP. Advance care planning also initiated. See above for details. D/w son and daughter at bedside patient's condition is severe and ill. D/w family the prognosis is very guarded. Family understood and agrees w/ current management. Emotional support provided to family. I personally provided 70 minutes of critical care time for respiratory failure & hyperkalemia evaluated with MICU team and treated with IV calcium gluconate x2, insulin 10units IV x2, duonebs x3, and BIPAP. Advance care planning also initiated. See above for details. D/w son and daughter at bedside patient's condition is severe and ill. D/w family the prognosis is very guarded. Family understood and agrees w/ current management. Emotional support provided to family.    D/w night NP to clarify home meds in AM. Medication rec not completed and will need to clarify home prednisone dose. I personally provided 70 minutes of critical care time for respiratory failure & hyperkalemia evaluated with MICU/cardiology team and treated with IV calcium gluconate x2, insulin 10units IV x2, duonebs x3, and BIPAP. Advance care planning also initiated. See above for details. D/w son and daughter at bedside patient's condition is severe and ill. D/w family the prognosis is very guarded. Family understood and agrees w/ current management. Emotional support provided to family.    D/w night NP to clarify home meds in AM. Medication rec not completed and will need to clarify home prednisone dose.

## 2017-10-01 NOTE — H&P ADULT - NSHPPHYSICALEXAM_GEN_ALL_CORE
PHYSICAL EXAM:  Vital Signs Last 24 Hrs  T(C): 36.4 (10-01-17 @ 22:37)  T(F): 97.5 (10-01-17 @ 22:37), Max: 99.1 (10-01-17 @ 16:30)  HR: 92 (10-01-17 @ 22:37) (76 - 92)  BP: 121/61 (10-01-17 @ 22:37)  BP(mean): --  RR: 15 (10-01-17 @ 22:37) (15 - 24)  SpO2: 96% (10-01-17 @ 22:37) (92% - 99%)  Wt(kg): --    Constitutional: Appears severely ill; on bipap; awakes w/ vocal stimuli.   EYES: EOMI w/ cataracts.   ENT:  Normal Hearing and no trauma   Neck: Soft and supple, No JVD  Respiratory: poor respiratory effort in setting of AMS; crackles in anterior lung fields; + rhonci.   Cardiovascular: S1 and S2, regular rate and rhythm, no Murmurs , no JVD  Gastrointestinal: Bowel Sounds present, soft, nontender, nondistended, no guarding, no rebound  Extremities: No cyanosis or clubbing; warm to touch  Vascular: 2+ peripheral pulses lower ex  Neurological: A/O x 0, patient awakes to vocal stimuli. Unable to perform motor/sensory exam.   Musculoskeletal: arthritic changes of hands bilaterally and of knee. No joint effusion. No joint erythema   Skin: Dry skin;   Psych: no depression or anhedonia  HEME: no bruises, no nose bleeds

## 2017-10-01 NOTE — ED PROVIDER NOTE - CARE PLAN
Principal Discharge DX:	Acute on chronic respiratory failure with hypercapnia  Secondary Diagnosis:	Hyperkalemia

## 2017-10-01 NOTE — H&P ADULT - PROBLEM SELECTOR PLAN 2
-EKG shows no peaked T-wave; will c/w aggressive medical management for hyperkalemia.  -monitor for arrhythmia.

## 2017-10-01 NOTE — H&P ADULT - PROBLEM SELECTOR PLAN 1
-Patient presents with worsening respiratory failure and found to be both hypoxic and hypercapnic. Patient is on BIPAP and son is HCP at bedside and with daughter as well. Son and daughter do not want CPR or intubation. They want to pursue treatment of pneumonia and reversible causes.   -Suspect the respiratory failure is multifactorial including aspiration pneumonia and pulmonary edema given B-lines on MICU's bedside ultrasound results.   -Patient requires inpatient care for respiratory failure, management of airway, treatment of pneumonia w/ IV antibiotics given altered status.  -c/w BIPAP support -Patient presents with worsening respiratory failure and found to be both hypoxic and hypercapnic. Patient is on BIPAP and son is HCP at bedside and with daughter as well. Son and daughter do not want CPR or intubation. They want to pursue treatment of pneumonia and reversible causes.   -Suspect the respiratory failure is multifactorial including aspiration pneumonia and pulmonary edema given B-lines on MICU's bedside ultrasound results.   -Patient requires inpatient care for respiratory failure, management of airway, treatment of pneumonia w/ IV antibiotics given altered status.  -c/w BIPAP support & IV lasix for acute decompensated heart failure

## 2017-10-01 NOTE — CONSULT NOTE ADULT - PROBLEM SELECTOR RECOMMENDATION 9
-Hypercapnic respiratory failure with respiratory acidosis likely 2/2 pul edema vs PNA (h/o dysphagia in the past)  -c/w broad spectrum abx given recent hospital stay to cover HCAP  -Bedside US: hepatization of LLL concerning for PNA, also diffuse B lines concerning for pul edema and PNA, will continue with diuresis PRN to keep euvolemic state  -C/w bipap throughout the night; pt encouraged to pull in good tidal volume; nasal trumpet inserted for better breath effort  -pt will need tele monitoring  -Keep NPO for now -acute on chronic hypercapnic respiratory failure with respiratory acidosis likely 2/2 pul edema vs PNA (h/o dysphagia in the past)  -c/w broad spectrum abx given recent hospital stay to cover HCAP  -Bedside US: hepatization of LLL concerning for PNA, also diffuse B lines concerning for pul edema and PNA, will continue with diuresis PRN to keep euvolemic state  -C/w bipap throughout the night; pt encouraged to pull in good tidal volume; nasal trumpet inserted for better breath effort  -pt will need tele monitoring  -Keep NPO for now

## 2017-10-01 NOTE — CONSULT NOTE ADULT - ASSESSMENT
89 yo F A fib/A flutter (not on AC), hx CHF, HTN, dysphagia (previously refused PEG), multiple admissions for PNA  (?aspiration related, req intubation for hypercapnic resp failure 2014), SBO (resection 2010), ITP on chronic prednisone, pancreatic lesion (prev declined further w/u), non ambulatory @ baseline, lives @ home w/ 24 HHA, presents to the ED with SOB, found to have respiratory acidosis likely 2/2 pul edema vs PNA, also hyperkalemia with improvement after K cocktail. 89 yo F hx  A fib/A flutter (not on AC),  dCHF, HTN, dysphagia (previously refused PEG), multiple admissions for PNA  (?aspiration related, req intubation for hypercapnic resp failure 2014), SBO (resection 2010), ITP on chronic prednisone, pancreatic lesion (prev declined further w/u), non ambulatory @ baseline, lives @ home w/ 24 HHA, presents to the ED with SOB, found to have respiratory acidosis likely 2/2 pul edema vs PNA, also hyperkalemia with improvement after K cocktail.

## 2017-10-01 NOTE — CONSULT NOTE ADULT - PROBLEM SELECTOR RECOMMENDATION 2
-K improved after Ca gluconate, insulin, dextrose, lasix, duonebs, c/w K cocktail with serial BMP + VBG  -Unclear source of hyperkalemia; Creatinine is at baseline and pt is not in decompensated kidney state. Pt with reported use of K tablet as per son likely contributing to picture as well.  -EKG reviewed: no peak T waves  -CTM -K improved after Ca gluconate, insulin, dextrose, lasix, duonebs, c/w K cocktail with serial BMP + VBG  -Unclear source of hyperkalemia; Creatinine is at baseline and pt is not in decompensated kidney state. Pt with reported use of K tablet as per son likely contributing to picture as well.  -EKG reviewed: no peak T waves

## 2017-10-01 NOTE — H&P ADULT - PROBLEM SELECTOR PLAN 6
-c.w home prednisone. -c.w home prednisone.  -will transfuse platelet if family agrees.  -transfuse for platelet if <20K and febrile or < 10K if not febrile .   -rec heme eval in AM; do not believe patient is in DIC. Will check fibrinogen -Patient w/ NEWTON likely 2.2 to heart failure; will recheck BMP and electrolytes  -renally dose antibiotics  -monitor GFR  -Avoid nephrotoxins.

## 2017-10-01 NOTE — ED ADULT NURSE REASSESSMENT NOTE - NS ED NURSE REASSESS COMMENT FT1
As Per DR. Triana, d50, calcium, and insulin to be held until the results of new BMP and VBG are in.  Cardiac monitoring is in progress.

## 2017-10-01 NOTE — ED ADULT NURSE NOTE - OBJECTIVE STATEMENT
89 y f came to the ed with sob. patient states it began last night and her son placed on her on supplemental o2 via nc. therapy helped although the sob continued to worsen. patient denies any chest pain. abdomen is soft and nontender. skin is warm and dry. patient is taking shallow tachypneic breaths. denies any fevers, chills.

## 2017-10-01 NOTE — H&P ADULT - NSHPLABSRESULTS_GEN_ALL_CORE
10.8   15.7  )-----------( 23       ( 01 Oct 2017 16:45 )             31.8       10    132<L>  |  88<L>  |  38<H>  ----------------------------<  77  5.9<H>   |  34<H>  |  1.55<H>    Ca    9.8      01 Oct 2017 21:30    TPro  7.3  /  Alb  3.9  /  TBili  0.3  /  DBili  x   /  AST  35  /  ALT  27  /  AlkPhos  73  10        CARDIAC MARKERS ( 01 Oct 2017 21:30 )  x     / 0.31 ng/mL / x     / x     / x      CARDIAC MARKERS ( 01 Oct 2017 16:45 )  x     / 0.29 ng/mL / x     / x     / x          Urinalysis Basic - ( 01 Oct 2017 20:33 )    Color: x / Appearance: Clear / S.010 / pH: x  Gluc: x / Ketone: Negative  / Bili: Negative / Urobili: Negative   Blood: x / Protein: Negative / Nitrite: Negative   Leuk Esterase: Negative / RBC: 0-2 /HPF / WBC 0-2 /HPF   Sq Epi: x / Non Sq Epi: x / Bacteria: x    I personally reviewed & interpreted the lab findings above; Leukocytosis on CBC and chronic anemia. Chronic thrombocytopenia. BMP c/w hyponatremia and hyperkalemia. NEWTON on CKD   I personally reviewed & interpreted the radiographic findings; CXR c/w hazy opacities bilaterally but poor quality   I personally reviewed & interpreted the EKG findings; No peaked T-waves; no ischemic changes

## 2017-10-01 NOTE — H&P ADULT - ASSESSMENT
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 (family denies), moderate AS, HTN, dementia (baseline AAOx2), dysphagia (declined PEG tube), hx of recurrent aspiration pneumonia, ?COPD presents with hypoxic respiratory failure.

## 2017-10-01 NOTE — ED PROVIDER NOTE - MEDICAL DECISION MAKING DETAILS
89 year old female, past medical history A-fib/flutter no AC, pneumonia in past, resp failure with intubation in 2014, SBO s/p resection 2010, ITP on chronic prednisone, s/p cholecystectomy, s/p hysterectomy, presents to the ED for shortness of breath for 2 days. Hypoxic to 60s ora, improved to 92 on 2 L n/c, Differential includes congestive heart failure, COPD, pulmonary effusions. On educational US diffuse B lines, bilateral effusions, so less likely PE, check labwork. EKG, cxr for confirmation, duonebs, steroids dhruv since on chronically, lasix, start BIPAP.

## 2017-10-01 NOTE — CONSULT NOTE ADULT - SUBJECTIVE AND OBJECTIVE BOX
CHIEF COMPLAINT:    HPI:  89 yo F A fib/A flutter (not on AC), hx CHF, HTN, dysphagia (previously refused PEG), multiple admissions for PNA  (?aspiration related, req intubation for hypercapnic resp failure ), SBO (resection ), ITP on chronic prednisone, pancreatic lesion (prev declined further w/u), non ambulatory @ baseline, lives @ home w/ 24 HHA, presents to the ED with SOB x2 days. Pt's daughter at bedside providing history. Per daughter, pt was found to have an O2 sat of 72% today, was placed on home O2 with fluctuating O2 sat from 70-90%. In triage SPO2 found to be in 60s on room air. Patient with increasing lethargy as well. Pt normally AAOx1-2at baseline, has 24 hours HHA, non ambulatory since a few years ago 2/2 knee arthritis. Pt endorses SOB at rest, but denies CP, dizziness, HA, numbness, tingling, LE edema, melena, hematochezia, dysuria. Daughter states that pt had wanted to pee multiple times but with no urine output. Pt also endorses chronic abd pain. Denies smoking, drinking history.    In the ED, VS: T98.7 HR83 /79 RR22 92% NC. Given tachypnea, pt was placed on bipap with improvement in tachypnea.  Labs: WBC 15 (chronic), K 7.3 -> 6.2 s/p K cocktail, EKG with no new peak T waves, Na 130, Cr 1.52 (at baseline), BUN 36, Trop 0.29, ProBNP 26715, pH 7.22 -> 7.18, lactate 2.8. UA negative. RVP negative. Pt received 2g ca gluconate, duonebs x3, lasix 40, dextrose 50 x1, insulin 10. Felipe was placed.     PAST MEDICAL & SURGICAL HISTORY:  PNA (pneumonia)  Dysphagia  Thrombocytopenia: ITP  Atrial fibrillation and flutter: No A/C  during hospitalization in 2014  Respiratory failure: in 2014 was intubated  Cataract: right eye  Small bowel obstruction: s/p resection in   HTN - Hypertension  S/P cholecystectomy  H/O: Hysterectomy  S/P Small Bowel Resection      FAMILY HISTORY:  No pertinent family history in first degree relatives      SOCIAL HISTORY:  Smoking: [X ] Never Smoked [ ] Former Smoker (__ packs x ___ years) [ ] Current Smoker  (__ packs x ___ years)  Substance Use: [X ] Never Used [ ] Used ____  EtOH Use:  Marital Status: [ ] Single [ ]  [ ]  [X ]     Allergies    eggs (Rash)  no drug allergy (Unknown)    Intolerances    vinegar sensitivity    family states that the patient shakes when she ingests vinegar (Other)      HOME MEDICATIONS:    REVIEW OF SYSTEMS:  Constitutional: [X ] negative [ ] fevers [ ] chills [ ] weight loss [ ] weight gain +weakness  HEENT: [X ] negative [ ] dry eyes [ ] eye irritation [ ] postnasal drip [ ] nasal congestion  CV: [X] negative  [ ] chest pain [ ] orthopnea [ ] palpitations [ ] murmur  Resp: [ ] negative [ ] cough X[ ] shortness of breath X] dyspnea [ ] wheezing [ ] sputum [ ] hemoptysis  GI: [ ] negative [ ] nausea [ ] vomiting [ ] diarrhea [ ] constipation [X ] abd pain [ ] dysphagia   : [ ] negative [ ] dysuria [ ] nocturia [ ] hematuria [ ] increased urinary frequency  Musculoskeletal: [X ] negative [ ] back pain [ ] myalgias [ ] arthralgias [ ] fracture  Skin: [X ] negative [ ] rash [ ] itch  Neurological: [ X] negative [ ] headache [ ] dizziness [ ] syncope [ ] weakness [ ] numbness  Psychiatric: [X ] negative [ ] anxiety [ ] depression  Endocrine: [ ] negative [ ] diabetes [ ] thyroid problem  Hematologic/Lymphatic: [ ] negative [ ] anemia [ ] bleeding problem  Allergic/Immunologic: [ ] negative [ ] itchy eyes [ ] nasal discharge [ ] hives [ ] angioedema  [X ] All other systems negative  [ ] Unable to assess ROS because ________    OBJECTIVE:  ICU Vital Signs Last 24 Hrs  T(C): 36.5 (01 Oct 2017 19:10), Max: 37.3 (01 Oct 2017 16:30)  T(F): 97.7 (01 Oct 2017 19:10), Max: 99.1 (01 Oct 2017 16:30)  HR: 86 (01 Oct 2017 20:24) (77 - 86)  BP: 127/80 (01 Oct 2017 20:24) (122/61 - 142/80)  BP(mean): --  ABP: --  ABP(mean): --  RR: 18 (01 Oct 2017 20:24) (17 - 24)  SpO2: 99% (01 Oct 2017 20:24) (92% - 99%)        CAPILLARY BLOOD GLUCOSE    GENERAL: Slight respiratory distress, well-developed, cachetic  HEAD:  Atraumatic, Normocephalic  ENT: EOMI, PERRLA, conjunctiva and sclera clear, Neck supple, No JVD, moist mucosa, no pharynageal erythema, no tonsillar enlargement or exudate  CHEST/LUNG: Crackles b/l especially at bases; No wheeze, equal breath sounds bilaterally, poor breath sound  HEART: Regular rate and rhythm; No murmurs, rubs, or gallops  ABDOMEN: Soft, TTP at epigastric region, Nondistended; Bowel sounds present, no organomegaly  EXTREMITIES:  2+ Peripheral Pulses, No clubbing, cyanosis,+1 pitting edema  PSYCH: AAOx3  NEUROLOGY: non-focal, cranial nerves intact  SKIN: multiple ecchymoses on lower extremities, one papular red elevated lesion on R shin, non TTP    LINES: peripherals    HOSPITAL MEDICATIONS:  Standing Meds:  aspirin  chewable 324 milliGRAM(s) Oral daily  insulin regular  human recombinant. 10 Unit(s) IV Push once  dextrose 50% Injectable 50 milliLiter(s) IV Push Once  calcium gluconate IVPB 2 Gram(s) IV Intermittent Once  ALBUTerol    0.083%. 2.5 milliGRAM(s) Nebulizer once      PRN Meds:    LABS:                        10.8   15.7  )-----------( 23       ( 01 Oct 2017 16:45 )             31.8     Hgb Trend: 10.8<--  10-01    130<L>  |  88<L>  |  36<H>  ----------------------------<  157<H>  6.2<HH>   |  30  |  1.52<H>    Ca    10.5      01 Oct 2017 18:35    TPro  7.3  /  Alb  3.9  /  TBili  0.3  /  DBili  x   /  AST  35  /  ALT  27  /  AlkPhos  73  10    Creatinine Trend: 1.52<--, 1.57<--    Urinalysis Basic - ( 01 Oct 2017 20:33 )    Color: x / Appearance: Clear / S.010 / pH: x  Gluc: x / Ketone: Negative  / Bili: Negative / Urobili: Negative   Blood: x / Protein: Negative / Nitrite: Negative   Leuk Esterase: Negative / RBC: 0-2 /HPF / WBC 0-2 /HPF   Sq Epi: x / Non Sq Epi: x / Bacteria: x      Venous Blood Gas:  10-01 @ 18:59  7.18/97/29/35/42  VBG Lactate: 2.8  Venous Blood Gas:  10-01 @ 16:45  7.22/92/28/36/41  VBG Lactate: 1.6      MICROBIOLOGY:     RADIOLOGY:  [X ] Reviewed and interpreted by me    EKst deg AVB, poor R progression, no peak T waves CHIEF COMPLAINT:    HPI:  89 yo F A fib/A flutter (not on AC), hx CHF, HTN, dysphagia (previously refused PEG), multiple admissions for PNA  (?aspiration related, req intubation for hypercapnic resp failure ), SBO (resection ), ITP on chronic prednisone, pancreatic lesion (prev declined further w/u), non ambulatory @ baseline, lives @ home w/ 24 HHA, presents to the ED with SOB x2 days. Pt's daughter at bedside providing history. Per daughter, pt was found to have an O2 sat of 72% today, was placed on home O2 with fluctuating O2 sat from 70-90%. In triage SPO2 found to be in 60s on room air. Patient with increasing lethargy as well. Pt normally AAOx1-2at baseline, has 24 hours HHA, non ambulatory since a few years ago 2/2 knee arthritis. Pt endorses SOB at rest, but denies CP, dizziness, HA, numbness, tingling, LE edema, melena, hematochezia, dysuria. Daughter states that pt had wanted to pee multiple times but with no urine output. Pt also endorses chronic abd pain. Denies smoking, drinking history.    In the ED, VS: T98.7 HR83 /79 RR22 92% NC. Given tachypnea, pt was placed on bipap with improvement in tachypnea.  Labs: WBC 15 (chronic), K 7.3 -> 6.2 s/p K cocktail, EKG with no new peak T waves, Na 130, Cr 1.52 (at baseline), BUN 36, Trop 0.29, ProBNP 86017, pH 7.22 -> 7.18, lactate 2.8. UA negative. RVP negative. Pt received 2g ca gluconate, duonebs x3, lasix 40, dextrose 50 x1, insulin 10. Felipe was placed.     PAST MEDICAL & SURGICAL HISTORY:  PNA (pneumonia)  Dysphagia  Thrombocytopenia: ITP  Atrial fibrillation and flutter: No A/C  during hospitalization in 2014  Respiratory failure: in 2014 was intubated  Cataract: right eye  Small bowel obstruction: s/p resection in   HTN - Hypertension  S/P cholecystectomy  H/O: Hysterectomy  S/P Small Bowel Resection      FAMILY HISTORY:  No pertinent family history in first degree relatives      SOCIAL HISTORY:  Smoking: [X ] Never Smoked [ ] Former Smoker (__ packs x ___ years) [ ] Current Smoker  (__ packs x ___ years)  Substance Use: [X ] Never Used [ ] Used ____  EtOH Use:  Marital Status: [ ] Single [ ]  [ ]  [X ]     Allergies    eggs (Rash)  no drug allergy (Unknown)    Intolerances  vinegar sensitivity  family states that the patient shakes when she ingests vinegar (Other)    REVIEW OF SYSTEMS:  Constitutional: [X ] negative [ ] fevers [ ] chills [ ] weight loss [ ] weight gain +weakness  HEENT: [X ] negative [ ] dry eyes [ ] eye irritation [ ] postnasal drip [ ] nasal congestion  CV: [X] negative  [ ] chest pain [ ] orthopnea [ ] palpitations [ ] murmur  Resp: [ ] negative [ ] cough X[ ] shortness of breath X] dyspnea [ ] wheezing [ ] sputum [ ] hemoptysis  GI: [ ] negative [ ] nausea [ ] vomiting [ ] diarrhea [ ] constipation [X ] abd pain [ ] dysphagia   : [ ] negative [ ] dysuria [ ] nocturia [ ] hematuria [ ] increased urinary frequency  Musculoskeletal: [X ] negative [ ] back pain [ ] myalgias [ ] arthralgias [ ] fracture  Skin: [X ] negative [ ] rash [ ] itch  Neurological: [ X] negative [ ] headache [ ] dizziness [ ] syncope [ ] weakness [ ] numbness  Psychiatric: [X ] negative [ ] anxiety [ ] depression  Endocrine: [ ] negative [ ] diabetes [ ] thyroid problem  Hematologic/Lymphatic: [ ] negative [ ] anemia [ ] bleeding problem  Allergic/Immunologic: [ ] negative [ ] itchy eyes [ ] nasal discharge [ ] hives [ ] angioedema  [X ] All other systems negative  [ ] Unable to assess ROS because ________    OBJECTIVE:  ICU Vital Signs Last 24 Hrs  T(C): 36.5 (01 Oct 2017 19:10), Max: 37.3 (01 Oct 2017 16:30)  T(F): 97.7 (01 Oct 2017 19:10), Max: 99.1 (01 Oct 2017 16:30)  HR: 86 (01 Oct 2017 20:24) (77 - 86)  BP: 127/80 (01 Oct 2017 20:24) (122/61 - 142/80)  RR: 18 (01 Oct 2017 20:24) (17 - 24)  SpO2: 99% (01 Oct 2017 20:24) (92% - 99%)    GENERAL: Slight respiratory distress, well-developed, cachetic  HEAD:  Atraumatic, Normocephalic  ENT: EOMI, PERRLA, conjunctiva and sclera clear, Neck supple, No JVD, moist mucosa, no pharynageal erythema, no tonsillar enlargement or exudate  CHEST/LUNG: Crackles b/l especially at bases; No wheeze, reduced breath sounds bilateral bases  HEART: Regular rate and rhythm; No murmurs, rubs, or gallops  ABDOMEN: Soft, TTP at epigastric region, Nondistended; Bowel sounds present, no organomegaly  EXTREMITIES:  2+ Peripheral Pulses, No clubbing, cyanosis,+1 pitting edema  PSYCH: unable to assess due to somnolence, delirium, language barrier  NEUROLOGY: non-focal, cranial nerves intact  SKIN: multiple ecchymoses on lower extremities, one blood-filled bulla R shin with skin intact, non TTP    LINES: peripherals    HOSPITAL MEDICATIONS:  Standing Meds:  aspirin  chewable 324 milliGRAM(s) Oral daily  insulin regular  human recombinant. 10 Unit(s) IV Push once  dextrose 50% Injectable 50 milliLiter(s) IV Push Once  calcium gluconate IVPB 2 Gram(s) IV Intermittent Once  ALBUTerol    0.083%. 2.5 milliGRAM(s) Nebulizer once      PRN Meds:    LABS:                        10.8   15.7  )-----------( 23       ( 01 Oct 2017 16:45 )             31.8     Hgb Trend: 10.8<--  10    130<L>  |  88<L>  |  36<H>  ----------------------------<  157<H>  6.2<HH>   |  30  |  1.52<H>    Ca    10.5      01 Oct 2017 18:35    TPro  7.3  /  Alb  3.9  /  TBili  0.3  /  DBili  x   /  AST  35  /  ALT  27  /  AlkPhos  73  10-    Creatinine Trend: 1.52<--, 1.57<--    Urinalysis Basic - ( 01 Oct 2017 20:33 )    Color: x / Appearance: Clear / S.010 / pH: x  Gluc: x / Ketone: Negative  / Bili: Negative / Urobili: Negative   Blood: x / Protein: Negative / Nitrite: Negative   Leuk Esterase: Negative / RBC: 0-2 /HPF / WBC 0-2 /HPF   Sq Epi: x / Non Sq Epi: x / Bacteria: x      Venous Blood Gas:  10-01 @ 18:59  7.18/97/29/35/42  VBG Lactate: 2.8  Venous Blood Gas:  10-01 @ 16:45  7.22/92/28/36/41  VBG Lactate: 1.6      MICROBIOLOGY:     RADIOLOGY:  [X ] Reviewed and interpreted by me  CXR: bibasilar infiltrates    EKst deg AVB, poor R progression, no peak T waves

## 2017-10-01 NOTE — H&P ADULT - PROBLEM SELECTOR PLAN 8
-Patient w. anemia and no GI blood loss; will continue to monitor hemoglobin. Will check hemolysis labs in AM -c/w IV diuresis for acute decompensated diastolic heart failure  -Troponin increasing; EKG no ischemic changes. ED called cardiology consult and will followup.   -admit to telemetry and continue to monitor cardiac enzymes

## 2017-10-01 NOTE — ED PROVIDER NOTE - OBJECTIVE STATEMENT
89 year old female, past medical history A-fib/flutter no AC, pneumonia in past, resp failure with intubation in 2014, SBO s/p resection 2010, ITP on chronic prednisone, s/p cholecystectomy, s/p hysterectomy, presents to the ED for shortness of breath for 2 days. Son reports worsening SOB, 72 SPo2 ORA at home, improved with 2L NC. In triage SPO2 found to be in 60s on room air. Patient with increasing lethargy as well. Patient normally Axox1-2 at baseline, has 24 hour home health aide

## 2017-10-01 NOTE — ED PROVIDER NOTE - CRITICAL CARE PROVIDED
documentation/interpretation of diagnostic studies/consultation with other physicians/consult w/ pt's family directly relating to pts condition/direct patient care (not related to procedure)/conducted a detailed discussion of DNR status/additional history taking

## 2017-10-01 NOTE — H&P ADULT - PROBLEM SELECTOR PLAN 3
-patient given 40mg IV lasix in ED; will c/w IV diuresis and monitor hemodynamics carefully in setting of infection. -patient given 40mg IV lasix in ED; will c/w IV diuresis and monitor hemodynamics carefully in setting of infection. Suspect the pulmonary edema may be from ischemic cardiomyopathy given elevated enzymes. Will check TTE and appreciate cards eval.

## 2017-10-01 NOTE — ED ADULT NURSE REASSESSMENT NOTE - NS ED NURSE REASSESS COMMENT FT1
[facility] room called and said that the cardiac rhythm has gone back into afib.  Dr. Mota aware.  cardiac monitoring is ongoing, safety maintained.

## 2017-10-01 NOTE — ED PROVIDER NOTE - INTERPRETATION
EKG reviewed for rate, rhythm, axis, intervals and segments, including QRS morphology, P wave appearance T wave appearance, VA interval, and QT interval.  I find the EKG to be unremarkable in all of these regards except as follows: 1st deg AVB, poor R progression

## 2017-10-01 NOTE — H&P ADULT - PROBLEM SELECTOR PLAN 7
-c/w IV diuresis for acute decompensated diastolic heart failure  -Troponin increasing; EKG no ischemic changes. ED called cardiology consult and will followup.   -admit to telemetry and continue to monitor cardiac enzymes -c.w home prednisone.  -will transfuse platelet if family agrees.  -transfuse for platelet if <20K and febrile or < 10K if not febrile .   -rec heme eval in AM; do not believe patient is in DIC. Will check fibrinogen

## 2017-10-01 NOTE — ED PROVIDER NOTE - PROGRESS NOTE DETAILS
patient hyperkalemia, medication given to reverse, discussed with family who state patient does take potassium supplements, this could be possible cause.  Patient on BIPAP now with tachypnea improving patient still hyperkalemia and worsening hypercarbia, patient still mentating same as arrival, will redose meds and increase bipap settings and reeval, considering intubation if symptoms worsen or labs worsen. Patient with slight improvement of CO2 after nasal trumpet placement, still slight hyper K on VBG, will redose meds, Patient DNR/DNI after MICU evaluation, recommending telemtry admission. Patient to continue on bipap for undifferential resp failure. given broad spectrum abx for possible pna.

## 2017-10-01 NOTE — H&P ADULT - PROBLEM SELECTOR PLAN 4
-c/w intravenous antibiotics; follow up blood culture  -check sputum culture -Patient w/ respiratory acidosis and B-lines on bedside u./s (performed by MICU team) and last TTE in 6/2017 shows EF 7% and moderate AS. Patient started on IV diuretics in ED. Troponins elevated suspicious for ischemic cardiomyopathy. TTE ordered. Cardiology consulted by ED. Son (hcp) to discuss goals of care regarding cath w./ cards team if required.   -c/w trending CE, admit to telemetry  -clarify home meds as patient's family do not have list and pharmacy is [facility] ([phone]).  -daily weights, strict I/os.

## 2017-10-01 NOTE — H&P ADULT - PROBLEM SELECTOR PLAN 5
-Patient w/ NEWTON likely 2.2 to heart failure; will recheck BMP and electrolytes  -renally dose antibiotics  -monitor GFR  -Avoid nephrotoxins. -c/w intravenous antibiotics; follow up blood culture  -check sputum culture

## 2017-10-01 NOTE — H&P ADULT - PROBLEM SELECTOR PLAN 10
Spent more than 30-minutes of critical care time discussing goals of care and patient's HCP son and daughter agree with DNR/DNI. Questions answered and emotional support provided. DNR form filled by ED provider and in chart.

## 2017-10-01 NOTE — CONSULT NOTE ADULT - SUBJECTIVE AND OBJECTIVE BOX
Initial Cardiology Attending Consult    CHIEF COMPLAINT:  BILL PARIKH    HISTORY OF PRESENT ILLNESS:  BILL PARIKH is a 89y Female patient presenting with ***.     Allergies    eggs (Rash)  no drug allergy (Unknown)    Intolerances    vinegar sensitivity    family states that the patient shakes when she ingests vinegar (Other)  	    PAST MEDICAL & SURGICAL HISTORY:  PNA (pneumonia)  Dysphagia  Thrombocytopenia: ITP  Atrial fibrillation and flutter: No A/C  during hospitalization in april 2014  Respiratory failure: in april 2014 was intubated  Cataract: right eye  Small bowel obstruction: s/p resection in 2010  HTN - Hypertension  S/P cholecystectomy  H/O: Hysterectomy  S/P Small Bowel Resection      FAMILY HISTORY:  No pertinent family history in first degree relatives      SOCIAL HISTORY:    [ ] Non-smoker  [ ] Smoker  [ ] Alcohol      REVIEW OF SYSTEMS:  CONSTITUTIONAL: No fever, weight loss, or fatigue  EYES: No eye pain, visual disturbances, or discharge  ENMT:  No difficulty hearing, tinnitus, vertigo; No sinus or throat pain  NECK: No pain or stiffness  RESPIRATORY: No cough, wheezing, chills or hemoptysis; No Shortness of Breath  CARDIOVASCULAR: No chest pain, palpitations, passing out, dizziness, or leg swelling  GASTROINTESTINAL: No abdominal or epigastric pain. No nausea, vomiting, or hematemesis; No diarrhea or constipation. No melena or hematochezia.  GENITOURINARY: No dysuria, frequency, hematuria, or incontinence  NEUROLOGICAL: No headaches, memory loss, loss of strength, numbness, or tremors  SKIN: No itching, burning, rashes, or lesions   LYMPH Nodes: No enlarged glands  ENDOCRINE: No heat or cold intolerance; No hair loss  MUSCULOSKELETAL: No joint pain or swelling; No muscle, back, or extremity pain  PSYCHIATRIC: No depression, anxiety, mood swings, or difficulty sleeping  HEME/LYMPH: No easy bruising, or bleeding gums  ALLERY AND IMMUNOLOGIC: No hives or eczema	    [ ] All others negative	  [ ] Unable to obtain    PHYSICAL EXAM:  I&O's Summary      Appearance: Normal	  HEENT:   Normal oral mucosa, PERRL, EOMI	  Lymphatic: No lymphadenopathy  Cardiovascular: Normal S1 S2, No JVD, No murmurs, No edema  Respiratory: Lungs clear to auscultation	  Psychiatry: A & O x 3, Mood & affect appropriate  Gastrointestinal:  Soft, Non-tender, + BS	  Skin: No rashes, No ecchymoses, No cyanosis	  Neurologic: Non-focal  Extremities: Normal range of motion, No clubbing, cyanosis or edema  Vascular: Peripheral pulses palpable 2+ bilaterally    MEDICATIONS:  SALTANAT SIMINTOOB    LABS:	 	    CBC Full  -  ( 01 Oct 2017 16:45 )  WBC Count : 15.7 K/uL  Hemoglobin : 10.8 g/dL  Hematocrit : 31.8 %  Platelet Count - Automated : 23 K/uL  Mean Cell Volume : 88.1 fl  Mean Cell Hemoglobin : 30.0 pg  Mean Cell Hemoglobin Concentration : 34.0 gm/dL  Auto Neutrophil # : 13.8 K/uL  Auto Lymphocyte # : 1.0 K/uL  Auto Monocyte # : 0.9 K/uL  Auto Eosinophil # : 0.1 K/uL  Auto Basophil # : 0.0 K/uL  Auto Neutrophil % : 83.0 %  Auto Lymphocyte % : 3.0 %  Auto Monocyte % : 7.0 %  Auto Eosinophil % : x  Auto Basophil % : x    10-01    132<L>  |  88<L>  |  38<H>  ----------------------------<  77  5.9<H>   |  34<H>  |  1.55<H>  10-01    130<L>  |  88<L>  |  36<H>  ----------------------------<  157<H>  6.2<HH>   |  30  |  1.52<H>    Ca    9.8      01 Oct 2017 21:30  Ca    10.5      01 Oct 2017 18:35    TPro  7.3  /  Alb  3.9  /  TBili  0.3  /  DBili  x   /  AST  35  /  ALT  27  /  AlkPhos  73  10-01      proBNP:   Lipid Profile:   HgA1c:   TSH:     TROPONIN:  CARDIAC MARKERS ( 01 Oct 2017 21:30 )  x     / 0.31 ng/mL / x     / x     / x      CARDIAC MARKERS ( 01 Oct 2017 16:45 )  x     / 0.29 ng/mL / x     / x     / x            TELEMETRY: 	    ECG:  	  RADIOLOGY:  OTHER: 	    CARDIAC TESTING/STUDIES:    [ ] Echocardiogram:  [ ]  Catheterization:  [ ] Stress Test:  	  	  ASSESSMENT/PLAN: Initial Cardiology Attending Consult    CHIEF COMPLAINT:difficulty breathing    HISTORY OF PRESENT ILLNESS:  BILL PARIKH is a 89y Female PMH afib/flutter not on AC, ITP on chronic prednisone, moderate AS, dCHF, HTN, dementia, hx of recurrent aspiration PNA, possible COPD who presents with 1 day of SOB.  Her aide and family checked her )2 sat and found her to be hypoxic to 70%, she had O2 at home from a prior hospital discharge.  It was plaed on her and titrated to 100% O2 sat, 4L.  she continued to require the oxygen, desating to 70% witout the oxygen.    the patients son states last night she was able to lie flat in bed without SOB.  She had not been complaining of chest pain, palpitations, LE edema, no observed PND or orthopnea last night. no fevers or chills, Nausea or vomiting.  The presentation is similar to her prior admission  6/2017.    Allergies    eggs (Rash)  no drug allergy (Unknown)    Intolerances    vinegar sensitivity    family states that the patient shakes when she ingests vinegar (Other)  	  home Medications:   * Patient Currently Takes Medications as of 03-Jul-2017 13:17 documented in Structured Notes  · 	ferrous sulfate 325 mg oral tablet: 1 tab(s) orally 3 times a day  · 	predniSONE 2.5 mg oral tablet: 3 tab(s) orally once a day  · 	dilTIAZem 60 mg oral tablet: 1 tab(s) orally every 8 hours  · 	sucralfate 1 g/10 mL oral suspension: 10 milliliter(s) orally 3 times a day  · 	pantoprazole 40 mg oral delayed release tablet: 1 tab(s) orally once a day (before a meal)  · 	furosemide 20 mg oral tablet: 1 tab(s) orally once a day  · 	acetaminophen 325 mg oral tablet: 2 tab(s) orally every 6 hours, As needed, Mild Pain (1 - 3)  · 	montelukast 5 mg oral tablet, chewable: 1 tab(s) orally once a day (at bedtime)  · 	dicyclomine 10 mg oral capsule: 1 cap(s) orally 2 times a day (before meals)    PAST MEDICAL & SURGICAL HISTORY:  PNA (pneumonia)  Dysphagia  Thrombocytopenia: ITP  Atrial fibrillation and flutter: No A/C  during hospitalization in april 2014  Respiratory failure: in april 2014 was intubated  Cataract: right eye  Small bowel obstruction: s/p resection in 2010  HTN - Hypertension  S/P cholecystectomy  H/O: Hysterectomy  S/P Small Bowel Resection      FAMILY HISTORY:  No pertinent family history in first degree relatives    SOCIAL HISTORY:    [ ] Non-smoker    REVIEW OF SYSTEMS: obtained from son at bedside due to condition of the patient  CONSTITUTIONAL: No fever, weight loss, or fatigue  EYES: No eye pain, visual disturbances, or discharge  ENMT:  No difficulty hearing, tinnitus, vertigo; No sinus or throat pain  NECK: No pain or stiffness  RESPIRATORY: No cough, wheezing, chills or hemoptysis; + Shortness of Breath  CARDIOVASCULAR: No chest pain, palpitations, passing out, dizziness, or leg swelling  GASTROINTESTINAL: No abdominal or epigastric pain. No nausea, vomiting, or hematemesis; No diarrhea or constipation. No melena or hematochezia.  GENITOURINARY: No dysuria, frequency, hematuria, or incontinence  NEUROLOGICAL: No headaches, memory loss, loss of strength, numbness, or tremors  SKIN: No itching, burning, rashes, or lesions   LYMPH Nodes: No enlarged glands  ENDOCRINE: No heat or cold intolerance; No hair loss  MUSCULOSKELETAL: No joint pain or swelling; No muscle, back, or extremity pain  PSYCHIATRIC: No depression, anxiety, mood swings, or difficulty sleeping  HEME/LYMPH: No easy bruising, or bleeding gums  ALLERY AND IMMUNOLOGIC: No hives or eczema	      PHYSICAL EXAM:  I&O's Summary      In ED: VS T 99.1, HR 70-80, 130/59, 16, 98% on BIPAP 14/6, 40%     Vital Signs Last 24 Hrs  	T(C): 36.4 (10-01-17 @ 22:37)  	T(F): 97.5 (10-01-17 @ 22:37), Max: 99.1 (10-01-17 @ 16:30)  	HR: 92 (10-01-17 @ 22:37) (76 - 92)  	BP: 121/61 (10-01-17 @ 22:37)  	BP(mean): --  	RR: 15 (10-01-17 @ 22:37) (15 - 24)  	SpO2: 96% (10-01-17 @ 22:37) (92% - 99%)  	Wt(kg): --    Appearance: Normal	  HEENT:   Normal oral mucosa, PERRL, EOMI	  Lymphatic: No lymphadenopathy  Cardiovascular: Normal S1 S2, No JVD, No murmurs, No edema  Respiratory: Lungs clear to auscultation	  Psychiatry: A & O x 3, Mood & affect appropriate  Gastrointestinal:  Soft, Non-tender, + BS	  Skin: No rashes, No ecchymoses, No cyanosis	  Neurologic: Non-focal  Extremities: Normal range of motion, No clubbing, cyanosis or edema  Vascular: Peripheral pulses palpable 2+ bilaterally      LABS:	 	    CBC Full  -  ( 01 Oct 2017 16:45 )  WBC Count : 15.7 K/uL  Hemoglobin : 10.8 g/dL  Hematocrit : 31.8 %  Platelet Count - Automated : 23 K/uL  Mean Cell Volume : 88.1 fl  Mean Cell Hemoglobin : 30.0 pg  Mean Cell Hemoglobin Concentration : 34.0 gm/dL  Auto Neutrophil # : 13.8 K/uL  Auto Lymphocyte # : 1.0 K/uL  Auto Monocyte # : 0.9 K/uL  Auto Eosinophil # : 0.1 K/uL  Auto Basophil # : 0.0 K/uL  Auto Neutrophil % : 83.0 %  Auto Lymphocyte % : 3.0 %  Auto Monocyte % : 7.0 %  Auto Eosinophil % : x  Auto Basophil % : x    10-01    132<L>  |  88<L>  |  38<H>  ----------------------------<  77  5.9<H>   |  34<H>  |  1.55<H>  10-01    130<L>  |  88<L>  |  36<H>  ----------------------------<  157<H>  6.2<HH>   |  30  |  1.52<H>    Ca    9.8      01 Oct 2017 21:30  Ca    10.5      01 Oct 2017 18:35    TPro  7.3  /  Alb  3.9  /  TBili  0.3  /  DBili  x   /  AST  35  /  ALT  27  /  AlkPhos  73  10-01      proBNP: 34K  Lipid Profile:   HgA1c:   TSH:     TROPONIN:  CARDIAC MARKERS ( 01 Oct 2017 21:30 )  x     / 0.31 ng/mL / x     / x     / x      CARDIAC MARKERS ( 01 Oct 2017 16:45 )  x     / 0.29 ng/mL / x     / x     / x      	    ECG:  sinus 1st deg AVB 83, peak T initial EKG  RADIOLOGY: CXR bl hazy opacities and bl pl effusions	    CARDIAC TESTING/STUDIES:    [ ] Echocardiogram:------------------------------------------------------------------------  Conclusions:   Technically difficult study.  1. Aortic valve not well visualized; appears calcified.  Peak transaortic valve gradient equals 49 mm Hg, mean  transaortic valve gradient equals 27 mm Hg, aortic valve  velocity time integral equals 62 cm, consistent with  moderate aortic stenosis. Suboptimal imaging precludes  accurate assessment of LVOT diameter. Unable to accurately  calculate aortic valve area by continuity. No aortic valve  regurgitation seen.  2. Normal left ventricular internal dimensions and wall  thicknesses.  3. Endocardium not well visualized; grossly hyperdynamic  left ventricular systolic function. EF 75-80%  4. The right ventricle is not well visualized.    ASSESSMENT/PLAN: 	   89y Female PMH afib/flutter not on AC, ITP on chronic prednisone, moderate AS, dCHF, HTN, dementia, hx of recurrent aspiration PNA, possible COPD who presents with 1 day of SOB.  Her aide and family checked her )2 sat and found her to be hypoxic to 70%, she had O2 at home from a prior hospital discharge.  It was plaed on her and titrated to 100% O2 sat, 4L.  she continued to require the oxygen, desating to 70% witout the oxygen.    the patients son states last night she was able to lie flat in bed without SOB.  She had not been complaining of chest pain, palpitations, LE edema, no observed PND or orthopnea last night. no fevers or chills, Nausea or vomiting.  The presentation is similar to her prior admission  6/2017. Initial Cardiology Attending Consult    CHIEF COMPLAINT:difficulty breathing    HISTORY OF PRESENT ILLNESS:  BILL PARIKH is a 89y Female PMH afib/flutter not on AC, ITP on chronic prednisone, moderate AS, dCHF, HTN, dementia, hx of recurrent aspiration PNA, possible COPD who presents with 1 day of SOB.  Her aide and family checked her O2 sat and found her to be hypoxic to 70%, she had O2 at home from a prior hospital discharge.  It was plaed on her and titrated to 100% O2 sat, 4L.  she continued to require the oxygen, desating to 70% witout the oxygen.    the patients son states last night she was able to lie flat in bed without SOB.  She had not been complaining of chest pain, palpitations, LE edema, no observed PND or orthopnea last night. no fevers or chills, Nausea or vomiting.  The presentation is similar to her prior admission  6/2017.    Allergies    eggs (Rash)  no drug allergy (Unknown)    Intolerances    vinegar sensitivity    family states that the patient shakes when she ingests vinegar (Other)  	  home Medications:   * Patient Currently Takes Medications as of 03-Jul-2017 13:17 documented in Structured Notes  · 	ferrous sulfate 325 mg oral tablet: 1 tab(s) orally 3 times a day  · 	predniSONE 2.5 mg oral tablet: 3 tab(s) orally once a day  · 	dilTIAZem 60 mg oral tablet: 1 tab(s) orally every 8 hours  · 	sucralfate 1 g/10 mL oral suspension: 10 milliliter(s) orally 3 times a day  · 	pantoprazole 40 mg oral delayed release tablet: 1 tab(s) orally once a day (before a meal)  · 	furosemide 20 mg oral tablet: 1 tab(s) orally once a day  · 	acetaminophen 325 mg oral tablet: 2 tab(s) orally every 6 hours, As needed, Mild Pain (1 - 3)  · 	montelukast 5 mg oral tablet, chewable: 1 tab(s) orally once a day (at bedtime)  · 	dicyclomine 10 mg oral capsule: 1 cap(s) orally 2 times a day (before meals)    PAST MEDICAL & SURGICAL HISTORY:  PNA (pneumonia)  Dysphagia  Thrombocytopenia: ITP  Atrial fibrillation and flutter: No A/C  during hospitalization in april 2014  Respiratory failure: in april 2014 was intubated  Cataract: right eye  Small bowel obstruction: s/p resection in 2010  HTN - Hypertension  S/P cholecystectomy  H/O: Hysterectomy  S/P Small Bowel Resection      FAMILY HISTORY:  No pertinent family history in first degree relatives    SOCIAL HISTORY:    [ ] Non-smoker    REVIEW OF SYSTEMS: obtained from son at bedside due to condition of the patient  CONSTITUTIONAL: No fever, weight loss, or fatigue  EYES: No eye pain, visual disturbances, or discharge  ENMT:  No difficulty hearing, tinnitus, vertigo; No sinus or throat pain  NECK: No pain or stiffness  RESPIRATORY: No cough, wheezing, chills or hemoptysis; + Shortness of Breath  CARDIOVASCULAR: No chest pain, palpitations, passing out, dizziness, or leg swelling  GASTROINTESTINAL: No abdominal or epigastric pain. No nausea, vomiting, or hematemesis; No diarrhea or constipation. No melena or hematochezia.  GENITOURINARY: No dysuria, frequency, hematuria, or incontinence  NEUROLOGICAL: No headaches, memory loss, loss of strength, numbness, or tremors  SKIN: No itching, burning, rashes, or lesions   LYMPH Nodes: No enlarged glands  ENDOCRINE: No heat or cold intolerance; No hair loss  MUSCULOSKELETAL: No joint pain or swelling; No muscle, back, or extremity pain  PSYCHIATRIC: No depression, anxiety, mood swings, or difficulty sleeping  HEME/LYMPH: No easy bruising, or bleeding gums  ALLERY AND IMMUNOLOGIC: No hives or eczema	      PHYSICAL EXAM:  I&O's Summary      In ED: VS T 99.1, HR 70-80, 130/59, 16, 98% on BIPAP 14/6, 40%     Vital Signs Last 24 Hrs  	T(C): 36.4 (10-01-17 @ 22:37)  	T(F): 97.5 (10-01-17 @ 22:37), Max: 99.1 (10-01-17 @ 16:30)  	HR: 92 (10-01-17 @ 22:37) (76 - 92)  	BP: 121/61 (10-01-17 @ 22:37)  	BP(mean): --  	RR: 15 (10-01-17 @ 22:37) (15 - 24)  	SpO2: 96% (10-01-17 @ 22:37) (92% - 99%)  	Wt(kg): --    GEN: eld F sitting with bipap, drowsy, responsive	  HEENT:   no JVD, no carotid bruis	  Lymphatic: No lymphadenopathy  Cardiovascular: Normal S1 S2, No JVD, 3/6 SM, No LE or sacral edema  Respiratory: Crackles and reduced br sounds, no wheeze  Psychiatry: unable to evaluate  Gastrointestinal:  Soft, Non-tender, + BS	  Skin: No rashes, + bl shin ecchymoses, No cyanosis	  Neurologic: Non-focal  Extremities: warm, + ecchymosis, no  clubbing, cyanosis or edema  Vascular: Peripheral pulses palpable 2+ bilaterally      LABS:	 	    CBC Full  -  ( 01 Oct 2017 16:45 )  WBC Count : 15.7 K/uL  Hemoglobin : 10.8 g/dL  Hematocrit : 31.8 %  Platelet Count - Automated : 23 K/uL  Mean Cell Volume : 88.1 fl  Mean Cell Hemoglobin : 30.0 pg  Mean Cell Hemoglobin Concentration : 34.0 gm/dL  Auto Neutrophil # : 13.8 K/uL  Auto Lymphocyte # : 1.0 K/uL  Auto Monocyte # : 0.9 K/uL  Auto Eosinophil # : 0.1 K/uL  Auto Basophil # : 0.0 K/uL  Auto Neutrophil % : 83.0 %  Auto Lymphocyte % : 3.0 %  Auto Monocyte % : 7.0 %  Auto Eosinophil % : x  Auto Basophil % : x    10-01    132<L>  |  88<L>  |  38<H>  ----------------------------<  77  5.9<H>   |  34<H>  |  1.55<H>  10-01    130<L>  |  88<L>  |  36<H>  ----------------------------<  157<H>  6.2<HH>   |  30  |  1.52<H>    Ca    9.8      01 Oct 2017 21:30  Ca    10.5      01 Oct 2017 18:35    TPro  7.3  /  Alb  3.9  /  TBili  0.3  /  DBili  x   /  AST  35  /  ALT  27  /  AlkPhos  73  10-01      proBNP: 34K  Lipid Profile:   HgA1c:   TSH:     TROPONIN:  CARDIAC MARKERS ( 01 Oct 2017 21:30 )  x     / 0.31 ng/mL / x     / x     / x      CARDIAC MARKERS ( 01 Oct 2017 16:45 )  x     / 0.29 ng/mL / x     / x     / x      	    ECG:  sinus 1st deg AVB 83, peak T initial EKG  RADIOLOGY: CXR bl hazy opacities and bl pl effusions	    CARDIAC TESTING/STUDIES:    [ ] Echocardiogram:------------------------------------------------------------------------  Conclusions:   Technically difficult study.  1. Aortic valve not well visualized; appears calcified.  Peak transaortic valve gradient equals 49 mm Hg, mean  transaortic valve gradient equals 27 mm Hg, aortic valve  velocity time integral equals 62 cm, consistent with  moderate aortic stenosis. Suboptimal imaging precludes  accurate assessment of LVOT diameter. Unable to accurately  calculate aortic valve area by continuity. No aortic valve  regurgitation seen.  2. Normal left ventricular internal dimensions and wall  thicknesses.  3. Endocardium not well visualized; grossly hyperdynamic  left ventricular systolic function. EF 75-80%  4. The right ventricle is not well visualized.    ASSESSMENT/PLAN: 	   89y Female PMH afib/flutter not on AC, ITP on chronic prednisone, moderate AS, dCHF, HTN, dementia, hx of recurrent aspiration PNA, possible COPD who presents with 1 day of SOB.  Likely from combination  CHF exacerbation and aspiration PNA.  Tronopin elevation is stable, likely demand from the hypoxemia and volume overload. Will diurese and begin medical therapy.    -lasix 40mg IV, for net neg 1L daily  -wean from bipap as tolerated  -measure ins and outs  -daily weights    ASA 81mg  -hold plavix and heparin due to ITP hx and bl ecchymosis  -continue diltiazem 30mg q8hrs  -monitor on tele    Cardiology will continue to follow    Simeon Damian MD, PhD  Cardiology Attending  [phone]

## 2017-10-01 NOTE — H&P ADULT - HISTORY OF PRESENT ILLNESS
Hx obtained from HCP son & daughter who are present at bedside. Patient unable to provide hx 2/2 to advanced medical illnesses and AMS.    89 yo F w/ hx of Afib/Aflutter (not on AC), diastolic heart failure, moderate AS, HTN, dementia (baseline AAOx2), dysphagia (declined PEG tube), hx of recurrent aspiration pneumonia, ?COPD presents with shortness of breath. Patient lives alone and has 24-hr aide for 7-days a week. Patient was found today earlier to have shortness of breath and aide checked O2-saturation which was 70%. The patient has home O2-therapy for unclear reasons and was placed on 4-L NC and O2 saturation improved to 90%. In triage room patient had O2-saturation of 60s on room air. Unable to obtain further collateral information such as if patient was developing chest pain or CARRILLO, or orthopnea or leg swelling, fever/chills.     In ED: VS T 99.1, HR 70-80, 130/59, 16, 98% on BIPAP 14/6, 40% Hx obtained from HCP son & daughter who are present at bedside. Patient unable to provide hx 2/2 to advanced medical illnesses and AMS.    	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 (family denies), moderate AS, HTN, dementia (baseline AAOx2), dysphagia (declined PEG tube), hx of recurrent aspiration pneumonia, ?COPD presents with shortness of breath. Patient lives alone and has 24-hr aide for 7-days a week. Patient was found today earlier to have shortness of breath and aide checked O2-saturation which was 70%. Prior to today, patient was fine per son at bedside. The patient has home O2-therapy for unclear reasons and was placed on 4-L NC and O2 saturation improved to 90%. In triage room patient had O2-saturation of 60s on room air. Unable to obtain further collateral information such as if patient was developing chest pain or CARRILLO, or orthopnea or leg swelling, fever/chills. Aide is not present at bedside.     In ED: VS T 99.1, HR 70-80, 130/59, 16, 98% on BIPAP 14/6, 40% Hx obtained from HCP son & daughter who are present at bedside. Patient unable to provide hx 2/2 to advanced medical illnesses and AMS.    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 (family denies), moderate AS, HTN, dementia (baseline AAOx2), dysphagia (declined PEG tube), hx of recurrent aspiration pneumonia, ?COPD presents with shortness of breath. Patient lives alone and has 24-hr aide for 7-days a week. Patient was found today earlier to have shortness of breath and aide checked O2-saturation which was 70%. Prior to today, patient was fine per son at bedside. The patient has home O2-therapy for unclear reasons and was placed on 4-L NC and O2 saturation improved to 90%. In triage room patient had O2-saturation of 60s on room air. Unable to obtain further collateral information such as if patient was developing chest pain or CARRILLO, or orthopnea or leg swelling, fever/chills. Aide is not present at bedside.     In ED: VS T 99.1, HR 70-80, 130/59, 16, 98% on BIPAP 14/6, 40%

## 2017-10-01 NOTE — ED PROVIDER NOTE - ATTENDING CONTRIBUTION TO CARE
89 yo female, PMH as noted, here with SOB, hypoxia from home.  No fevers or recent illness.  Respiratory distress on arrival -- BiPAP initiated.  Hyperkalemia -- given calcium, bicarb, insulin, glucose.  Hypercarbia and metabolic acidosis.  MICU consulted.  Combination of possible fluid overload and reactive airway disease, does not appear to have an infectious component at this time.

## 2017-10-02 DIAGNOSIS — J44.9 CHRONIC OBSTRUCTIVE PULMONARY DISEASE, UNSPECIFIED: ICD-10-CM

## 2017-10-02 DIAGNOSIS — J96.90 RESPIRATORY FAILURE, UNSPECIFIED, UNSPECIFIED WHETHER WITH HYPOXIA OR HYPERCAPNIA: ICD-10-CM

## 2017-10-02 DIAGNOSIS — J18.9 PNEUMONIA, UNSPECIFIED ORGANISM: ICD-10-CM

## 2017-10-02 LAB
ANION GAP SERPL CALC-SCNC: 10 MMOL/L — SIGNIFICANT CHANGE UP (ref 5–17)
ANION GAP SERPL CALC-SCNC: 11 MMOL/L — SIGNIFICANT CHANGE UP (ref 5–17)
ANION GAP SERPL CALC-SCNC: 13 MMOL/L — SIGNIFICANT CHANGE UP (ref 5–17)
ANION GAP SERPL CALC-SCNC: 15 MMOL/L — SIGNIFICANT CHANGE UP (ref 5–17)
APPEARANCE UR: ABNORMAL
BACTERIA # UR AUTO: NEGATIVE — SIGNIFICANT CHANGE UP
BASE EXCESS BLDA CALC-SCNC: 8.1 MMOL/L — HIGH (ref -2–2)
BASOPHILS # BLD AUTO: 0 K/UL — SIGNIFICANT CHANGE UP (ref 0–0.2)
BILIRUB UR-MCNC: NEGATIVE — SIGNIFICANT CHANGE UP
BLD GP AB SCN SERPL QL: NEGATIVE — SIGNIFICANT CHANGE UP
BUN SERPL-MCNC: 37 MG/DL — HIGH (ref 7–23)
BUN SERPL-MCNC: 38 MG/DL — HIGH (ref 7–23)
BUN SERPL-MCNC: 38 MG/DL — HIGH (ref 7–23)
CALCIUM SERPL-MCNC: 10.6 MG/DL — HIGH (ref 8.4–10.5)
CALCIUM SERPL-MCNC: 10.6 MG/DL — HIGH (ref 8.4–10.5)
CALCIUM SERPL-MCNC: 10.8 MG/DL — HIGH (ref 8.4–10.5)
CHLORIDE SERPL-SCNC: 85 MMOL/L — LOW (ref 96–108)
CHLORIDE SERPL-SCNC: 85 MMOL/L — LOW (ref 96–108)
CHLORIDE SERPL-SCNC: 87 MMOL/L — LOW (ref 96–108)
CHLORIDE SERPL-SCNC: 87 MMOL/L — LOW (ref 96–108)
CHLORIDE UR-SCNC: <20 MMOL/L — SIGNIFICANT CHANGE UP
CK MB CFR SERPL CALC: 9.3 NG/ML — HIGH (ref 0–3.8)
CO2 BLDA-SCNC: 36 MMOL/L — HIGH (ref 22–30)
CO2 SERPL-SCNC: 28 MMOL/L — SIGNIFICANT CHANGE UP (ref 22–31)
CO2 SERPL-SCNC: 31 MMOL/L — SIGNIFICANT CHANGE UP (ref 22–31)
CO2 SERPL-SCNC: 32 MMOL/L — HIGH (ref 22–31)
CO2 SERPL-SCNC: 32 MMOL/L — HIGH (ref 22–31)
COLOR SPEC: YELLOW — SIGNIFICANT CHANGE UP
CREAT SERPL-MCNC: 1.44 MG/DL — HIGH (ref 0.5–1.3)
CREAT SERPL-MCNC: 1.46 MG/DL — HIGH (ref 0.5–1.3)
CREAT SERPL-MCNC: 1.53 MG/DL — HIGH (ref 0.5–1.3)
DIFF PNL FLD: ABNORMAL
EOSINOPHIL # BLD AUTO: 0 K/UL — SIGNIFICANT CHANGE UP (ref 0–0.5)
EPI CELLS # UR: 1 /HPF — SIGNIFICANT CHANGE UP (ref 0–5)
GAS PNL BLDA: SIGNIFICANT CHANGE UP
GLUCOSE SERPL-MCNC: 136 MG/DL — HIGH (ref 70–99)
GLUCOSE SERPL-MCNC: 170 MG/DL — HIGH (ref 70–99)
GLUCOSE SERPL-MCNC: 206 MG/DL — HIGH (ref 70–99)
GLUCOSE UR QL: NEGATIVE MG/DL — SIGNIFICANT CHANGE UP
HCO3 BLDA-SCNC: 35 MMOL/L — HIGH (ref 21–29)
HCT VFR BLD CALC: 29.6 % — LOW (ref 34.5–45)
HGB BLD-MCNC: 10 G/DL — LOW (ref 11.5–15.5)
HOROWITZ INDEX BLDA+IHG-RTO: SIGNIFICANT CHANGE UP
HYALINE CASTS # UR AUTO: 0 /LPF — SIGNIFICANT CHANGE UP (ref 0–7)
KETONES UR-MCNC: NEGATIVE — SIGNIFICANT CHANGE UP
LEUKOCYTE ESTERASE UR-ACNC: ABNORMAL
LYMPHOCYTES # BLD AUTO: 0.3 K/UL — LOW (ref 1–3.3)
LYMPHOCYTES # BLD AUTO: 1 % — LOW (ref 13–44)
MAGNESIUM SERPL-MCNC: 2.1 MG/DL — SIGNIFICANT CHANGE UP (ref 1.6–2.6)
MCHC RBC-ENTMCNC: 30.3 PG — SIGNIFICANT CHANGE UP (ref 27–34)
MCHC RBC-ENTMCNC: 33.9 GM/DL — SIGNIFICANT CHANGE UP (ref 32–36)
MCV RBC AUTO: 89.4 FL — SIGNIFICANT CHANGE UP (ref 80–100)
MONOCYTES # BLD AUTO: 0.1 K/UL — SIGNIFICANT CHANGE UP (ref 0–0.9)
MONOCYTES NFR BLD AUTO: 1 % — LOW (ref 2–14)
NEUTROPHILS # BLD AUTO: 15.7 K/UL — HIGH (ref 1.8–7.4)
NEUTROPHILS NFR BLD AUTO: 97 % — HIGH (ref 43–77)
NITRITE UR-MCNC: NEGATIVE — SIGNIFICANT CHANGE UP
PCO2 BLDA: 61 MMHG — HIGH (ref 32–46)
PH BLDA: 7.37 — SIGNIFICANT CHANGE UP (ref 7.35–7.45)
PH UR: 5 — SIGNIFICANT CHANGE UP (ref 5–8)
PHOSPHATE SERPL-MCNC: 4.4 MG/DL — SIGNIFICANT CHANGE UP (ref 2.5–4.5)
PLATELET # BLD AUTO: 16 K/UL — CRITICAL LOW (ref 150–400)
PO2 BLDA: 76 MMHG — SIGNIFICANT CHANGE UP (ref 74–108)
POTASSIUM SERPL-MCNC: 5.3 MMOL/L — SIGNIFICANT CHANGE UP (ref 3.5–5.3)
POTASSIUM SERPL-MCNC: 5.5 MMOL/L — HIGH (ref 3.5–5.3)
POTASSIUM SERPL-MCNC: 5.5 MMOL/L — HIGH (ref 3.5–5.3)
POTASSIUM SERPL-MCNC: 5.8 MMOL/L — HIGH (ref 3.5–5.3)
POTASSIUM SERPL-SCNC: 5.3 MMOL/L — SIGNIFICANT CHANGE UP (ref 3.5–5.3)
POTASSIUM SERPL-SCNC: 5.5 MMOL/L — HIGH (ref 3.5–5.3)
POTASSIUM SERPL-SCNC: 5.5 MMOL/L — HIGH (ref 3.5–5.3)
POTASSIUM SERPL-SCNC: 5.8 MMOL/L — HIGH (ref 3.5–5.3)
PROCALCITONIN SERPL-MCNC: 0.34 NG/ML — HIGH (ref 0–0.04)
PROT UR-MCNC: ABNORMAL MG/DL
RAPID RVP RESULT: SIGNIFICANT CHANGE UP
RBC # BLD: 3.31 M/UL — LOW (ref 3.8–5.2)
RBC # FLD: 15.2 % — HIGH (ref 10.3–14.5)
RBC CASTS # UR COMP ASSIST: 277 /HPF — HIGH (ref 0–4)
RH IG SCN BLD-IMP: POSITIVE — SIGNIFICANT CHANGE UP
SAO2 % BLDA: 96 % — SIGNIFICANT CHANGE UP (ref 92–96)
SODIUM SERPL-SCNC: 127 MMOL/L — LOW (ref 135–145)
SODIUM SERPL-SCNC: 128 MMOL/L — LOW (ref 135–145)
SODIUM SERPL-SCNC: 130 MMOL/L — LOW (ref 135–145)
SODIUM SERPL-SCNC: 131 MMOL/L — LOW (ref 135–145)
SODIUM UR-SCNC: <20 MMOL/L — SIGNIFICANT CHANGE UP
SP GR SPEC: 1.02 — SIGNIFICANT CHANGE UP (ref 1.01–1.02)
TROPONIN T SERPL-MCNC: 0.25 NG/ML — HIGH (ref 0–0.06)
TROPONIN T SERPL-MCNC: 0.29 NG/ML — HIGH (ref 0–0.06)
UROBILINOGEN FLD QL: NEGATIVE MG/DL — SIGNIFICANT CHANGE UP
WBC # BLD: 16.2 K/UL — HIGH (ref 3.8–10.5)
WBC # FLD AUTO: 16.2 K/UL — HIGH (ref 3.8–10.5)
WBC UR QL: 158 /HPF — HIGH (ref 0–5)

## 2017-10-02 PROCEDURE — 99222 1ST HOSP IP/OBS MODERATE 55: CPT

## 2017-10-02 RX ORDER — HALOPERIDOL DECANOATE 100 MG/ML
0.5 INJECTION INTRAMUSCULAR ONCE
Qty: 0 | Refills: 0 | Status: COMPLETED | OUTPATIENT
Start: 2017-10-02 | End: 2017-10-02

## 2017-10-02 RX ORDER — SUCRALFATE 1 G
1 TABLET ORAL
Qty: 0 | Refills: 0 | Status: DISCONTINUED | OUTPATIENT
Start: 2017-10-02 | End: 2017-10-13

## 2017-10-02 RX ORDER — ALBUTEROL 90 UG/1
2.5 AEROSOL, METERED ORAL ONCE
Qty: 0 | Refills: 0 | Status: COMPLETED | OUTPATIENT
Start: 2017-10-02 | End: 2017-10-02

## 2017-10-02 RX ORDER — PANTOPRAZOLE SODIUM 20 MG/1
1 TABLET, DELAYED RELEASE ORAL
Qty: 0 | Refills: 0 | COMMUNITY

## 2017-10-02 RX ORDER — INSULIN HUMAN 100 [IU]/ML
5 INJECTION, SOLUTION SUBCUTANEOUS ONCE
Qty: 0 | Refills: 0 | Status: COMPLETED | OUTPATIENT
Start: 2017-10-02 | End: 2017-10-02

## 2017-10-02 RX ORDER — DEXTROSE 50 % IN WATER 50 %
50 SYRINGE (ML) INTRAVENOUS ONCE
Qty: 0 | Refills: 0 | Status: COMPLETED | OUTPATIENT
Start: 2017-10-02 | End: 2017-10-02

## 2017-10-02 RX ORDER — PANTOPRAZOLE SODIUM 20 MG/1
40 TABLET, DELAYED RELEASE ORAL DAILY
Qty: 0 | Refills: 0 | Status: DISCONTINUED | OUTPATIENT
Start: 2017-10-02 | End: 2017-10-13

## 2017-10-02 RX ORDER — ZINC SULFATE TAB 220 MG (50 MG ZINC EQUIVALENT) 220 (50 ZN) MG
220 TAB ORAL DAILY
Qty: 0 | Refills: 0 | Status: DISCONTINUED | OUTPATIENT
Start: 2017-10-02 | End: 2017-10-13

## 2017-10-02 RX ORDER — INSULIN HUMAN 100 [IU]/ML
5 INJECTION, SOLUTION SUBCUTANEOUS ONCE
Qty: 0 | Refills: 0 | Status: DISCONTINUED | OUTPATIENT
Start: 2017-10-02 | End: 2017-10-02

## 2017-10-02 RX ORDER — DILTIAZEM HCL 120 MG
1 CAPSULE, EXT RELEASE 24 HR ORAL
Qty: 0 | Refills: 0 | COMMUNITY

## 2017-10-02 RX ORDER — IPRATROPIUM/ALBUTEROL SULFATE 18-103MCG
3 AEROSOL WITH ADAPTER (GRAM) INHALATION EVERY 6 HOURS
Qty: 0 | Refills: 0 | Status: DISCONTINUED | OUTPATIENT
Start: 2017-10-02 | End: 2017-10-07

## 2017-10-02 RX ORDER — SUCRALFATE 1 G
10 TABLET ORAL
Qty: 0 | Refills: 0 | COMMUNITY

## 2017-10-02 RX ADMIN — PANTOPRAZOLE SODIUM 40 MILLIGRAM(S): 20 TABLET, DELAYED RELEASE ORAL at 13:24

## 2017-10-02 RX ADMIN — Medication 250 MILLIGRAM(S): at 00:00

## 2017-10-02 RX ADMIN — Medication 1 GRAM(S): at 22:31

## 2017-10-02 RX ADMIN — Medication 20 MILLIGRAM(S): at 13:35

## 2017-10-02 RX ADMIN — ALBUTEROL 2.5 MILLIGRAM(S): 90 AEROSOL, METERED ORAL at 09:49

## 2017-10-02 RX ADMIN — HALOPERIDOL DECANOATE 0.5 MILLIGRAM(S): 100 INJECTION INTRAMUSCULAR at 22:31

## 2017-10-02 RX ADMIN — Medication 50 MILLILITER(S): at 08:33

## 2017-10-02 RX ADMIN — Medication 3 MILLILITER(S): at 18:00

## 2017-10-02 RX ADMIN — Medication 1 GRAM(S): at 18:21

## 2017-10-02 RX ADMIN — INSULIN HUMAN 5 UNIT(S): 100 INJECTION, SOLUTION SUBCUTANEOUS at 08:34

## 2017-10-02 RX ADMIN — ZINC SULFATE TAB 220 MG (50 MG ZINC EQUIVALENT) 220 MILLIGRAM(S): 220 (50 ZN) TAB at 13:24

## 2017-10-02 NOTE — CONSULT NOTE ADULT - ASSESSMENT
89 yo lady with reported home oxygen, PNA aspiration  (pneumonia), dysphagia, ITP, Afib-no ac, resp failure with intubation, htn-admitted with hypercarbic resp faillure, pna and acute on chronic chf exacerbation

## 2017-10-02 NOTE — PROGRESS NOTE ADULT - SUBJECTIVE AND OBJECTIVE BOX
CHIEF COMPLAINT:Patient is a 89y old  Female who presents with a chief complaint of shortness of breath / hypoxic respiratory failure (01 Oct 2017 22:34)        Allergies:  eggs (Rash)  no drug allergy (Unknown)  vinegar sensitivity    family states that the patient shakes when she ingests vinegar (Other)      PAST MEDICAL & SURGICAL HISTORY:  PNA (pneumonia)  Dysphagia  Thrombocytopenia: ITP  Atrial fibrillation and flutter: No A/C  during hospitalization in april 2014  Respiratory failure: in april 2014 was intubated  Cataract: right eye  Small bowel obstruction: s/p resection in 2010  HTN - Hypertension  S/P cholecystectomy  H/O: Hysterectomy  S/P Small Bowel Resection      FAMILY HISTORY:  No pertinent family history in first degree relatives      REVIEW OF SYSTEMS:  UTO, confused. on Bipap    Medications:  MEDICATIONS  (STANDING):  furosemide   Injectable 40 milliGRAM(s) IV Push daily  piperacillin/tazobactam IVPB. 3.375 Gram(s) IV Intermittent every 12 hours  pantoprazole  Injectable 40 milliGRAM(s) IV Push daily  diltiazem    Tablet 60 milliGRAM(s) Oral every 8 hours  zinc sulfate 220 milliGRAM(s) Oral daily  sucralfate suspension 1 Gram(s) Oral Before meals and at bedtime  methylPREDNISolone sodium succinate Injectable 20 milliGRAM(s) IV Push every 8 hours  ALBUTerol/ipratropium for Nebulization 3 milliLiter(s) Nebulizer every 6 hours    MEDICATIONS  (PRN):    	    PHYSICAL EXAM:  T(C): 36.4 (10-02-17 @ 10:02), Max: 37.3 (10-01-17 @ 16:30)  HR: 106 (10-02-17 @ 13:21) (76 - 106)  BP: 124/67 (10-02-17 @ 13:21) (107/59 - 142/80)  RR: 18 (10-02-17 @ 10:02) (15 - 24)  SpO2: 99% (10-02-17 @ 11:35) (92% - 100%)  Wt(kg): --  I&O's Summary      Appearance: Frail	  HEENT:   NCAT, PERRL, EOMI	  Lymphatic: No lymphadenopathy  Cardiovascular: Normal S1 S2, RRR  Respiratory: bibasilar crackles  Psychiatry: A & O x 3, Mood & affect appropriate  Gastrointestinal:  Soft, Non-tender, + BS  Skin: No rashes, No ecchymoses, No cyanosis	  Neurologic: Non-focal  Extremities: Normal range of motion, No clubbing, cyanosis or edema    	  LABS:	 	    CARDIAC MARKERS:  CARDIAC MARKERS ( 02 Oct 2017 04:57 )  x     / 0.25 ng/mL / x     / x     / x      CARDIAC MARKERS ( 02 Oct 2017 01:57 )  x     / x     / x     / x     / 9.3 ng/mL  CARDIAC MARKERS ( 01 Oct 2017 23:28 )  x     / 0.29 ng/mL / x     / x     / x      CARDIAC MARKERS ( 01 Oct 2017 21:30 )  x     / 0.31 ng/mL / x     / x     / x      CARDIAC MARKERS ( 01 Oct 2017 16:45 )  x     / 0.29 ng/mL / x     / x     / x                                    10.0   16.2  )-----------( 16       ( 02 Oct 2017 02:04 )             29.6     10-02    128<L>  |  85<L>  |  37<H>  ----------------------------<  136<H>  5.5<H>   |  28  |  1.46<H>    Ca    10.8<H>      02 Oct 2017 06:15  Phos  4.4     10-02  Mg     2.1     10-02    TPro  7.3  /  Alb  3.9  /  TBili  0.3  /  DBili  x   /  AST  35  /  ALT  27  /  AlkPhos  73  10-01    proBNP: Serum Pro-Brain Natriuretic Peptide: 49377 pg/mL (10-01 @ 16:45)    Lipid Profile:   HgA1c:   TSH:

## 2017-10-02 NOTE — PROGRESS NOTE ADULT - SUBJECTIVE AND OBJECTIVE BOX
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  Her aide and family checked her O2 sat and found her to be hypoxic to 70%, she had O2 at home from a prior hospital discharge.  It was plaed on her and titrated to 100% O2 sat, 4L.  she continued to require the oxygen, desating to 70% witout the oxygen.    the patients son states last night she was able to lie flat in bed without SOB.  She had not been complaining of chest pain, palpitations, LE edema, no observed PND or orthopnea last night. no fevers or chills, Nausea or vomiting.  The presentation is similar to her prior admission  6/2017.  Currently patient is on BiPap, stable.     Allergies    eggs (Rash)  no drug allergy (Unknown)    Intolerances    vinegar sensitivity    family states that the patient shakes when she ingests vinegar (Other)  	  home Medications:   * Patient Currently Takes Medications as of 03-Jul-2017 13:17 documented in Structured Notes  · 	ferrous sulfate 325 mg oral tablet: 1 tab(s) orally 3 times a day  · 	predniSONE 2.5 mg oral tablet: 3 tab(s) orally once a day  · 	dilTIAZem 60 mg oral tablet: 1 tab(s) orally every 8 hours  · 	sucralfate 1 g/10 mL oral suspension: 10 milliliter(s) orally 3 times a day  · 	pantoprazole 40 mg oral delayed release tablet: 1 tab(s) orally once a day (before a meal)  · 	furosemide 20 mg oral tablet: 1 tab(s) orally once a day  · 	acetaminophen 325 mg oral tablet: 2 tab(s) orally every 6 hours, As needed, Mild Pain (1 - 3)  · 	montelukast 5 mg oral tablet, chewable: 1 tab(s) orally once a day (at bedtime)  · 	dicyclomine 10 mg oral capsule: 1 cap(s) orally 2 times a day (before meals)    PAST MEDICAL & SURGICAL HISTORY:  PNA (pneumonia)  Dysphagia  Thrombocytopenia: ITP  Atrial fibrillation and flutter: No A/C  during hospitalization in april 2014  Respiratory failure: in april 2014 was intubated  Cataract: right eye  Small bowel obstruction: s/p resection in 2010  HTN - Hypertension  S/P cholecystectomy  H/O: Hysterectomy  S/P Small Bowel Resection      FAMILY HISTORY:  No pertinent family history in first degree relatives    SOCIAL HISTORY:    [ ] Non-smoker    REVIEW OF SYSTEMS: obtained from son at bedside due to condition of the patient  CONSTITUTIONAL: No fever, weight loss, or fatigue  EYES: No eye pain, visual disturbances, or discharge  ENMT:  No difficulty hearing, tinnitus, vertigo; No sinus or throat pain  NECK: No pain or stiffness  RESPIRATORY: No cough, wheezing, chills or hemoptysis; + Shortness of Breath  CARDIOVASCULAR: No chest pain, palpitations, passing out, dizziness, or leg swelling  GASTROINTESTINAL: No abdominal or epigastric pain. No nausea, vomiting, or hematemesis; No diarrhea or constipation. No melena or hematochezia.  GENITOURINARY: No dysuria, frequency, hematuria, or incontinence  NEUROLOGICAL: No headaches, memory loss, loss of strength, numbness, or tremors  SKIN: No itching, burning, rashes, or lesions   LYMPH Nodes: No enlarged glands  ENDOCRINE: No heat or cold intolerance; No hair loss  MUSCULOSKELETAL: No joint pain or swelling; No muscle, back, or extremity pain  PSYCHIATRIC: No depression, anxiety, mood swings, or difficulty sleeping  HEME/LYMPH: No easy bruising, or bleeding gums  ALLERY AND IMMUNOLOGIC: No hives or eczema	            VITALS:  Vital Signs Last 24 Hrs  T(C): 36.8 (02 Oct 2017 18:18), Max: 36.8 (02 Oct 2017 18:18)  T(F): 98.2 (02 Oct 2017 18:18), Max: 98.2 (02 Oct 2017 18:18)  HR: 78 (02 Oct 2017 18:18) (74 - 106)  BP: 106/70 (02 Oct 2017 18:18) (106/70 - 142/80)  BP(mean): --  RR: 16 (02 Oct 2017 18:18) (15 - 19)  SpO2: 97% (02 Oct 2017 18:18) (95% - 100%)    PHYSICAL EXAM:  GEN: eld F sitting with bipap, drowsy, responsive	  HEENT:   no JVD, no carotid bruis	  Lymphatic: No lymphadenopathy  Cardiovascular: Normal S1 S2, No JVD, 3/6 SM, No LE or sacral edema  Respiratory: Crackles and reduced br sounds, no wheeze  Psychiatry: unable to evaluate  Gastrointestinal:  Soft, Non-tender, + BS	  Skin: No rashes, + bl shin ecchymoses, No cyanosis	  Neurologic: Non-focal  Extremities: warm, + ecchymosis, no  clubbing, cyanosis or edema  Vascular: Peripheral pulses palpable 2+ bilaterally      LABS:	 	                        10.0   16.2  )-----------( 16       ( 02 Oct 2017 02:04 )             29.6   N=x    ; L=x        02 Oct 2017 06:15    128    |  85     |  37     ----------------------------<  136    5.5     |  28     |  1.46     Ca    10.8       02 Oct 2017 06:15  Phos  4.4       02 Oct 2017 01:57  Mg     2.1       02 Oct 2017 01:57    TPro  7.3    /  Alb  3.9    /  TBili  0.3    /  DBili  x      /  AST  35     /  ALT  27     /  AlkPhos  73     01 Oct 2017 16:45      Hepatic panel: 01 Oct 2017 16:45  7.3   | 3.9                            0.3   | 0.3  /x                              35    | 27                                /73   \par                                       	    ECG:  sinus 1st deg AVB 83, peak T initial EKG  RADIOLOGY: CXR bl hazy opacities and bl pl effusions	    CARDIAC TESTING/STUDIES:    [ ] Echocardiogram:------------------------------------------------------------------------  Conclusions:   Technically difficult study.  1. Aortic valve not well visualized; appears calcified.  Peak transaortic valve gradient equals 49 mm Hg, mean  transaortic valve gradient equals 27 mm Hg, aortic valve  velocity time integral equals 62 cm, consistent with  moderate aortic stenosis. Suboptimal imaging precludes  accurate assessment of LVOT diameter. Unable to accurately  calculate aortic valve area by continuity. No aortic valve  regurgitation seen.  2. Normal left ventricular internal dimensions and wall  thicknesses.  3. Endocardium not well visualized; grossly hyperdynamic  left ventricular systolic function. EF 75-80%  4. The right ventricle is not well visualized.    ASSESSMENT/PLAN: 	   89y Female [facility] afib/flutter not on AC, ITP on chronic prednisone, moderate AS, dCHF, HTN, dementia, hx of recurrent aspiration PNA, possible COPD who presents with 1 day of SOB.  Likely from combination  CHF exacerbation and aspiration PNA.  Tronopin elevation is stable, likely demand from the hypoxemia and volume overload. Will diurese and begin medical therapy.  Patient is DNR.    -lasix 40mg IV, for net neg 1L daily  -wean from bipap as tolerated  -measure ins and outs  -daily weights    ASA 81mg  -hold plavix and heparin due to ITP hx and bl ecchymosis  -continue diltiazem 30mg q8hrs  -monitor on tele    Thank you, my team will follow.    Ade Cruz M.D, F.A.C.C  [facility]   [phone]

## 2017-10-02 NOTE — CONSULT NOTE ADULT - ASSESSMENT
Imp:  89y Female admitted with resp failure  hx of hyponatremia on prev admissions and CKD  now with [place]  hyperkalemia - as per chart, may have been on K supplements at home  K improving  hyponatremia persists  on IV lasix for diastolic CHF  avoid ACE/ARB, NSAIDS  check UA and urine lytes  avoid hypotonic fluids  monitor I&O's, electrolytes and renal function Imp:  89y Female admitted with resp failure  hx of hyponatremia on prev admissions and CKD  now with [place]  hyperkalemia - as per chart, may have been on K supplements at home  K improving  hyponatremia persists  on IV lasix for diastolic CHF  hypercalcemia  - appears to have developed after CA infusion for hyperkalemia - will  monitor  avoid ACE/ARB, NSAIDS  check UA and urine lytes  avoid hypotonic fluids  monitor I&O's, electrolytes and renal function

## 2017-10-02 NOTE — CONSULT NOTE ADULT - SUBJECTIVE AND OBJECTIVE BOX
PULMONARY CONSULT    Initial HPI on admission:    89 yo F w/ hx of Afib/Aflutter (not on AC), diastolic heart failure, ITP on chronic prednisone, hx of pancreatic neoplasm? (family denies), moderate AS, HTN, dementia (baseline AAOx2), dysphagia (declined PEG tube), hx of recurrent aspiration pneumonia, ?COPD presents with shortness of breath. Patient lives alone and has 24-hr aide for 7-days a week. Patient was found today earlier to have shortness of breath and aide checked O2-saturation which was 70%. Reported pt fine during earlier part of week.. The patient has home O2-therapy for unclear reasons and was placed on 4-L NC and O2 saturation improved to 90%. In triage room patient had O2-saturation of 60s on room air. Abg revieled hypercarbic resp failure, placed on bipap. Called to eval. On bipap, awake, saying few words-speaks Farsi, maex4, unable to obtain complete hx-d/t bipap and language barrier, but currently -sob,, -loera, -cough, -fever , + diffuse wheezing notedin er, s/p solumedrol,O/W ros neg    In ED: VS T 99.1, HR 70-80, 130/59, 16, 98% on BIPAP 20/8, 40%  rr 18, mv 6.7          PAST MEDICAL & SURGICAL HISTORY:  PNA (pneumonia)  Dysphagia  Thrombocytopenia: ITP  Atrial fibrillation and flutter: No A/C  during hospitalization in 2014  Respiratory failure: in 2014 was intubated  Cataract: right eye  Small bowel obstruction: s/p resection in   HTN - Hypertension  S/P cholecystectomy  H/O: Hysterectomy  S/P Small Bowel Resection    Allergies    eggs (Rash)  no drug allergy (Unknown)    Intolerances    vinegar sensitivity    family states that the patient shakes when she ingests vinegar (Other)    FAMILY HISTORY:  No pertinent family history in first degree relatives    Social history:     Review of Systems:  CONSTITUTIONAL: No fever, chills, or fatigue  EYES: No eye pain, visual disturbances, or discharge  ENMT:  No difficulty hearing, tinnitus, vertigo; No sinus or throat pain  NECK: No pain or stiffness  RESPIRATORY: Per above  CARDIOVASCULAR: No chest pain, palpitations, dizziness, or leg swelling  GASTROINTESTINAL: No abdominal or epigastric pain. No nausea, vomiting, or hematemesis; No diarrhea or constipation. No melena or hematochezia.  GENITOURINARY: No dysuria, frequency, hematuria, or incontinence  NEUROLOGICAL: No headaches, memory loss, loss of strength, numbness, or tremors  SKIN: No itching, burning, rashes, or lesions   MUSCULOSKELETAL: No joint pain or swelling; No muscle, back, or extremity pain  PSYCHIATRIC: No depression, anxiety, mood swings, or difficulty sleeping      Medications:  MEDICATIONS  (STANDING):  furosemide   Injectable 40 milliGRAM(s) IV Push daily  vancomycin  IVPB 1000 milliGRAM(s) IV Intermittent every 24 hours  piperacillin/tazobactam IVPB. 3.375 Gram(s) IV Intermittent every 12 hours  pantoprazole  Injectable 40 milliGRAM(s) IV Push daily  predniSONE   Tablet 5 milliGRAM(s) Oral daily  diltiazem    Tablet 60 milliGRAM(s) Oral every 8 hours  zinc sulfate 220 milliGRAM(s) Oral daily  sucralfate suspension 1 Gram(s) Oral Before meals and at bedtime    MEDICATIONS  (PRN):            Vital Signs Last 24 Hrs  T(C): 36.4 (02 Oct 2017 10:02), Max: 37.3 (01 Oct 2017 16:30)  T(F): 97.6 (02 Oct 2017 10:02), Max: 99.1 (01 Oct 2017 16:30)  HR: 92 (02 Oct 2017 11:35) (76 - 101)  BP: 132/69 (02 Oct 2017 10:02) (107/59 - 142/80)  BP(mean): --  RR: 18 (02 Oct 2017 10:02) (15 - 24)  SpO2: 99% (02 Oct 2017 11:35) (92% - 100%)    ABG - ( 02 Oct 2017 09:05 )  pH: 7.30  /  pCO2: 72    /  pO2: 103   / HCO3: 34    / Base Excess: 6.5   /  SaO2: 98                VBG pH 7.22 10-01 @ 21:30  VBG pCO2 94 10-01 @ 21:30  VBG O2 sat 62 10-01 @ 21:30  VBG lactate 1.5 10-01 @ 21:30  VBG pH 7.18 10-01 @ 18:59  VBG pCO2 97 10-01 @ 18:59  VBG O2 sat 42 10-01 @ 18:59  VBG lactate 2.8 10-01 @ 18:59  VBG pH 7.22 10-01 @ 16:45  VBG pCO2 92 10-01 @ 16:45  VBG O2 sat 41 10-01 @ 16:45  VBG lactate 1.6 10-01 @ 16:45            LABS:                        10.0   16.2  )-----------( 16       ( 02 Oct 2017 02:04 )             29.6     10    128<L>  |  85<L>  |  37<H>  ----------------------------<  136<H>  5.5<H>   |  28  |  1.46<H>    Ca    10.8<H>      02 Oct 2017 06:15  Phos  4.4     10-02  Mg     2.1     10-02    TPro  7.3  /  Alb  3.9  /  TBili  0.3  /  DBili  x   /  AST  35  /  ALT  27  /  AlkPhos  73  10      CARDIAC MARKERS ( 02 Oct 2017 04:57 )  x     / 0.25 ng/mL / x     / x     / x      CARDIAC MARKERS ( 02 Oct 2017 01:57 )  x     / x     / x     / x     / 9.3 ng/mL  CARDIAC MARKERS ( 01 Oct 2017 23:28 )  x     / 0.29 ng/mL / x     / x     / x      CARDIAC MARKERS ( 01 Oct 2017 21:30 )  x     / 0.31 ng/mL / x     / x     / x      CARDIAC MARKERS ( 01 Oct 2017 16:45 )  x     / 0.29 ng/mL / x     / x     / x          CAPILLARY BLOOD GLUCOSE          Urinalysis Basic - ( 01 Oct 2017 20:33 )    Color: x / Appearance: Clear / S.010 / pH: x  Gluc: x / Ketone: Negative  / Bili: Negative / Urobili: Negative   Blood: x / Protein: Negative / Nitrite: Negative   Leuk Esterase: Negative / RBC: 0-2 /HPF / WBC 0-2 /HPF   Sq Epi: x / Non Sq Epi: x / Bacteria: x        Serum Pro-Brain Natriuretic Peptide: 75488 pg/mL (01 Oct 2017 16:45)              CULTURES: none        Physical Examination:  a/o speaking words  General: No acute distress.      HEENT: Pupils equal, reactive to light.  Symmetric.    PULM: decreased bs bilaterally, no significant sputum production    CVS: S1, S2rrr    ABD: Soft, nondistended, nontender, normoactive bowel sounds, no masses    EXT: No edema, nontender    SKIN: Warm and well perfused, no rashes noted.    NEURO: Alert, oriented, interactive, nonfocal    RADIOLOGY REVIEWED  CXR: 10/2 cxr-bilat effusion        TTE:< from: Transthoracic Echocardiogram (17 @ 10:04) >  mplete spectral and color flow Doppler.  INDICATION: Acute diastolic (congestive) heart failure  (I50.31)  ------------------------------------------------------------------------  Dimensions:    Normal Values:  LA:     3.3    2.0 - 4.0 cm  Ao:     2.6    2.0 - 3.8 cm  SEPTUM: 0.9    0.6 - 1.2 cm  PWT:    1.1    0.6 - 1.1 cm  LVIDd:  3.5    3.0 - 5.6 cm  LVIDs:  1.9    1.8 - 4.0 cm  Derived variables:  LVMI: 75 g/m2  RWT: 0.62  Fractional short: 46 %  EF (Visual Estimate): 75-80 %  Doppler Peak Velocity (m/sec): AoV=3.5  ------------------------------------------------------------------------  Observations:  Mitral Valve: Mitral annular calcification. Mitral valve  not well visualized. Minimal mitral regurgitation.  Aortic Valve/Aorta: Aortic valve not well visualized;  appears calcified. Peak transaortic valve gradient equals  49 mm Hg, mean transaortic valve gradient equals 27 mm Hg,  aortic valve velocity time integral equals 62 cm,  consistent with moderate aortic stenosis. Suboptimal  imaging precludes accurate assessment of LVOT diameter.  Unable to accurately calculate aortic valve area by  continuity. No aortic valve regurgitation seen. Peak left  ventricular outflow tract gradient equals 2 mm Hg, mean  gradient is equal to 1 mm Hg, LVOT velocity time integral  equals 12 cm.  Normal aortic root (Ao: 2.6 cm at the sinuses of Valsalva).  Left Atrium: Normal left atrium.  LA volume index = 26  cc/m2.  Left Ventricle: Endocardium not well visualized; grossly  hyperdynamic left ventricular systolic function. Normal  left ventricular internal dimensions and wall thicknesses.  Moderate diastolic dysfunction (Stage II).  Right Heart: Right atrium not well visualized. The right  ventricle is not well visualized. Tricuspid valve not well  visualized. Pulmonic valve not well visualized.  Pericardium/Pleura: Normal pericardium with no pericardial  effusion.  Hemodynamic: Estimated right atrial pressure is 8 mm Hg.  ------------------------------------------------------------------------  Conclusions:  Technically difficult study.  1. Aortic valve not well visualized; appears calcified.  Peak transaortic valve gradient equals 49 mm Hg, mean  transaortic valve gradient equals 27 mm Hg, aortic valve  velocity time integral equals 62 cm, consistent with  moderate aortic stenosis. Suboptimal imaging precludes  accurate assessment of LVOT diameter. Unable to accurately  calculate aortic valve area by continuity. No aortic valve  regurgitation seen.  2. Normal left ventricular internal dimensions and wall  thicknesses.  3. Endocardium not well visualized; grossly hyperdynamic  left ventricular systolic function.    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 (family denies), moderate AS, HTN, dementia (baseline AAOx2), dysphagia (declined PEG tube), hx of recurrent aspiration pneumonia, ?COPD presents with shortness of breath. Patient lives alone and has 24-hr aide for 7-days a week. Patient was found today earlier to have shortness of breath and aide checked O2-saturation which was 70%. Reported pt fine during earlier part of week.. The patient has home O2-therapy for unclear reasons and was placed on 4-L NC and O2 saturation improved to 90%. In triage room patient had O2-saturation of 60s on room air. Abg revealed hypercarbic resp failure, placed on bipap. Called to eval. On bipap, awake, saying few words-speaks Farsi, maex4, unable to obtain complete hx-d/t bipap and language barrier, but currently -sob,, -loera, -cough, -fever , + diffuse wheezing notedin er, s/p solumedrol,O/W ros neg    In ED: VS T 99.1, HR 70-80, 130/59, 16, 98% on BIPAP 20/8, 40%  rr 18, mv 6.7    PAST MEDICAL & SURGICAL HISTORY:  PNA (pneumonia)  Dysphagia  Thrombocytopenia: ITP  Atrial fibrillation and flutter: No A/C  during hospitalization in 2014  Respiratory failure: in 2014 was intubated  Cataract: right eye  Small bowel obstruction: s/p resection in   HTN - Hypertension  S/P cholecystectomy  H/O: Hysterectomy  S/P Small Bowel Resection    Allergies    eggs (Rash)  no drug allergy (Unknown)    Intolerances    vinegar sensitivity    family states that the patient shakes when she ingests vinegar (Other)    FAMILY HISTORY:  No pertinent family history in first degree relatives    Social history: nonsmoker    Review of Systems:  CONSTITUTIONAL: No fever, chills, or fatigue  EYES: No eye pain, visual disturbances, or discharge  ENMT:  No difficulty hearing, tinnitus, vertigo; No sinus or throat pain  NECK: No pain or stiffness  RESPIRATORY: Per above  CARDIOVASCULAR: No chest pain, palpitations, dizziness, or leg swelling  GASTROINTESTINAL: No abdominal or epigastric pain. No nausea, vomiting, or hematemesis; No diarrhea or constipation. No melena or hematochezia.  GENITOURINARY: No dysuria, frequency, hematuria, or incontinence  NEUROLOGICAL: No headaches, memory loss, loss of strength, numbness, or tremors  SKIN: No itching, burning, rashes, or lesions   MUSCULOSKELETAL: No joint pain or swelling; No muscle, back, or extremity pain  PSYCHIATRIC: No depression, anxiety, mood swings, or difficulty sleeping      Medications:  MEDICATIONS  (STANDING):  furosemide   Injectable 40 milliGRAM(s) IV Push daily  vancomycin  IVPB 1000 milliGRAM(s) IV Intermittent every 24 hours  piperacillin/tazobactam IVPB. 3.375 Gram(s) IV Intermittent every 12 hours  pantoprazole  Injectable 40 milliGRAM(s) IV Push daily  predniSONE   Tablet 5 milliGRAM(s) Oral daily  diltiazem    Tablet 60 milliGRAM(s) Oral every 8 hours  zinc sulfate 220 milliGRAM(s) Oral daily  sucralfate suspension 1 Gram(s) Oral Before meals and at bedtime    MEDICATIONS  (PRN):    Vital Signs Last 24 Hrs  T(C): 36.4 (02 Oct 2017 10:02), Max: 37.3 (01 Oct 2017 16:30)  T(F): 97.6 (02 Oct 2017 10:02), Max: 99.1 (01 Oct 2017 16:30)  HR: 92 (02 Oct 2017 11:35) (76 - 101)  BP: 132/69 (02 Oct 2017 10:02) (107/59 - 142/80)  BP(mean): --  RR: 18 (02 Oct 2017 10:02) (15 - 24)  SpO2: 99% (02 Oct 2017 11:35) (92% - 100%)    ABG - ( 02 Oct 2017 09:05 )  pH: 7.30  /  pCO2: 72    /  pO2: 103   / HCO3: 34    / Base Excess: 6.5   /  SaO2: 98                VBG pH 7.22 10-01 @ 21:30  VBG pCO2 94 10-01 @ 21:30  VBG O2 sat 62 10-01 @ 21:30  VBG lactate 1.5 10-01 @ 21:30  VBG pH 7.18 10-01 @ 18:59  VBG pCO2 97 10-01 @ 18:59  VBG O2 sat 42 10-01 @ 18:59  VBG lactate 2.8 10-01 @ 18:59  VBG pH 7.22 10-01 @ 16:45  VBG pCO2 92 10-01 @ 16:45  VBG O2 sat 41 10-01 @ 16:45  VBG lactate 1.6 10-01 @ 16:45            LABS:                        10.0   16.2  )-----------( 16       ( 02 Oct 2017 02:04 )             29.6     10    128<L>  |  85<L>  |  37<H>  ----------------------------<  136<H>  5.5<H>   |  28  |  1.46<H>    Ca    10.8<H>      02 Oct 2017 06:15  Phos  4.4     10-02  Mg     2.1     10-02    TPro  7.3  /  Alb  3.9  /  TBili  0.3  /  DBili  x   /  AST  35  /  ALT  27  /  AlkPhos  73  10      CARDIAC MARKERS ( 02 Oct 2017 04:57 )  x     / 0.25 ng/mL / x     / x     / x      CARDIAC MARKERS ( 02 Oct 2017 01:57 )  x     / x     / x     / x     / 9.3 ng/mL  CARDIAC MARKERS ( 01 Oct 2017 23:28 )  x     / 0.29 ng/mL / x     / x     / x      CARDIAC MARKERS ( 01 Oct 2017 21:30 )  x     / 0.31 ng/mL / x     / x     / x      CARDIAC MARKERS ( 01 Oct 2017 16:45 )  x     / 0.29 ng/mL / x     / x     / x          CAPILLARY BLOOD GLUCOSE          Urinalysis Basic - ( 01 Oct 2017 20:33 )    Color: x / Appearance: Clear / S.010 / pH: x  Gluc: x / Ketone: Negative  / Bili: Negative / Urobili: Negative   Blood: x / Protein: Negative / Nitrite: Negative   Leuk Esterase: Negative / RBC: 0-2 /HPF / WBC 0-2 /HPF   Sq Epi: x / Non Sq Epi: x / Bacteria: x        Serum Pro-Brain Natriuretic Peptide: 20714 pg/mL (01 Oct 2017 16:45)    CULTURES: none    Physical Examination:  a/o speaking words  General: No acute distress.      HEENT: Pupils equal, reactive to light.  Symmetric.    PULM: decreased bs bilaterally, no significant sputum production    CVS: S1, S2rrr    ABD: Soft, nondistended, nontender, normoactive bowel sounds, no masses    EXT: No edema, nontender    SKIN: Warm and well perfused, no rashes noted.    NEURO: Alert, oriented, interactive, nonfocal    RADIOLOGY REVIEWED  CXR: 10/2 cxr-bilat effusion        TTE:< from: Transthoracic Echocardiogram (17 @ 10:04) >  mplete spectral and color flow Doppler.  INDICATION: Acute diastolic (congestive) heart failure  (I50.31)  ------------------------------------------------------------------------  Dimensions:    Normal Values:  LA:     3.3    2.0 - 4.0 cm  Ao:     2.6    2.0 - 3.8 cm  SEPTUM: 0.9    0.6 - 1.2 cm  PWT:    1.1    0.6 - 1.1 cm  LVIDd:  3.5    3.0 - 5.6 cm  LVIDs:  1.9    1.8 - 4.0 cm  Derived variables:  LVMI: 75 g/m2  RWT: 0.62  Fractional short: 46 %  EF (Visual Estimate): 75-80 %  Doppler Peak Velocity (m/sec): AoV=3.5  ------------------------------------------------------------------------  Observations:  Mitral Valve: Mitral annular calcification. Mitral valve  not well visualized. Minimal mitral regurgitation.  Aortic Valve/Aorta: Aortic valve not well visualized;  appears calcified. Peak transaortic valve gradient equals  49 mm Hg, mean transaortic valve gradient equals 27 mm Hg,  aortic valve velocity time integral equals 62 cm,  consistent with moderate aortic stenosis. Suboptimal  imaging precludes accurate assessment of LVOT diameter.  Unable to accurately calculate aortic valve area by  continuity. No aortic valve regurgitation seen. Peak left  ventricular outflow tract gradient equals 2 mm Hg, mean  gradient is equal to 1 mm Hg, LVOT velocity time integral  equals 12 cm.  Normal aortic root (Ao: 2.6 cm at the sinuses of Valsalva).  Left Atrium: Normal left atrium.  LA volume index = 26  cc/m2.  Left Ventricle: Endocardium not well visualized; grossly  hyperdynamic left ventricular systolic function. Normal  left ventricular internal dimensions and wall thicknesses.  Moderate diastolic dysfunction (Stage II).  Right Heart: Right atrium not well visualized. The right  ventricle is not well visualized. Tricuspid valve not well  visualized. Pulmonic valve not well visualized.  Pericardium/Pleura: Normal pericardium with no pericardial  effusion.  Hemodynamic: Estimated right atrial pressure is 8 mm Hg.  ------------------------------------------------------------------------  Conclusions:  Technically difficult study.  1. Aortic valve not well visualized; appears calcified.  Peak transaortic valve gradient equals 49 mm Hg, mean  transaortic valve gradient equals 27 mm Hg, aortic valve  velocity time integral equals 62 cm, consistent with  moderate aortic stenosis. Suboptimal imaging precludes  accurate assessment of LVOT diameter. Unable to accurately  calculate aortic valve area by continuity. No aortic valve  regurgitation seen.  2. Normal left ventricular internal dimensions and wall  thicknesses.  3. Endocardium not well visualized; grossly hyperdynamic  left ventricular systolic function.    < end of copied text >

## 2017-10-02 NOTE — CONSULT NOTE ADULT - PROBLEM SELECTOR RECOMMENDATION 9
hypercarbic respiratory failure   keep o2 sat >=90% with bipap 20/8 -40%  follow abg keep ph >=7.30  pt DNR

## 2017-10-02 NOTE — PHYSICAL THERAPY INITIAL EVALUATION ADULT - PERTINENT HX OF CURRENT PROBLEM, REHAB EVAL
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 (family denies), moderate AS, HTN, dementia (baseline AAOx2), dysphagia (declined PEG tube), hx of recurrent aspiration pneumonia, ?COPD presents with shortness of breath. Patient lives alone and has 24-hr aide for 7-days a week. On day of admission p/w shortness of breath and aide checked O2-saturation which was 70%.

## 2017-10-02 NOTE — CONSULT NOTE ADULT - ATTENDING COMMENTS
care provided 10/1/17  Patient seen and examined.  Agree with resident note as above.  Patient presents with hypoxia noted at home.  Found to have acute on chronic hypercapneic respiratory failure.  Based on ultrasound, likely has pneumonia with acute on chronic diastolic heart failure with pulmonary edema.  suspect that penumonia is the more acute component to this patient's presentation, given she appears clinically euvolemic to hypovolemic, is hypercalcemic, and has a L basilar infiltrate on ultrasound.  Would treat with BiPAP and IV abx, maintain an approximately even volume status.  Please maintain nasal trumpet to assist with patency of the upper airway and to provide a noxious stimulus to assist respiratory drive.  (Tidal volumes without trumpet were 50-150cc on 14/6 BiPAP). Requires tele and medical mangement of hyperkalemia, trend UOP via camilo faith BMP q6h.  Family states that patient is DNR DNI.
Agree with above  steroids and abx for now  likely hypercarbic failure rather than PNA

## 2017-10-02 NOTE — CHART NOTE - NSCHARTNOTEFT_GEN_A_CORE
Alerted by RN that patient pulled out IV. Patient attempting to bite at and successful with scratching staff. Patient given 0.5 mg PO haldol for agitation. Will continue to monitor.   Jane Rajput PA-C [id number]

## 2017-10-02 NOTE — PHYSICAL THERAPY INITIAL EVALUATION ADULT - SPECIFY REASON(S)
As per daughter Matteo, pt dependent for functional mobility with 24/7 assist from HHA.  Pt not ambulating PTA, requires assist for wheelchair mobility.

## 2017-10-02 NOTE — PROGRESS NOTE ADULT - ASSESSMENT
89 yo F as above:  1. Acute respiratory failure - resp status currently stable on BiPAP, pt DNR/DNI, c/w BiPAP  2. Acute on chronic diast CHF - c/w IV Lasix per Cardio, I/O, daily wt, wean BiPAP as able  3. A fib - c/w ASA, Cardizem per Cardio  4. ITP - c/w steroids, trend CBC  5. Aspiration PNA - ID appreciated, c/w Zosyn + Vanco by lvl  6. NEWTON on CKD - per Renal  7. COPD - steroids and nebs per Pulm  8. Mechanical DVT prophylaxis

## 2017-10-02 NOTE — CONSULT NOTE ADULT - SUBJECTIVE AND OBJECTIVE BOX
Patient is a 89y old  Female who presents with a chief complaint of shortness of breath / hypoxic respiratory failure (01 Oct 2017 22:34)    HPI:  Hx obtained from HCP son & daughter who are present at bedside. Patient unable to provide hx 2/2 to advanced medical illnesses and AMS.    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 (family denies), moderate AS, HTN, dementia (baseline AAOx2), dysphagia (declined PEG tube), hx of recurrent aspiration pneumonia, ?COPD presents with shortness of breath. Patient lives alone and has 24-hr aide for 7-days a week. Patient was found today earlier to have shortness of breath and aide checked O2-saturation which was 70%. Prior to today, patient was fine per son at bedside. The patient has home O2-therapy for unclear reasons and was placed on 4-L NC and O2 saturation improved to 90%. In triage room patient had O2-saturation of 60s on room air. Unable to obtain further collateral information such as if patient was developing chest pain or CARRILLO, or orthopnea or leg swelling, fever/chills. Aide is not present at bedside.     In ED: VS T 99.1, HR 70-80, 130/59, 16, 98% on BIPAP 14/6, 40% (01 Oct 2017 22:34)      PAST MEDICAL & SURGICAL HISTORY:  PNA (pneumonia)  Dysphagia  Thrombocytopenia: ITP  Atrial fibrillation and flutter: No A/C  during hospitalization in april 2014  Respiratory failure: in april 2014 was intubated  Cataract: right eye  Small bowel obstruction: s/p resection in 2010  HTN - Hypertension  S/P cholecystectomy  H/O: Hysterectomy  S/P Small Bowel Resection      Social history:    FAMILY HISTORY:  No pertinent family history in first degree relatives    REVIEW OF SYSTEMS  General:	Denies any malaise fatigue or chills. Fevers absent    SIntolerances    vinegar sensitivity    family states that the patient shakes when she ingests vinegar (Other)      Antimicrobials:    piperacillin/tazobactam IVPB. 3.375 Gram(s) IV Intermittent every 12 hours        Vital Signs Last 24 Hrs  T(C): 36.4 (02 Oct 2017 10:02), Max: 37.3 (01 Oct 2017 16:30)  T(F): 97.6 (02 Oct 2017 10:02), Max: 99.1 (01 Oct 2017 16:30)  HR: 92 (02 Oct 2017 11:35) (76 - 101)  BP: 132/69 (02 Oct 2017 10:02) (107/59 - 142/80)  BP(mean): --  RR: 18 (02 Oct 2017 10:02) (15 - 24)  SpO2: 99% (02 Oct 2017 11:35) (92% - 100%)    PHYSICAL EXAM:Pleasant patient in no acute distress.         cachexia     Eyes:PERRL EOMI.NO discharge or conjunctival injection    ENMT:No sinus tenderness.No thrush.No pharyngeal exudate or erythema.Fair dental hygiene    Neck:Supple,No LN,no JVD      Respiratory:Good air entry bilaterally,CTA    Cardiovascular:S1 S2 wnl, No murmurs,rub or gallops    Gastrointestinal:Soft BS(+) no tenderness no masses ,No rebound or guarding    Genitourinary:No CVA tendereness     Rectal:    Extremities:No cyanosis,clubbing or edema.    Vascular:peripheral pulses felt    Neurological:AAO X 3,No grossly focal deficits    Skin:No rash     Lymph Nodes:No palpable LNs    Musculoskeletal:No joint swelling or LOM    Psychiatric:Affect normal.                                10.0   16.2  )-----------( 16       ( 02 Oct 2017 02:04 )             29.6         10-02    128<L>  |  85<L>  |  37<H>  ----------------------------<  136<H>  5.5<H>   |  28  |  1.46<H>    Ca    10.8<H>      02 Oct 2017 06:15  Phos  4.4     10-02  Mg     2.1     10-02    TPro  7.3  /  Alb  3.9  /  TBili  0.3  /  DBili  x   /  AST  35  /  ALT  27  /  AlkPhos  73  10-01      RECENT CULTURES:      MICROBIOLOGY:          Radiology:      Assessment:        Recommendations and Plan:    Pager [phone]  After 5 pm/weekends or if no response :[phone]

## 2017-10-02 NOTE — CONSULT NOTE ADULT - ASSESSMENT
frail elderly women with likely aspiration pna   very lethargic on bicap. vanco and dose by level and continue zosyn  prognosis poor  DNR   NPO.

## 2017-10-02 NOTE — CONSULT NOTE ADULT - SUBJECTIVE AND OBJECTIVE BOX
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 moderate AS, HTN, dementia , dysphagia ,hx of recurrent aspiration pneumonia,chronic lung disease on home O2  admitted with sob, hypoxia, found to have K 7.3 in ER    PAST MEDICAL & SURGICAL HISTORY:  PNA (pneumonia)  Dysphagia  Thrombocytopenia: ITP  Atrial fibrillation and flutter: No A/C  during hospitalization in 2014  Respiratory failure: in 2014 was intubated  Cataract: right eye  Small bowel obstruction: s/p resection in   HTN - Hypertension  S/P cholecystectomy  H/O: Hysterectomy  S/P Small Bowel Resection      Allergies    eggs (Rash)  no drug allergy (Unknown)    Intolerances    vinegar sensitivity    family states that the patient shakes when she ingests vinegar (Other)      Social History:    FAMILY HISTORY:  No pertinent family history in first degree relatives      PHYSICAL EXAM:  T(F): 97.6 (10-02-17 @ 10:02), Max: 99.1 (10-01-17 @ 16:30)  HR: 98 (10-02-17 @ 10:02)  BP: 132/69 (10-02-17 @ 10:02)  BP(mean): --  RR: 18 (10-02-17 @ 10:02)  SpO2: 100% (10-02-17 @ 10:02)  Wt(kg): --    Constitutional: lethargic on BIPAP  Head: NC AT  Mouth/Throat : unable  Eyes: PERRL, no discharge, no icterus  Neck: No JVD, bruit, adenopathy   Respiratory: scattered rhonchi  Cardiovascular: irregular rate, S1 and S2 3/6 sys m  Abd: : +BS, soft, NT , no rebound or guarding, no bruits  Musculoskeletal: no edema or tenderness  Extremities: no edema or cyanosis, palpable pulses      REVIEW OF SYSTEMS - unable renee to lethargy      MEDICATIONS  (STANDING):  furosemide   Injectable 40 milliGRAM(s) IV Push daily  vancomycin  IVPB 1000 milliGRAM(s) IV Intermittent every 24 hours  piperacillin/tazobactam IVPB. 3.375 Gram(s) IV Intermittent every 12 hours  pantoprazole  Injectable 40 milliGRAM(s) IV Push daily  predniSONE   Tablet 5 milliGRAM(s) Oral daily  diltiazem    Tablet 60 milliGRAM(s) Oral every 8 hours  zinc sulfate 220 milliGRAM(s) Oral daily  sucralfate suspension 1 Gram(s) Oral Before meals and at bedtime      LABS:                CBC Full  -  ( 02 Oct 2017 02:04 )  WBC Count : 16.2 K/uL  Hemoglobin : 10.0 g/dL  Hematocrit : 29.6 %  Platelet Count - Automated : 16 K/uL  Mean Cell Volume : 89.4 fl  Mean Cell Hemoglobin : 30.3 pg  Mean Cell Hemoglobin Concentration : 33.9 gm/dL  Auto Neutrophil # : 15.7 K/uL  Auto Lymphocyte # : 0.3 K/uL  Auto Monocyte # : 0.1 K/uL  Auto Eosinophil # : 0.0 K/uL  Auto Basophil # : 0.0 K/uL  Auto Neutrophil % : 97.0 %  Auto Lymphocyte % : 1.0 %  Auto Monocyte % : 1.0 %  Auto Eosinophil % : x  Auto Basophil % : x    10-02    128<L>  |  85<L>  |  37<H>  ----------------------------<  136<H>  5.5<H>   |  28  |  1.46<H>    Ca    10.8<H>      02 Oct 2017 06:15  Phos  4.4     10-02  Mg     2.1     10-02    TPro  7.3  /  Alb  3.9  /  TBili  0.3  /  DBili  x   /  AST  35  /  ALT  27  /  AlkPhos  73  10-01      Urine Studies:  Urinalysis Basic - ( 01 Oct 2017 20:33 )    Color: x / Appearance: Clear / S.010 / pH: x  Gluc: x / Ketone: Negative  / Bili: Negative / Urobili: Negative   Blood: x / Protein: Negative / Nitrite: Negative   Leuk Esterase: Negative / RBC: 0-2 /HPF / WBC 0-2 /HPF   Sq Epi: x / Non Sq Epi: x / Bacteria: x

## 2017-10-03 DIAGNOSIS — R69 ILLNESS, UNSPECIFIED: ICD-10-CM

## 2017-10-03 LAB
ANION GAP SERPL CALC-SCNC: 12 MMOL/L — SIGNIFICANT CHANGE UP (ref 5–17)
BASE EXCESS BLDV CALC-SCNC: 5.6 MMOL/L — HIGH (ref -2–2)
BUN SERPL-MCNC: 46 MG/DL — HIGH (ref 7–23)
BUN SERPL-MCNC: 49 MG/DL — HIGH (ref 7–23)
CA-I SERPL-SCNC: 1.16 MMOL/L — SIGNIFICANT CHANGE UP (ref 1.12–1.3)
CALCIUM SERPL-MCNC: 9.5 MG/DL — SIGNIFICANT CHANGE UP (ref 8.4–10.5)
CALCIUM SERPL-MCNC: 9.6 MG/DL — SIGNIFICANT CHANGE UP (ref 8.4–10.5)
CALCIUM SERPL-MCNC: 9.8 MG/DL — SIGNIFICANT CHANGE UP (ref 8.4–10.5)
CHLORIDE BLDV-SCNC: 82 MMOL/L — LOW (ref 96–108)
CHLORIDE SERPL-SCNC: 88 MMOL/L — LOW (ref 96–108)
CO2 BLDV-SCNC: 36 MMOL/L — HIGH (ref 22–30)
CO2 SERPL-SCNC: 34 MMOL/L — HIGH (ref 22–31)
CREAT SERPL-MCNC: 1.82 MG/DL — HIGH (ref 0.5–1.3)
CREAT SERPL-MCNC: 1.85 MG/DL — HIGH (ref 0.5–1.3)
CULTURE RESULTS: NO GROWTH — SIGNIFICANT CHANGE UP
GAS PNL BLDV: 127 MMOL/L — LOW (ref 136–145)
GAS PNL BLDV: SIGNIFICANT CHANGE UP
GAS PNL BLDV: SIGNIFICANT CHANGE UP
GLUCOSE BLDV-MCNC: 429 MG/DL — HIGH (ref 70–99)
GLUCOSE SERPL-MCNC: 131 MG/DL — HIGH (ref 70–99)
GLUCOSE SERPL-MCNC: 157 MG/DL — HIGH (ref 70–99)
HCO3 BLDV-SCNC: 34 MMOL/L — HIGH (ref 21–29)
HCT VFR BLD CALC: 30.8 % — LOW (ref 34.5–45)
HCT VFR BLDA CALC: 27 % — LOW (ref 39–50)
HGB BLD CALC-MCNC: 8.6 G/DL — LOW (ref 11.5–15.5)
HGB BLD-MCNC: 9.6 G/DL — LOW (ref 11.5–15.5)
LACTATE BLDV-MCNC: 2.3 MMOL/L — HIGH (ref 0.7–2)
MCHC RBC-ENTMCNC: 27.2 PG — SIGNIFICANT CHANGE UP (ref 27–34)
MCHC RBC-ENTMCNC: 31.1 GM/DL — LOW (ref 32–36)
MCV RBC AUTO: 87.5 FL — SIGNIFICANT CHANGE UP (ref 80–100)
OSMOLALITY UR: 342 MOS/KG — SIGNIFICANT CHANGE UP (ref 50–1200)
OTHER CELLS CSF MANUAL: 9 ML/DL — LOW (ref 18–22)
PCO2 BLDV: 78 MMHG — HIGH (ref 35–50)
PH BLDV: 7.26 — LOW (ref 7.35–7.45)
PLATELET # BLD AUTO: 9 K/UL — CRITICAL LOW (ref 150–400)
PO2 BLDV: 51 MMHG — HIGH (ref 25–45)
POTASSIUM BLDV-SCNC: 3.5 MMOL/L — SIGNIFICANT CHANGE UP (ref 3.5–5)
POTASSIUM SERPL-MCNC: 4.2 MMOL/L — SIGNIFICANT CHANGE UP (ref 3.5–5.3)
POTASSIUM SERPL-SCNC: 4.2 MMOL/L — SIGNIFICANT CHANGE UP (ref 3.5–5.3)
PTH-INTACT FLD-MCNC: 39 PG/ML — SIGNIFICANT CHANGE UP (ref 15–65)
RBC # BLD: 3.52 M/UL — LOW (ref 3.8–5.2)
RBC # FLD: 14.7 % — HIGH (ref 10.3–14.5)
SAO2 % BLDV: 78 % — SIGNIFICANT CHANGE UP (ref 67–88)
SODIUM SERPL-SCNC: 134 MMOL/L — LOW (ref 135–145)
SPECIMEN SOURCE: SIGNIFICANT CHANGE UP
WBC # BLD: 25.1 K/UL — HIGH (ref 3.8–10.5)
WBC # FLD AUTO: 25.1 K/UL — HIGH (ref 3.8–10.5)

## 2017-10-03 PROCEDURE — 99232 SBSQ HOSP IP/OBS MODERATE 35: CPT

## 2017-10-03 RX ADMIN — Medication 1 GRAM(S): at 12:23

## 2017-10-03 RX ADMIN — Medication 20 MILLIGRAM(S): at 06:21

## 2017-10-03 RX ADMIN — Medication 20 MILLIGRAM(S): at 18:00

## 2017-10-03 RX ADMIN — Medication 3 MILLILITER(S): at 07:57

## 2017-10-03 RX ADMIN — Medication 1 GRAM(S): at 16:30

## 2017-10-03 RX ADMIN — Medication 40 MILLIGRAM(S): at 06:26

## 2017-10-03 RX ADMIN — PIPERACILLIN AND TAZOBACTAM 25 GRAM(S): 4; .5 INJECTION, POWDER, LYOPHILIZED, FOR SOLUTION INTRAVENOUS at 22:55

## 2017-10-03 RX ADMIN — PANTOPRAZOLE SODIUM 40 MILLIGRAM(S): 20 TABLET, DELAYED RELEASE ORAL at 15:25

## 2017-10-03 RX ADMIN — PIPERACILLIN AND TAZOBACTAM 25 GRAM(S): 4; .5 INJECTION, POWDER, LYOPHILIZED, FOR SOLUTION INTRAVENOUS at 12:18

## 2017-10-03 RX ADMIN — ZINC SULFATE TAB 220 MG (50 MG ZINC EQUIVALENT) 220 MILLIGRAM(S): 220 (50 ZN) TAB at 12:25

## 2017-10-03 RX ADMIN — Medication 20 MILLIGRAM(S): at 00:51

## 2017-10-03 RX ADMIN — PIPERACILLIN AND TAZOBACTAM 25 GRAM(S): 4; .5 INJECTION, POWDER, LYOPHILIZED, FOR SOLUTION INTRAVENOUS at 00:51

## 2017-10-03 RX ADMIN — Medication 3 MILLILITER(S): at 18:38

## 2017-10-03 RX ADMIN — Medication 3 MILLILITER(S): at 13:26

## 2017-10-03 RX ADMIN — Medication 1 GRAM(S): at 22:55

## 2017-10-03 RX ADMIN — Medication 3 MILLILITER(S): at 00:52

## 2017-10-03 NOTE — PROGRESS NOTE ADULT - PROBLEM SELECTOR PLAN 1
off bipap since yesterday  check vbg today  c/w high lfo off bipap since yesterday  check vbg today-  c/w high flow 50% 40 LMP

## 2017-10-03 NOTE — PROGRESS NOTE ADULT - SUBJECTIVE AND OBJECTIVE BOX
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  Her aide and family checked her O2 sat and found her to be hypoxic to 70%, she had O2 at home from a prior hospital discharge.  It was plaed on her and titrated to 100% O2 sat, 4L.  she continued to require the oxygen, desating to 70% witout the oxygen.    the patients son states last night she was able to lie flat in bed without SOB.  She had not been complaining of chest pain, palpitations, LE edema, no observed PND or orthopnea last night. no fevers or chills, Nausea or vomiting.  The presentation is similar to her prior admission  6/2017.  Currently on high flow oxygen, appears more awake and alert. Not oriented.     Allergies    eggs (Rash)  no drug allergy (Unknown)    Intolerances    vinegar sensitivity    family states that the patient shakes when she ingests vinegar (Other)  	  home Medications:   * Patient Currently Takes Medications as of 03-Jul-2017 13:17 documented in Structured Notes  · 	ferrous sulfate 325 mg oral tablet: 1 tab(s) orally 3 times a day  · 	predniSONE 2.5 mg oral tablet: 3 tab(s) orally once a day  · 	dilTIAZem 60 mg oral tablet: 1 tab(s) orally every 8 hours  · 	sucralfate 1 g/10 mL oral suspension: 10 milliliter(s) orally 3 times a day  · 	pantoprazole 40 mg oral delayed release tablet: 1 tab(s) orally once a day (before a meal)  · 	furosemide 20 mg oral tablet: 1 tab(s) orally once a day  · 	acetaminophen 325 mg oral tablet: 2 tab(s) orally every 6 hours, As needed, Mild Pain (1 - 3)  · 	montelukast 5 mg oral tablet, chewable: 1 tab(s) orally once a day (at bedtime)  · 	dicyclomine 10 mg oral capsule: 1 cap(s) orally 2 times a day (before meals)    PAST MEDICAL & SURGICAL HISTORY:  PNA (pneumonia)  Dysphagia  Thrombocytopenia: ITP  Atrial fibrillation and flutter: No A/C  during hospitalization in april 2014  Respiratory failure: in april 2014 was intubated  Cataract: right eye  Small bowel obstruction: s/p resection in 2010  HTN - Hypertension  S/P cholecystectomy  H/O: Hysterectomy  S/P Small Bowel Resection      FAMILY HISTORY:  No pertinent family history in first degree relatives    SOCIAL HISTORY:    [ ] Non-smoker    REVIEW OF SYSTEMS: obtained from son at bedside due to condition of the patient  CONSTITUTIONAL: No fever, weight loss, or fatigue  EYES: No eye pain, visual disturbances, or discharge  ENMT:  No difficulty hearing, tinnitus, vertigo; No sinus or throat pain  NECK: No pain or stiffness  RESPIRATORY: No cough, wheezing, chills or hemoptysis; + Shortness of Breath  CARDIOVASCULAR: No chest pain, palpitations, passing out, dizziness, or leg swelling  GASTROINTESTINAL: No abdominal or epigastric pain. No nausea, vomiting, or hematemesis; No diarrhea or constipation. No melena or hematochezia.  GENITOURINARY: No dysuria, frequency, hematuria, or incontinence  NEUROLOGICAL: No headaches, memory loss, loss of strength, numbness, or tremors  SKIN: No itching, burning, rashes, or lesions   LYMPH Nodes: No enlarged glands  ENDOCRINE: No heat or cold intolerance; No hair loss  MUSCULOSKELETAL: No joint pain or swelling; No muscle, back, or extremity pain  PSYCHIATRIC: No depression, anxiety, mood swings, or difficulty sleeping  HEME/LYMPH: No easy bruising, or bleeding gums  ALLERY AND IMMUNOLOGIC: No hives or eczema	            VITALS:  Vital Signs Last 24 Hrs  T(C): 36.4 (03 Oct 2017 11:39), Max: 36.8 (02 Oct 2017 18:18)  T(F): 97.6 (03 Oct 2017 11:39), Max: 98.2 (02 Oct 2017 18:18)  HR: 91 (03 Oct 2017 11:39) (69 - 106)  BP: 127/75 (03 Oct 2017 11:39) (104/61 - 127/75)  BP(mean): --  RR: 16 (03 Oct 2017 11:39) (15 - 18)  SpO2: 95% (03 Oct 2017 11:39) (92% - 99%)    PHYSICAL EXAM:  GEN: eld F sitting with high flow oxygen, awake, alert, responsive	  HEENT:   no JVD, no carotid bruit	  Lymphatic: No lymphadenopathy  Cardiovascular: Normal S1 S2, No JVD, 3/6 SM, No LE or sacral edema  Respiratory: CTA b/l  Psychiatry: unable to evaluate  Gastrointestinal:  Soft, Non-tender, + BS	  Skin: No rashes, + bl shin ecchymoses, No cyanosis	  Neurologic: Non-focal  Extremities: warm, + ecchymosis, no  clubbing, cyanosis or edema  Vascular: Peripheral pulses palpable 2+ bilaterally      LABS:	 	                        9.6    25.1  )-----------( 9        ( 03 Oct 2017 09:30 )             30.8   N=x    ; L=x        03 Oct 2017 09:29    134    |  88     |  49     ----------------------------<  157    4.2     |  34     |  1.85     Ca    9.6        03 Oct 2017 09:29  Phos  4.4       02 Oct 2017 01:57  Mg     2.1       02 Oct 2017 01:57    TPro  7.3    /  Alb  3.9    /  TBili  0.3    /  DBili  x      /  AST  35     /  ALT  27     /  AlkPhos  73     01 Oct 2017 16:45      Hepatic panel: 01 Oct 2017 16:45  7.3   | 3.9                            0.3   | 0.3  /x                              35    | 27                                /73   \par                                                            	    ECG:  sinus 1st deg AVB 83, peak T initial EKG  Telemetry: atrial flutter 80-100BPM	    CARDIAC TESTING/STUDIES:    [ ] Echocardiogram:------------------------------------------------------------------------  Conclusions:   Technically difficult study.  1. Aortic valve not well visualized; appears calcified.  Peak transaortic valve gradient equals 49 mm Hg, mean  transaortic valve gradient equals 27 mm Hg, aortic valve  velocity time integral equals 62 cm, consistent with  moderate aortic stenosis. Suboptimal imaging precludes  accurate assessment of LVOT diameter. Unable to accurately  calculate aortic valve area by continuity. No aortic valve  regurgitation seen.  2. Normal left ventricular internal dimensions and wall  thicknesses.  3. Endocardium not well visualized; grossly hyperdynamic  left ventricular systolic function. EF 75-80%  4. The right ventricle is not well visualized.    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  Likely from combination  CHF exacerbation and aspiration PNA.  Tronopin elevation is stable, likely demand from the hypoxemia and volume overload.   Patient is DNR.    -Recommend lasix 40mg PO, for net neg 1L daily  -weaned to high flow oxygen  -measure ins and outs  -daily weights    ASA 81mg  -hold plavix and heparin due to ITP hx and bl ecchymosis  -continue diltiazem 30mg q8hrs  -monitor on tele    Thank you, my team will follow.    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  Her aide and family checked her O2 sat and found her to be hypoxic to 70%, she had O2 at home from a prior hospital discharge.  It was plaed on her and titrated to 100% O2 sat, 4L.  she continued to require the oxygen, desating to 70% witout the oxygen.    the patients son states last night she was able to lie flat in bed without SOB.  She had not been complaining of chest pain, palpitations, LE edema, no observed PND or orthopnea last night. no fevers or chills, Nausea or vomiting.  The presentation is similar to her prior admission  6/2017.  Currently on high flow oxygen, appears more awake and alert. Not oriented.     Allergies    eggs (Rash)  no drug allergy (Unknown)    Intolerances    vinegar sensitivity    family states that the patient shakes when she ingests vinegar (Other)  	  home Medications:   * Patient Currently Takes Medications as of 03-Jul-2017 13:17 documented in Structured Notes  · 	ferrous sulfate 325 mg oral tablet: 1 tab(s) orally 3 times a day  · 	predniSONE 2.5 mg oral tablet: 3 tab(s) orally once a day  · 	dilTIAZem 60 mg oral tablet: 1 tab(s) orally every 8 hours  · 	sucralfate 1 g/10 mL oral suspension: 10 milliliter(s) orally 3 times a day  · 	pantoprazole 40 mg oral delayed release tablet: 1 tab(s) orally once a day (before a meal)  · 	furosemide 20 mg oral tablet: 1 tab(s) orally once a day  · 	acetaminophen 325 mg oral tablet: 2 tab(s) orally every 6 hours, As needed, Mild Pain (1 - 3)  · 	montelukast 5 mg oral tablet, chewable: 1 tab(s) orally once a day (at bedtime)  · 	dicyclomine 10 mg oral capsule: 1 cap(s) orally 2 times a day (before meals)    PAST MEDICAL & SURGICAL HISTORY:  PNA (pneumonia)  Dysphagia  Thrombocytopenia: ITP  Atrial fibrillation and flutter: No A/C  during hospitalization in april 2014  Respiratory failure: in april 2014 was intubated  Cataract: right eye  Small bowel obstruction: s/p resection in 2010  HTN - Hypertension  S/P cholecystectomy  H/O: Hysterectomy  S/P Small Bowel Resection      FAMILY HISTORY:  No pertinent family history in first degree relatives    SOCIAL HISTORY:    [ ] Non-smoker    REVIEW OF SYSTEMS: obtained from son at bedside due to condition of the patient  CONSTITUTIONAL: No fever, weight loss, or fatigue  EYES: No eye pain, visual disturbances, or discharge  ENMT:  No difficulty hearing, tinnitus, vertigo; No sinus or throat pain  NECK: No pain or stiffness  RESPIRATORY: No cough, wheezing, chills or hemoptysis; + Shortness of Breath  CARDIOVASCULAR: No chest pain, palpitations, passing out, dizziness, or leg swelling  GASTROINTESTINAL: No abdominal or epigastric pain. No nausea, vomiting, or hematemesis; No diarrhea or constipation. No melena or hematochezia.  GENITOURINARY: No dysuria, frequency, hematuria, or incontinence  NEUROLOGICAL: No headaches, memory loss, loss of strength, numbness, or tremors  SKIN: No itching, burning, rashes, or lesions   LYMPH Nodes: No enlarged glands  ENDOCRINE: No heat or cold intolerance; No hair loss  MUSCULOSKELETAL: No joint pain or swelling; No muscle, back, or extremity pain  PSYCHIATRIC: No depression, anxiety, mood swings, or difficulty sleeping  HEME/LYMPH: No easy bruising, or bleeding gums  ALLERY AND IMMUNOLOGIC: No hives or eczema	            VITALS:  Vital Signs Last 24 Hrs  T(C): 36.4 (03 Oct 2017 11:39), Max: 36.8 (02 Oct 2017 18:18)  T(F): 97.6 (03 Oct 2017 11:39), Max: 98.2 (02 Oct 2017 18:18)  HR: 91 (03 Oct 2017 11:39) (69 - 106)  BP: 127/75 (03 Oct 2017 11:39) (104/61 - 127/75)  BP(mean): --  RR: 16 (03 Oct 2017 11:39) (15 - 18)  SpO2: 95% (03 Oct 2017 11:39) (92% - 99%)    PHYSICAL EXAM:  GEN: eld F sitting with high flow oxygen, awake, alert, responsive	  HEENT:   no JVD, no carotid bruit	  Lymphatic: No lymphadenopathy  Cardiovascular: Normal S1 S2, No JVD, 3/6 SM, No LE or sacral edema  Respiratory: CTA b/l  Psychiatry: unable to evaluate  Gastrointestinal:  Soft, Non-tender, + BS	  Skin: No rashes, + bl shin ecchymoses, No cyanosis	  Neurologic: Non-focal  Extremities: warm, + ecchymosis, no  clubbing, cyanosis or edema  Vascular: Peripheral pulses palpable 2+ bilaterally      LABS:	 	                        9.6    25.1  )-----------( 9        ( 03 Oct 2017 09:30 )             30.8   N=x    ; L=x        03 Oct 2017 09:29    134    |  88     |  49     ----------------------------<  157    4.2     |  34     |  1.85     Ca    9.6        03 Oct 2017 09:29  Phos  4.4       02 Oct 2017 01:57  Mg     2.1       02 Oct 2017 01:57    TPro  7.3    /  Alb  3.9    /  TBili  0.3    /  DBili  x      /  AST  35     /  ALT  27     /  AlkPhos  73     01 Oct 2017 16:45      Hepatic panel: 01 Oct 2017 16:45  7.3   | 3.9                            0.3   | 0.3  /x                              35    | 27                                /73   \par                                                            	    ECG:  sinus 1st deg AVB 83, peak T initial EKG  Telemetry: atrial flutter 80-100BPM	    CARDIAC TESTING/STUDIES:    [ ] Echocardiogram:------------------------------------------------------------------------  Conclusions:   Technically difficult study.  1. Aortic valve not well visualized; appears calcified.  Peak transaortic valve gradient equals 49 mm Hg, mean  transaortic valve gradient equals 27 mm Hg, aortic valve  velocity time integral equals 62 cm, consistent with  moderate aortic stenosis. Suboptimal imaging precludes  accurate assessment of LVOT diameter. Unable to accurately  calculate aortic valve area by continuity. No aortic valve  regurgitation seen.  2. Normal left ventricular internal dimensions and wall  thicknesses.  3. Endocardium not well visualized; grossly hyperdynamic  left ventricular systolic function. EF 75-80%  4. The right ventricle is not well visualized.    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  Likely from combination  CHF exacerbation and aspiration PNA.  Tronopin elevation is stable, likely demand from the hypoxemia and volume overload.   Patient is DNR.    -Recommend lasix 40mg PO, for net neg 1L daily  -weaned to high flow oxygen  -measure ins and outs  -daily weights    ASA 81mg  -hold plavix and heparin due to ITP hx and bl ecchymosis  -continue diltiazem 30mg q8hrs  -monitor on tele    Recommend VTE prophylaxis    Thank you, my team will follow.    Ade Cruz M.D, F.A.C.C  [facility]   [phone]

## 2017-10-03 NOTE — PROGRESS NOTE ADULT - ASSESSMENT
89y Female admitted with resp failure  hx of hyponatremia on prev admissions and CKD  now with NEWTON  hyperkalemia   K improving  hyponatremia improving  on IV lasix for diastolic CHF- consider change to oral diuretics soon   hypercalcemia  - appears to have developed after CA infusion for hyperkalemia -now normalized   avoid ACE/ARB, NSAIDS/ potassium supplements   check UA and urine lytes  avoid hypotonic fluids/ IV piggybacks   monitor I&O's, electrolytes and renal function

## 2017-10-03 NOTE — PROGRESS NOTE ADULT - SUBJECTIVE AND OBJECTIVE BOX
Patient is a 89y Female  being evaluated for  hyponatremia/ hyperkalemia  lethargic/ minimally responsive on O2 mask                   PHYSICAL EXAM:  Vitals:  T(F): 97.6 (10-03-17 @ 11:39), Max: 98.2 (10-02-17 @ 18:18)  HR: 91 (10-03-17 @ 11:39)  BP: 127/75 (10-03-17 @ 11:39)  BP(mean): --  RR: 16 (10-03-17 @ 11:39)  SpO2: 95% (10-03-17 @ 11:39)  Wt(kg): --  Constitutional: no acute distress  HEENT:  NC, ext ears nl, oropharynx clear,  nose nl  Neck: No JVD, bruit, adenopathy or thyromegaly  Eyes: PERRL, no discharge, no icterus  Respiratory: decreased BS, occ rhonchi, no wheeze   Cardiovascular: regular rate, S1 and S2 no rub or gallop  Abd: : BS+, soft, NT , no rebound or guarding, no bruits  Extremities: no edema or cyanosis, palpable pulses, + contracture   Neurological: A/O x 3, nl coordination and tone    I and O's:    10-02 @ 07:  -  10-03 @ 07:00  --------------------------------------------------------  IN: 50 mL / OUT: 1125 mL / NET: -1075 mL    10-03 @ 07:01  -  10-03 @ 12:14  --------------------------------------------------------  IN: 60 mL / OUT: 0 mL / NET: 60 mL        Height (cm): 154.94 (10-02 @ 18:18)  Weight (kg): 43.6 (10-02 @ 18:18)  BMI (kg/m2): 18.2 (10-02 @ 18:18)    REVIEW OF SYSTEMS:  unable as patient lethargic   Allergies    eggs (Rash)  no drug allergy (Unknown)    Intolerances    vinegar sensitivity    family states that the patient shakes when she ingests vinegar (Other)      MEDICATIONS  (STANDING):  ALBUTerol/ipratropium for Nebulization 3 milliLiter(s) Nebulizer every 6 hours  diltiazem    Tablet 60 milliGRAM(s) Oral every 8 hours  furosemide   Injectable 40 milliGRAM(s) IV Push daily  methylPREDNISolone sodium succinate Injectable 20 milliGRAM(s) IV Push every 8 hours  pantoprazole  Injectable 40 milliGRAM(s) IV Push daily  piperacillin/tazobactam IVPB. 3.375 Gram(s) IV Intermittent every 12 hours  sucralfate suspension 1 Gram(s) Oral Before meals and at bedtime  zinc sulfate 220 milliGRAM(s) Oral daily      LABS:  CBC Full  -  ( 03 Oct 2017 09:30 )  WBC Count : 25.1 K/uL  Hemoglobin : 9.6 g/dL  Hematocrit : 30.8 %  Platelet Count - Automated : 9 K/uL  Mean Cell Volume : 87.5 fl  Mean Cell Hemoglobin : 27.2 pg  Mean Cell Hemoglobin Concentration : 31.1 gm/dL  Auto Neutrophil # : x  Auto Lymphocyte # : x  Auto Monocyte # : x  Auto Eosinophil # : x  Auto Basophil # : x  Auto Neutrophil % : x  Auto Lymphocyte % : x  Auto Monocyte % : x  Auto Eosinophil % : x  Auto Basophil % : x    10-03    134<L>  |  88<L>  |  49<H>  ----------------------------<  157<H>  4.2   |  34<H>  |  1.85<H>    Ca    9.6      03 Oct 2017 09:29  Phos  4.4     10-  Mg     2.1     10-    TPro  7.3  /  Alb  3.9  /  TBili  0.3  /  DBili  x   /  AST  35  /  ALT  27  /  AlkPhos  73  10-      Urine Studies:  Urinalysis Basic - ( 02 Oct 2017 17:20 )    Color: Yellow / Appearance: Slightly Turbid / S.019 / pH: x  Gluc: x / Ketone: Negative  / Bili: Negative / Urobili: Negative mg/dL   Blood: x / Protein: Trace mg/dL / Nitrite: Negative   Leuk Esterase: Large / RBC: 277 /HPF /  /HPF   Sq Epi: x / Non Sq Epi: 1 /HPF / Bacteria: Negative        Urine chemistry:   Urine Na: Sodium, Random Urine: <20 mmol/L (10-02 @ 17:25)    Urine Creatinine:   Urine Protein/Cr ratio:  Urine K:   Urine Osm: Osmolality, Random Urine: 342 mos/kg (10-03 @ 00:19)    24 Hr urine studies:       Imp:  89y Female

## 2017-10-03 NOTE — PROGRESS NOTE ADULT - ASSESSMENT
frail elderly women with likely aspiration pna   very lethargic  continue zosyn to [possible aspiration pna   prognosis poor  DNR   NPO.

## 2017-10-03 NOTE — PROGRESS NOTE ADULT - ASSESSMENT
Admitted for shortness of breath, respiratory  failure possible secondary to aspiration  Hx of  ITP , platelets decreased from 23,000 to 9,000 since admitted.  Started a higher dose of steroids for pullmonary issue.  Should be adequate for treatment of ITP.  Fall in platelets could be related to consumption form an acute infection or a drug reaction (Piperacillin)

## 2017-10-03 NOTE — PROGRESS NOTE ADULT - SUBJECTIVE AND OBJECTIVE BOX
infectious diseases progress note:    Patient is a 89y old  Female who presents with a chief complaint of shortness of breath / hypoxic respiratory failure (01 Oct 2017 22:34)        Acute on chronic respiratory failure with hypercapnia        ROS:  CONSTITUTIONAL:  Negative fever or chills, feels well, good appetite  EYES:  Negative  blurry vision or double vision  CARDIOVASCULAR:  Negative for chest pain or palpitations  RESPIRATORY:  Negative for cough, wheezing, or SOB   GASTROINTESTINAL:  Negative for nausea, vomiting, diarrhea, constipation, or abdominal pain  GENITOURINARY:  Negative frequency, urgency or dysuria  NEUROLOGIC:  No headache, confusion, dizziness, lightheadedness    Allergies    eggs (Rash)  no drug allergy (Unknown)    Intolerances    vinegar sensitivity    family states that the patient shakes when she ingests vinegar (Other)      ANTIBIOTICS/RELEVANT:  antimicrobials  piperacillin/tazobactam IVPB. 3.375 Gram(s) IV Intermittent every 12 hours    immunologic:    OTHER:  ALBUTerol/ipratropium for Nebulization 3 milliLiter(s) Nebulizer every 6 hours  diltiazem    Tablet 60 milliGRAM(s) Oral every 8 hours  furosemide   Injectable 40 milliGRAM(s) IV Push daily  methylPREDNISolone sodium succinate Injectable 20 milliGRAM(s) IV Push every 8 hours  pantoprazole  Injectable 40 milliGRAM(s) IV Push daily  sucralfate suspension 1 Gram(s) Oral Before meals and at bedtime  zinc sulfate 220 milliGRAM(s) Oral daily      Objective:  Vital Signs Last 24 Hrs  T(C): 36.3 (03 Oct 2017 04:35), Max: 36.8 (02 Oct 2017 18:18)  T(F): 97.3 (03 Oct 2017 04:35), Max: 98.2 (02 Oct 2017 18:18)  HR: 69 (03 Oct 2017 06:16) (69 - 106)  BP: 111/68 (03 Oct 2017 04:35) (104/61 - 132/69)  BP(mean): --  RR: 18 (03 Oct 2017 06:16) (15 - 18)  SpO2: 94% (03 Oct 2017 06:16) (92% - 100%)    PHYSICAL EXAM:  cacetic 	  Neck:no JVD, no lymphadenopathy, supple  Respiratory: CTA soledad  Cardiovascular: S1S2 RRR, no murmurs  Gastrointestinal:soft, (+) BS, no HSM  Extremities:no e/e/c        LABS:                        10.0   16.2  )-----------( 16       ( 02 Oct 2017 02:04 )             29.6     10-02    130<L>  |  87<L>  |  46<H>  ----------------------------<  131<H>  5.3   |  32<H>  |  1.82<H>    Ca    9.5      02 Oct 2017 23:08  Phos  4.4     10-02  Mg     2.1     10    TPro  7.3  /  Alb  3.9  /  TBili  0.3  /  DBili  x   /  AST  35  /  ALT  27  /  AlkPhos  73  10-      Urinalysis Basic - ( 02 Oct 2017 17:20 )    Color: Yellow / Appearance: Slightly Turbid / S.019 / pH: x  Gluc: x / Ketone: Negative  / Bili: Negative / Urobili: Negative mg/dL   Blood: x / Protein: Trace mg/dL / Nitrite: Negative   Leuk Esterase: Large / RBC: 277 /HPF /  /HPF   Sq Epi: x / Non Sq Epi: 1 /HPF / Bacteria: Negative          MICROBIOLOGY:    RECENT CULTURES:        RESPIRATORY CULTURES:              RADIOLOGY & ADDITIONAL STUDIES:        Pager [phone]  After 5 pm/weekends or if no response :[phone]

## 2017-10-03 NOTE — PROGRESS NOTE ADULT - SUBJECTIVE AND OBJECTIVE BOX
Follow-up Pulm Progress Note    o2 sat 93% on high flow 50%/40lpm, awake, "communicating", maex4, mild aggitated    Medications:  MEDICATIONS  (STANDING):  ALBUTerol/ipratropium for Nebulization 3 milliLiter(s) Nebulizer every 6 hours  diltiazem    Tablet 60 milliGRAM(s) Oral every 8 hours  furosemide   Injectable 40 milliGRAM(s) IV Push daily  methylPREDNISolone sodium succinate Injectable 20 milliGRAM(s) IV Push every 8 hours  pantoprazole  Injectable 40 milliGRAM(s) IV Push daily  piperacillin/tazobactam IVPB. 3.375 Gram(s) IV Intermittent every 12 hours  sucralfate suspension 1 Gram(s) Oral Before meals and at bedtime  zinc sulfate 220 milliGRAM(s) Oral daily    MEDICATIONS  (PRN):          Vital Signs Last 24 Hrs  T(C): 36.4 (03 Oct 2017 11:39), Max: 36.8 (02 Oct 2017 18:18)  T(F): 97.6 (03 Oct 2017 11:39), Max: 98.2 (02 Oct 2017 18:18)  HR: 91 (03 Oct 2017 11:39) (69 - 106)  BP: 127/75 (03 Oct 2017 11:39) (104/61 - 127/75)  BP(mean): --  RR: 16 (03 Oct 2017 11:39) (15 - 18)  SpO2: 95% (03 Oct 2017 11:39) (92% - 99%)    ABG - ( 02 Oct 2017 16:00 )  pH: 7.37  /  pCO2: 61    /  pO2: 76    / HCO3: 35    / Base Excess: 8.1   /  SaO2: 96                VBG pH 7.22 10-01 @ 21:30    VBG pCO2 94 10-01 @ 21:30    VBG O2 sat 62 10-01 @ 21:30    VBG lactate 1.5 10-01 @ 21:30  VBG pH 7.18 10-01 @ 18:59    VBG pCO2 97 10-01 @ 18:59    VBG O2 sat 42 10-01 @ 18:59    VBG lactate 2.8 10-01 @ 18:59  VBG pH 7.22 10-01 @ 16:45    VBG pCO2 92 10-01 @ 16:45    VBG O2 sat 41 10-01 @ 16:45    VBG lactate 1.6 10-01 @ 16:45      10-02 @ 07:01  -  10-03 @ 07:00  --------------------------------------------------------  IN: 50 mL / OUT: 1125 mL / NET: -1075 mL          LABS:                        9.6    25.1  )-----------( 9        ( 03 Oct 2017 09:30 )             30.8     10-03    134<L>  |  88<L>  |  49<H>  ----------------------------<  157<H>  4.2   |  34<H>  |  1.85<H>    Ca    9.6      03 Oct 2017 09:29  Phos  4.4     10  Mg     2.1     10-02    TPro  7.3  /  Alb  3.9  /  TBili  0.3  /  DBili  x   /  AST  35  /  ALT  27  /  AlkPhos  73  10      CARDIAC MARKERS ( 02 Oct 2017 04:57 )  x     / 0.25 ng/mL / x     / x     / x      CARDIAC MARKERS ( 02 Oct 2017 01:57 )  x     / x     / x     / x     / 9.3 ng/mL  CARDIAC MARKERS ( 01 Oct 2017 23:28 )  x     / 0.29 ng/mL / x     / x     / x      CARDIAC MARKERS ( 01 Oct 2017 21:30 )  x     / 0.31 ng/mL / x     / x     / x      CARDIAC MARKERS ( 01 Oct 2017 16:45 )  x     / 0.29 ng/mL / x     / x     / x          CAPILLARY BLOOD GLUCOSE          Urinalysis Basic - ( 02 Oct 2017 17:20 )    Color: Yellow / Appearance: Slightly Turbid / S.019 / pH: x  Gluc: x / Ketone: Negative  / Bili: Negative / Urobili: Negative mg/dL   Blood: x / Protein: Trace mg/dL / Nitrite: Negative   Leuk Esterase: Large / RBC: 277 /HPF /  /HPF   Sq Epi: x / Non Sq Epi: 1 /HPF / Bacteria: Negative      Procalcitonin, Serum: 0.34 ng/mL (10-02-17 @ 14:10)    Serum Pro-Brain Natriuretic Peptide: 82995 pg/mL (10-01-17 @ 16:45)                CULTURES: (if applicable)  Culture Results:   No growth (10-02 @ 06:42)    Most recent blood culture -- 10-02 @ 06:42   -- -- .Urine Clean Catch (Midstream) 10-02 @ 06:42        Physical Examination:  PULM: decreased bs bilaterally, no significant sputum production  CVS: S1, S2 heard    RADIOLOGY REVIEWED  CXR: 10/2 ?bilat hazy opacities Follow-up Pulm Progress Note    o2 sat 93% on high flow 50%/40lpm, awake, "communicating", maex4, mild aggitated    Medications:  MEDICATIONS  (STANDING):  ALBUTerol/ipratropium for Nebulization 3 milliLiter(s) Nebulizer every 6 hours  diltiazem    Tablet 60 milliGRAM(s) Oral every 8 hours  furosemide   Injectable 40 milliGRAM(s) IV Push daily  methylPREDNISolone sodium succinate Injectable 20 milliGRAM(s) IV Push every 8 hours  pantoprazole  Injectable 40 milliGRAM(s) IV Push daily  piperacillin/tazobactam IVPB. 3.375 Gram(s) IV Intermittent every 12 hours  sucralfate suspension 1 Gram(s) Oral Before meals and at bedtime  zinc sulfate 220 milliGRAM(s) Oral daily    MEDICATIONS  (PRN):    Vital Signs Last 24 Hrs  T(C): 36.4 (03 Oct 2017 11:39), Max: 36.8 (02 Oct 2017 18:18)  T(F): 97.6 (03 Oct 2017 11:39), Max: 98.2 (02 Oct 2017 18:18)  HR: 91 (03 Oct 2017 11:39) (69 - 106)  BP: 127/75 (03 Oct 2017 11:39) (104/61 - 127/75)  BP(mean): --  RR: 16 (03 Oct 2017 11:39) (15 - 18)  SpO2: 95% (03 Oct 2017 11:39) (92% - 99%)    ABG - ( 02 Oct 2017 16:00 )  pH: 7.37  /  pCO2: 61    /  pO2: 76    / HCO3: 35    / Base Excess: 8.1   /  SaO2: 96        VBG pH 7.22 10-01 @ 21:30    VBG pCO2 94 10-01 @ 21:30    VBG O2 sat 62 10-01 @ 21:30    VBG lactate 1.5 10-01 @ 21:30  VBG pH 7.18 10-01 @ 18:59    VBG pCO2 97 10-01 @ 18:59    VBG O2 sat 42 10-01 @ 18:59    VBG lactate 2.8 10-01 @ 18:59  VBG pH 7.22 10-01 @ 16:45    VBG pCO2 92 10-01 @ 16:45    VBG O2 sat 41 10-01 @ 16:45    VBG lactate 1.6 10-01 @ 16:45      10-02 @ 07:01  -  10-03 @ 07:00  --------------------------------------------------------  IN: 50 mL / OUT: 1125 mL / NET: -1075 mL          LABS:                        9.6    25.1  )-----------( 9        ( 03 Oct 2017 09:30 )             30.8     10-03    134<L>  |  88<L>  |  49<H>  ----------------------------<  157<H>  4.2   |  34<H>  |  1.85<H>    Ca    9.6      03 Oct 2017 09:29  Phos  4.4     10  Mg     2.1     10-02    TPro  7.3  /  Alb  3.9  /  TBili  0.3  /  DBili  x   /  AST  35  /  ALT  27  /  AlkPhos  73  10      CARDIAC MARKERS ( 02 Oct 2017 04:57 )  x     / 0.25 ng/mL / x     / x     / x      CARDIAC MARKERS ( 02 Oct 2017 01:57 )  x     / x     / x     / x     / 9.3 ng/mL  CARDIAC MARKERS ( 01 Oct 2017 23:28 )  x     / 0.29 ng/mL / x     / x     / x      CARDIAC MARKERS ( 01 Oct 2017 21:30 )  x     / 0.31 ng/mL / x     / x     / x      CARDIAC MARKERS ( 01 Oct 2017 16:45 )  x     / 0.29 ng/mL / x     / x     / x          CAPILLARY BLOOD GLUCOSE    Urinalysis Basic - ( 02 Oct 2017 17:20 )    Color: Yellow / Appearance: Slightly Turbid / S.019 / pH: x  Gluc: x / Ketone: Negative  / Bili: Negative / Urobili: Negative mg/dL   Blood: x / Protein: Trace mg/dL / Nitrite: Negative   Leuk Esterase: Large / RBC: 277 /HPF /  /HPF   Sq Epi: x / Non Sq Epi: 1 /HPF / Bacteria: Negative      Procalcitonin, Serum: 0.34 ng/mL (10-02-17 @ 14:10)    Serum Pro-Brain Natriuretic Peptide: 94780 pg/mL (10-01-17 @ 16:45)    CULTURES: (if applicable)  Culture Results:   No growth (10-02 @ 06:42)    Most recent blood culture -- 10-02 @ 06:42   -- -- .Urine Clean Catch (Midstream) 10-02 @ 06:42        Physical Examination:  PULM: decreased bs bilaterally, no significant sputum production  CVS: S1, S2 heard    RADIOLOGY REVIEWED  CXR: 10/2 ?bilat hazy opacities

## 2017-10-03 NOTE — PROGRESS NOTE ADULT - PROBLEM SELECTOR PLAN 6
f/u vbg eval for need for bipap qhs  solumedrol 20mg iv x 1 dose tonight  pred 40 mg po daily x 3 days beginning 10/4  then resume pt pred 5 mg for ITP therapy f/u vbg eval for need for bipap qhs  solumedrol 20mg iv x 1 dose tonight  pred 40 mg po daily x 3 days beginning 10/4  then resume pt pred 5 mg for ITP therapy  c/w gita

## 2017-10-03 NOTE — PROGRESS NOTE ADULT - ASSESSMENT
89 yo F as above:  1. Acute respiratory failure - resp status improving, pt DNR/DNI, off BiPAP, c/w HF NC  2. Acute on chronic diast CHF - Lasix per Cardio, I/O  3. A fib - c/w ASA, Cardizem per Cardio  4. ITP - c/w steroids, monitor plt, if no improvement d/c Zosyn  5. Aspiration PNA - ID appreciated, c/w Zosyn for now + Vanco by lvl  6. NEWTON on CKD - per Renal  7. COPD - steroids and nebs per Pulm  8. Mechanical DVT prophylaxis  9. Suspected dysphagia - family refused swallow eval, aware of risks of aspiration, will c/w current soft diet for comfort, palliative options being discussed w/family

## 2017-10-03 NOTE — PROGRESS NOTE ADULT - PROBLEM SELECTOR PLAN 1
Monitor CBC/platelets  Continue  steroids, consider lowering dose  If platelets do not improve over the next 24 hours would d/c Zosyn,  use an alternative non penicillin related antibiotic,

## 2017-10-03 NOTE — SWALLOW BEDSIDE ASSESSMENT ADULT - SWALLOW EVAL: DIAGNOSIS
Pt is well known to this service, has a hx of dysphagia and aspiration PNA. During prior hospitalizations, the pt's daughter/family declined recommendations and interventions by this service therefore we contacted daughter to discuss whether they wished for pt to be assessed prior to evaluation. D/W daughter that given hx of dysphagia and ID's current concern for aspiration PNA, the only way that this service could assess pt would be an objective swallow study (modified barium swallow exam) if/when pt's respiratory status and MS improve. Daughter informed this service that she will discuss this with pt's other family members and will contact us with decision. All of this was reviewed with medicine SHAYAN Mckeon [id number]

## 2017-10-03 NOTE — PROGRESS NOTE ADULT - SUBJECTIVE AND OBJECTIVE BOX
Admitted for respiratory failure, possible aspiration.  Followed for ITP - generally reponds to a low dose of prednisone when  platletls fall. On a maintainance dose ofr 5 mg.  Platelets run in  30,000 to 75,000 range.    Pt is seen and examined  pt is awake and lying in bed  On high flow O2  Seems agitated (on  higher dose of steroids)        MEDICATIONS  (STANDING):  ALBUTerol/ipratropium for Nebulization 3 milliLiter(s) Nebulizer every 6 hours  diltiazem    Tablet 60 milliGRAM(s) Oral every 8 hours  furosemide   Injectable 40 milliGRAM(s) IV Push daily  methylPREDNISolone sodium succinate Injectable 20 milliGRAM(s) IV Push every 8 hours  pantoprazole  Injectable 40 milliGRAM(s) IV Push daily  piperacillin/tazobactam IVPB. 3.375 Gram(s) IV Intermittent every 12 hours  sucralfate suspension 1 Gram(s) Oral Before meals and at bedtime  zinc sulfate 220 milliGRAM(s) Oral daily    MEDICATIONS  (PRN):      Allergies    eggs (Rash)  no drug allergy (Unknown)    Intolerances    vinegar sensitivity    family states that the patient shakes when she ingests vinegar (Other)      Vital Signs Last 24 Hrs  T(C): 36.3 (03 Oct 2017 04:35), Max: 36.8 (02 Oct 2017 18:18)  T(F): 97.3 (03 Oct 2017 04:35), Max: 98.2 (02 Oct 2017 18:18)  HR: 78 (03 Oct 2017 09:00) (69 - 106)  BP: 111/68 (03 Oct 2017 04:35) (104/61 - 124/67)  BP(mean): --  RR: 16 (03 Oct 2017 09:00) (15 - 18)  SpO2: 95% (03 Oct 2017 09:00) (92% - 99%)    PHYSICAL EXAM  General: adult in NAD  HEENT: clear oropharynx, anicteric sclera, pink conjunctiva  Neck: supple  CV: normal S1/S2 with no murmur rubs or gallops  Lungs: positive air movement b/l ant lungs, clear anteriorly to auscultation, no wheezes, no rales  Abdomen: soft non-tender non-distended, no hepatosplenomegaly  Ext: no clubbing cyanosis or edema  Skin: echymosis on legs (chronic)  Neuro: alert and no focal deficits, mildly agitated  LABS:                      9.6    25.1  )-----------( 9        ( 03 Oct 2017 09:30 )             30.8   Mean Cell Volume : 87.5 fl  Mean Cell Hemoglobin : 27.2 pg  Mean Cell Hemoglobin Concentration : 31.1 gm/dL  Auto Neutrophil # : x  Auto Lymphocyte # : x  Auto Monocyte # : x  Auto Eosinophil # : x  Auto Basophil # : x  Auto Neutrophil % : x  Auto Lymphocyte % : x  Auto Monocyte % : x  Auto Eosinophil % : x  Auto Basophil % : x      10-03    134<L>  |  88<L>  |  49<H>  ----------------------------<  157<H>  4.2   |  34<H>  |  1.85<H>    Ca    9.6      03 Oct 2017 09:29  Phos  4.4     10-02  Mg     2.1     10-02    TPro  7.3  /  Alb  3.9  /  TBili  0.3  /  DBili  x   /  AST  35  /  ALT  27  /  AlkPhos  73  10-01      RADIOLOGY & ADDITIONAL STUDIES:

## 2017-10-03 NOTE — PROGRESS NOTE ADULT - SUBJECTIVE AND OBJECTIVE BOX
CHIEF COMPLAINT:Patient is a 89y old  Female who presents with a chief complaint of shortness of breath / hypoxic respiratory failure (01 Oct 2017 22:34)        Allergies:  eggs (Rash)  no drug allergy (Unknown)  vinegar sensitivity    family states that the patient shakes when she ingests vinegar (Other)      PAST MEDICAL & SURGICAL HISTORY:  PNA (pneumonia)  Dysphagia  Thrombocytopenia: ITP  Atrial fibrillation and flutter: No A/C  during hospitalization in april 2014  Respiratory failure: in april 2014 was intubated  Cataract: right eye  Small bowel obstruction: s/p resection in 2010  HTN - Hypertension  S/P cholecystectomy  H/O: Hysterectomy  S/P Small Bowel Resection      FAMILY HISTORY:  No pertinent family history in first degree relatives      REVIEW OF SYSTEMS:  UTO, confused. on Bipap    Medications:  MEDICATIONS  (STANDING):  furosemide   Injectable 40 milliGRAM(s) IV Push daily  piperacillin/tazobactam IVPB. 3.375 Gram(s) IV Intermittent every 12 hours  pantoprazole  Injectable 40 milliGRAM(s) IV Push daily  diltiazem    Tablet 60 milliGRAM(s) Oral every 8 hours  zinc sulfate 220 milliGRAM(s) Oral daily  sucralfate suspension 1 Gram(s) Oral Before meals and at bedtime  methylPREDNISolone sodium succinate Injectable 20 milliGRAM(s) IV Push every 8 hours  ALBUTerol/ipratropium for Nebulization 3 milliLiter(s) Nebulizer every 6 hours    MEDICATIONS  (PRN):    	    PHYSICAL EXAM:  T(C): 36.4 (10-02-17 @ 10:02), Max: 37.3 (10-01-17 @ 16:30)  HR: 106 (10-02-17 @ 13:21) (76 - 106)  BP: 124/67 (10-02-17 @ 13:21) (107/59 - 142/80)  RR: 18 (10-02-17 @ 10:02) (15 - 24)  SpO2: 99% (10-02-17 @ 11:35) (92% - 100%)  Wt(kg): --  I&O's Summary      Appearance: Frail	  HEENT:   NCAT, PERRL, EOMI	  Lymphatic: No lymphadenopathy  Cardiovascular: Normal S1 S2, RRR  Respiratory: bibasilar crackles  Psychiatry: A & O x 3, Mood & affect appropriate  Gastrointestinal:  Soft, Non-tender, + BS  Skin: No rashes, No ecchymoses, No cyanosis	  Neurologic: Non-focal  Extremities: Normal range of motion, No clubbing, cyanosis or edema    	  LABS:	 	    CARDIAC MARKERS:  CARDIAC MARKERS ( 02 Oct 2017 04:57 )  x     / 0.25 ng/mL / x     / x     / x      CARDIAC MARKERS ( 02 Oct 2017 01:57 )  x     / x     / x     / x     / 9.3 ng/mL  CARDIAC MARKERS ( 01 Oct 2017 23:28 )  x     / 0.29 ng/mL / x     / x     / x      CARDIAC MARKERS ( 01 Oct 2017 21:30 )  x     / 0.31 ng/mL / x     / x     / x      CARDIAC MARKERS ( 01 Oct 2017 16:45 )  x     / 0.29 ng/mL / x     / x     / x                                    10.0   16.2  )-----------( 16       ( 02 Oct 2017 02:04 )             29.6     10-02    128<L>  |  85<L>  |  37<H>  ----------------------------<  136<H>  5.5<H>   |  28  |  1.46<H>    Ca    10.8<H>      02 Oct 2017 06:15  Phos  4.4     10-02  Mg     2.1     10-02    TPro  7.3  /  Alb  3.9  /  TBili  0.3  /  DBili  x   /  AST  35  /  ALT  27  /  AlkPhos  73  10-01    proBNP: Serum Pro-Brain Natriuretic Peptide: 47779 pg/mL (10-01 @ 16:45)    Lipid Profile:   HgA1c:   TSH:

## 2017-10-04 LAB
ANION GAP SERPL CALC-SCNC: 14 MMOL/L — SIGNIFICANT CHANGE UP (ref 5–17)
BASE EXCESS BLDV CALC-SCNC: 11 MMOL/L — HIGH (ref -2–2)
BASE EXCESS BLDV CALC-SCNC: 14 MMOL/L — HIGH (ref -2–2)
BUN SERPL-MCNC: 50 MG/DL — HIGH (ref 7–23)
CA-I SERPL-SCNC: 1.12 MMOL/L — SIGNIFICANT CHANGE UP (ref 1.12–1.3)
CALCIUM SERPL-MCNC: 8.9 MG/DL — SIGNIFICANT CHANGE UP (ref 8.4–10.5)
CHLORIDE BLDV-SCNC: 88 MMOL/L — LOW (ref 96–108)
CHLORIDE SERPL-SCNC: 87 MMOL/L — LOW (ref 96–108)
CO2 BLDV-SCNC: 40 MMOL/L — HIGH (ref 22–30)
CO2 BLDV-SCNC: 44 MMOL/L — HIGH (ref 22–30)
CO2 SERPL-SCNC: 34 MMOL/L — HIGH (ref 22–31)
CREAT SERPL-MCNC: 1.68 MG/DL — HIGH (ref 0.5–1.3)
GAS PNL BLDV: 132 MMOL/L — LOW (ref 136–145)
GAS PNL BLDV: SIGNIFICANT CHANGE UP
GAS PNL BLDV: SIGNIFICANT CHANGE UP
GLUCOSE BLDV-MCNC: 147 MG/DL — HIGH (ref 70–99)
GLUCOSE SERPL-MCNC: 155 MG/DL — HIGH (ref 70–99)
HCO3 BLDV-SCNC: 38 MMOL/L — HIGH (ref 21–29)
HCO3 BLDV-SCNC: 42 MMOL/L — HIGH (ref 21–29)
HCT VFR BLD CALC: 28.2 % — LOW (ref 34.5–45)
HCT VFR BLDA CALC: 28 % — LOW (ref 39–50)
HGB BLD CALC-MCNC: 9.1 G/DL — LOW (ref 11.5–15.5)
HGB BLD-MCNC: 9 G/DL — LOW (ref 11.5–15.5)
LACTATE BLDV-MCNC: 1.2 MMOL/L — SIGNIFICANT CHANGE UP (ref 0.7–2)
MCHC RBC-ENTMCNC: 26.6 PG — LOW (ref 27–34)
MCHC RBC-ENTMCNC: 31.9 GM/DL — LOW (ref 32–36)
MCV RBC AUTO: 83.4 FL — SIGNIFICANT CHANGE UP (ref 80–100)
OTHER CELLS CSF MANUAL: 11 ML/DL — LOW (ref 18–22)
PCO2 BLDV: 68 MMHG — HIGH (ref 35–50)
PCO2 BLDV: 78 MMHG — HIGH (ref 35–50)
PH BLDV: 7.34 — LOW (ref 7.35–7.45)
PH BLDV: 7.36 — SIGNIFICANT CHANGE UP (ref 7.35–7.45)
PLATELET # BLD AUTO: 1 K/UL — CRITICAL LOW (ref 150–400)
PO2 BLDV: 52 MMHG — HIGH (ref 25–45)
PO2 BLDV: 55 MMHG — HIGH (ref 25–45)
POTASSIUM BLDV-SCNC: 3.2 MMOL/L — LOW (ref 3.5–5)
POTASSIUM SERPL-MCNC: 3.4 MMOL/L — LOW (ref 3.5–5.3)
POTASSIUM SERPL-SCNC: 3.4 MMOL/L — LOW (ref 3.5–5.3)
RBC # BLD: 3.38 M/UL — LOW (ref 3.8–5.2)
RBC # FLD: 15.5 % — HIGH (ref 10.3–14.5)
SAO2 % BLDV: 79 % — SIGNIFICANT CHANGE UP (ref 67–88)
SAO2 % BLDV: 86 % — SIGNIFICANT CHANGE UP (ref 67–88)
SODIUM SERPL-SCNC: 135 MMOL/L — SIGNIFICANT CHANGE UP (ref 135–145)
WBC # BLD: 18.63 K/UL — HIGH (ref 3.8–10.5)
WBC # FLD AUTO: 18.63 K/UL — HIGH (ref 3.8–10.5)

## 2017-10-04 PROCEDURE — 99232 SBSQ HOSP IP/OBS MODERATE 35: CPT

## 2017-10-04 PROCEDURE — 93306 TTE W/DOPPLER COMPLETE: CPT | Mod: 26

## 2017-10-04 RX ORDER — ERTAPENEM SODIUM 1 G/1
500 INJECTION, POWDER, LYOPHILIZED, FOR SOLUTION INTRAMUSCULAR; INTRAVENOUS EVERY 24 HOURS
Qty: 0 | Refills: 0 | Status: DISCONTINUED | OUTPATIENT
Start: 2017-10-05 | End: 2017-10-08

## 2017-10-04 RX ORDER — ACETAMINOPHEN 500 MG
650 TABLET ORAL ONCE
Qty: 0 | Refills: 0 | Status: DISCONTINUED | OUTPATIENT
Start: 2017-10-04 | End: 2017-10-13

## 2017-10-04 RX ORDER — HALOPERIDOL DECANOATE 100 MG/ML
0.5 INJECTION INTRAMUSCULAR ONCE
Qty: 0 | Refills: 0 | Status: DISCONTINUED | OUTPATIENT
Start: 2017-10-04 | End: 2017-10-13

## 2017-10-04 RX ADMIN — PIPERACILLIN AND TAZOBACTAM 25 GRAM(S): 4; .5 INJECTION, POWDER, LYOPHILIZED, FOR SOLUTION INTRAVENOUS at 13:58

## 2017-10-04 RX ADMIN — Medication 3 MILLILITER(S): at 06:12

## 2017-10-04 RX ADMIN — ZINC SULFATE TAB 220 MG (50 MG ZINC EQUIVALENT) 220 MILLIGRAM(S): 220 (50 ZN) TAB at 14:01

## 2017-10-04 RX ADMIN — Medication 40 MILLIGRAM(S): at 06:12

## 2017-10-04 RX ADMIN — PANTOPRAZOLE SODIUM 40 MILLIGRAM(S): 20 TABLET, DELAYED RELEASE ORAL at 13:24

## 2017-10-04 RX ADMIN — Medication 3 MILLILITER(S): at 13:26

## 2017-10-04 RX ADMIN — Medication 1 GRAM(S): at 21:14

## 2017-10-04 RX ADMIN — Medication 1 GRAM(S): at 06:12

## 2017-10-04 RX ADMIN — Medication 3 MILLILITER(S): at 18:14

## 2017-10-04 RX ADMIN — Medication 1 GRAM(S): at 14:00

## 2017-10-04 RX ADMIN — Medication 1 GRAM(S): at 18:14

## 2017-10-04 RX ADMIN — Medication 3 MILLILITER(S): at 00:10

## 2017-10-04 NOTE — DIETITIAN INITIAL EVALUATION ADULT. - PROBLEM SELECTOR PLAN 7
-c.w home prednisone.  -will transfuse platelet if family agrees.  -transfuse for platelet if <20K and febrile or < 10K if not febrile .   -rec heme eval in AM; do not believe patient is in DIC. Will check fibrinogen

## 2017-10-04 NOTE — PROGRESS NOTE ADULT - SUBJECTIVE AND OBJECTIVE BOX
Pt is seen and examined  pt is awake and lying in bed/  more alert, no agitated  pt seems comfortable and denies any complaints at this time        MEDICATIONS  (STANDING):  ALBUTerol/ipratropium for Nebulization 3 milliLiter(s) Nebulizer every 6 hours  diltiazem    Tablet 60 milliGRAM(s) Oral every 8 hours  furosemide   Injectable 40 milliGRAM(s) IV Push daily  pantoprazole  Injectable 40 milliGRAM(s) IV Push daily  piperacillin/tazobactam IVPB. 3.375 Gram(s) IV Intermittent every 12 hours  predniSONE   Tablet 40 milliGRAM(s) Oral daily  sucralfate suspension 1 Gram(s) Oral Before meals and at bedtime  zinc sulfate 220 milliGRAM(s) Oral daily    MEDICATIONS  (PRN):  acetaminophen   Tablet. 650 milliGRAM(s) Oral once PRN Moderate Pain (4 - 6)      Allergies    eggs (Rash)  no drug allergy (Unknown)    Intolerances    vinegar sensitivity    family states that the patient shakes when she ingests vinegar (Other)      Vital Signs Last 24 Hrs  T(C): 36.6 (04 Oct 2017 04:42), Max: 36.7 (03 Oct 2017 15:23)  T(F): 97.8 (04 Oct 2017 04:42), Max: 98 (03 Oct 2017 15:23)  HR: 72 (04 Oct 2017 08:20) (72 - 91)  BP: 123/77 (04 Oct 2017 04:42) (114/70 - 127/75)  BP(mean): --  RR: 16 (04 Oct 2017 08:20) (16 - 18)  SpO2: 95% (04 Oct 2017 08:20) (95% - 100%)    PHYSICAL EXAM  General: elderly adult in NAD  HEENT: clear oropharynx, anicteric sclera, pink conjunctiva  Neck: supple  CV: normal S1/S2 with no murmur rubs or gallops  Lungs: positive air movement b/l ant lungs,clear to auscultation, no wheezes, no rales  Abdomen: soft non-tender non-distended, no hepatosplenomegaly  Ext: no clubbing cyanosis or edema  Skin: extensive ecchymosis on legs,  a hemorraghic  bullous on  right leg  Neuro: alert and responds appropriately, no focal deficits  LABS:                          9.6    25.1  )-----------( 9        ( 03 Oct 2017 09:30 )             30.8         Mean Cell Volume : 87.5 fl  Mean Cell Hemoglobin : 27.2 pg  Mean Cell Hemoglobin Concentration : 31.1 gm/dL  Auto Neutrophil # : x  Auto Lymphocyte # : x  Auto Monocyte # : x  Auto Eosinophil # : x  Auto Basophil # : x  Auto Neutrophil % : x  Auto Lymphocyte % : x  Auto Monocyte % : x  Auto Eosinophil % : x  Auto Basophil % : x      10-04    135  |  87<L>  |  50<H>  ----------------------------<  155<H>  3.4<L>   |  34<H>  |  1.68<H>    Ca    8.9      04 Oct 2017 07:05                                    BLOOD SMEAR INTERPRETATION:       RADIOLOGY & ADDITIONAL STUDIES:

## 2017-10-04 NOTE — DIETITIAN INITIAL EVALUATION ADULT. - PROBLEM SELECTOR PLAN 6
-Patient w/ NEWTON likely 2.2 to heart failure; will recheck BMP and electrolytes  -renally dose antibiotics  -monitor GFR  -Avoid nephrotoxins.

## 2017-10-04 NOTE — DIETITIAN INITIAL EVALUATION ADULT. - NUTRITION INTERVENTION
Collaboration and Referral of Nutrition Care/Medical Food Supplements/Feeding Assistance/Meals and Snack

## 2017-10-04 NOTE — DIETITIAN INITIAL EVALUATION ADULT. - SOURCE
other (specify)/NP, RN, Medical record, Daughter at bedside, Previous RD note from Jan 2016/patient/family/significant other

## 2017-10-04 NOTE — PROGRESS NOTE ADULT - PROBLEM SELECTOR PLAN 1
keep o2 sat >=88% with 5lnc  titrate down fio2 to keep o2 sat>=88%  hold high flow  continue with bipap 16/5 40% qhs  check VBG later today

## 2017-10-04 NOTE — PROGRESS NOTE ADULT - SUBJECTIVE AND OBJECTIVE BOX
Cardiology Attending Progress Note    CHIEF COMPLAINT/REASON FOR CONSULT: SOB    HISTORY OF PRESENT ILLNESS:    BILL PARIKH is a 89y Female with AF/AFL (not on OACC), ITP (on prednisone), moderate AS, HFpEF, HTN, Dementia, hx of recurrent aspiration PNA and possible COPD now admitted on 10/01/2017 with SOB and hypoxia thought to be multifactorial in nature (Aspiration PNA on ABX, COPD, HFpEF).    INTERVAL EVENTS:   Overnight patient somewhat improved, still on high flow Nasal Cannula.  No CP. No Palpitations. No Ha.     Allergies    eggs (Rash)  no drug allergy (Unknown)    Intolerances    vinegar sensitivity    family states that the patient shakes when she ingests vinegar (Other)  	    MEDICATIONS:  diltiazem    Tablet 60 milliGRAM(s) Oral every 8 hours  furosemide   Injectable 40 milliGRAM(s) IV Push daily    piperacillin/tazobactam IVPB. 3.375 Gram(s) IV Intermittent every 12 hours    ALBUTerol/ipratropium for Nebulization 3 milliLiter(s) Nebulizer every 6 hours    acetaminophen   Tablet. 650 milliGRAM(s) Oral once PRN    pantoprazole  Injectable 40 milliGRAM(s) IV Push daily  sucralfate suspension 1 Gram(s) Oral Before meals and at bedtime    predniSONE   Tablet 40 milliGRAM(s) Oral daily    zinc sulfate 220 milliGRAM(s) Oral daily      PAST MEDICAL & SURGICAL HISTORY:  PNA (pneumonia)  Dysphagia  Thrombocytopenia: ITP  Atrial fibrillation and flutter: No A/C  during hospitalization in april 2014  Respiratory failure: in april 2014 was intubated  Cataract: right eye  Small bowel obstruction: s/p resection in 2010  HTN - Hypertension  S/P cholecystectomy  H/O: Hysterectomy  S/P Small Bowel Resection      FAMILY HISTORY:  No pertinent family history in first degree relatives      SOCIAL HISTORY:    Never smoked, no ETOH, no IVDU    REVIEW OF SYSTEMS:    CONSTITUTIONAL: +weakness, fevers or chills  EYES/ENT: No visual changes;  No vertigo or throat pain   NECK: No pain or stiffness  RESPIRATORY: No cough, wheezing, hemoptysis; +shortness of breath  CARDIOVASCULAR: No chest pain or palpitations  GASTROINTESTINAL: No abdominal or epigastric pain. No nausea, vomiting, or hematemesis; No diarrhea or constipation. No melena or hematochezia.  GENITOURINARY: No dysuria, frequency or hematuria  NEUROLOGICAL: No numbness or weakness  SKIN: No itching, burning, rashes, or lesions   All other review of systems is negative unless indicated above.    PHYSICAL EXAM:  T(C): 36.6 (10-04-17 @ 04:42), Max: 36.7 (10-03-17 @ 15:23)  HR: 72 (10-04-17 @ 08:20) (72 - 88)  BP: 123/77 (10-04-17 @ 04:42) (114/70 - 123/77)  RR: 16 (10-04-17 @ 08:20) (16 - 18)  SpO2: 95% (10-04-17 @ 08:20) (95% - 100%)  Wt(kg): --  I&O's Summary    03 Oct 2017 07:01  -  04 Oct 2017 07:00  --------------------------------------------------------  IN: 400 mL / OUT: 1600 mL / NET: -1200 mL    04 Oct 2017 07:01  -  04 Oct 2017 13:02  --------------------------------------------------------  IN: 0 mL / OUT: 400 mL / NET: -400 mL        Appearance: Normal	  HEENT:   Normal oral mucosa, PERRL, EOMI	  Lymphatic: No lymphadenopathy  Cardiovascular: Irregular, S1 S2, No JVD, 2/6 ANGELES  Respiratory: Lungs clear to auscultation	  Psychiatry: A & O x 3, Mood & affect appropriate  Gastrointestinal:  Soft, Non-tender, + BS	  Skin: No rashes, No ecchymoses, No cyanosis	  Neurologic: Non-focal  Extremities: Normal range of motion, No clubbing, cyanosis or edema  Vascular: Peripheral pulses palpable 2+ bilaterally    LABS:	 	    CBC Full  -  ( 04 Oct 2017 07:05 )  WBC Count : 18.63 K/uL  Hemoglobin : 9.0 g/dL  Hematocrit : 28.2 %  Platelet Count - Automated : 1 K/uL  Mean Cell Volume : 83.4 fl  Mean Cell Hemoglobin : 26.6 pg  Mean Cell Hemoglobin Concentration : 31.9 gm/dL  Auto Neutrophil # : x  Auto Lymphocyte # : x  Auto Monocyte # : x  Auto Eosinophil # : x  Auto Basophil # : x  Auto Neutrophil % : x  Auto Lymphocyte % : x  Auto Monocyte % : x  Auto Eosinophil % : x  Auto Basophil % : x    10-04    135  |  87<L>  |  50<H>  ----------------------------<  155<H>  3.4<L>   |  34<H>  |  1.68<H>  10-03    134<L>  |  88<L>  |  49<H>  ----------------------------<  157<H>  4.2   |  34<H>  |  1.85<H>    Ca    8.9      04 Oct 2017 07:05  Ca    9.6      03 Oct 2017 09:29          ECG:    TELEMETRY:   	    	  CXR:    TTE:     A/P:    1.      Terrell Quinonez MD  Cardiology Attending  Cell: [phone] Cardiology Attending Progress Note    CHIEF COMPLAINT/REASON FOR CONSULT: SOB    HISTORY OF PRESENT ILLNESS:    BILL PARIKH is a 89y Female with AF/AFL (not on OACC), ITP (on prednisone), Severe AS, HFpEF, HTN, Dementia, hx of recurrent aspiration PNA and possible COPD now admitted on 10/01/2017 with SOB and hypoxia thought to be multifactorial in nature (Aspiration PNA on ABX, COPD, HFpEF).    INTERVAL EVENTS:   Overnight patient somewhat improved, still on high flow Nasal Cannula.  No CP. No Palpitations. No Ha.     Allergies    eggs (Rash)  no drug allergy (Unknown)    Intolerances    vinegar sensitivity    family states that the patient shakes when she ingests vinegar (Other)  	    MEDICATIONS:  diltiazem    Tablet 60 milliGRAM(s) Oral every 8 hours  furosemide   Injectable 40 milliGRAM(s) IV Push daily    piperacillin/tazobactam IVPB. 3.375 Gram(s) IV Intermittent every 12 hours    ALBUTerol/ipratropium for Nebulization 3 milliLiter(s) Nebulizer every 6 hours    acetaminophen   Tablet. 650 milliGRAM(s) Oral once PRN    pantoprazole  Injectable 40 milliGRAM(s) IV Push daily  sucralfate suspension 1 Gram(s) Oral Before meals and at bedtime    predniSONE   Tablet 40 milliGRAM(s) Oral daily    zinc sulfate 220 milliGRAM(s) Oral daily      PAST MEDICAL & SURGICAL HISTORY:  PNA (pneumonia)  Dysphagia  Thrombocytopenia: ITP  Atrial fibrillation and flutter: No A/C  during hospitalization in april 2014  Respiratory failure: in april 2014 was intubated  Cataract: right eye  Small bowel obstruction: s/p resection in 2010  HTN - Hypertension  S/P cholecystectomy  H/O: Hysterectomy  S/P Small Bowel Resection      FAMILY HISTORY:  No pertinent family history in first degree relatives      SOCIAL HISTORY:    Never smoked, no ETOH, no IVDU    REVIEW OF SYSTEMS:    CONSTITUTIONAL: +weakness, fevers or chills  EYES/ENT: No visual changes;  No vertigo or throat pain   NECK: No pain or stiffness  RESPIRATORY: No cough, wheezing, hemoptysis; +shortness of breath  CARDIOVASCULAR: No chest pain or palpitations  GASTROINTESTINAL: No abdominal or epigastric pain. No nausea, vomiting, or hematemesis; No diarrhea or constipation. No melena or hematochezia.  GENITOURINARY: No dysuria, frequency or hematuria  NEUROLOGICAL: No numbness or weakness  SKIN: No itching, burning, rashes, or lesions   All other review of systems is negative unless indicated above.    PHYSICAL EXAM:  T(C): 36.6 (10-04-17 @ 04:42), Max: 36.7 (10-03-17 @ 15:23)  HR: 72 (10-04-17 @ 08:20) (72 - 88)  BP: 123/77 (10-04-17 @ 04:42) (114/70 - 123/77)  RR: 16 (10-04-17 @ 08:20) (16 - 18)  SpO2: 95% (10-04-17 @ 08:20) (95% - 100%)  Wt(kg): --  I&O's Summary    03 Oct 2017 07:01  -  04 Oct 2017 07:00  --------------------------------------------------------  IN: 400 mL / OUT: 1600 mL / NET: -1200 mL    04 Oct 2017 07:01  -  04 Oct 2017 13:02  --------------------------------------------------------  IN: 0 mL / OUT: 400 mL / NET: -400 mL        Appearance: Normal	  HEENT:   Normal oral mucosa, PERRL, EOMI	  Lymphatic: No lymphadenopathy  Cardiovascular: Irregular, S1 S2, No JVD, 2/6 ANGELES  Respiratory: Lungs clear to auscultation	  Psychiatry: A & O x 3, Mood & affect appropriate  Gastrointestinal:  Soft, Non-tender, + BS	  Skin: No rashes, No ecchymoses, No cyanosis	  Neurologic: Non-focal  Extremities: Normal range of motion, No clubbing, cyanosis or edema  Vascular: Peripheral pulses palpable 2+ bilaterally    LABS:	 	    CBC Full  -  ( 04 Oct 2017 07:05 )  WBC Count : 18.63 K/uL  Hemoglobin : 9.0 g/dL  Hematocrit : 28.2 %  Platelet Count - Automated : 1 K/uL  Mean Cell Volume : 83.4 fl  Mean Cell Hemoglobin : 26.6 pg  Mean Cell Hemoglobin Concentration : 31.9 gm/dL  Auto Neutrophil # : x  Auto Lymphocyte # : x  Auto Monocyte # : x  Auto Eosinophil # : x  Auto Basophil # : x  Auto Neutrophil % : x  Auto Lymphocyte % : x  Auto Monocyte % : x  Auto Eosinophil % : x  Auto Basophil % : x    10-04    135  |  87<L>  |  50<H>  ----------------------------<  155<H>  3.4<L>   |  34<H>  |  1.68<H>  10-03    134<L>  |  88<L>  |  49<H>  ----------------------------<  157<H>  4.2   |  34<H>  |  1.85<H>    Ca    8.9      04 Oct 2017 07:05  Ca    9.6      03 Oct 2017 09:29      trop 0. 31-> 0.29 -> 0.25      ECG: 10/01/2017: NSR@83, nl axis, 1st degree AV block    TELEMETRY: Intermittent AFL, NSR  	    	  CXR: 10/01/2017:  IMPRESSION:     Limited evaluation secondary to rotation.    There are bilateral hazy interstitial opacities predominantly in the   right middle to lower lung lobe and the visualized left lung field which   may be consistent with the prior ground glass opacities seen in the prior   CT chest from June 26, 2017. Changes compatible with congestive heart   failure.    Trace bilateral pleural effusions.      TTE: 10/04/ 2017:  EF (Teicholtz): 67 %  Doppler Peak Velocity (m/sec): AoV=4.1  ------------------------------------------------------------------------  Observations:  Mitral Valve: Mitral annular calcification and calcified  mitral leaflets. Mild mitral regurgitation.  Mean  transmitral valve gradient equals 4 mm Hg, consistent with  mild mitral stenosis.  Aortic Valve/Aorta: Calcified trileaflet aortic valve with  decreased opening. Peak transaortic valve gradient equals  67 mm Hg, mean transaortic valve gradient equals 42 mm Hg,  estimated aortic valve area equals 0.9 sqcm (by continuity  equation), aortic valve velocity time integral equals 96  cm, consistent with severe aortic stenosis. Peak left  ventricular outflow tract gradient equals 5 mm Hg, mean  gradient is equal to 2 mm Hg, LVOT velocity time integral  equals 27 cm.  Aortic Root: 2.9 cm.  LVOT diameter: 2 cm.  Left Atrium: Normal left atrium.  LA volume index = 29  cc/m2.  Left Ventricle: Hyperdynamic left ventricle. Mid septal  hypertrophy. Moderate diastolic dysfunction (Stage II).  Right Heart: Normal right atrium. Normal right ventricular  size and function. Normal tricuspid valve. Mild tricuspid  regurgitation. Normal pulmonic valve. Minimal pulmonic  regurgitation.  Pericardium/Pleura: Normal pericardium with no pericardial  effusion.  Left pleural effusion.  Hemodynamic: Estimated right atrial pressure is 8 mm Hg.  Estimated right ventricular systolic pressure equals 42 mm  Hg, assuming right atrial pressure equals 8 mm Hg,  consistent with mild pulmonary hypertension.  ------------------------------------------------------------------------  Conclusions:  1. Mitral annular calcification and calcified mitral  leaflets.  2. Calcified trileaflet aortic valve with decreased  opening. Peak transaortic valve gradient equals 67 mm Hg,  mean transaortic valve gradient equals 42 mm Hg, estimated  aortic valve area equals 0.9 sqcm (by continuity equation),  aortic valve velocity time integral equals 96 cm,  consistent with severe aortic stenosis.  3. Mid septal hypertrophy.  4. Hyperdynamic left ventricle.  5. Moderate diastolic dysfunction (Stage II).  6. Left pleural effusion.  *** Compared with echocardiogram of 6/26/2017, trans aortic  gradients have increased.      A/P:  89y Female with AF/AFL (not on OAC), ITP (on prednisone), Severe AS, HFpEF, HTN, Dementia, hx of recurrent aspiration PNA and possible COPD now admitted on 10/01/2017 with SOB and hypoxia thought to be multifactorial in nature (Aspiration PNA on ABX, COPD, HFpEF).    1. HFpEF - TTE done today with normal LV/RV function, moderate diastolic dysfunction, progression in aortic stenosis to severe (PG 67 mmHg, MG 42 mmHg, SKYLA 0.9 cm2), mild MS, mild TR, mild pulmonary hypertension (RVSP 42 mmHg).  -Overall I suspect her SOB/Hypoxia is multifactorial in nature from COPD and HFpEF with severe aortic stenosis.  -Initially with elevated cardiac enzymes, now downtrended (trop 0. 31-> 0.29 -> 0.25). Likely demand ischemia in the setting of acute on chronic CHF and CKD. Anticoagulation held in the setting of ITP.  -At this point she appears relatively euvolemic. Net negative 1.2 L overnight, no JVD. Further, TTE with hyperdamic LV suggestive of underfilling  -Would switch from Lasix 40 mg IV QD to Lasix 60 QD. Aim for net even at this point.  -Caution with beta blockade given COPD, severe AS.  -Patient is DNR  -COPD management as per pulmonary.  -Cont Zosyn as per ID for possible aspiration PNA.  -Anticipate difficulty weaning her from high flow nasal canula given multiple comorbidities.  -Not a candidate for BAV/TAVR at this time given profound thrombocytopenia     2. AFIB/AFL  -Off OAC due to ITP  -Cont Diltiazem 60 mg po Q8 for rate control    -Appreciate ID/Pulmonary/Nephrology input  -Patient is DNR    -Will follow along with you    Terrell Quinonez MD  Cardiology Attending  Cell: [phone]

## 2017-10-04 NOTE — PROGRESS NOTE ADULT - SUBJECTIVE AND OBJECTIVE BOX
Follow-up Pulm Progress Note    wore bipap over night, 02 sat86% on room air, awake  khigh flow reapplied 02 sat 91%    Medications:  MEDICATIONS  (STANDING):  ALBUTerol/ipratropium for Nebulization 3 milliLiter(s) Nebulizer every 6 hours  diltiazem    Tablet 60 milliGRAM(s) Oral every 8 hours  furosemide   Injectable 40 milliGRAM(s) IV Push daily  pantoprazole  Injectable 40 milliGRAM(s) IV Push daily  piperacillin/tazobactam IVPB. 3.375 Gram(s) IV Intermittent every 12 hours  predniSONE   Tablet 40 milliGRAM(s) Oral daily  sucralfate suspension 1 Gram(s) Oral Before meals and at bedtime  zinc sulfate 220 milliGRAM(s) Oral daily    MEDICATIONS  (PRN):  acetaminophen   Tablet. 650 milliGRAM(s) Oral once PRN Moderate Pain (4 - 6)          Vital Signs Last 24 Hrs  T(C): 36.6 (04 Oct 2017 04:42), Max: 36.7 (03 Oct 2017 15:23)  T(F): 97.8 (04 Oct 2017 04:42), Max: 98 (03 Oct 2017 15:23)  HR: 72 (04 Oct 2017 08:20) (72 - 91)  BP: 123/77 (04 Oct 2017 04:42) (114/70 - 127/75)  BP(mean): --  RR: 16 (04 Oct 2017 08:20) (16 - 18)  SpO2: 95% (04 Oct 2017 08:20) (95% - 100%)    ABG - ( 02 Oct 2017 16:00 )  pH: 7.37  /  pCO2: 61    /  pO2: 76    / HCO3: 35    / Base Excess: 8.1   /  SaO2: 96                VBG pH 7.36 10-04 @ 05:16    VBG pCO2 68 10-04 @ 05:16    VBG O2 sat 86 10-04 @ 05:16    VBG lactate 1.2 10-04 @ 05:16  VBG pH 7.26 10-03 @ 14:25    VBG pCO2 78 10-03 @ 14:25    VBG O2 sat 78 10-03 @ 14:25    VBG lactate 2.3 10-03 @ 14:25      10-03 @ 07:01  -  10-04 @ 07:00  --------------------------------------------------------  IN: 400 mL / OUT: 1600 mL / NET: -1200 mL          LABS:                        9.6    25.1  )-----------( 9        ( 03 Oct 2017 09:30 )             30.8     10-04    135  |  87<L>  |  50<H>  ----------------------------<  155<H>  3.4<L>   |  34<H>  |  1.68<H>    Ca    8.9      04 Oct 2017 07:05            CAPILLARY BLOOD GLUCOSE          Urinalysis Basic - ( 02 Oct 2017 17:20 )    Color: Yellow / Appearance: Slightly Turbid / S.019 / pH: x  Gluc: x / Ketone: Negative  / Bili: Negative / Urobili: Negative mg/dL   Blood: x / Protein: Trace mg/dL / Nitrite: Negative   Leuk Esterase: Large / RBC: 277 /HPF /  /HPF   Sq Epi: x / Non Sq Epi: 1 /HPF / Bacteria: Negative      Procalcitonin, Serum: 0.34 ng/mL (10-02-17 @ 14:10)    Serum Pro-Brain Natriuretic Peptide: 13241 pg/mL (10-01-17 @ 16:45)                CULTURES: (if applicable)  Culture Results:   No growth (10-02 @ 06:42)    Most recent blood culture -- 10-02 @ 06:42   -- -- .Urine Clean Catch (Midstream) 10-02 @ 06:42    .        Physical Examination:  PULM: decreased bs bilat bilaterally, no significant sputum production  CVS: S1, S2 heard    RADIOLOGY REVIEWED  CXR: < from: Xray Chest 1 View AP- PORTABLE-Urgent (10.01.17 @ 16:46) >  IMPRESSION:     Limited evaluation secondary to rotation.    There are bilateral hazy interstitial opacities predominantly in the   right middle to lower lung lobe and the visualized left lung field which   may be consistent with the prior ground glass opacities seen in the prior   CT chest from 2017. Changes compatible with congestive heart   failure.    Trace bilateral pleural effusions.    Scoliosis of the spine.    < end of copied text > Follow-up Pulm Progress Note    wore bipap over night, 02 sat86% on room air, awake  khigh flow reapplied 02 sat 91%    Medications:  MEDICATIONS  (STANDING):  ALBUTerol/ipratropium for Nebulization 3 milliLiter(s) Nebulizer every 6 hours  diltiazem    Tablet 60 milliGRAM(s) Oral every 8 hours  furosemide   Injectable 40 milliGRAM(s) IV Push daily  pantoprazole  Injectable 40 milliGRAM(s) IV Push daily  piperacillin/tazobactam IVPB. 3.375 Gram(s) IV Intermittent every 12 hours  predniSONE   Tablet 40 milliGRAM(s) Oral daily  sucralfate suspension 1 Gram(s) Oral Before meals and at bedtime  zinc sulfate 220 milliGRAM(s) Oral daily    MEDICATIONS  (PRN):  acetaminophen   Tablet. 650 milliGRAM(s) Oral once PRN Moderate Pain (4 - 6)          Vital Signs Last 24 Hrs  T(C): 36.6 (04 Oct 2017 04:42), Max: 36.7 (03 Oct 2017 15:23)  T(F): 97.8 (04 Oct 2017 04:42), Max: 98 (03 Oct 2017 15:23)  HR: 72 (04 Oct 2017 08:20) (72 - 91)  BP: 123/77 (04 Oct 2017 04:42) (114/70 - 127/75)  BP(mean): --  RR: 16 (04 Oct 2017 08:20) (16 - 18)  SpO2: 95% (04 Oct 2017 08:20) (95% - 100%)    ABG - ( 02 Oct 2017 16:00 )  pH: 7.37  /  pCO2: 61    /  pO2: 76    / HCO3: 35    / Base Excess: 8.1   /  SaO2: 96                VBG pH 7.36 10-04 @ 05:16    VBG pCO2 68 10-04 @ 05:16    VBG O2 sat 86 10-04 @ 05:16    VBG lactate 1.2 10-04 @ 05:16  VBG pH 7.26 10-03 @ 14:25    VBG pCO2 78 10-03 @ 14:25    VBG O2 sat 78 10-03 @ 14:25    VBG lactate 2.3 10-03 @ 14:25      10-03 @ 07:01  -  10-04 @ 07:00  --------------------------------------------------------  IN: 400 mL / OUT: 1600 mL / NET: -1200 mL          LABS:                        9.6    25.1  )-----------( 9        ( 03 Oct 2017 09:30 )             30.8     10-04    135  |  87<L>  |  50<H>  ----------------------------<  155<H>  3.4<L>   |  34<H>  |  1.68<H>    Ca    8.9      04 Oct 2017 07:05            CAPILLARY BLOOD GLUCOSE          Urinalysis Basic - ( 02 Oct 2017 17:20 )    Color: Yellow / Appearance: Slightly Turbid / S.019 / pH: x  Gluc: x / Ketone: Negative  / Bili: Negative / Urobili: Negative mg/dL   Blood: x / Protein: Trace mg/dL / Nitrite: Negative   Leuk Esterase: Large / RBC: 277 /HPF /  /HPF   Sq Epi: x / Non Sq Epi: 1 /HPF / Bacteria: Negative      Procalcitonin, Serum: 0.34 ng/mL (10-02-17 @ 14:10)    Serum Pro-Brain Natriuretic Peptide: 40853 pg/mL (10-01-17 @ 16:45)          CULTURES: (if applicable)  Culture Results:   No growth (10-02 @ 06:42)    Most recent blood culture -- 10-02 @ 06:42   -- -- .Urine Clean Catch (Midstream) 10-02 @ 06:42    .        Physical Examination:  PULM: decreased bs bilat bilaterally, no significant sputum production  CVS: S1, S2 heard    RADIOLOGY REVIEWED  CXR: < from: Xray Chest 1 View AP- PORTABLE-Urgent (10.01.17 @ 16:46) >  IMPRESSION:     Limited evaluation secondary to rotation.    There are bilateral hazy interstitial opacities predominantly in the   right middle to lower lung lobe and the visualized left lung field which   may be consistent with the prior ground glass opacities seen in the prior   CT chest from 2017. Changes compatible with congestive heart   failure.    Trace bilateral pleural effusions.    Scoliosis of the spine.    < end of copied text >

## 2017-10-04 NOTE — PROGRESS NOTE ADULT - SUBJECTIVE AND OBJECTIVE BOX
Patient is a 89y Female  being evaluated for  hyponatremia/ hyperkalemia        PHYSICAL EXAM:  Vital Signs Last 24 Hrs  T(C): 36.6 (04 Oct 2017 04:42), Max: 36.7 (03 Oct 2017 15:23)  T(F): 97.8 (04 Oct 2017 04:42), Max: 98 (03 Oct 2017 15:23)  HR: 80 (04 Oct 2017 04:42) (78 - 91)  BP: 123/77 (04 Oct 2017 04:42) (114/70 - 127/75)  BP(mean): --  RR: 18 (04 Oct 2017 04:42) (16 - 18)  SpO2: 97% (04 Oct 2017 04:42) (95% - 100%)  Constitutional: no acute distress  Extremities: no edema or cyanosis, palpable pulses, + contracture   Neurological: A/O x 3, nl coordination and tone    I and O's:    10-02 @ 07:01  -  10-03 @ 07:00  --------------------------------------------------------  IN: 50 mL / OUT: 1125 mL / NET: -1075 mL    10-03 @ 07:01  -  10-03 @ 12:14  --------------------------------------------------------  IN: 60 mL / OUT: 0 mL / NET: 60 mL        Height (cm): 154.94 (10-02 @ 18:18)  Weight (kg): 43.6 (10-02 @ 18:18)  BMI (kg/m2): 18.2 (10-02 @ 18:18)    REVIEW OF SYSTEMS:  unable as patient lethargic   Allergies    eggs (Rash)  no drug allergy (Unknown)    Intolerances    vinegar sensitivity    family states that the patient shakes when she ingests vinegar (Other)    MEDICATIONS  (STANDING):  ALBUTerol/ipratropium for Nebulization 3 milliLiter(s) Nebulizer every 6 hours  diltiazem    Tablet 60 milliGRAM(s) Oral every 8 hours  furosemide   Injectable 40 milliGRAM(s) IV Push daily  pantoprazole  Injectable 40 milliGRAM(s) IV Push daily  piperacillin/tazobactam IVPB. 3.375 Gram(s) IV Intermittent every 12 hours  predniSONE   Tablet 40 milliGRAM(s) Oral daily  sucralfate suspension 1 Gram(s) Oral Before meals and at bedtime  zinc sulfate 220 milliGRAM(s) Oral daily    MEDICATIONS  (PRN):  acetaminophen   Tablet. 650 milliGRAM(s) Oral once PRN Moderate Pain (4 - 6)    LABS:  CBC Full  -  ( 03 Oct 2017 09:30 )  WBC Count : 25.1 K/uL  Hemoglobin : 9.6 g/dL  Hematocrit : 30.8 %  Platelet Count - Automated : 9 K/uL  Mean Cell Volume : 87.5 fl  Mean Cell Hemoglobin : 27.2 pg  Mean Cell Hemoglobin Concentration : 31.1 gm/dL  Auto Neutrophil # : x  Auto Lymphocyte # : x  Auto Monocyte # : x  Auto Eosinophil # : x  Auto Basophil # : x  Auto Neutrophil % : x  Auto Lymphocyte % : x  Auto Monocyte % : x  Auto Eosinophil % : x  Auto Basophil % : x    10-    134<L>  |  88<L>  |  49<H>  ----------------------------<  157<H>  4.2   |  34<H>  |  1.85<H>    Ca    9.6      03 Oct 2017 09:29  Phos  4.4     10-  Mg     2.1     10-    TPro  7.3  /  Alb  3.9  /  TBili  0.3  /  DBili  x   /  AST  35  /  ALT  27  /  AlkPhos  73  10-      Urine Studies:  Urinalysis Basic - ( 02 Oct 2017 17:20 )    Color: Yellow / Appearance: Slightly Turbid / S.019 / pH: x  Gluc: x / Ketone: Negative  / Bili: Negative / Urobili: Negative mg/dL   Blood: x / Protein: Trace mg/dL / Nitrite: Negative   Leuk Esterase: Large / RBC: 277 /HPF /  /HPF   Sq Epi: x / Non Sq Epi: 1 /HPF / Bacteria: Negative        Urine chemistry:   Urine Na: Sodium, Random Urine: <20 mmol/L (10-02 @ 17:25)    Urine Creatinine:   Urine Protein/Cr ratio:  Urine K:   Urine Osm: Osmolality, Random Urine: 342 mos/kg (10- @ 00:19)    24 Hr urine studies:       Imp:  89y Female

## 2017-10-04 NOTE — CHART NOTE - NSCHARTNOTEFT_GEN_A_CORE
Notified by RN in regards in agitation. Notified by RN in regards in agitation. Patient admitted with hypercarbic respiratory failure. Latest VBG pCO2 78. Patient requiring BIPAP. Patient seen at the bedside with daughter. With the help of the patient's daughter, trying to re-orient patient was unsuccessful. Swatting at staff members and not allowing any form of respiratory assistance device to be applied. Patient ordered for haldol 0.5 mg PO x 1 for agitation. Will continue to monitor.   Jane Rajput PA-C   [id number]

## 2017-10-04 NOTE — DIETITIAN INITIAL EVALUATION ADULT. - PHYSICAL APPEARANCE
emaciated/Nutrition physical focused exam not preformed as Pt with dementia. However Pt with visual muscle wasting at temporals, clavicles, and severe fat wasting at orbitals and buccals.

## 2017-10-04 NOTE — CHART NOTE - NSCHARTNOTEFT_GEN_A_CORE
Upon Nutritional Assessment by the Registered Dietitian your patient was determined to meet criteria / has evidence of the following diagnosis/diagnoses:          [ ]  Mild Protein Calorie Malnutrition        [ ]  Moderate Protein Calorie Malnutrition        [X ] Severe Protein Calorie Malnutrition        [ ] Unspecified Protein Calorie Malnutrition        [ X] Underweight / BMI <19        [ ] Morbid Obesity / BMI > 40      Findings as based on:  [ X] Comprehensive nutrition assessment: Unspecified wt loss PTA  [ X] Nutrition Focused Physical Exam: visual severe fat and muscle wasting   [ X] Other: BMI: 17.4, Poor PO intake inhouse.       Nutrition Plan/Recommendations:   Defer diet consistency to pt/family wishes and medical team  Add Ensure Enlive x2 daily to supplement intake    PROVIDER Section:     By signing this assessment you are acknowledging and agree with the diagnosis/diagnoses assigned by the Registered Dietitian    Comments:

## 2017-10-04 NOTE — PROGRESS NOTE ADULT - ASSESSMENT
89 yo F as above:  1. Acute respiratory failure - resp status improving, pt DNR/DNI, off BiPAP, now doing well on NC  2. Acute on chronic diast CHF - Lasix per Cardio, I/O  3. A fib - c/w ASA, Cardizem per Cardio  4. ITP - plts continue to drop, changed Zosyn to Ertapenem, transfuse plts as per Heme  5. Aspiration PNA - ID appreciated, c/w Ertapenem  6. NEWTON on CKD - per Renal  7. COPD - steroids and nebs per Pulm  8. Mechanical DVT prophylaxis  9. Suspected dysphagia - family refused swallow eval, aware of risks of aspiration, will c/w current soft diet for comfort, palliative options being discussed w/family

## 2017-10-04 NOTE — PROGRESS NOTE ADULT - PROBLEM SELECTOR PLAN 6
keep o2 sat >=88% with 5lnc, titrate oxygen down to keep o2 sat>=88%  hold high flow  bipap qhs  check vbg later today  pred 40 mg po daily x 3 days beginning 10/4  then resume pt pred 5 mg for ITP therapy  c/w gita

## 2017-10-04 NOTE — PROGRESS NOTE ADULT - PROBLEM SELECTOR PLAN 1
Monitor CBC/platelets, BC today  result pending.  Continue  steroids, consider lowering dose  If platelets do not improving would d/c Zosyn  use an alternative non penicillin related antibiotic,  If platelets do not improve over the next day , will add IV gammaglobulins  Would hold on platlelet trans fusions unless  less than 5,000 or overt bleeding.

## 2017-10-04 NOTE — DIETITIAN INITIAL EVALUATION ADULT. - NS AS NUTRI INTERV COLLABORAT
Collaboration with other providers/Malnutrition sticker placed in chart. Team made aware. RD remains available to monitor PO Intake, wt, labs and diet education review.

## 2017-10-04 NOTE — DIETITIAN INITIAL EVALUATION ADULT. - ORAL INTAKE PTA
fair/Daughter reports Pt's PO intake to be variable. Pt has HHA to help with meal prep and feeding assistance. Breakfast: oatmeal, banana. Lunch: Chicken, fish,  vegetables. Dinner: Milk and cottage cheese.

## 2017-10-04 NOTE — PROGRESS NOTE ADULT - ASSESSMENT
89y Female admitted with resp failure  hx of hyponatremia on prev admissions and CKD  now with NEWTON  hyperkalemia   K improving  hyponatremia improving  on IV lasix for diastolic CHF  urine osm elevated as noted  avoid ACE/ARB, NSAIDS/ potassium supplements     monitor I&O's, electrolytes and renal function

## 2017-10-04 NOTE — DIETITIAN INITIAL EVALUATION ADULT. - OTHER INFO
Pt seen for BMI > 19. daughter at bedside reports that Pt's PO intake house been variable. Per documentation PO intake has been between 0-50% at meals. RD noted breakfast tray with 50% of oatmeal. Daughter states that Pt is willing to try Ensure enlive to supplement PO intake. Daughter reports that Pt has visually lost weight but is unable to quantify Wt loss or time frame but believes Pt weights less that 90lbs. Pt current wt is 92.1lbs. Pt takes MVI. Pt with known dysphagia history; however family denies chewing/swallowing difficulty. No IG distress at this time, Pt takes stool softeners at home. NKFA Pt seen for BMI > 19. daughter at bedside reports that Pt's PO intake house been variable. Per documentation PO intake has been between 0-50% at meals. RD noted breakfast tray with 50% of oatmeal. Daughter states that Pt is willing to try Ensure enlive to supplement PO intake. Daughter reports that Pt has visually lost weight but is unable to quantify Wt loss or time frame but believes Pt weights less that 90lbs. Pt current wt is 92.1lbs. Pt takes MVI. Pt with known dysphagia history; however family denies chewing/swallowing difficulty. No IG distress at this time, Pt takes stool softeners at home. Noted allergy to egg.

## 2017-10-04 NOTE — PROGRESS NOTE ADULT - SUBJECTIVE AND OBJECTIVE BOX
CHIEF COMPLAINT:Patient is a 89y old  Female who presents with a chief complaint of shortness of breath / hypoxic respiratory failure (01 Oct 2017 22:34)        Allergies:  eggs (Rash)  no drug allergy (Unknown)  vinegar sensitivity    family states that the patient shakes when she ingests vinegar (Other)      PAST MEDICAL & SURGICAL HISTORY:  PNA (pneumonia)  Dysphagia  Thrombocytopenia: ITP  Atrial fibrillation and flutter: No A/C  during hospitalization in april 2014  Respiratory failure: in april 2014 was intubated  Cataract: right eye  Small bowel obstruction: s/p resection in 2010  HTN - Hypertension  S/P cholecystectomy  H/O: Hysterectomy  S/P Small Bowel Resection      FAMILY HISTORY:  No pertinent family history in first degree relatives      REVIEW OF SYSTEMS:  UTO, but no active complaints    Medications:  MEDICATIONS  (STANDING):  ALBUTerol/ipratropium for Nebulization 3 milliLiter(s) Nebulizer every 6 hours  diltiazem    Tablet 60 milliGRAM(s) Oral every 8 hours  furosemide   Injectable 40 milliGRAM(s) IV Push daily  pantoprazole  Injectable 40 milliGRAM(s) IV Push daily  predniSONE   Tablet 40 milliGRAM(s) Oral daily  sucralfate suspension 1 Gram(s) Oral Before meals and at bedtime  zinc sulfate 220 milliGRAM(s) Oral daily    MEDICATIONS  (PRN):  acetaminophen   Tablet. 650 milliGRAM(s) Oral once PRN Moderate Pain (4 - 6)    	    PHYSICAL EXAM:  T(C): 36.8 (10-04-17 @ 13:22), Max: 36.8 (10-04-17 @ 13:22)  HR: 88 (10-04-17 @ 13:22) (72 - 88)  BP: 128/77 (10-04-17 @ 13:22) (123/70 - 128/77)  RR: 18 (10-04-17 @ 13:22) (16 - 18)  SpO2: 99% (10-04-17 @ 13:22) (95% - 100%)  Wt(kg): --  I&O's Summary    03 Oct 2017 07:01  -  04 Oct 2017 07:00  --------------------------------------------------------  IN: 400 mL / OUT: 1600 mL / NET: -1200 mL    04 Oct 2017 07:01  -  04 Oct 2017 15:36  --------------------------------------------------------  IN: 0 mL / OUT: 800 mL / NET: -800 mL        Appearance: Frail	  HEENT:   NCAT, PERRL, EOMI	  Lymphatic: No lymphadenopathy  Cardiovascular: Normal S1 S2, RRR  Respiratory: CTA BL  Psychiatry: A & O x 3, Mood & affect appropriate  Gastrointestinal:  Soft, Non-tender, + BS  Skin: No rashes, No ecchymoses, No cyanosis	  Neurologic: Non-focal  Extremities: Normal range of motion, No clubbing, cyanosis or edema    	  LABS:	 	    CARDIAC MARKERS:                                9.0    18.63 )-----------( 1        ( 04 Oct 2017 07:05 )             28.2     10-04    135  |  87<L>  |  50<H>  ----------------------------<  155<H>  3.4<L>   |  34<H>  |  1.68<H>    Ca    8.9      04 Oct 2017 07:05      proBNP:   Lipid Profile:   HgA1c:   TSH:

## 2017-10-04 NOTE — DIETITIAN INITIAL EVALUATION ADULT. - PROBLEM SELECTOR PLAN 3
-patient given 40mg IV lasix in ED; will c/w IV diuresis and monitor hemodynamics carefully in setting of infection. Suspect the pulmonary edema may be from ischemic cardiomyopathy given elevated enzymes. Will check TTE and appreciate cards eval.

## 2017-10-04 NOTE — DIETITIAN INITIAL EVALUATION ADULT. - ADHERENCE
n/a/Per daughter Pt does not follow any diet restrictions PTA. Pt's diet it regular textured at home with thin liquids. Pt follows a Kosher diet.

## 2017-10-04 NOTE — PROGRESS NOTE ADULT - SUBJECTIVE AND OBJECTIVE BOX
infectious diseases progress note:    Patient is a 89y old  Female who presents with a chief complaint of shortness of breath / hypoxic respiratory failure (01 Oct 2017 22:34)        Acute on chronic respiratory failure with hypercapnia  responds      eggs (Rash)  no drug allergy (Unknown)    Intolerances    vinegar sensitivity    family states that the patient shakes when she ingests vinegar (Other)      ANTIBIOTICS/RELEVANT:  antimicrobials  piperacillin/tazobactam IVPB. 3.375 Gram(s) IV Intermittent every 12 hours    immunologic:    OTHER:  acetaminophen   Tablet. 650 milliGRAM(s) Oral once PRN  ALBUTerol/ipratropium for Nebulization 3 milliLiter(s) Nebulizer every 6 hours  diltiazem    Tablet 60 milliGRAM(s) Oral every 8 hours  furosemide   Injectable 40 milliGRAM(s) IV Push daily  pantoprazole  Injectable 40 milliGRAM(s) IV Push daily  predniSONE   Tablet 40 milliGRAM(s) Oral daily  sucralfate suspension 1 Gram(s) Oral Before meals and at bedtime  zinc sulfate 220 milliGRAM(s) Oral daily      Objective:  Vital Signs Last 24 Hrs  T(C): 36.6 (04 Oct 2017 04:42), Max: 36.7 (03 Oct 2017 15:23)  T(F): 97.8 (04 Oct 2017 04:42), Max: 98 (03 Oct 2017 15:23)  HR: 80 (04 Oct 2017 04:42) (78 - 91)  BP: 123/77 (04 Oct 2017 04:42) (114/70 - 127/75)  BP(mean): --  RR: 18 (04 Oct 2017 04:42) (16 - 18)  SpO2: 97% (04 Oct 2017 04:42) (95% - 100%)    PHYSICAL EXAM:  cachetic lethargic   Eyes:MEAGHAN, EOMI  Ear/Nose/Throat: no oral lesion, no sinus tenderness on percussion	  Neck:no JVD, no lymphadenopathy, supple  Respiratory:  decreased   Cardiovascular: S1S2 RRR, no murmurs  Gastrointestinal:soft, (+) BS, no HSM  Extremities:no e/e/c        LABS:                        9.6    25.1  )-----------( 9        ( 03 Oct 2017 09:30 )             30.8     10-03    134<L>  |  88<L>  |  49<H>  ----------------------------<  157<H>  4.2   |  34<H>  |  1.85<H>    Ca    9.6      03 Oct 2017 09:29        Urinalysis Basic - ( 02 Oct 2017 17:20 )    Color: Yellow / Appearance: Slightly Turbid / S.019 / pH: x  Gluc: x / Ketone: Negative  / Bili: Negative / Urobili: Negative mg/dL   Blood: x / Protein: Trace mg/dL / Nitrite: Negative   Leuk Esterase: Large / RBC: 277 /HPF /  /HPF   Sq Epi: x / Non Sq Epi: 1 /HPF / Bacteria: Negative          MICROBIOLOGY:    RECENT CULTURES:  10-02 @ 06:42 .Urine Clean Catch (Midstream)                No growth          RESPIRATORY CULTURES:              RADIOLOGY & ADDITIONAL STUDIES:        Pager [phone]  After 5 pm/weekends or if no response :[phone]

## 2017-10-04 NOTE — DIETITIAN INITIAL EVALUATION ADULT. - FACTORS AFF FOOD INTAKE
difficulty chewing/difficulty swallowing/Pt wears full upper and lower dentures that per daughter fit well. Pt has a history of dysphagia, per chart family has refused PEG in the past.

## 2017-10-04 NOTE — DIETITIAN INITIAL EVALUATION ADULT. - NS AS NUTRI INTERV MEALS SNACK
General/healthful diet/Defer diet consistency to Pt/family wishes and medical team. Continue Kosher diet per Pt's preferences.

## 2017-10-04 NOTE — DIETITIAN INITIAL EVALUATION ADULT. - PROBLEM SELECTOR PLAN 1
-Patient presents with worsening respiratory failure and found to be both hypoxic and hypercapnic. Patient is on BIPAP and son is HCP at bedside and with daughter as well. Son and daughter do not want CPR or intubation. They want to pursue treatment of pneumonia and reversible causes.   -Suspect the respiratory failure is multifactorial including aspiration pneumonia and pulmonary edema given B-lines on MICU's bedside ultrasound results.   -Patient requires inpatient care for respiratory failure, management of airway, treatment of pneumonia w/ IV antibiotics given altered status.  -c/w BIPAP support & IV lasix for acute decompensated heart failure

## 2017-10-04 NOTE — DIETITIAN INITIAL EVALUATION ADULT. - PROBLEM SELECTOR PLAN 4
-Patient w/ respiratory acidosis and B-lines on bedside u./s (performed by MICU team) and last TTE in 6/2017 shows EF 7% and moderate AS. Patient started on IV diuretics in ED. Troponins elevated suspicious for ischemic cardiomyopathy. TTE ordered. Cardiology consulted by ED. Son (hcp) to discuss goals of care regarding cath w./ cards team if required.   -c/w trending CE, admit to telemetry  -clarify home meds as patient's family do not have list and pharmacy is [facility] ([phone]).  -daily weights, strict I/os.

## 2017-10-04 NOTE — DIETITIAN INITIAL EVALUATION ADULT. - ENERGY NEEDS
Ht: 5'1", Wt: 92.1lbs, BMI: 17.4kg/m2, IBW: 105lbs(+/-10%), 87%IBW  Pertinent information: Pt admitted for SOB and respiratory failure. Per chart, Pt likely with aspiration PNA, acute on chronic HF, Afib/a flutter. Per chart, Pt seen by speech and swallow, awaiting family decision regarding MBS.   +1 generalized Edema, Skin intact

## 2017-10-04 NOTE — DIETITIAN INITIAL EVALUATION ADULT. - PT NOT SOURCE
Pt with dementia and Farsi speaking only. Daughter at bedside provided all subjective information./confused

## 2017-10-05 DIAGNOSIS — R13.10 DYSPHAGIA, UNSPECIFIED: ICD-10-CM

## 2017-10-05 DIAGNOSIS — Z51.5 ENCOUNTER FOR PALLIATIVE CARE: ICD-10-CM

## 2017-10-05 LAB
ANION GAP SERPL CALC-SCNC: 15 MMOL/L — SIGNIFICANT CHANGE UP (ref 5–17)
BASE EXCESS BLDV CALC-SCNC: 16.9 MMOL/L — HIGH (ref -2–2)
BUN SERPL-MCNC: 52 MG/DL — HIGH (ref 7–23)
CA-I SERPL-SCNC: 1.16 MMOL/L — SIGNIFICANT CHANGE UP (ref 1.12–1.3)
CALCIUM SERPL-MCNC: 9.1 MG/DL — SIGNIFICANT CHANGE UP (ref 8.4–10.5)
CHLORIDE BLDV-SCNC: 86 MMOL/L — LOW (ref 96–108)
CHLORIDE SERPL-SCNC: 87 MMOL/L — LOW (ref 96–108)
CO2 BLDV-SCNC: 47 MMOL/L — HIGH (ref 22–30)
CO2 SERPL-SCNC: 37 MMOL/L — HIGH (ref 22–31)
CREAT SERPL-MCNC: 1.49 MG/DL — HIGH (ref 0.5–1.3)
GAS PNL BLDV: 137 MMOL/L — SIGNIFICANT CHANGE UP (ref 136–145)
GAS PNL BLDV: SIGNIFICANT CHANGE UP
GAS PNL BLDV: SIGNIFICANT CHANGE UP
GLUCOSE BLDV-MCNC: 202 MG/DL — HIGH (ref 70–99)
GLUCOSE SERPL-MCNC: 124 MG/DL — HIGH (ref 70–99)
HCO3 BLDV-SCNC: 44 MMOL/L — HIGH (ref 21–29)
HCT VFR BLD CALC: 24.8 % — LOW (ref 34.5–45)
HCT VFR BLD CALC: 29.1 % — LOW (ref 34.5–45)
HCT VFR BLDA CALC: 28 % — LOW (ref 39–50)
HGB BLD CALC-MCNC: 8.9 G/DL — LOW (ref 11.5–15.5)
HGB BLD-MCNC: 8.9 G/DL — LOW (ref 11.5–15.5)
HGB BLD-MCNC: 9.6 G/DL — LOW (ref 11.5–15.5)
LACTATE BLDV-MCNC: 3.1 MMOL/L — HIGH (ref 0.7–2)
MAGNESIUM SERPL-MCNC: 2.1 MG/DL — SIGNIFICANT CHANGE UP (ref 1.6–2.6)
MCHC RBC-ENTMCNC: 28.7 PG — SIGNIFICANT CHANGE UP (ref 27–34)
MCHC RBC-ENTMCNC: 31.4 PG — SIGNIFICANT CHANGE UP (ref 27–34)
MCHC RBC-ENTMCNC: 32.9 GM/DL — SIGNIFICANT CHANGE UP (ref 32–36)
MCHC RBC-ENTMCNC: 35.9 GM/DL — SIGNIFICANT CHANGE UP (ref 32–36)
MCV RBC AUTO: 87.4 FL — SIGNIFICANT CHANGE UP (ref 80–100)
MCV RBC AUTO: 87.6 FL — SIGNIFICANT CHANGE UP (ref 80–100)
OTHER CELLS CSF MANUAL: 6 ML/DL — LOW (ref 18–22)
PCO2 BLDV: 78 MMHG — HIGH (ref 35–50)
PH BLDV: 7.38 — SIGNIFICANT CHANGE UP (ref 7.35–7.45)
PLATELET # BLD AUTO: 18 K/UL — CRITICAL LOW (ref 150–400)
PLATELET # BLD AUTO: 2 K/UL — CRITICAL LOW (ref 150–400)
PO2 BLDV: 30 MMHG — SIGNIFICANT CHANGE UP (ref 25–45)
POTASSIUM BLDV-SCNC: 2.7 MMOL/L — CRITICAL LOW (ref 3.5–5)
POTASSIUM SERPL-MCNC: 3.2 MMOL/L — LOW (ref 3.5–5.3)
POTASSIUM SERPL-SCNC: 3.2 MMOL/L — LOW (ref 3.5–5.3)
RBC # BLD: 2.83 M/UL — LOW (ref 3.8–5.2)
RBC # BLD: 3.33 M/UL — LOW (ref 3.8–5.2)
RBC # FLD: 14.5 % — SIGNIFICANT CHANGE UP (ref 10.3–14.5)
RBC # FLD: 15.3 % — HIGH (ref 10.3–14.5)
SAO2 % BLDV: 48 % — LOW (ref 67–88)
SODIUM SERPL-SCNC: 139 MMOL/L — SIGNIFICANT CHANGE UP (ref 135–145)
WBC # BLD: 16.6 K/UL — HIGH (ref 3.8–10.5)
WBC # BLD: 20.59 K/UL — HIGH (ref 3.8–10.5)
WBC # FLD AUTO: 16.6 K/UL — HIGH (ref 3.8–10.5)
WBC # FLD AUTO: 20.59 K/UL — HIGH (ref 3.8–10.5)

## 2017-10-05 PROCEDURE — 71010: CPT | Mod: 26

## 2017-10-05 PROCEDURE — 99232 SBSQ HOSP IP/OBS MODERATE 35: CPT

## 2017-10-05 PROCEDURE — 99223 1ST HOSP IP/OBS HIGH 75: CPT

## 2017-10-05 RX ORDER — POTASSIUM CHLORIDE 20 MEQ
40 PACKET (EA) ORAL ONCE
Qty: 0 | Refills: 0 | Status: COMPLETED | OUTPATIENT
Start: 2017-10-05 | End: 2017-10-05

## 2017-10-05 RX ORDER — IMMUNE GLOBULIN,GAMMA(IGG) 5 %
17.44 VIAL (ML) INTRAVENOUS DAILY
Qty: 0 | Refills: 0 | Status: DISCONTINUED | OUTPATIENT
Start: 2017-10-05 | End: 2017-10-05

## 2017-10-05 RX ORDER — IMMUNE GLOBULIN,GAMMA(IGG) 5 %
17.4 VIAL (ML) INTRAVENOUS DAILY
Qty: 0 | Refills: 0 | Status: COMPLETED | OUTPATIENT
Start: 2017-10-05 | End: 2017-10-09

## 2017-10-05 RX ORDER — POTASSIUM CHLORIDE 20 MEQ
40 PACKET (EA) ORAL ONCE
Qty: 0 | Refills: 0 | Status: DISCONTINUED | OUTPATIENT
Start: 2017-10-05 | End: 2017-10-05

## 2017-10-05 RX ADMIN — Medication 1 GRAM(S): at 11:29

## 2017-10-05 RX ADMIN — ZINC SULFATE TAB 220 MG (50 MG ZINC EQUIVALENT) 220 MILLIGRAM(S): 220 (50 ZN) TAB at 11:29

## 2017-10-05 RX ADMIN — Medication 1 GRAM(S): at 06:07

## 2017-10-05 RX ADMIN — PANTOPRAZOLE SODIUM 40 MILLIGRAM(S): 20 TABLET, DELAYED RELEASE ORAL at 11:29

## 2017-10-05 RX ADMIN — Medication 3 MILLILITER(S): at 12:21

## 2017-10-05 RX ADMIN — Medication 3 MILLILITER(S): at 06:07

## 2017-10-05 RX ADMIN — Medication 29 GRAM(S): at 17:20

## 2017-10-05 RX ADMIN — Medication 40 MILLIGRAM(S): at 06:07

## 2017-10-05 RX ADMIN — Medication 40 MILLIEQUIVALENT(S): at 11:01

## 2017-10-05 RX ADMIN — Medication 3 MILLILITER(S): at 23:27

## 2017-10-05 RX ADMIN — Medication 1 GRAM(S): at 22:09

## 2017-10-05 RX ADMIN — ERTAPENEM SODIUM 100 MILLIGRAM(S): 1 INJECTION, POWDER, LYOPHILIZED, FOR SOLUTION INTRAMUSCULAR; INTRAVENOUS at 11:28

## 2017-10-05 RX ADMIN — Medication 1 GRAM(S): at 17:20

## 2017-10-05 NOTE — PROGRESS NOTE ADULT - ASSESSMENT
89y Female admitted with resp failure  hx of hyponatremia on prev admissions and CKD  now with NEWTON  hypokalemia replete prn  po diuretic change per cv  hyponatremia improving  avoid ACE/ARB, NSAIDS/ potassium supplements   monitor I&O's, electrolytes and renal function

## 2017-10-05 NOTE — PROGRESS NOTE ADULT - SUBJECTIVE AND OBJECTIVE BOX
infectious diseases progress note:    Patient is a 89y old  Female who presents with a chief complaint of shortness of breath / hypoxic respiratory failure (01 Oct 2017 22:34)        Acute on chronic respiratory failure with hypercapnia    more [id number]     Allergies    eggs (Rash)  no drug allergy (Unknown)    Intolerances    vinegar sensitivity    family states that the patient shakes when she ingests vinegar (Other)      ANTIBIOTICS/RELEVANT:  antimicrobials  ertapenem  IVPB 500 milliGRAM(s) IV Intermittent every 24 hours    immunologic:    OTHER:  acetaminophen   Tablet. 650 milliGRAM(s) Oral once PRN  ALBUTerol/ipratropium for Nebulization 3 milliLiter(s) Nebulizer every 6 hours  diltiazem    Tablet 60 milliGRAM(s) Oral every 8 hours  furosemide   Injectable 40 milliGRAM(s) IV Push daily  haloperidol     Tablet 0.5 milliGRAM(s) Oral once  pantoprazole  Injectable 40 milliGRAM(s) IV Push daily  predniSONE   Tablet 40 milliGRAM(s) Oral daily  sucralfate suspension 1 Gram(s) Oral Before meals and at bedtime  zinc sulfate 220 milliGRAM(s) Oral daily      Objective:  Vital Signs Last 24 Hrs  T(C): 36.4 (05 Oct 2017 05:28), Max: 37.4 (04 Oct 2017 15:05)  T(F): 97.5 (05 Oct 2017 05:28), Max: 99.3 (04 Oct 2017 15:05)  HR: 75 (05 Oct 2017 06:00) (69 - 96)  BP: 121/60 (05 Oct 2017 05:28) (105/61 - 129/75)  BP(mean): --  RR: 20 (05 Oct 2017 05:28) (16 - 20)  SpO2: 97% (05 Oct 2017 06:00) (94% - 100%)    PHYSICAL EXAM:  Constitutional:Well-developed, well nourished--no acute distress  Eyes:MEAGHAN, EOMI  Ear/Nose/Throat: no oral lesion, no sinus tenderness on percussion	  Neck:no JVD, no lymphadenopathy, supple  Respiratory: CTA soledad  Cardiovascular: S1S2 RRR, no murmurs  Gastrointestinal:soft, (+) BS, no HSM  Extremities:no e/e/c        LABS:                        9.0    18.63 )-----------( 1        ( 04 Oct 2017 07:05 )             28.2     10-04    135  |  87<L>  |  50<H>  ----------------------------<  155<H>  3.4<L>   |  34<H>  |  1.68<H>    Ca    8.9      04 Oct 2017 07:05              MICROBIOLOGY:    RECENT CULTURES:  10-03 @ 11:19 .Blood Blood-Peripheral                No growth to date.    10-02 @ 06:42 .Urine Clean Catch (Midstream)                No growth          RESPIRATORY CULTURES:              RADIOLOGY & ADDITIONAL STUDIES:        Pager [phone]  After 5 pm/weekends or if no response :[phone]

## 2017-10-05 NOTE — PROGRESS NOTE ADULT - SUBJECTIVE AND OBJECTIVE BOX
Patient is a 89y Female  being evaluated for  hyponatremia/ hyperkalemia  notes reviewed  sleeping comfortably    Vital Signs Last 24 Hrs  T(C): 36.4 (05 Oct 2017 05:28), Max: 37.4 (04 Oct 2017 15:05)  T(F): 97.5 (05 Oct 2017 05:28), Max: 99.3 (04 Oct 2017 15:05)  HR: 75 (05 Oct 2017 06:00) (69 - 96)  BP: 121/60 (05 Oct 2017 05:28) (105/61 - 129/75)  BP(mean): --  RR: 20 (05 Oct 2017 05:28) (16 - 20)  SpO2: 97% (05 Oct 2017 06:00) (94% - 100%)    PHYSICAL EXAM:    I and O's:    10-02 @ 07:01  -  10-03 @ 07:00  --------------------------------------------------------  IN: 50 mL / OUT: 1125 mL / NET: -1075 mL    10-03 @ 07:01  -  10-03 @ 12:14  --------------------------------------------------------  IN: 60 mL / OUT: 0 mL / NET: 60 mL        Height (cm): 154.94 (10-02 @ 18:18)  Weight (kg): 43.6 (10-02 @ 18:18)  BMI (kg/m2): 18.2 (10-02 @ 18:18)    REVIEW OF SYSTEMS:  unable as patient lethargic   Allergies    eggs (Rash)  no drug allergy (Unknown)    Intolerances    vinegar sensitivity    family states that the patient shakes when she ingests vinegar (Other)    MEDICATIONS  (STANDING):  ALBUTerol/ipratropium for Nebulization 3 milliLiter(s) Nebulizer every 6 hours  diltiazem    Tablet 60 milliGRAM(s) Oral every 8 hours  ertapenem  IVPB 500 milliGRAM(s) IV Intermittent every 24 hours  furosemide   Injectable 40 milliGRAM(s) IV Push daily  haloperidol     Tablet 0.5 milliGRAM(s) Oral once  pantoprazole  Injectable 40 milliGRAM(s) IV Push daily  predniSONE   Tablet 40 milliGRAM(s) Oral daily  sucralfate suspension 1 Gram(s) Oral Before meals and at bedtime  zinc sulfate 220 milliGRAM(s) Oral daily    MEDICATIONS  (PRN):  acetaminophen   Tablet. 650 milliGRAM(s) Oral once PRN Moderate Pain (4 - 6)      LABS:  CBC Full  -  ( 03 Oct 2017 09:30 )  WBC Count : 25.1 K/uL  Hemoglobin : 9.6 g/dL  Hematocrit : 30.8 %  Platelet Count - Automated : 9 K/uL  Mean Cell Volume : 87.5 fl  Mean Cell Hemoglobin : 27.2 pg  Mean Cell Hemoglobin Concentration : 31.1 gm/dL  Auto Neutrophil # : x  Auto Lymphocyte # : x  Auto Monocyte # : x  Auto Eosinophil # : x  Auto Basophil # : x  Auto Neutrophil % : x  Auto Lymphocyte % : x  Auto Monocyte % : x  Auto Eosinophil % : x  Auto Basophil % : x    1003    134<L>  |  88<L>  |  49<H>  ----------------------------<  157<H>  4.2   |  34<H>  |  1.85<H>    Ca    9.6      03 Oct 2017 09:29  Phos  4.4     10  Mg     2.1     10-02    TPro  7.3  /  Alb  3.9  /  TBili  0.3  /  DBili  x   /  AST  35  /  ALT  27  /  AlkPhos  73  10-01    10    135  |  87<L>  |  50<H>  ----------------------------<  155<H>  3.4<L>   |  34<H>  |  1.68<H>    Ca    8.9      04 Oct 2017 07:05      Urine Studies:  Urinalysis Basic - ( 02 Oct 2017 17:20 )    Color: Yellow / Appearance: Slightly Turbid / S.019 / pH: x  Gluc: x / Ketone: Negative  / Bili: Negative / Urobili: Negative mg/dL   Blood: x / Protein: Trace mg/dL / Nitrite: Negative   Leuk Esterase: Large / RBC: 277 /HPF /  /HPF   Sq Epi: x / Non Sq Epi: 1 /HPF / Bacteria: Negative        Urine chemistry:   Urine Na: Sodium, Random Urine: <20 mmol/L (10-02 @ 17:25)    Urine Creatinine:   Urine Protein/Cr ratio:  Urine K:   Urine Osm: Osmolality, Random Urine: 342 mos/kg (10-03 @ 00:19)    24 Hr urine studies:       Imp:  89y Female

## 2017-10-05 NOTE — ADVANCED PRACTICE NURSE CONSULT - ASSESSMENT
The pt is in the constant observation room on [facility]- daughter at bedside. Daughter reports that the "aides at home want to sleep and tell my mother to urinate in the diaper at night." Education was provided as to the benefits of underpads and the difference between IAD and pressure ulcers. Daughter concerned that her mother has developed a bedsore. Upon assessment, the pt presented with  darkened pigmentation of the skin on the b/l buttocks and sacrum- assessment was limited as staff had applied a coating of Elliott on the skin. Explained that over time, the skin can develop a darkened tone when chronically exposed to moisture.  On the lateral aspect of the RLE is a dark, raised hematoma measuring 3cm x2cm x0cm,- there is no drainage at present- pts skin is frail, dry with numerous ecchymotic areas noted on the  hands, arms and BLE. An Adaptic dressing was placed over the hematoma to protect. would also recommend Sween 24 cream daily to moisturize.  Pt has a thin frail appearance- was seen by nutritions with a dx of severe malnutrition noted. A Swallow evaluation is pending.  the pt is on a low air-loss surface being turned and positioned as per review of the nursing documentation

## 2017-10-05 NOTE — PROGRESS NOTE ADULT - ASSESSMENT
89 y/o female Admitted for shortness of breath, respiratory  failure possible secondary to aspiration  Hx of  ITP ,  noted platelets decreased from 23,000 to 9,000 since admitted.

## 2017-10-05 NOTE — PROGRESS NOTE ADULT - SUBJECTIVE AND OBJECTIVE BOX
CHIEF COMPLAINT:Patient is a 89y old  Female who presents with a chief complaint of shortness of breath / hypoxic respiratory failure (01 Oct 2017 22:34)        Allergies:  eggs (Rash)  no drug allergy (Unknown)  vinegar sensitivity    family states that the patient shakes when she ingests vinegar (Other)      PAST MEDICAL & SURGICAL HISTORY:  PNA (pneumonia)  Dysphagia  Thrombocytopenia: ITP  Atrial fibrillation and flutter: No A/C  during hospitalization in april 2014  Respiratory failure: in april 2014 was intubated  Cataract: right eye  Small bowel obstruction: s/p resection in 2010  HTN - Hypertension  S/P cholecystectomy  H/O: Hysterectomy  S/P Small Bowel Resection      FAMILY HISTORY:  No pertinent family history in first degree relatives      REVIEW OF SYSTEMS:  UTO, but no active complaints      Medications:  MEDICATIONS  (STANDING):  ALBUTerol/ipratropium for Nebulization 3 milliLiter(s) Nebulizer every 6 hours  diltiazem    Tablet 60 milliGRAM(s) Oral every 8 hours  ertapenem  IVPB 500 milliGRAM(s) IV Intermittent every 24 hours  furosemide   Injectable 40 milliGRAM(s) IV Push daily  haloperidol     Tablet 0.5 milliGRAM(s) Oral once  immune globulin gamma IVPB 17.4 Gram(s) IV Intermittent daily  pantoprazole  Injectable 40 milliGRAM(s) IV Push daily  predniSONE   Tablet 40 milliGRAM(s) Oral daily  sucralfate suspension 1 Gram(s) Oral Before meals and at bedtime  zinc sulfate 220 milliGRAM(s) Oral daily    MEDICATIONS  (PRN):  acetaminophen   Tablet. 650 milliGRAM(s) Oral once PRN Moderate Pain (4 - 6)    	    PHYSICAL EXAM:  T(C): 36.6 (10-05-17 @ 15:55), Max: 37.2 (10-04-17 @ 21:03)  HR: 80 (10-05-17 @ 15:55) (72 - 97)  BP: 122/72 (10-05-17 @ 15:55) (105/61 - 122/72)  RR: 18 (10-05-17 @ 15:55) (18 - 20)  SpO2: 98% (10-05-17 @ 15:55) (94% - 100%)  Wt(kg): --  I&O's Summary    04 Oct 2017 07:01  -  05 Oct 2017 07:00  --------------------------------------------------------  IN: 580 mL / OUT: 1450 mL / NET: -870 mL    05 Oct 2017 07:01  -  05 Oct 2017 19:35  --------------------------------------------------------  IN: 0 mL / OUT: 500 mL / NET: -500 mL        Appearance: Frail	  HEENT:   NCAT, PERRL, EOMI	  Lymphatic: No lymphadenopathy  Cardiovascular: Normal S1 S2, RRR  Respiratory: CTA BL  Psychiatry: A & O x 3, Mood & affect appropriate  Gastrointestinal:  Soft, Non-tender, + BS  Skin: No rashes, No ecchymoses, No cyanosis	  Neurologic: Non-focal  Extremities: Normal range of motion, No clubbing, cyanosis or edema    	  LABS:	 	    CARDIAC MARKERS:                                9.6    16.6  )-----------( 18       ( 05 Oct 2017 16:21 )             29.1     10-05    139  |  87<L>  |  52<H>  ----------------------------<  124<H>  3.2<L>   |  37<H>  |  1.49<H>    Ca    9.1      05 Oct 2017 07:17  Mg     2.1     10-05      proBNP:   Lipid Profile:   HgA1c:   TSH:

## 2017-10-05 NOTE — PROGRESS NOTE ADULT - PROBLEM SELECTOR PLAN 4
-Spoke with daughter via phone who states that at this time she would like to continue all medical management to treat pneumonia, platelets and has asked for swallow evaluation.  I offered family meeting to discuss goals of care in this frail elderly patient, daughter responded that the phone call was the goals of care conversation. Please reconsult as needed.  Thank you

## 2017-10-05 NOTE — ADVANCED PRACTICE NURSE CONSULT - RECOMMEDATIONS
will recommend the followin. RLE:  Apply Cavilon to the hematoma. follow with Adaptic then abdominal pad and secure with Spandage; change secondary dressing daily- Adaptic may stay in place x 5 days.  2. Routine pericare with Elliott  3. Sween 24 cream to dry skin daily to moisturize  4. Avoid adhesives on pts skin  5. continue with turning and positioning  6. nutrition support as pt condition allows.  Tx plan discussed with RN

## 2017-10-05 NOTE — PROGRESS NOTE ADULT - PROBLEM SELECTOR PLAN 1
keep o2 sat >=88% with 5lnc  titrate down fio2 to keep o2 sat>=88%  DC high flow  change bipap 16/5 40% qhs prn  check VBG today  pt has been off bipap since yesterday keep o2 sat >=88% with 5lnc  titrate down fio2 to keep o2 sat>=88%  DC high flow  change bipap 16/5 40% qhs prn  check VBG today  pt has been off bipap since yesterday  d/w pt daughter at bed side keep o2 sat >=88% with 5lnc  titrate down fio2 to keep o2 sat>=88%  DC high flow  change bipap 16/5 40% qhs and  prn  check VBG today  pt has been off bipap since yesterday  d/w pt daughter at bed side

## 2017-10-05 NOTE — PROGRESS NOTE ADULT - SUBJECTIVE AND OBJECTIVE BOX
SUBJ: patient without complaints of chest pain or plapitations, off bipap, onto NC overnight    MEDICATIONS  (STANDING):  ALBUTerol/ipratropium for Nebulization 3 milliLiter(s) Nebulizer every 6 hours  diltiazem    Tablet 60 milliGRAM(s) Oral every 8 hours  ertapenem  IVPB 500 milliGRAM(s) IV Intermittent every 24 hours  furosemide   Injectable 40 milliGRAM(s) IV Push daily  haloperidol     Tablet 0.5 milliGRAM(s) Oral once  pantoprazole  Injectable 40 milliGRAM(s) IV Push daily  predniSONE   Tablet 40 milliGRAM(s) Oral daily  sucralfate suspension 1 Gram(s) Oral Before meals and at bedtime  zinc sulfate 220 milliGRAM(s) Oral daily    MEDICATIONS  (PRN):  acetaminophen   Tablet. 650 milliGRAM(s) Oral once PRN Moderate Pain (4 - 6)    REVIEW OF SYSTEMS:  CONSTITUTIONAL: No fever, or chills  EYES: No visual disturbances  RESPIRATORY: + SOB, + cough, no wheeze  CARDIOVASCULAR: No chest pain, palpitations, dizziness, or leg swelling  GASTROINTESTINAL: No abdominal or epigastric pain. No nausea, vomiting, or hematemesis; No diarrhea or constipation. NEUROLOGICAL: No headaches, memory loss, loss of strength, numbness, or tremors  SKIN: No itching, burning, rashes, + echymosis      Vital Signs Last 24 Hrs  T(C): 36.4 (05 Oct 2017 05:28), Max: 37.4 (04 Oct 2017 15:05)  T(F): 97.5 (05 Oct 2017 05:28), Max: 99.3 (04 Oct 2017 15:05)  HR: 75 (05 Oct 2017 06:00) (69 - 96)  BP: 121/60 (05 Oct 2017 05:28) (105/61 - 129/75)  BP(mean): --  RR: 20 (05 Oct 2017 05:28) (16 - 20)  SpO2: 97% (05 Oct 2017 06:00) (94% - 100%)    PHYSICAL EXAM:  · CONSTITUTIONAL: elderly, frail F, sitting in bed with NC  · EYES: no drainage or redness  · NECK: No bruits; no JVD  ·RESPIRATORY:   airway patent; reduced br sounds bases,  respirations non-labored; clear to auscultation bilaterally; no chest wall tenderness; no intercostal retractions; no rales, rhonchi or wheeze  · CARDIOVASCULAR: regular rate and rhythm  no rub  3/6 ANGELES  . GASTROINTESTINAL:  no distention; no masses palpable; bowel sounds normal  · EXTREMITIES: No cyanosis, clubbing , no LE or sacral edema  · VASCULAR:  Equal and normal pulses (radial, femoral)  · SKIN: + ecchymosis; no rash  . LYMPH NODES: No lymphadedenopathy  · MUSCULOSKELETAL:  No calf tenderness  no joint swelling	    TTE: images reviewed  tele: [id number]    LABS:   CBC Full  -  ( 05 Oct 2017 07:21 )  WBC Count : 20.59 K/uL  Hemoglobin : 8.9 g/dL  Hematocrit : 24.8 %  Platelet Count - Automated : 2 K/uL  Mean Cell Volume : 87.6 fl  Mean Cell Hemoglobin : 31.4 pg  Mean Cell Hemoglobin Concentration : 35.9 gm/dL  Auto Neutrophil # : x  Auto Lymphocyte # : x  Auto Monocyte # : x  Auto Eosinophil # : x  Auto Basophil # : x  Auto Neutrophil % : x  Auto Lymphocyte % : x  Auto Monocyte % : x  Auto Eosinophil % : x  Auto Basophil % : x    10-05    139  |  87<L>  |  52<H>  ----------------------------<  124<H>  3.2<L>   |  37<H>  |  1.49<H>    Ca    9.1      05 Oct 2017 07:17  Mg     2.1     10-05    IMPRESSION AND PLAN:    A/P:  89y Female with AF/AFL (not on OAC), ITP (on prednisone), Severe AS, Mild MS,  HFpEF, HTN, Dementia, hx of recurrent aspiration PNA and possible COPD now admitted on 10/01/2017 with SOB and hypoxia thought to be multifactorial in nature (Aspiration PNA on ABX, COPD, HFpEF, valvular disease).    1. HFpEF - TTE performed this admission with normal LV/RV function, moderate diastolic dysfunction, progression in aortic stenosis to severe (PG 67 mmHg, MG 42 mmHg, SKYLA 0.9 cm2), mild MS, mild TR, mild pulmonary hypertension (RVSP 42 mmHg).  -Overall I suspect her SOB/Hypoxia is multifactorial in nature from COPD and HFpEF with severe aortic stenosis.  -Cardiac enzymes have trended down, (trop 0. 31-> 0.29 -> 0.25). Likely demand ischemia in the setting of acute on chronic CHF and CKD. Anticoagulation held in the setting of ITP.  Patient continues to be euvolemic on exam. , TTE with hyperdamic LV suggestive of underfilling  Recommend switching from Lasix 40 mg IV QD to Lasix 60 QD. Aim for net even at this point.  -Caution use with beta blockade given COPD, severe AS.  -Patient is DNR  -COPD management as per pulmonary.  -Cont ABX per ID for possible aspiration PNA.  --Not a candidate for BAV/TAVR at this time given profound thrombocytopenia     2. AFIB/AFL  -Off OAC due to ITP  -Cont Diltiazem 60 mg po Q8 for rate control    -Appreciate ID/Pulmonary/Nephrology input  -Patient is DNR    -Will follow along with you    Simeon Damian MD, PhD  Cardiology Attending  [phone]

## 2017-10-05 NOTE — ADVANCED PRACTICE NURSE CONSULT - REASON FOR CONSULT
Requested by staff to assess skin status: sacrum. staff is concerned that the pt has developed a deep tissue injury. PMH is noted:  89 yo F w/ hx of Afib/Aflutter (not on AC), diastolic heart failure, ITP on chronic prednisone, hx of pancreatic neoplasm? (family denies), moderate AS, HTN, dementia (baseline AAOx2), dysphagia (declined PEG tube), hx of recurrent aspiration pneumonia, ?COPD presents with shortness of breath. Patient lives alone and has 24-hr aide for 7-days a week. Patient was found today earlier to have shortness of breath and aide checked O2-saturation which was 70%. Prior to today, patient was fine per son at bedside. The patient has home O2-therapy for unclear reasons and was placed on 4-L NC and O2 saturation improved to 90%. In triage room patient had O2-saturation of 60s on room air. Unable to obtain further collateral information such as if patient was developing chest pain or CARRILLO, or orthopnea or leg swelling, fever/chills. Aide is not present at bedside.

## 2017-10-05 NOTE — SWALLOW BEDSIDE ASSESSMENT ADULT - SWALLOW EVAL: DIAGNOSIS
Chart reviewed. Case d/w RN Dinora, SHAYAN Singh and Pt's daughter. Pt to be scheduled for a modified barium swallow tomorrow, if respiratory/cardiac status stable and pt cooperative.

## 2017-10-05 NOTE — PROGRESS NOTE ADULT - ASSESSMENT
Admitted for shortness of breath, respiratory  failure possible secondary to aspiration  Hx of  ITP , platelets decreased from 23,000 to 9,000 since admitted.  Started a higher dose of steroids for pullmonary issue.  Should be adequate for treatment of ITP.  Fall in platelets could be related to consumption form an acute infection or a drug reaction (Piperacillin)  Platelet count yesterday 1,000. Given  a platelet transfusion.  CBC today pending.

## 2017-10-05 NOTE — PROGRESS NOTE ADULT - ASSESSMENT
frail elderly women with likely aspiration pna    now on invanz, but I dont think that zosyn caused low platlets  Probably ITP  to complete 8 day course of ab and dc.

## 2017-10-05 NOTE — PROGRESS NOTE ADULT - ASSESSMENT
89 yo F as above:  1. Acute respiratory failure - resp status improving, pt DNR/DNI, off BiPAP, now doing well on NC  2. Acute on chronic diast CHF - Lasix per Cardio, I/O  3. A fib - c/w ASA, Cardizem per Cardio  4. ITP - plts continue to drop, changed Zosyn to Ertapenem, transfuse plts as per Heme, start IVIG  5. Aspiration PNA - ID appreciated, c/w Ertapenem  6. NEWTON on CKD - per Renal  7. COPD - steroids and nebs per Pulm  8. Mechanical DVT prophylaxis  9. Suspected dysphagia - swallow eval whern daughter at bedside

## 2017-10-05 NOTE — PROGRESS NOTE ADULT - SUBJECTIVE AND OBJECTIVE BOX
HPI:  Hx obtained from HCP son & daughter who are present at bedside. Patient unable to provide hx 2/2 to advanced medical illnesses and AMS.    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 (family denies), moderate AS, HTN, dementia (baseline AAOx2), dysphagia (declined PEG tube), hx of recurrent aspiration pneumonia, ?COPD presents with shortness of breath. Patient lives alone and has 24-hr aide for 7-days a week. Patient was found today earlier to have shortness of breath and aide checked O2-saturation which was 70%. Prior to today, patient was fine per son at bedside. The patient has home O2-therapy for unclear reasons and was placed on 4-L NC and O2 saturation improved to 90%. In triage room patient had O2-saturation of 60s on room air. Unable to obtain further collateral information such as if patient was developing chest pain or CARRILLO, or orthopnea or leg swelling, fever/chills. Aide is not present at bedside.     In ED: VS T 99.1, HR 70-80, 130/59, 16, 98% on BIPAP 14/6, 40% (01 Oct 2017 22:34)      PERTINENT PM/SXH:   PNA (pneumonia)  Dysphagia  Thrombocytopenia  Atrial fibrillation and flutter  Respiratory failure  Cataract  Small bowel obstruction  HTN - Hypertension    S/P cholecystectomy  H/O: Hysterectomy  S/P Small Bowel Resection    SOCIAL HISTORY:   Significant other/partner:  [ ] YES  [x ] NO               Children:  [ x] YES  [ ] NO                   Congregational/Spirituality: Yazidism  Substance hx:  [ ] YES   x[ ] NO                   Tobacco hx:  [ ] YES  [x ] NO                       Alcohol hx: [ ] YES  [ x] NO         Home Opioid hx:  [ ] YES  [x ] NO   Living Situation: x[ ] Home with 24 hour home aides  [ ] Long term care  [ ] Rehab [ ] Other    FAMILY HISTORY:  No pertinent family history in first degree relatives    [x ] Family history non-contributory     BASELINE (I)ADLs (prior to admission):  [place]: [ ] total  [ ] moderate [ x] dependent    ADVANCE DIRECTIVES:    DNR Yes    MOLST  [ ] YES [x ] NO                      [ ] Completed  Health Care Proxy [ ] YES  [ x] NO   [ ] Completed  Living Will  [ ] YES [x] NO             [ x] Surrogate  [ ] HCP  [ ] Guardian:     ANTWAN 9DAUGHTER) [phone]                                                             Phone#:    Allergies    eggs (Rash)  no drug allergy (Unknown)    Intolerances    vinegar sensitivity    family states that the patient shakes when she ingests vinegar (Other)      MEDICATIONS  (STANDING):  ALBUTerol/ipratropium for Nebulization 3 milliLiter(s) Nebulizer every 6 hours  diltiazem    Tablet 60 milliGRAM(s) Oral every 8 hours  ertapenem  IVPB 500 milliGRAM(s) IV Intermittent every 24 hours  furosemide   Injectable 40 milliGRAM(s) IV Push daily  haloperidol     Tablet 0.5 milliGRAM(s) Oral once  immune globulin gamma IVPB 17.4 Gram(s) IV Intermittent daily  pantoprazole  Injectable 40 milliGRAM(s) IV Push daily  predniSONE   Tablet 40 milliGRAM(s) Oral daily  sucralfate suspension 1 Gram(s) Oral Before meals and at bedtime  zinc sulfate 220 milliGRAM(s) Oral daily    MEDICATIONS  (PRN):  acetaminophen   Tablet. 650 milliGRAM(s) Oral once PRN Moderate Pain (4 - 6)      PRESENT SYMPTOMS:  Source: [ ] Patient   [ ] Family   [ ] Team     Pain:                        [ ] No [ ] Yes             [ ] Mild [ ] Moderate [ ] Severe    Onset -  Location -  Duration -  Character -  Alleviating/Aggravating -  Radiation -  Timing -      Dyspnea:                [ ] No [ ] Yes             [ ] Mild [ ] Moderate [ ] Severe    Anxiety:                  [ ] No [ ] Yes             [ ] Mild [ ] Moderate [ ] Severe    Fatigue:                  [ ] No [ ] Yes             [ ] Mild [ ] Moderate [ ] Severe    Nausea:                  [ ] No [ ] Yes             [ ] Mild [ ] Moderate [ ] Severe    Loss of appetite:   [ ] No [ ] Yes             [ ] Mild [ ] Moderate [ ] Severe    Constipation:        [ ] No [ ] Yes             [ ] Mild [ ] Moderate [ ] Severe    Other Symptoms:  [ ] All other review of systems negative   [X ] Unable to obtain due to poor mentation     Karnofsky Performance Score/Palliative Performance Status Version 2:    20     %    PHYSICAL EXAM:  Vital Signs Last 24 Hrs  T(C): 36.4 (05 Oct 2017 05:28), Max: 37.4 (04 Oct 2017 15:05)  T(F): 97.5 (05 Oct 2017 05:28), Max: 99.3 (04 Oct 2017 15:05)  HR: 83 (05 Oct 2017 11:22) (69 - 96)  BP: 121/60 (05 Oct 2017 05:28) (105/61 - 129/75)  BP(mean): --  RR: 20 (05 Oct 2017 05:28) (16 - 20)  SpO2: 99% (05 Oct 2017 11:22) (94% - 100%) I&O's Summary    04 Oct 2017 07:01  -  05 Oct 2017 07:00  --------------------------------------------------------  IN: 580 mL / OUT: 1450 mL / NET: -870 mL        General:  [X ] Alert  [ ] Oriented x      [ ] Lethargic  [ ] Agitated   [ X] Cachexia   [ ] Unarousable  [ ] Verbal  [ X] Non-Verbal    HEENT:  [ ] Normal   [ X] Dry mouth   [ ] ET Tube    [ ] Trach  [ ] Oral lesions    Lungs: UNABLE TO ASSESS: AGITATED  [ ] Clear [ ] Tachypnea  [ ] Audible excessive secretions   [ ] Rhonchi        [ ] Right [ ] Left [ ] Bilateral  [ ] Crackles        [ ] Right [ ] Left [ ] Bilateral  [ ] Wheezing     [ ] Right [ ] Left [ ] Bilateral    Cardiovascular:  [ ] Regular [ ] Irregular [ ] Tachycardia   [ ] Bradycardia  [ ] Murmur [ ] Other  UNABLE TO ASSESS : BECAME AGITATED    Abdomen: [ ] Soft  [ ] Distended   [ ] +BS  [ ] Non tender [ ] Tender  [ ]PEG   [ ]OGT/ NGT   Last BM:       Genitourinary: [ ] Normal [ X] Incontinent   [ ] Oliguria/Anuria   [ ] Felipe    Musculoskeletal:  [ ] Normal   [ ] Weakness  [X ] Bedbound/Wheelchair bound [ ] Edema  B/L UE   LE NOT EVALUATED 2/2 AGITATION    Neurological: [ ] No focal deficits  [X ] Cognitive impairment  [ ] Dysphagia [ ] Dysarthria [ ] Paresis [ ] Other     Skin: [ ] Normal   [ ] Pressure ulcer(s)                  [ ] Rash  DID NOT EVALUATE SACRUM ; UPPER EXTREMITIES WITH NOTED ECCHYMOSIS AND EDEMA +3 TO B/L HANDS    LABS:                        8.9    20.59 )-----------( 2        ( 05 Oct 2017 07:21 )             24.8     10-05    139  |  87<L>  |  52<H>  ----------------------------<  124<H>  3.2<L>   |  37<H>  |  1.49<H>    Ca    9.1      05 Oct 2017 07:17  Mg     2.1     10-05            Shock: [X ] Septic [ ] Cardiogenic [ ] Neurologic [ ] Hypovolemic  Vasopressors x   Inotrophs x     Protein Calorie Malnutrition: [ ] Mild [ ] Moderate [ X] Severe    Oral Intake: [X ] Unable/mouth care only [ ] Minimal [ ] Moderate [ ] Full Capability  Diet: [ ] NPO [ ] Tube feeds [ ] TPN [ ] Other     RADIOLOGY & ADDITIONAL STUDIES:    REFERRALS:   [ ] Chaplaincy  [ ] Hospice  [ ] Child Life  [ ] Social Work  [ ] Case management [ ] Holistic Therapy

## 2017-10-05 NOTE — PROGRESS NOTE ADULT - SUBJECTIVE AND OBJECTIVE BOX
Follow-up Pulm Progress Note    did not wear bipap last nigiht    Medications:  MEDICATIONS  (STANDING):  ALBUTerol/ipratropium for Nebulization 3 milliLiter(s) Nebulizer every 6 hours  diltiazem    Tablet 60 milliGRAM(s) Oral every 8 hours  ertapenem  IVPB 500 milliGRAM(s) IV Intermittent every 24 hours  furosemide   Injectable 40 milliGRAM(s) IV Push daily  haloperidol     Tablet 0.5 milliGRAM(s) Oral once  pantoprazole  Injectable 40 milliGRAM(s) IV Push daily  predniSONE   Tablet 40 milliGRAM(s) Oral daily  sucralfate suspension 1 Gram(s) Oral Before meals and at bedtime  zinc sulfate 220 milliGRAM(s) Oral daily    MEDICATIONS  (PRN):  acetaminophen   Tablet. 650 milliGRAM(s) Oral once PRN Moderate Pain (4 - 6)          Vital Signs Last 24 Hrs  T(C): 36.4 (05 Oct 2017 05:28), Max: 37.4 (04 Oct 2017 15:05)  T(F): 97.5 (05 Oct 2017 05:28), Max: 99.3 (04 Oct 2017 15:05)  HR: 75 (05 Oct 2017 06:00) (69 - 96)  BP: 121/60 (05 Oct 2017 05:28) (105/61 - 129/75)  BP(mean): --  RR: 20 (05 Oct 2017 05:28) (16 - 20)  SpO2: 97% (05 Oct 2017 06:00) (94% - 100%)      VBG pH 7.34 10-04 @ 20:51    VBG pCO2 78 10-04 @ 20:51    VBG O2 sat 79 10-04 @ 20:51    VBG lactate -- 10-04 @ 20:51  VBG pH 7.36 10-04 @ 05:16    VBG pCO2 68 10-04 @ 05:16    VBG O2 sat 86 10-04 @ 05:16    VBG lactate 1.2 10-04 @ 05:16  VBG pH 7.26 10-03 @ 14:25    VBG pCO2 78 10-03 @ 14:25    VBG O2 sat 78 10-03 @ 14:25    VBG lactate 2.3 10-03 @ 14:25      10-04 @ 07:01  -  10-05 @ 07:00  --------------------------------------------------------  IN: 580 mL / OUT: 1450 mL / NET: -870 mL          LABS:                        9.0    18.63 )-----------( 1        ( 04 Oct 2017 07:05 )             28.2     10-05    139  |  87<L>  |  52<H>  ----------------------------<  124<H>  3.2<L>   |  37<H>  |  1.49<H>    Ca    9.1      05 Oct 2017 07:17  Mg     2.1     10-05            CAPILLARY BLOOD GLUCOSE            Procalcitonin, Serum: 0.34 ng/mL (10-02-17 @ 14:10)                  CULTURES: (if applicable)  Culture Results:   No growth to date. (10-03 @ 11:19)  Culture Results:   No growth to date. (10-03 @ 11:19)  Culture Results:   No growth (10-02 @ 06:42)    Most recent blood culture -- 10-03 @ 11:19   -- -- .Blood Blood-Peripheral 10-03 @ 11:19  Most recent blood culture -- 10-02 @ 06:42   -- -- .Urine Clean Catch (Midstream) 10-02 @ 06:42        Physical Examination:  PULM: decreased bs bilaterally, no significant sputum production  CVS: S1, S2 heard    RADIOLOGY REVIEWED  CXR: < from: Xray Chest 1 View AP- PORTABLE-Urgent (10.01.17 @ 16:46) >  IMPRESSION:     Limited evaluation secondary to rotation.    There are bilateral hazy interstitial opacities predominantly in the   right middle to lower lung lobe and the visualized left lung field which   may be consistent with the prior ground glass opacities seen in the prior   CT chest from June 26, 2017. Changes compatible with congestive heart   failure.    Trace bilateral pleural effusions.    Scoliosis of the spine.    < end of copied text >      CT chest:    TTE:< from: Transthoracic Echocardiogram (10.04.17 @ 23:22) >  consistent with mild pulmonary hypertension.  ------------------------------------------------------------------------  Conclusions:  1. Mitral annular calcification and calcified mitral  leaflets.  2. Calcified trileaflet aortic valve with decreased  opening. Peak transaortic valve gradient equals 67 mm Hg,  mean transaortic valve gradient equals 42 mm Hg, estimated  aortic valve area equals 0.9 sqcm (by continuity equation),  aortic valve velocity time integral equals 96 cm,  consistent with severe aortic stenosis.  3. Mid septal hypertrophy.  4. Hyperdynamic left ventricle.  5. Moderate diastolic dysfunction (Stage II).  6. Left pleural effusion.  *** Compared with echocardiogram of 6/26/2017, trans aortic  gradients have increased.    < end of copied text > Follow-up Pulm Progress Note    did not wear bipap last night, vbg=    Medications:  MEDICATIONS  (STANDING):  ALBUTerol/ipratropium for Nebulization 3 milliLiter(s) Nebulizer every 6 hours  diltiazem    Tablet 60 milliGRAM(s) Oral every 8 hours  ertapenem  IVPB 500 milliGRAM(s) IV Intermittent every 24 hours  furosemide   Injectable 40 milliGRAM(s) IV Push daily  haloperidol     Tablet 0.5 milliGRAM(s) Oral once  pantoprazole  Injectable 40 milliGRAM(s) IV Push daily  predniSONE   Tablet 40 milliGRAM(s) Oral daily  sucralfate suspension 1 Gram(s) Oral Before meals and at bedtime  zinc sulfate 220 milliGRAM(s) Oral daily    MEDICATIONS  (PRN):  acetaminophen   Tablet. 650 milliGRAM(s) Oral once PRN Moderate Pain (4 - 6)          Vital Signs Last 24 Hrs  T(C): 36.4 (05 Oct 2017 05:28), Max: 37.4 (04 Oct 2017 15:05)  T(F): 97.5 (05 Oct 2017 05:28), Max: 99.3 (04 Oct 2017 15:05)  HR: 75 (05 Oct 2017 06:00) (69 - 96)  BP: 121/60 (05 Oct 2017 05:28) (105/61 - 129/75)  BP(mean): --  RR: 20 (05 Oct 2017 05:28) (16 - 20)  SpO2: 97% (05 Oct 2017 06:00) (94% - 100%)      VBG pH 7.34 10-04 @ 20:51    VBG pCO2 78 10-04 @ 20:51    VBG O2 sat 79 10-04 @ 20:51    VBG lactate -- 10-04 @ 20:51  VBG pH 7.36 10-04 @ 05:16    VBG pCO2 68 10-04 @ 05:16    VBG O2 sat 86 10-04 @ 05:16    VBG lactate 1.2 10-04 @ 05:16  VBG pH 7.26 10-03 @ 14:25    VBG pCO2 78 10-03 @ 14:25    VBG O2 sat 78 10-03 @ 14:25    VBG lactate 2.3 10-03 @ 14:25      10-04 @ 07:01  -  10-05 @ 07:00  --------------------------------------------------------  IN: 580 mL / OUT: 1450 mL / NET: -870 mL          LABS:                        9.0    18.63 )-----------( 1        ( 04 Oct 2017 07:05 )             28.2     10-05    139  |  87<L>  |  52<H>  ----------------------------<  124<H>  3.2<L>   |  37<H>  |  1.49<H>    Ca    9.1      05 Oct 2017 07:17  Mg     2.1     10-05            CAPILLARY BLOOD GLUCOSE            Procalcitonin, Serum: 0.34 ng/mL (10-02-17 @ 14:10)                  CULTURES: (if applicable)  Culture Results:   No growth to date. (10-03 @ 11:19)  Culture Results:   No growth to date. (10-03 @ 11:19)  Culture Results:   No growth (10-02 @ 06:42)    Most recent blood culture -- 10-03 @ 11:19   -- -- .Blood Blood-Peripheral 10-03 @ 11:19  Most recent blood culture -- 10-02 @ 06:42   -- -- .Urine Clean Catch (Midstream) 10-02 @ 06:42        Physical Examination:  PULM: decreased bs bilaterally, no significant sputum production  CVS: S1, S2 heard    RADIOLOGY REVIEWED  CXR: < from: Xray Chest 1 View AP- PORTABLE-Urgent (10.01.17 @ 16:46) >  IMPRESSION:     Limited evaluation secondary to rotation.    There are bilateral hazy interstitial opacities predominantly in the   right middle to lower lung lobe and the visualized left lung field which   may be consistent with the prior ground glass opacities seen in the prior   CT chest from June 26, 2017. Changes compatible with congestive heart   failure.    Trace bilateral pleural effusions.    Scoliosis of the spine.    < end of copied text >      CT chest:    TTE:< from: Transthoracic Echocardiogram (10.04.17 @ 23:22) >  consistent with mild pulmonary hypertension.  ------------------------------------------------------------------------  Conclusions:  1. Mitral annular calcification and calcified mitral  leaflets.  2. Calcified trileaflet aortic valve with decreased  opening. Peak transaortic valve gradient equals 67 mm Hg,  mean transaortic valve gradient equals 42 mm Hg, estimated  aortic valve area equals 0.9 sqcm (by continuity equation),  aortic valve velocity time integral equals 96 cm,  consistent with severe aortic stenosis.  3. Mid septal hypertrophy.  4. Hyperdynamic left ventricle.  5. Moderate diastolic dysfunction (Stage II).  6. Left pleural effusion.  *** Compared with echocardiogram of 6/26/2017, trans aortic  gradients have increased.    < end of copied text > Follow-up Pulm Progress Note    did not wear bipap last night    Medications:  MEDICATIONS  (STANDING):  ALBUTerol/ipratropium for Nebulization 3 milliLiter(s) Nebulizer every 6 hours  diltiazem    Tablet 60 milliGRAM(s) Oral every 8 hours  ertapenem  IVPB 500 milliGRAM(s) IV Intermittent every 24 hours  furosemide   Injectable 40 milliGRAM(s) IV Push daily  haloperidol     Tablet 0.5 milliGRAM(s) Oral once  pantoprazole  Injectable 40 milliGRAM(s) IV Push daily  predniSONE   Tablet 40 milliGRAM(s) Oral daily  sucralfate suspension 1 Gram(s) Oral Before meals and at bedtime  zinc sulfate 220 milliGRAM(s) Oral daily    MEDICATIONS  (PRN):  acetaminophen   Tablet. 650 milliGRAM(s) Oral once PRN Moderate Pain (4 - 6)          Vital Signs Last 24 Hrs  T(C): 36.4 (05 Oct 2017 05:28), Max: 37.4 (04 Oct 2017 15:05)  T(F): 97.5 (05 Oct 2017 05:28), Max: 99.3 (04 Oct 2017 15:05)  HR: 75 (05 Oct 2017 06:00) (69 - 96)  BP: 121/60 (05 Oct 2017 05:28) (105/61 - 129/75)  BP(mean): --  RR: 20 (05 Oct 2017 05:28) (16 - 20)  SpO2: 97% (05 Oct 2017 06:00) (94% - 100%)      VBG pH 7.34 10-04 @ 20:51    VBG pCO2 78 10-04 @ 20:51    VBG O2 sat 79 10-04 @ 20:51    VBG lactate -- 10-04 @ 20:51  VBG pH 7.36 10-04 @ 05:16    VBG pCO2 68 10-04 @ 05:16    VBG O2 sat 86 10-04 @ 05:16    VBG lactate 1.2 10-04 @ 05:16  VBG pH 7.26 10-03 @ 14:25    VBG pCO2 78 10-03 @ 14:25    VBG O2 sat 78 10-03 @ 14:25    VBG lactate 2.3 10-03 @ 14:25      10-04 @ 07:01  -  10-05 @ 07:00  --------------------------------------------------------  IN: 580 mL / OUT: 1450 mL / NET: -870 mL          LABS:                        9.0    18.63 )-----------( 1        ( 04 Oct 2017 07:05 )             28.2     10-05    139  |  87<L>  |  52<H>  ----------------------------<  124<H>  3.2<L>   |  37<H>  |  1.49<H>    Ca    9.1      05 Oct 2017 07:17  Mg     2.1     10-05            CAPILLARY BLOOD GLUCOSE            Procalcitonin, Serum: 0.34 ng/mL (10-02-17 @ 14:10)      CULTURES: (if applicable)  Culture Results:   No growth to date. (10-03 @ 11:19)  Culture Results:   No growth to date. (10-03 @ 11:19)  Culture Results:   No growth (10-02 @ 06:42)    Most recent blood culture -- 10-03 @ 11:19   -- -- .Blood Blood-Peripheral 10-03 @ 11:19  Most recent blood culture -- 10-02 @ 06:42   -- -- .Urine Clean Catch (Midstream) 10-02 @ 06:42        Physical Examination:  PULM: decreased bs bilaterally, no significant sputum production  CVS: S1, S2 heard    RADIOLOGY REVIEWED  CXR: < from: Xray Chest 1 View AP- PORTABLE-Urgent (10.01.17 @ 16:46) >  IMPRESSION:     Limited evaluation secondary to rotation.    There are bilateral hazy interstitial opacities predominantly in the   right middle to lower lung lobe and the visualized left lung field which   may be consistent with the prior ground glass opacities seen in the prior   CT chest from June 26, 2017. Changes compatible with congestive heart   failure.    Trace bilateral pleural effusions.    Scoliosis of the spine.    < end of copied text >      CT chest:    TTE:< from: Transthoracic Echocardiogram (10.04.17 @ 23:22) >  consistent with mild pulmonary hypertension.  ------------------------------------------------------------------------  Conclusions:  1. Mitral annular calcification and calcified mitral  leaflets.  2. Calcified trileaflet aortic valve with decreased  opening. Peak transaortic valve gradient equals 67 mm Hg,  mean transaortic valve gradient equals 42 mm Hg, estimated  aortic valve area equals 0.9 sqcm (by continuity equation),  aortic valve velocity time integral equals 96 cm,  consistent with severe aortic stenosis.  3. Mid septal hypertrophy.  4. Hyperdynamic left ventricle.  5. Moderate diastolic dysfunction (Stage II).  6. Left pleural effusion.  *** Compared with echocardiogram of 6/26/2017, trans aortic  gradients have increased.    < end of copied text >

## 2017-10-05 NOTE — PROGRESS NOTE ADULT - PROBLEM SELECTOR PLAN 1
Monitor CBC/platelets, BC today  result pending.  Continue  steroids, consider lowering dose  If platelets do not improving would will give a course of IV gamma globulins 0.4gm/KG daily x 5 days.  use an alternative non penicillin related antibiotic,  If platelets do not improve over the next day , will add IV gammaglobulins  Would hold on platlelet trans fusions unless  less than 5,000 or overt bleeding.

## 2017-10-05 NOTE — PROGRESS NOTE ADULT - SUBJECTIVE AND OBJECTIVE BOX
Pt is seen and examined  pt is awake and lying in bed  pt seems comfortable and denies any complaints at this time  No excessive bleeding or increased bruising noted  Antibiotics changed, given  platelet transfsuion for  cxoount of 1,000.        MEDICATIONS  (STANDING):  ALBUTerol/ipratropium for Nebulization 3 milliLiter(s) Nebulizer every 6 hours  diltiazem    Tablet 60 milliGRAM(s) Oral every 8 hours  ertapenem  IVPB 500 milliGRAM(s) IV Intermittent every 24 hours  furosemide   Injectable 40 milliGRAM(s) IV Push daily  haloperidol     Tablet 0.5 milliGRAM(s) Oral once  pantoprazole  Injectable 40 milliGRAM(s) IV Push daily  predniSONE   Tablet 40 milliGRAM(s) Oral daily  sucralfate suspension 1 Gram(s) Oral Before meals and at bedtime  zinc sulfate 220 milliGRAM(s) Oral daily    MEDICATIONS  (PRN):  acetaminophen   Tablet. 650 milliGRAM(s) Oral once PRN Moderate Pain (4 - 6)      Allergies    eggs (Rash)  no drug allergy (Unknown)    Intolerances    vinegar sensitivity    family states that the patient shakes when she ingests vinegar (Other)      Vital Signs Last 24 Hrs  T(C): 36.4 (05 Oct 2017 05:28), Max: 37.4 (04 Oct 2017 15:05)  T(F): 97.5 (05 Oct 2017 05:28), Max: 99.3 (04 Oct 2017 15:05)  HR: 75 (05 Oct 2017 06:00) (69 - 96)  BP: 121/60 (05 Oct 2017 05:28) (105/61 - 129/75)  BP(mean): --  RR: 20 (05 Oct 2017 05:28) (16 - 20)  SpO2: 97% (05 Oct 2017 06:00) (94% - 100%)    PHYSICAL EXAM  General: adult in NAD  HEENT: clear oropharynx, anicteric sclera, pink conjunctiva  Neck: supple  CV: normal S1/S2 with no murmur rubs or gallops  Lungs: positive air movement b/l ant lungs, clear to auscultation, no wheezes, no rales  Abdomen: soft non-tender non-distended, no hepatosplenomegaly  Ext: no clubbing cyanosis or edema  Skin: no rashes and no petechiae  Neuro: alert and oriented X 4, no focal deficits  LABS:                        9.0    18.63 )-----------( 1        ( 04 Oct 2017 07:05 )             28.2     Mean Cell Volume : 83.4 fl  Mean Cell Hemoglobin : 26.6 pg  Mean Cell Hemoglobin Concentration : 31.9 gm/dL  Auto Neutrophil # : x  Auto Lymphocyte # : x  Auto Monocyte # : x  Auto Eosinophil # : x  Auto Basophil # : x  Auto Neutrophil % : x  Auto Lymphocyte % : x  Auto Monocyte % : x  Auto Eosinophil % : x  Auto Basophil % : x    10-05    139  |  87<L>  |  52<H>  ----------------------------<  124<H>  3.2<L>   |  37<H>  |  1.49<H>    Ca    9.1      05 Oct 2017 07:17  Mg     2.1     10-05                                    BLOOD SMEAR INTERPRETATION:       RADIOLOGY & ADDITIONAL STUDIES:

## 2017-10-05 NOTE — PROGRESS NOTE ADULT - PROBLEM SELECTOR PLAN 6
keep o2 sat >=88% with 5lnc, titrate oxygen down to keep o2 sat>=88%  DC high flow  bipap qhs prn  check vbg later today  pred 40 mg po daily x 3 days beginning 10/4  then resume pt pred 5 mg for ITP therapy  c/w gita keep o2 sat >=88% with 5lnc, titrate oxygen down to keep o2 sat>=88%  DC high flow  bipap qhs and prn  check vbg later today  pred 40 mg po daily x 3 days beginning 10/4  then resume pt pred 5 mg for ITP therapy  c/w gita

## 2017-10-06 DIAGNOSIS — E87.3 ALKALOSIS: ICD-10-CM

## 2017-10-06 LAB
ANION GAP SERPL CALC-SCNC: 6 MMOL/L — SIGNIFICANT CHANGE UP (ref 5–17)
BASE EXCESS BLDV CALC-SCNC: 20.5 MMOL/L — HIGH (ref -2–2)
BUN SERPL-MCNC: 52 MG/DL — HIGH (ref 7–23)
CA-I SERPL-SCNC: 1.17 MMOL/L — SIGNIFICANT CHANGE UP (ref 1.12–1.3)
CALCIUM SERPL-MCNC: 9.3 MG/DL — SIGNIFICANT CHANGE UP (ref 8.4–10.5)
CHLORIDE BLDV-SCNC: 84 MMOL/L — LOW (ref 96–108)
CHLORIDE SERPL-SCNC: 91 MMOL/L — LOW (ref 96–108)
CO2 BLDV-SCNC: 49 MMOL/L — HIGH (ref 22–30)
CO2 SERPL-SCNC: 47 MMOL/L — CRITICAL HIGH (ref 22–31)
CREAT SERPL-MCNC: 1.27 MG/DL — SIGNIFICANT CHANGE UP (ref 0.5–1.3)
GAS PNL BLDV: 141 MMOL/L — SIGNIFICANT CHANGE UP (ref 136–145)
GAS PNL BLDV: SIGNIFICANT CHANGE UP
GAS PNL BLDV: SIGNIFICANT CHANGE UP
GLUCOSE BLDV-MCNC: 216 MG/DL — HIGH (ref 70–99)
GLUCOSE SERPL-MCNC: 134 MG/DL — HIGH (ref 70–99)
HCO3 BLDV-SCNC: 47 MMOL/L — HIGH (ref 21–29)
HCT VFR BLD CALC: 23.8 % — LOW (ref 34.5–45)
HCT VFR BLDA CALC: 28 % — LOW (ref 39–50)
HGB BLD CALC-MCNC: 8.9 G/DL — LOW (ref 11.5–15.5)
HGB BLD-MCNC: 8.8 G/DL — LOW (ref 11.5–15.5)
LACTATE BLDV-MCNC: 2.6 MMOL/L — HIGH (ref 0.7–2)
MAGNESIUM SERPL-MCNC: 2.1 MG/DL — SIGNIFICANT CHANGE UP (ref 1.6–2.6)
MCHC RBC-ENTMCNC: 32.6 PG — SIGNIFICANT CHANGE UP (ref 27–34)
MCHC RBC-ENTMCNC: 37 GM/DL — HIGH (ref 32–36)
MCV RBC AUTO: 87.9 FL — SIGNIFICANT CHANGE UP (ref 80–100)
OTHER CELLS CSF MANUAL: 12 ML/DL — LOW (ref 18–22)
PCO2 BLDV: 67 MMHG — HIGH (ref 35–50)
PH BLDV: 7.46 — HIGH (ref 7.35–7.45)
PLATELET # BLD AUTO: 21 K/UL — LOW (ref 150–400)
PO2 BLDV: 168 MMHG — HIGH (ref 25–45)
POTASSIUM BLDV-SCNC: 2.9 MMOL/L — CRITICAL LOW (ref 3.5–5)
POTASSIUM SERPL-MCNC: 3.5 MMOL/L — SIGNIFICANT CHANGE UP (ref 3.5–5.3)
POTASSIUM SERPL-SCNC: 3.5 MMOL/L — SIGNIFICANT CHANGE UP (ref 3.5–5.3)
RBC # BLD: 2.71 M/UL — LOW (ref 3.8–5.2)
RBC # FLD: 15.2 % — HIGH (ref 10.3–14.5)
SAO2 % BLDV: 100 % — HIGH (ref 67–88)
SODIUM SERPL-SCNC: 144 MMOL/L — SIGNIFICANT CHANGE UP (ref 135–145)
WBC # BLD: 14.8 K/UL — HIGH (ref 3.8–10.5)
WBC # FLD AUTO: 14.8 K/UL — HIGH (ref 3.8–10.5)

## 2017-10-06 PROCEDURE — 99232 SBSQ HOSP IP/OBS MODERATE 35: CPT

## 2017-10-06 PROCEDURE — 74230 X-RAY XM SWLNG FUNCJ C+: CPT | Mod: 26

## 2017-10-06 RX ORDER — ACETAZOLAMIDE 250 MG/1
250 TABLET ORAL EVERY 8 HOURS
Qty: 0 | Refills: 0 | Status: DISCONTINUED | OUTPATIENT
Start: 2017-10-06 | End: 2017-10-06

## 2017-10-06 RX ORDER — POTASSIUM CHLORIDE 20 MEQ
40 PACKET (EA) ORAL ONCE
Qty: 0 | Refills: 0 | Status: COMPLETED | OUTPATIENT
Start: 2017-10-06 | End: 2017-10-06

## 2017-10-06 RX ORDER — ACETAZOLAMIDE 250 MG/1
250 TABLET ORAL EVERY 8 HOURS
Qty: 0 | Refills: 0 | Status: COMPLETED | OUTPATIENT
Start: 2017-10-06 | End: 2017-10-07

## 2017-10-06 RX ORDER — POTASSIUM CHLORIDE 20 MEQ
40 PACKET (EA) ORAL ONCE
Qty: 0 | Refills: 0 | Status: DISCONTINUED | OUTPATIENT
Start: 2017-10-06 | End: 2017-10-06

## 2017-10-06 RX ADMIN — Medication 1 GRAM(S): at 16:25

## 2017-10-06 RX ADMIN — Medication 1 GRAM(S): at 22:19

## 2017-10-06 RX ADMIN — Medication 3 MILLILITER(S): at 05:50

## 2017-10-06 RX ADMIN — ERTAPENEM SODIUM 100 MILLIGRAM(S): 1 INJECTION, POWDER, LYOPHILIZED, FOR SOLUTION INTRAMUSCULAR; INTRAVENOUS at 05:51

## 2017-10-06 RX ADMIN — Medication 3 MILLILITER(S): at 12:12

## 2017-10-06 RX ADMIN — ACETAZOLAMIDE 250 MILLIGRAM(S): 250 TABLET ORAL at 22:18

## 2017-10-06 RX ADMIN — Medication 40 MILLIGRAM(S): at 05:50

## 2017-10-06 RX ADMIN — Medication 29 GRAM(S): at 17:20

## 2017-10-06 RX ADMIN — Medication 40 MILLIEQUIVALENT(S): at 17:27

## 2017-10-06 RX ADMIN — Medication 1 GRAM(S): at 12:12

## 2017-10-06 RX ADMIN — Medication 1 GRAM(S): at 06:05

## 2017-10-06 RX ADMIN — PANTOPRAZOLE SODIUM 40 MILLIGRAM(S): 20 TABLET, DELAYED RELEASE ORAL at 12:12

## 2017-10-06 RX ADMIN — Medication 3 MILLILITER(S): at 17:27

## 2017-10-06 RX ADMIN — ZINC SULFATE TAB 220 MG (50 MG ZINC EQUIVALENT) 220 MILLIGRAM(S): 220 (50 ZN) TAB at 12:12

## 2017-10-06 RX ADMIN — Medication 3 MILLILITER(S): at 23:37

## 2017-10-06 NOTE — PROGRESS NOTE ADULT - SUBJECTIVE AND OBJECTIVE BOX
CHIEF COMPLAINT:Patient is a 89y old  Female who presents with a chief complaint of shortness of breath / hypoxic respiratory failure (01 Oct 2017 22:34)        Allergies:  eggs (Rash)  no drug allergy (Unknown)  vinegar sensitivity    family states that the patient shakes when she ingests vinegar (Other)      PAST MEDICAL & SURGICAL HISTORY:  PNA (pneumonia)  Dysphagia  Thrombocytopenia: ITP  Atrial fibrillation and flutter: No A/C  during hospitalization in april 2014  Respiratory failure: in april 2014 was intubated  Cataract: right eye  Small bowel obstruction: s/p resection in 2010  HTN - Hypertension  S/P cholecystectomy  H/O: Hysterectomy  S/P Small Bowel Resection      FAMILY HISTORY:  No pertinent family history in first degree relatives      REVIEW OF SYSTEMS:  UTO, but no active complaints      Medications:  MEDICATIONS  (STANDING):  acetazolamide Injectable 250 milliGRAM(s) IV Push every 8 hours  ALBUTerol/ipratropium for Nebulization 3 milliLiter(s) Nebulizer every 6 hours  diltiazem    Tablet 60 milliGRAM(s) Oral every 8 hours  ertapenem  IVPB 500 milliGRAM(s) IV Intermittent every 24 hours  haloperidol     Tablet 0.5 milliGRAM(s) Oral once  immune globulin gamma IVPB 17.4 Gram(s) IV Intermittent daily  pantoprazole  Injectable 40 milliGRAM(s) IV Push daily  sucralfate suspension 1 Gram(s) Oral Before meals and at bedtime  zinc sulfate 220 milliGRAM(s) Oral daily    MEDICATIONS  (PRN):  acetaminophen   Tablet. 650 milliGRAM(s) Oral once PRN Moderate Pain (4 - 6)    	    PHYSICAL EXAM:  T(C): 36.6 (10-06-17 @ 20:39), Max: 36.6 (10-06-17 @ 20:39)  HR: 84 (10-06-17 @ 20:39) (78 - 91)  BP: 144/76 (10-06-17 @ 20:39) (118/74 - 144/76)  RR: 18 (10-06-17 @ 20:39) (18 - 18)  SpO2: 97% (10-06-17 @ 20:39) (97% - 99%)  Wt(kg): --  I&O's Summary    05 Oct 2017 07:01  -  06 Oct 2017 07:00  --------------------------------------------------------  IN: 60 mL / OUT: 1550 mL / NET: -1490 mL    06 Oct 2017 07:01  -  06 Oct 2017 21:27  --------------------------------------------------------  IN: 400 mL / OUT: 1000 mL / NET: -600 mL        Appearance: Frail	  HEENT:   NCAT, PERRL, EOMI	  Lymphatic: No lymphadenopathy  Cardiovascular: Normal S1 S2, RRR  Respiratory: CTA BL  Psychiatry: A & O x 3, Mood & affect appropriate  Gastrointestinal:  Soft, Non-tender, + BS  Skin: No rashes, No ecchymoses, No cyanosis	  Neurologic: Non-focal  Extremities: Normal range of motion, No clubbing, cyanosis or edema  	  LABS:	 	    CARDIAC MARKERS:                                8.8    14.8  )-----------( 21       ( 06 Oct 2017 09:34 )             23.8     10-06    144  |  91<L>  |  52<H>  ----------------------------<  134<H>  3.5   |  47<HH>  |  1.27    Ca    9.3      06 Oct 2017 09:34  Mg     2.1     10-06      proBNP:   Lipid Profile:   HgA1c:   TSH:

## 2017-10-06 NOTE — PROGRESS NOTE ADULT - ASSESSMENT
89 yo F as above:  1. Acute respiratory failure - resp status improving, pt DNR/DNI, off BiPAP, now doing well on NC  2. Acute on chronic diast CHF - Lasix per Cardio, I/O  3. A fib - c/w ASA, Cardizem per Cardio  4. ITP - plts continue to drop, changed Zosyn to Ertapenem, now plt improving w/IVIG  5. Aspiration PNA - ID appreciated, c/w Ertapenem  6. NEWTON on CKD - per Renal  7. COPD - steroids and nebs per Pulm  8. Mechanical DVT prophylaxis  9. Dysphagia - puree diet with strict aspiration precautions

## 2017-10-06 NOTE — SWALLOW VFSS/MBS ASSESSMENT ADULT - ORAL PHASE
Delayed oral transit time/Reduced anterior - posterior transport/Residue in oral cavity/Incomplete tongue to palate contact/Uncontrolled bolus / spillover in julio-pharynx Delayed oral transit time/Residue in oral cavity/Reduced anterior - posterior transport/Incomplete tongue to palate contact Delayed oral transit time/Reduced anterior - posterior transport/Incomplete tongue to palate contact/Residue in oral cavity Reduced anterior - posterior transport/Residue in oral cavity/Delayed oral transit time/Incomplete tongue to palate contact Residue in oral cavity/Delayed oral transit time/Reduced anterior - posterior transport/Incomplete tongue to palate contact

## 2017-10-06 NOTE — PROGRESS NOTE ADULT - ASSESSMENT
89y Female admitted with resp failure  hx of hyponatremia on prev admissions and CKD  now with NEWTON,better  hypokalemia replete prn  po diuretic change per cv  hyponatremia resolved  monitor I&O's, electrolytes and renal function

## 2017-10-06 NOTE — SWALLOW VFSS/MBS ASSESSMENT ADULT - THE ABOVE FINDINGS WERE DISCUSSED WITH
Discussed at length with patient's daughter. D/w JOSSELINE Farias Discussed at length with patient's daughter. D/w JOSSELINE Farias and SHAYAN Hurtado Discussed at length with patient's daughter (reviewed that given swallow profile, . D/w JOSSELINE Farias and SHAYAN Hurtado Discussed at length with patient's daughter (reviewed that given swallow profile, suspect that dysphagia may get progressively worse over time, reviewed potential aspiration risk. Discussed other options including enteral feeds, but discussed risk of same. Family opting for oral feeds for QOL, given advanced age)  D/w JOSSELINE Farias and SHAYAN Hurtado

## 2017-10-06 NOTE — PROVIDER CONTACT NOTE (CRITICAL VALUE NOTIFICATION) - RECOMMENDATIONS
please note critical plts
Notify NP & c/o to monitor.
Continue bipap
Notify NP
Notify NP & c/o to monitor.
Notify NP. C/o to monitor.

## 2017-10-06 NOTE — SWALLOW VFSS/MBS ASSESSMENT ADULT - DIAGNOSTIC IMPRESSIONS
Patient presents with [id number] dysphagia with impaired oral management, delay in trigger of the swallow reflex, pharyngeal residue post swallow, discoordination of the swallow reflex for thin liquids, and impaired airway protection for thin liquids (with silent laryngeal penetration). Esophageal dysphagia was marked by the presence of a cricopharyngeal bar as well as distention of the lumen below this region and post swallow residue (please note that due to the physical constraints of testing, only the proximal esophagus could be visualized)

## 2017-10-06 NOTE — PROGRESS NOTE ADULT - SUBJECTIVE AND OBJECTIVE BOX
Follow-up Pulm Progress Note    No new respiratory events overnight.  Denies SOB/CP.   MBS results noted  97% on 4L NC    Medications:  MEDICATIONS  (STANDING):  ALBUTerol/ipratropium for Nebulization 3 milliLiter(s) Nebulizer every 6 hours  diltiazem    Tablet 60 milliGRAM(s) Oral every 8 hours  ertapenem  IVPB 500 milliGRAM(s) IV Intermittent every 24 hours  furosemide   Injectable 40 milliGRAM(s) IV Push daily  haloperidol     Tablet 0.5 milliGRAM(s) Oral once  immune globulin gamma IVPB 17.4 Gram(s) IV Intermittent daily  pantoprazole  Injectable 40 milliGRAM(s) IV Push daily  sucralfate suspension 1 Gram(s) Oral Before meals and at bedtime  zinc sulfate 220 milliGRAM(s) Oral daily    MEDICATIONS  (PRN):  acetaminophen   Tablet. 650 milliGRAM(s) Oral once PRN Moderate Pain (4 - 6)          Vital Signs Last 24 Hrs  T(C): 36.4 (06 Oct 2017 15:36), Max: 36.6 (05 Oct 2017 21:03)  T(F): 97.6 (06 Oct 2017 15:36), Max: 97.8 (05 Oct 2017 21:03)  HR: 91 (06 Oct 2017 15:36) (73 - 91)  BP: 128/64 (06 Oct 2017 15:36) (118/74 - 128/64)  BP(mean): --  RR: 18 (06 Oct 2017 15:36) (18 - 18)  SpO2: 99% (06 Oct 2017 15:36) (97% - 99%) on 4L NC      VBG pH 7.38 10-05 @ 10:44    VBG pCO2 78 10-05 @ 10:44    VBG O2 sat 48 10-05 @ 10:44    VBG lactate 3.1 10-05 @ 10:44  VBG pH 7.34 10-04 @ 20:51    VBG pCO2 78 10-04 @ 20:51    VBG O2 sat 79 10-04 @ 20:51    VBG lactate -- 10-04 @ 20:51      10-05 @ 07:01  -  10-06 @ 07:00  --------------------------------------------------------  IN: 60 mL / OUT: 1550 mL / NET: -1490 mL          LABS:                        8.8    14.8  )-----------( 21       ( 06 Oct 2017 09:34 )             23.8     10-06    144  |  91<L>  |  52<H>  ----------------------------<  134<H>  3.5   |  47<HH>  |  1.27    Ca    9.3      06 Oct 2017 09:34  Mg     2.1     10-06            CAPILLARY BLOOD GLUCOSE                            CULTURES: (if applicable)  Culture Results:   No growth to date. (10-03 @ 11:19)  Culture Results:   No growth to date. (10-03 @ 11:19)  Culture Results:   No growth (10-02 @ 06:42)    Most recent blood culture -- 10-03 @ 11:19   -- -- .Blood Blood-Peripheral 10-03 @ 11:19  Most recent blood culture -- 10-02 @ 06:42   -- -- .Urine Clean Catch (Midstream) 10-02 @ 06:42        Physical Examination:  PULM: Trace crackles at bases  CVS: S1, S2 heard    RADIOLOGY REVIEWED  CXR 10/5: Improved bibasilar opacities

## 2017-10-06 NOTE — PROGRESS NOTE ADULT - SUBJECTIVE AND OBJECTIVE BOX
Pt is seen and examined  pt is awake and lying in bed  having  difficulty sleeping at night  pt seems comfortable lethargic, easily arousible responds appropriately  No complaints.      MEDICATIONS  (STANDING):  ALBUTerol/ipratropium for Nebulization 3 milliLiter(s) Nebulizer every 6 hours  diltiazem    Tablet 60 milliGRAM(s) Oral every 8 hours  ertapenem  IVPB 500 milliGRAM(s) IV Intermittent every 24 hours  furosemide   Injectable 40 milliGRAM(s) IV Push daily  haloperidol     Tablet 0.5 milliGRAM(s) Oral once  immune globulin gamma IVPB 17.4 Gram(s) IV Intermittent daily  pantoprazole  Injectable 40 milliGRAM(s) IV Push daily  sucralfate suspension 1 Gram(s) Oral Before meals and at bedtime  zinc sulfate 220 milliGRAM(s) Oral daily    MEDICATIONS  (PRN):  acetaminophen   Tablet. 650 milliGRAM(s) Oral once PRN Moderate Pain (4 - 6)      Allergies    eggs (Rash)  no drug allergy (Unknown)    Intolerances    vinegar sensitivity    family states that the patient shakes when she ingests vinegar (Other)      Vital Signs Last 24 Hrs  T(C): 36.4 (06 Oct 2017 04:21), Max: 36.7 (05 Oct 2017 12:41)  T(F): 97.6 (06 Oct 2017 04:21), Max: 98.1 (05 Oct 2017 12:41)  HR: 79 (06 Oct 2017 04:21) (72 - 97)  BP: 118/74 (06 Oct 2017 04:21) (108/68 - 122/72)  BP(mean): --  RR: 18 (06 Oct 2017 04:21) (18 - 20)  SpO2: 97% (06 Oct 2017 04:21) (97% - 99%)    PHYSICAL EXAM  General: adult in NAD  HEENT: anicteric sclera, pink conjunctiva  Neck: supple  CV: normal S1/S2 with no murmur rubs or gallops  Lungs: positive air movement b/l ant lungs, clear to auscultation, no wheezes, no rales  Abdomen: soft non-tender non-distended, no hepatosplenomegaly  Ext: no clubbing cyanosis or edema  Skin: extensive  ecchymosis on legs, stable  Neuro: alert and , no focal deficits  LABS:                          8.8    14.8  )-----------( 21       ( 06 Oct 2017 09:34 )             23.8         Mean Cell Volume : 87.9 fl  Mean Cell Hemoglobin : 32.6 pg  Mean Cell Hemoglobin Concentration : 37.0 gm/dL  Auto Neutrophil # : x  Auto Lymphocyte # : x  Auto Monocyte # : x  Auto Eosinophil # : x  Auto Basophil # : x  Auto Neutrophil % : x  Auto Lymphocyte % : x  Auto Monocyte % : x  Auto Eosinophil % : x  Auto Basophil % : x      10-06    144  |  91<L>  |  52<H>  ----------------------------<  134<H>  3.5   |  47<HH>  |  1.27    Ca    9.3      06 Oct 2017 09:34  Mg     2.1     10-06    RADIOLOGY & ADDITIONAL STUDIES:    < from: Xray Chest 1 View AP- PORTABLE-Urgent (10.01.17 @ 16:46) >  EXAM:  CHEST-PORTABLE URGENT                            PROCEDURE DATE:  10/01/2017            INTERPRETATION:  CLINICAL INFORMATION: Shortness of breath.    TECHNIQUE: AP view of the chest.    COMPARISON: Chest radiograph June 25, 2017. CT chest June 26, 2017.    FINDINGS/  IMPRESSION:     Limited evaluation secondary to rotation.    There are bilateral hazy interstitial opacities predominantly in the   right middle to lower lung lobe and the visualized left lung field which   may be consistent with the prior ground glass opacities seen in the prior   CT chest from June 26, 2017. Changes compatible with congestive heart   failure.    Trace bilateral pleural effusions.    Scoliosis of the spine.    < end of copied text >

## 2017-10-06 NOTE — PROGRESS NOTE ADULT - PROBLEM SELECTOR PLAN 1
Monitor CBC/platelets,  Continue  steroids, decrease to 30 mg/ day Prednisone  Continue  gammaglobulins 0.4gm/KG daily x 4 more days.  Would  get out of bed, PT evaluation.

## 2017-10-06 NOTE — PROGRESS NOTE ADULT - ASSESSMENT
Admitted for shortness of breath, respiratory  failure possible secondary to aspiration  Hx of  ITP , platelets decreased from 23,000 to 9,000 since admitted.  Started a higher dose of steroids for pulmonary issue.  Chest xray improved.  Should be adequate for treatment of ITP.  Fall in platelets could be related to consumption form an acute infection or a drug reaction (Piperacillin)  Platelet count yesterday 2,000 Given  a platelet transfusion. Started IV IG  Platelets 22,000 today

## 2017-10-06 NOTE — SWALLOW VFSS/MBS ASSESSMENT ADULT - ESOPHAGEAL STAGE
There was a CP bar.  When the pharynx and esophagus elevated during the swallow, noted residue and distention below the bar. There was a CP bar. with trace retention in this region.  When the pharynx and esophagus elevated during the swallow, noted residue and distention below the bar. There was a CP bar.  Residue and distention were noted below the bar.

## 2017-10-06 NOTE — PROGRESS NOTE ADULT - SUBJECTIVE AND OBJECTIVE BOX
infectious diseases progress note:    Patient is a 89y old  Female who presents with a chief complaint of shortness of breath / hypoxic respiratory failure (01 Oct 2017 22:34)        Acute on chronic respiratory failure with hypercapnia        R  Allergies    eggs (Rash)  no drug allergy (Unknown)    Intolerances    vinegar sensitivity    family states that the patient shakes when she ingests vinegar (Other)      ANTIBIOTICS/RELEVANT:  antimicrobials  ertapenem  IVPB 500 milliGRAM(s) IV Intermittent every 24 hours    immunologic:  immune globulin gamma IVPB 17.4 Gram(s) IV Intermittent daily    OTHER:  acetaminophen   Tablet. 650 milliGRAM(s) Oral once PRN  ALBUTerol/ipratropium for Nebulization 3 milliLiter(s) Nebulizer every 6 hours  diltiazem    Tablet 60 milliGRAM(s) Oral every 8 hours  furosemide   Injectable 40 milliGRAM(s) IV Push daily  haloperidol     Tablet 0.5 milliGRAM(s) Oral once  pantoprazole  Injectable 40 milliGRAM(s) IV Push daily  sucralfate suspension 1 Gram(s) Oral Before meals and at bedtime  zinc sulfate 220 milliGRAM(s) Oral daily      Objective:  Vital Signs Last 24 Hrs  T(C): 36.4 (06 Oct 2017 04:21), Max: 36.7 (05 Oct 2017 12:41)  T(F): 97.6 (06 Oct 2017 04:21), Max: 98.1 (05 Oct 2017 12:41)  HR: 79 (06 Oct 2017 04:21) (72 - 97)  BP: 118/74 (06 Oct 2017 04:21) (108/68 - 122/72)  BP(mean): --  RR: 18 (06 Oct 2017 04:21) (18 - 20)  SpO2: 97% (06 Oct 2017 04:21) (97% - 99%)    PHYSICAL EXAM:  Constitutional:Well-developed, well nourished--no acute distress  Eyes:MEAGHAN, EOMI  Ear/Nose/Throat: no oral lesion, no sinus tenderness on percussion	  Neck:no JVD, no lymphadenopathy, supple  Respiratory: CTA soledad  Cardiovascular: S1S2 RRR, no murmurs  Gastrointestinal:soft, (+) BS, no HSM  Extremities:no e/e/c        LABS:                        9.6    16.6  )-----------( 18       ( 05 Oct 2017 16:21 )             29.1     10-05    139  |  87<L>  |  52<H>  ----------------------------<  124<H>  3.2<L>   |  37<H>  |  1.49<H>    Ca    9.1      05 Oct 2017 07:17  Mg     2.1     10-05              MICROBIOLOGY:    RECENT CULTURES:  10-03 @ 11:19 .Blood Blood-Peripheral                No growth to date.    10-02 @ 06:42 .Urine Clean Catch (Midstream)                No growth          RESPIRATORY CULTURES:              RADIOLOGY & ADDITIONAL STUDIES:        Pager [phone]  After 5 pm/weekends or if no response :[phone]

## 2017-10-06 NOTE — SWALLOW VFSS/MBS ASSESSMENT ADULT - ADDITIONAL INFORMATION
+ anterior cervical osteophytes. + calcification in the regions of the cricoid/arytenoid/thyroid cartilages. + anterior cervical osteophytes C4-5. + calcification in the regions of the cricoid/arytenoid/thyroid cartilages. + anterior cervical osteophytes C4-5. + calcification in the regions of the cricoid/arytenoid/thyroid cartilages.    Disorders: reduced lingual strength/ROM/Rate of motion, reduced BOT to posterior pharyngeal wall contact with a narrow column of contrast between structures, delay in trigger of the swallow reflex with low trigger points, reduced anterior hyoid excursion, reduced laryngeal elevation, incomplete laryngeal vestibule closure,, reduced supraglottic sensation,  CP bar. Etiology of other esophageal findings is unclear - suggest GI consult for assessment.     Strategies: intake of controlled volumes, slow rate of oral intake, texture alternation/successive swallow reduced but did not clear oral/pharyngeal residue.

## 2017-10-06 NOTE — SWALLOW VFSS/MBS ASSESSMENT ADULT - NS SWALLOW VFSS REC ASPIR MON
fever/cough/change of breathing pattern/gurgly voice/upper respiratory infection/pneumonia/throat clearing/Monitor for s/s aspiration/laryngeal penetration. If noted:  D/C p.o. intake, provide non-oral nutrition/hydration/meds, and contact this service @ [phone]

## 2017-10-06 NOTE — SWALLOW VFSS/MBS ASSESSMENT ADULT - RECOMMENDED CONSISTENCY
Cautiously continue p.o diet- Dysphagia I with nectar thickened liquids. MD/team Please enter the following as provider to RN orders : 1) Full assist with meals, 2) Crush meds or provide via alternate source, 3) Pt must be fully upright during and after meals (for 60 min). 4) ALL p.o. via teaspoon. NO CUP. NO STRAW.  5) Provide small single bites and sips at slow rate 6) Encourage successive swallows for oral and pharyngeal clearance 7) Alternate food and liquids to aid in oral and pharyngeal clearance 8) Aspiration precautions. Monitor for s/s aspiration/laryngeal penetration. If noted:  D/C p.o. intake, provide non-oral nutrition/hydration/meds. From an julio-pharyngeal standpoint, suggest to cautiously continue p.o diet- Dysphagia I with nectar thickened liquids. MD/team Please enter the following as provider to RN orders : 1) Full assist with meals, 2) Crush meds or provide via alternate source, 3) Pt must be fully upright during and after meals (for 60 min). 4) ALL p.o. via teaspoon. NO CUP. NO STRAW.  5) Provide small single bites and sips at slow rate 6) Encourage successive swallows for oral and pharyngeal clearance 7) Alternate food and liquids to aid in oral and pharyngeal clearance 8) Aspiration precautions. Monitor for s/s aspiration/laryngeal penetration. If noted:  D/C p.o. intake, provide non-oral nutrition/hydration/meds.

## 2017-10-06 NOTE — PROGRESS NOTE ADULT - ASSESSMENT
89 yo lady with reported home oxygen, Aspiration PNA, dysphagia, ITP, Afib-no ac, resp failure with intubation, htn-admitted with hypercarbic resp faillure, pna and acute on chronic chf exacerbation

## 2017-10-06 NOTE — PROGRESS NOTE ADULT - PROBLEM SELECTOR PLAN 1
Overall improved  -Keep sp02>92% on supplemental oxygen  -Serum co2 47 today, check VBG to eval pH  -May need to stop lasix VBG noted  Serum c02 uptrending with worsened metabolic alkalosis  -Stop lasix  -Diamox   -Check VBG in AM  -CXR in AM

## 2017-10-06 NOTE — PROVIDER CONTACT NOTE (OTHER) - BACKGROUND
pt admitted for acute on chronic respiratory failure with hypercapnia. PMHx: PNA, Dysphagia , a-fib, ITP, COPD.

## 2017-10-06 NOTE — PROGRESS NOTE ADULT - PROBLEM SELECTOR PLAN 3
-Lasix 40mg IV daily  -CXR yesterday with improved congestion -c/w Ertapenem, d/c after last dose tomorrow to complete 8 days of abx

## 2017-10-06 NOTE — SWALLOW VFSS/MBS ASSESSMENT ADULT - SLP PERTINENT HISTORY OF CURRENT PROBLEM
Pt alert. Daughter accompanied patient to exam to ensure cooperation for testing. Daughter served as , at pt request.

## 2017-10-06 NOTE — PROGRESS NOTE ADULT - PROBLEM SELECTOR PLAN 2
-c/w Ertapenem, d/c after last dose tomorrow to complete 8 days of abx Overall improved  -Keep sp02>92% on supplemental oxygen  -Serum co2 47 today, check VBG to eval pH

## 2017-10-06 NOTE — PROGRESS NOTE ADULT - SUBJECTIVE AND OBJECTIVE BOX
SUBJ: patient without complaints of chest pain or plapitations, still off bipap, continued on NC    MEDICATIONS  (STANDING):  ALBUTerol/ipratropium for Nebulization 3 milliLiter(s) Nebulizer every 6 hours  diltiazem    Tablet 60 milliGRAM(s) Oral every 8 hours  ertapenem  IVPB 500 milliGRAM(s) IV Intermittent every 24 hours  furosemide   Injectable 40 milliGRAM(s) IV Push daily  haloperidol     Tablet 0.5 milliGRAM(s) Oral once  immune globulin gamma IVPB 17.4 Gram(s) IV Intermittent daily  pantoprazole  Injectable 40 milliGRAM(s) IV Push daily  sucralfate suspension 1 Gram(s) Oral Before meals and at bedtime  zinc sulfate 220 milliGRAM(s) Oral daily    MEDICATIONS  (PRN):  acetaminophen   Tablet. 650 milliGRAM(s) Oral once PRN Moderate Pain (4 - 6)      REVIEW OF SYSTEMS:  CONSTITUTIONAL: No fever, or chills  EYES: No visual disturbances  RESPIRATORY: + SOB, + cough, no wheeze  CARDIOVASCULAR: No chest pain, palpitations, dizziness, or leg swelling  GASTROINTESTINAL: No abdominal or epigastric pain. No nausea, vomiting, or hematemesis; No diarrhea or constipation. NEUROLOGICAL: No headaches, memory loss, loss of strength, numbness, or tremors  SKIN: No itching, burning, rashes, + echymosis    Vital Signs Last 24 Hrs  T(C): 36.4 (06 Oct 2017 04:21), Max: 36.7 (05 Oct 2017 12:41)  T(F): 97.6 (06 Oct 2017 04:21), Max: 98.1 (05 Oct 2017 12:41)  HR: 79 (06 Oct 2017 04:21) (72 - 97)  BP: 118/74 (06 Oct 2017 04:21) (108/68 - 122/72)  BP(mean): --  RR: 18 (06 Oct 2017 04:21) (18 - 20)  SpO2: 97% (06 Oct 2017 04:21) (97% - 99%)    PHYSICAL EXAM:  · CONSTITUTIONAL: elderly, frail F, sitting in bed with NC  · EYES: no drainage or redness  · NECK: No bruits; no JVD  ·RESPIRATORY:   airway patent; reduced br sounds bases,  respirations non-labored; clear to auscultation bilaterally; no chest wall tenderness; no intercostal retractions; no rales, rhonchi or wheeze  · CARDIOVASCULAR: regular rate and rhythm  no rub  3/6 ANGELES  . GASTROINTESTINAL:  no distention; no masses palpable; bowel sounds normal  · EXTREMITIES: No cyanosis, clubbing , no LE or sacral edema  · VASCULAR:  Equal and normal pulses (radial, femoral)  · SKIN: + ecchymosis; no rash  . LYMPH NODES: No lymphadedenopathy  · MUSCULOSKELETAL:  No calf tenderness  no joint swelling	    TTE: images reviewed  tele:  80-90    LABS:                        8.8    14.8  )-----------( 21       ( 06 Oct 2017 09:34 )             23.8   10-06    144  |  91<L>  |  52<H>  ----------------------------<  134<H>  3.5   |  47<HH>  |  1.27    Ca    9.3      06 Oct 2017 09:34  Mg     2.1     10-06      IMPRESSION AND PLAN:    A/P:  89y Female with AF/AFL (not on OAC), ITP (on prednisone), Severe AS, Mild MS,  HFpEF, HTN, Dementia, hx of recurrent aspiration PNA and possible COPD now admitted on 10/01/2017 with SOB and hypoxia thought to be multifactorial in nature (Aspiration PNA on ABX, COPD, HFpEF, valvular disease). During her hospital course her troponin has trended down and she has been been diurising well, weaning off the bipap, now on NC oxygen.  Will continue to treat medically from a cardiology standpoint and will followup as an outpatient.     1. HFpEF - TTE performed this admission with normal LV/RV function, moderate diastolic dysfunction, progression in aortic stenosis to severe (PG 67 mmHg, MG 42 mmHg, SKYLA 0.9 cm2), mild MS, mild TR, mild pulmonary hypertension (RVSP 42 mmHg).  -Overall I suspect her SOB/Hypoxia is multifactorial in nature from COPD and HFpEF with severe aortic stenosis.  -Cardiac enzymes have trended down, (trop 0. 31-> 0.29 -> 0.25). Likely demand ischemia in the setting of acute on chronic CHF and CKD. Anticoagulation held in the setting of ITP.  Patient continues to be euvolemic on exam. , TTE with hyperdynamic LV suggestive of underfilling  Recommend switching from Lasix 40 mg IV QD to Lasix 60 QD. Aim for net even at this point. She will need PO lasix on discharge, recommend lasix 60mg po daily  -monitor electrolytes and replete as needed  -Caution use with beta blockade given COPD, severe AS.  -Patient is DNR  -COPD management as per pulmonary.  -Cont ABX per ID for possible aspiration PNA.  --Not a candidate for BAV/TAVR at this time given profound thrombocytopenia     2. AFIB/AFL  -Off OAC due to ITP  -Cont Diltiazem 60 mg po Q8 for rate control, may consider switching to a long acting diltiazem 180mg XL daily      -Appreciate ID/Pulmonary/Nephrology input  -Patient is DNR    - final cardiology recommendations are above, please contact cardiology with any further questions  -please arrange outpatient cardiology follow-up    Simeon Damian MD, PhD  Cardiology Attending  [phone]

## 2017-10-06 NOTE — PROGRESS NOTE ADULT - ASSESSMENT
frail elderly women with likely aspiration pna    now on invanz, but I dont think that zosyn caused low platlets  Probably ITP  to complete 8 day course of ab  tomorrow and dc antibiotic.

## 2017-10-07 LAB
ANION GAP SERPL CALC-SCNC: 12 MMOL/L — SIGNIFICANT CHANGE UP (ref 5–17)
BASE EXCESS BLDV CALC-SCNC: 15.5 MMOL/L — HIGH (ref -2–2)
BUN SERPL-MCNC: 50 MG/DL — HIGH (ref 7–23)
CALCIUM SERPL-MCNC: 9.2 MG/DL — SIGNIFICANT CHANGE UP (ref 8.4–10.5)
CHLORIDE SERPL-SCNC: 90 MMOL/L — LOW (ref 96–108)
CO2 BLDV-SCNC: 44 MMOL/L — HIGH (ref 22–30)
CO2 SERPL-SCNC: 39 MMOL/L — HIGH (ref 22–31)
CREAT SERPL-MCNC: 1.11 MG/DL — SIGNIFICANT CHANGE UP (ref 0.5–1.3)
GLUCOSE SERPL-MCNC: 163 MG/DL — HIGH (ref 70–99)
HCO3 BLDV-SCNC: 42 MMOL/L — HIGH (ref 21–29)
HCT VFR BLD CALC: 28.1 % — LOW (ref 34.5–45)
HGB BLD-MCNC: 9.5 G/DL — LOW (ref 11.5–15.5)
HOROWITZ INDEX BLDV+IHG-RTO: 30 — SIGNIFICANT CHANGE UP
MCHC RBC-ENTMCNC: 30.3 PG — SIGNIFICANT CHANGE UP (ref 27–34)
MCHC RBC-ENTMCNC: 33.9 GM/DL — SIGNIFICANT CHANGE UP (ref 32–36)
MCV RBC AUTO: 89.3 FL — SIGNIFICANT CHANGE UP (ref 80–100)
PCO2 BLDV: 67 MMHG — HIGH (ref 35–50)
PH BLDV: 7.42 — SIGNIFICANT CHANGE UP (ref 7.35–7.45)
PLATELET # BLD AUTO: 19 K/UL — CRITICAL LOW (ref 150–400)
PO2 BLDV: 43 MMHG — SIGNIFICANT CHANGE UP (ref 25–45)
POTASSIUM SERPL-MCNC: 3.9 MMOL/L — SIGNIFICANT CHANGE UP (ref 3.5–5.3)
POTASSIUM SERPL-SCNC: 3.9 MMOL/L — SIGNIFICANT CHANGE UP (ref 3.5–5.3)
RBC # BLD: 3.15 M/UL — LOW (ref 3.8–5.2)
RBC # FLD: 15.5 % — HIGH (ref 10.3–14.5)
SAO2 % BLDV: 77 % — SIGNIFICANT CHANGE UP (ref 67–88)
SODIUM SERPL-SCNC: 141 MMOL/L — SIGNIFICANT CHANGE UP (ref 135–145)
WBC # BLD: 12.5 K/UL — HIGH (ref 3.8–10.5)
WBC # FLD AUTO: 12.5 K/UL — HIGH (ref 3.8–10.5)

## 2017-10-07 PROCEDURE — 71010: CPT | Mod: 26

## 2017-10-07 RX ADMIN — ZINC SULFATE TAB 220 MG (50 MG ZINC EQUIVALENT) 220 MILLIGRAM(S): 220 (50 ZN) TAB at 13:14

## 2017-10-07 RX ADMIN — PANTOPRAZOLE SODIUM 40 MILLIGRAM(S): 20 TABLET, DELAYED RELEASE ORAL at 13:14

## 2017-10-07 RX ADMIN — Medication 3 MILLILITER(S): at 06:52

## 2017-10-07 RX ADMIN — ERTAPENEM SODIUM 100 MILLIGRAM(S): 1 INJECTION, POWDER, LYOPHILIZED, FOR SOLUTION INTRAMUSCULAR; INTRAVENOUS at 06:54

## 2017-10-07 RX ADMIN — Medication 30 MILLIGRAM(S): at 06:53

## 2017-10-07 RX ADMIN — Medication 1 GRAM(S): at 21:45

## 2017-10-07 RX ADMIN — Medication 29 GRAM(S): at 13:43

## 2017-10-07 RX ADMIN — ACETAZOLAMIDE 250 MILLIGRAM(S): 250 TABLET ORAL at 06:54

## 2017-10-07 RX ADMIN — Medication 1 GRAM(S): at 18:28

## 2017-10-07 RX ADMIN — Medication 1 GRAM(S): at 11:00

## 2017-10-07 RX ADMIN — Medication 1 GRAM(S): at 06:52

## 2017-10-07 NOTE — PROGRESS NOTE ADULT - ASSESSMENT
89 yo lady with reported home oxygen, Aspiration PNA, dysphagia, ITP, Afib-no ac, resp failure with intubation, htn-admitted with hypercarbic resp faillure, pna and acute on chronic chf exacerbation

## 2017-10-07 NOTE — PROGRESS NOTE ADULT - SUBJECTIVE AND OBJECTIVE BOX
Patient is a 89y Female  being evaluated for  hyponatremia/ hyperkalemia  notes reviewed  resting comfortably    PHYSICAL EXAM:  Vital Signs Last 24 Hrs  T(C): 36.6 (07 Oct 2017 04:23), Max: 36.6 (06 Oct 2017 20:39)  T(F): 97.8 (07 Oct 2017 04:23), Max: 97.9 (06 Oct 2017 20:39)  HR: 74 (07 Oct 2017 04:23) (74 - 91)  BP: 124/63 (07 Oct 2017 04:23) (124/63 - 144/76)  BP(mean): --  RR: 18 (07 Oct 2017 04:23) (18 - 18)  SpO2: 96% (07 Oct 2017 04:23) (96% - 99%)  I and O's:    10-02 @ 07:01  -  10-03 @ 07:00  --------------------------------------------------------  IN: 50 mL / OUT: 1125 mL / NET: -1075 mL    10-03 @ 07:01  -  10-03 @ 12:14  --------------------------------------------------------  IN: 60 mL / OUT: 0 mL / NET: 60 mL        Height (cm): 154.94 (10-02 @ 18:18)  Weight (kg): 43.6 (10-02 @ 18:18)  BMI (kg/m2): 18.2 (10-02 @ 18:18)    REVIEW OF SYSTEMS:  unable as patient lethargic   Allergies    eggs (Rash)  no drug allergy (Unknown)    Intolerances    vinegar sensitivity    family states that the patient shakes when she ingests vinegar (Other)    LABS:  CBC Full  -  ( 03 Oct 2017 09:30 )  WBC Count : 25.1 K/uL  Hemoglobin : 9.6 g/dL  Hematocrit : 30.8 %  Platelet Count - Automated : 9 K/uL  Mean Cell Volume : 87.5 fl  Mean Cell Hemoglobin : 27.2 pg  Mean Cell Hemoglobin Concentration : 31.1 gm/dL  Auto Neutrophil # : x  Auto Lymphocyte # : x  Auto Monocyte # : x  Auto Eosinophil # : x  Auto Basophil # : x  Auto Neutrophil % : x  Auto Lymphocyte % : x  Auto Monocyte % : x  Auto Eosinophil % : x  Auto Basophil % : x    10-03    134<L>  |  88<L>  |  49<H>  ----------------------------<  157<H>  4.2   |  34<H>  |  1.85<H>    Ca    9.6      03 Oct 2017 09:29  Phos  4.4     10-02  Mg     2.1     10-02    TPro  7.3  /  Alb  3.9  /  TBili  0.3  /  DBili  x   /  AST  35  /  ALT  27  /  AlkPhos  73  10-01    10-04    135  |  87<L>  |  50<H>  ----------------------------<  155<H>  3.4<L>   |  34<H>  |  1.68<H>    Ca    8.9      04 Oct 2017 07:05      Urine Studies:  Urinalysis Basic - ( 02 Oct 2017 17:20 )    Color: Yellow / Appearance: Slightly Turbid / S.019 / pH: x  Gluc: x / Ketone: Negative  / Bili: Negative / Urobili: Negative mg/dL   Blood: x / Protein: Trace mg/dL / Nitrite: Negative   Leuk Esterase: Large / RBC: 277 /HPF /  /HPF   Sq Epi: x / Non Sq Epi: 1 /HPF / Bacteria: Negative        Urine chemistry:   Urine Na: Sodium, Random Urine: <20 mmol/L (10-02 @ 17:25)    Urine Creatinine:   Urine Protein/Cr ratio:  Urine K:   Urine Osm: Osmolality, Random Urine: 342 mos/kg (10-03 @ 00:19)    10-05    139  |  87<L>  |  52<H>  ----------------------------<  124<H>  3.2<L>   |  37<H>  |  1.49<H>    Ca    9.1      05 Oct 2017 07:17  Mg     2.1     10-05    10-06    144  |  91<L>  |  52<H>  ----------------------------<  134<H>  3.5   |  47<HH>  |  1.27    Ca    9.3      06 Oct 2017 09:34  Mg     2.1     10-06          Imp:  89y Female

## 2017-10-07 NOTE — PROGRESS NOTE ADULT - ASSESSMENT
89y Female admitted with resp failure  hx of hyponatremia on prev admissions and CKD  now with [place],better  hypokalemia replete prn  po diuretic change per cv,met alkalosis due to diuretics and lung disease, follow on diamox  hyponatremia resolved  monitor I&O's, electrolytes and renal function

## 2017-10-07 NOTE — PROGRESS NOTE ADULT - PROBLEM SELECTOR PLAN 1
Improvement secondary to high dose steroids and/or IGG  Continue to monitor CBC/platelets,  Continue steroids --  30 mg/ day Prednisone  IV IGG Day #3 today 10/7  Continue gammaglobulin 0.4gm/KG daily for total of 5 days  Continue antibiotics as per ID  Continue steroids, nebs as per pulm  Continue supportive care, out of bed, PT evaluation.

## 2017-10-07 NOTE — PROGRESS NOTE ADULT - SUBJECTIVE AND OBJECTIVE BOX
CHIEF COMPLAINT:Patient is a 89y old  Female who presents with a chief complaint of shortness of breath / hypoxic respiratory failure (01 Oct 2017 22:34)    	  Interval history: overall improved      Allergies:  eggs (Rash)  no drug allergy (Unknown)  vinegar sensitivity    family states that the patient shakes when she ingests vinegar (Other)      PAST MEDICAL & SURGICAL HISTORY:  PNA (pneumonia)  Dysphagia  Thrombocytopenia: ITP  Atrial fibrillation and flutter: No A/C  during hospitalization in april 2014  Respiratory failure: in april 2014 was intubated  Cataract: right eye  Small bowel obstruction: s/p resection in 2010  HTN - Hypertension  S/P cholecystectomy  H/O: Hysterectomy  S/P Small Bowel Resection      FAMILY HISTORY:  No pertinent family history in first degree relatives    REVIEW OF SYSTEMS:  UTO, but no active complaints    Medications:  MEDICATIONS  (STANDING):  diltiazem    Tablet 60 milliGRAM(s) Oral every 8 hours  ertapenem  IVPB 500 milliGRAM(s) IV Intermittent every 24 hours  haloperidol     Tablet 0.5 milliGRAM(s) Oral once  immune globulin gamma IVPB 17.4 Gram(s) IV Intermittent daily  pantoprazole  Injectable 40 milliGRAM(s) IV Push daily  predniSONE   Tablet 30 milliGRAM(s) Oral daily  sucralfate suspension 1 Gram(s) Oral Before meals and at bedtime  zinc sulfate 220 milliGRAM(s) Oral daily    MEDICATIONS  (PRN):  acetaminophen   Tablet. 650 milliGRAM(s) Oral once PRN Moderate Pain (4 - 6)    	    PHYSICAL EXAM:  T(C): 36.7 (10-07-17 @ 11:54), Max: 36.7 (10-07-17 @ 11:54)  HR: 77 (10-07-17 @ 11:54) (74 - 84)  BP: 108/62 (10-07-17 @ 11:54) (108/62 - 144/76)  RR: 17 (10-07-17 @ 11:54) (17 - 18)  SpO2: 98% (10-07-17 @ 11:54) (96% - 98%)  Wt(kg): --  I&O's Summary    06 Oct 2017 07:01  -  07 Oct 2017 07:00  --------------------------------------------------------  IN: 540 mL / OUT: 1200 mL / NET: -660 mL    07 Oct 2017 07:01  -  07 Oct 2017 17:27  --------------------------------------------------------  IN: 170 mL / OUT: 0 mL / NET: 170 mL      Appearance: Frail	  HEENT:   NCAT, PERRL, EOMI	  Lymphatic: No lymphadenopathy  Cardiovascular: Normal S1 S2, RRR  Respiratory: CTA BL  Psychiatry: A & O x 3, Mood & affect appropriate  Gastrointestinal:  Soft, Non-tender, + BS  Skin: No rashes, No ecchymoses, No cyanosis	  Neurologic: Non-focal  Extremities: Normal range of motion, No clubbing, cyanosis or edema  	  	  LABS:	 	    CARDIAC MARKERS:                                9.5    12.5  )-----------( 19       ( 07 Oct 2017 10:35 )             28.1     10-07    141  |  90<L>  |  50<H>  ----------------------------<  163<H>  3.9   |  39<H>  |  1.11    Ca    9.2      07 Oct 2017 10:35  Mg     2.1     10-06      proBNP:   Lipid Profile:   HgA1c:   TSH:

## 2017-10-07 NOTE — PROGRESS NOTE ADULT - SUBJECTIVE AND OBJECTIVE BOX
Pt is seen and examined. pt is awake and lying in bed, comfortable. No acute events overnight  c/o having  difficulty sleeping at night  pt seems comfortable lethargic, easily arousable responds appropriately  No complaints. Denies fevers, chills, nausea, worsening cough or sob. Had 1 episode of diarrhea this AM. No BRBPR or hematuria.  Day #3 IV IGG today, prednisone reduced to 30 mg po daily    MEDICATIONS  (STANDING):  ALBUTerol/ipratropium for Nebulization 3 milliLiter(s) Nebulizer every 6 hours  diltiazem    Tablet 60 milliGRAM(s) Oral every 8 hours  ertapenem  IVPB 500 milliGRAM(s) IV Intermittent every 24 hours  haloperidol     Tablet 0.5 milliGRAM(s) Oral once  immune globulin gamma IVPB 17.4 Gram(s) IV Intermittent daily  pantoprazole  Injectable 40 milliGRAM(s) IV Push daily  predniSONE   Tablet 30 milliGRAM(s) Oral daily  sucralfate suspension 1 Gram(s) Oral Before meals and at bedtime  zinc sulfate 220 milliGRAM(s) Oral daily    MEDICATIONS  (PRN):  acetaminophen   Tablet. 650 milliGRAM(s) Oral once PRN Moderate Pain (4 - 6)      Allergies    eggs (Rash)  no drug allergy (Unknown)    Intolerances    vinegar sensitivity    family states that the patient shakes when she ingests vinegar (Other)      Vital Signs Last 24 Hrs  T(C): 36.6 (07 Oct 2017 04:23), Max: 36.6 (06 Oct 2017 20:39)  T(F): 97.8 (07 Oct 2017 04:23), Max: 97.9 (06 Oct 2017 20:39)  HR: 74 (07 Oct 2017 04:23) (74 - 91)  BP: 124/63 (07 Oct 2017 04:23) (124/63 - 144/76)  BP(mean): --  RR: 18 (07 Oct 2017 04:23) (18 - 18)  SpO2: 96% (07 Oct 2017 04:23) (96% - 99%)      PHYSICAL EXAM  General: Frail dult in NAD  HEENT: anicteric sclera, pink conjunctiva  Neck: supple  CV: normal S1/S2 with no murmur rubs or gallops  Lungs: positive air movement b/l ant lungs, clear to auscultation, no wheezes, no rales  Abdomen: soft non-tender non-distended, no hepatosplenomegaly  Ext: no clubbing cyanosis or edema  Skin: extensive ecchymosis on legs, stable  Neuro: alert and , no focal deficits      LABS:                               8.8    14.8  )-----------( 21       ( 06 Oct 2017 09:34 )             23.8     Mean Cell Volume : 87.9 fl  Mean Cell Hemoglobin : 32.6 pg  Mean Cell Hemoglobin Concentration : 37.0 gm/dL  Auto Neutrophil # : x  Auto Lymphocyte # : x  Auto Monocyte # : x  Auto Eosinophil # : x  Auto Basophil # : x  Auto Neutrophil % : x  Auto Lymphocyte % : x  Auto Monocyte % : x  Auto Eosinophil % : x  Auto Basophil % : x    10-06    144  |  91<L>  |  52<H>  ----------------------------<  134<H>  3.5   |  47<HH>  |  1.27    Ca    9.3      06 Oct 2017 09:34  Mg     2.1     10-06      RADIOLOGY & ADDITIONAL STUDIES:    < from: Xray Chest 1 View AP- PORTABLE-Urgent (10.01.17 @ 16:46) >  EXAM:  CHEST-PORTABLE URGENT                            PROCEDURE DATE:  10/01/2017            INTERPRETATION:  CLINICAL INFORMATION: Shortness of breath.    TECHNIQUE: AP view of the chest.    COMPARISON: Chest radiograph June 25, 2017. CT chest June 26, 2017.    FINDINGS/  IMPRESSION:     Limited evaluation secondary to rotation.    There are bilateral hazy interstitial opacities predominantly in the   right middle to lower lung lobe and the visualized left lung field which   may be consistent with the prior ground glass opacities seen in the prior   CT chest from June 26, 2017. Changes compatible with congestive heart   failure.    Trace bilateral pleural effusions.    Scoliosis of the spine.    < end of copied text >

## 2017-10-07 NOTE — PROGRESS NOTE ADULT - ASSESSMENT
89 yo F as above:  1. Acute respiratory failure - resp status improving, pt DNR/DNI, off BiPAP, now doing well on NC  2. Acute on chronic diast CHF - euvolemic, diuresis on hold 2/2 alkalosis  3. A fib - c/w ASA, Cardizem per Cardio  4. ITP - plts continue to drop, changed Zosyn to Ertapenem, now plt improving w/IVIG  5. Aspiration PNA - ID appreciated, c/w Ertapenem  6. NEWTON on CKD - per Renal  7. COPD - steroids and nebs per Pulm  8. Mechanical DVT prophylaxis  9. Dysphagia - puree diet with strict aspiration precautions

## 2017-10-07 NOTE — PROGRESS NOTE ADULT - PROBLEM SELECTOR PLAN 2
Overall improved  -Keep sp02>=90% on 2l nc  titrate to off to keep o2 sat=90% Overall improved  -Keep sp02>=90% on 2l nc  titrate to off to keep o2 sat=90%  DC bipap

## 2017-10-07 NOTE — PROGRESS NOTE ADULT - SUBJECTIVE AND OBJECTIVE BOX
Follow-up Pulm Progress Note    No new respiratory events overnight.  Denies SOB/CP. 02 sat 95% on 2lnc, awake communicating    Medications:  MEDICATIONS  (STANDING):  ALBUTerol/ipratropium for Nebulization 3 milliLiter(s) Nebulizer every 6 hours  diltiazem    Tablet 60 milliGRAM(s) Oral every 8 hours  ertapenem  IVPB 500 milliGRAM(s) IV Intermittent every 24 hours  haloperidol     Tablet 0.5 milliGRAM(s) Oral once  immune globulin gamma IVPB 17.4 Gram(s) IV Intermittent daily  pantoprazole  Injectable 40 milliGRAM(s) IV Push daily  predniSONE   Tablet 30 milliGRAM(s) Oral daily  sucralfate suspension 1 Gram(s) Oral Before meals and at bedtime  zinc sulfate 220 milliGRAM(s) Oral daily    MEDICATIONS  (PRN):  acetaminophen   Tablet. 650 milliGRAM(s) Oral once PRN Moderate Pain (4 - 6)          Vital Signs Last 24 Hrs  T(C): 36.7 (07 Oct 2017 11:54), Max: 36.7 (07 Oct 2017 11:54)  T(F): 98.1 (07 Oct 2017 11:54), Max: 98.1 (07 Oct 2017 11:54)  HR: 77 (07 Oct 2017 11:54) (74 - 91)  BP: 108/62 (07 Oct 2017 11:54) (108/62 - 144/76)  BP(mean): --  RR: 17 (07 Oct 2017 11:54) (17 - 18)  SpO2: 98% (07 Oct 2017 11:54) (96% - 99%)      VBG pH 7.42 10-07 @ 10:26    VBG pCO2 67 10-07 @ 10:26    VBG O2 sat 77 10-07 @ 10:26    VBG lactate -- 10-07 @ 10:26  VBG pH 7.46 10-06 @ 16:34    VBG pCO2 67 10-06 @ 16:34    VBG O2 sat 100 10-06 @ 16:34    VBG lactate 2.6 10-06 @ 16:34      10-06 @ 07:01  -  10-07 @ 07:00  --------------------------------------------------------  IN: 540 mL / OUT: 1200 mL / NET: -660 mL          LABS:                        9.5    12.5  )-----------( 19       ( 07 Oct 2017 10:35 )             28.1     10-07    141  |  90<L>  |  50<H>  ----------------------------<  163<H>  3.9   |  39<H>  |  1.11    Ca    9.2      07 Oct 2017 10:35  Mg     2.1     10-06            CAPILLARY BLOOD GLUCOSE                            CULTURES: (if applicable)  Culture Results:   No growth to date. (10-03 @ 11:19)  Culture Results:   No growth to date. (10-03 @ 11:19)  Culture Results:   No growth (10-02 @ 06:42)    Most recent blood culture -- 10-03 @ 11:19   -- -- .Blood Blood-Peripheral 10-03 @ 11:19        Physical Examination:  PULM: decreased bs bilaterally, no significant sputum production  CVS: S1, S2 heard    RADIOLOGY REVIEWED  CXR: < from: Xray Chest 1 View AP -PORTABLE-Routine (10.07.17 @ 08:43) >  Impression: Left basilar airspace opacity, likely representing   atelectasis versus pneumonia    < end of copied text > Follow-up Pulm Progress Note    No new respiratory events overnight.  Denies SOB/CP. 02 sat 95% on 2lnc, awake communicating    Medications:  MEDICATIONS  (STANDING):  ALBUTerol/ipratropium for Nebulization 3 milliLiter(s) Nebulizer every 6 hours  diltiazem    Tablet 60 milliGRAM(s) Oral every 8 hours  ertapenem  IVPB 500 milliGRAM(s) IV Intermittent every 24 hours  haloperidol     Tablet 0.5 milliGRAM(s) Oral once  immune globulin gamma IVPB 17.4 Gram(s) IV Intermittent daily  pantoprazole  Injectable 40 milliGRAM(s) IV Push daily  predniSONE   Tablet 30 milliGRAM(s) Oral daily  sucralfate suspension 1 Gram(s) Oral Before meals and at bedtime  zinc sulfate 220 milliGRAM(s) Oral daily    MEDICATIONS  (PRN):  acetaminophen   Tablet. 650 milliGRAM(s) Oral once PRN Moderate Pain (4 - 6)          Vital Signs Last 24 Hrs  T(C): 36.7 (07 Oct 2017 11:54), Max: 36.7 (07 Oct 2017 11:54)  T(F): 98.1 (07 Oct 2017 11:54), Max: 98.1 (07 Oct 2017 11:54)  HR: 77 (07 Oct 2017 11:54) (74 - 91)  BP: 108/62 (07 Oct 2017 11:54) (108/62 - 144/76)  BP(mean): --  RR: 17 (07 Oct 2017 11:54) (17 - 18)  SpO2: 98% (07 Oct 2017 11:54) (96% - 99%)      VBG pH 7.42 10-07 @ 10:26    VBG pCO2 67 10-07 @ 10:26    VBG O2 sat 77 10-07 @ 10:26    VBG lactate -- 10-07 @ 10:26  VBG pH 7.46 10-06 @ 16:34    VBG pCO2 67 10-06 @ 16:34    VBG O2 sat 100 10-06 @ 16:34    VBG lactate 2.6 10-06 @ 16:34      10-06 @ 07:01  -  10-07 @ 07:00  --------------------------------------------------------  IN: 540 mL / OUT: 1200 mL / NET: -660 mL          LABS:                        9.5    12.5  )-----------( 19       ( 07 Oct 2017 10:35 )             28.1     10-07    141  |  90<L>  |  50<H>  ----------------------------<  163<H>  3.9   |  39<H>  |  1.11    Ca    9.2      07 Oct 2017 10:35  Mg     2.1     10-06            CAPILLARY BLOOD GLUCOSE      CULTURES: (if applicable)  Culture Results:   No growth to date. (10-03 @ 11:19)  Culture Results:   No growth to date. (10-03 @ 11:19)  Culture Results:   No growth (10-02 @ 06:42)    Most recent blood culture -- 10-03 @ 11:19   -- -- .Blood Blood-Peripheral 10-03 @ 11:19        Physical Examination:  PULM: decreased bs bilaterally, no significant sputum production  CVS: S1, S2 heard    RADIOLOGY REVIEWED  CXR: < from: Xray Chest 1 View AP -PORTABLE-Routine (10.07.17 @ 08:43) >  Impression: Left basilar airspace opacity, likely representing   atelectasis versus pneumonia    < end of copied text >

## 2017-10-07 NOTE — PROGRESS NOTE ADULT - ASSESSMENT
Admitted for shortness of breath, respiratory  failure possible secondary to aspiration  Hx of  ITP, platelets decreased from 23,000 to 9,000 since admitted.  Started a higher dose of steroids for pulmonary issue.  Chest xray improved.  Should be adequate for treatment of ITP.  Fall in platelets could be related to consumption form an acute infection or a drug reaction (Piperacillin)  Platelet count on 10/5 2,000 s/p platelet transfusion. Started IV IG x 5 days on 10/5  Platelets improved to 21,000 on 10/6

## 2017-10-08 LAB
ANION GAP SERPL CALC-SCNC: 14 MMOL/L — SIGNIFICANT CHANGE UP (ref 5–17)
BASE EXCESS BLDV CALC-SCNC: 16.5 MMOL/L — HIGH (ref -2–2)
BUN SERPL-MCNC: 55 MG/DL — HIGH (ref 7–23)
CA-I SERPL-SCNC: 1.22 MMOL/L — SIGNIFICANT CHANGE UP (ref 1.12–1.3)
CALCIUM SERPL-MCNC: 9.8 MG/DL — SIGNIFICANT CHANGE UP (ref 8.4–10.5)
CHLORIDE BLDV-SCNC: 91 MMOL/L — LOW (ref 96–108)
CHLORIDE SERPL-SCNC: 90 MMOL/L — LOW (ref 96–108)
CO2 BLDV-SCNC: 45 MMOL/L — HIGH (ref 22–30)
CO2 SERPL-SCNC: 37 MMOL/L — HIGH (ref 22–31)
CREAT SERPL-MCNC: 1.14 MG/DL — SIGNIFICANT CHANGE UP (ref 0.5–1.3)
CULTURE RESULTS: SIGNIFICANT CHANGE UP
CULTURE RESULTS: SIGNIFICANT CHANGE UP
GAS PNL BLDV: 139 MMOL/L — SIGNIFICANT CHANGE UP (ref 136–145)
GAS PNL BLDV: SIGNIFICANT CHANGE UP
GLUCOSE BLDV-MCNC: 92 MG/DL — SIGNIFICANT CHANGE UP (ref 70–99)
GLUCOSE SERPL-MCNC: 92 MG/DL — SIGNIFICANT CHANGE UP (ref 70–99)
HCO3 BLDV-SCNC: 43 MMOL/L — HIGH (ref 21–29)
HCT VFR BLD CALC: 22.7 % — LOW (ref 34.5–45)
HCT VFR BLDA CALC: 25 % — LOW (ref 39–50)
HGB BLD CALC-MCNC: 8 G/DL — LOW (ref 11.5–15.5)
HGB BLD-MCNC: 8 G/DL — LOW (ref 11.5–15.5)
LACTATE BLDV-MCNC: 1.8 MMOL/L — SIGNIFICANT CHANGE UP (ref 0.7–2)
MCHC RBC-ENTMCNC: 32.7 PG — SIGNIFICANT CHANGE UP (ref 27–34)
MCHC RBC-ENTMCNC: 35.2 GM/DL — SIGNIFICANT CHANGE UP (ref 32–36)
MCV RBC AUTO: 92.7 FL — SIGNIFICANT CHANGE UP (ref 80–100)
OTHER CELLS CSF MANUAL: 9 ML/DL — LOW (ref 18–22)
PCO2 BLDV: 65 MMHG — HIGH (ref 35–50)
PH BLDV: 7.43 — SIGNIFICANT CHANGE UP (ref 7.35–7.45)
PLATELET # BLD AUTO: 6 K/UL — CRITICAL LOW (ref 150–400)
PO2 BLDV: 49 MMHG — HIGH (ref 25–45)
POTASSIUM BLDV-SCNC: 2.7 MMOL/L — CRITICAL LOW (ref 3.5–5)
POTASSIUM SERPL-MCNC: 3.1 MMOL/L — LOW (ref 3.5–5.3)
POTASSIUM SERPL-SCNC: 3.1 MMOL/L — LOW (ref 3.5–5.3)
RBC # BLD: 2.45 M/UL — LOW (ref 3.8–5.2)
RBC # FLD: 16.1 % — HIGH (ref 10.3–14.5)
SAO2 % BLDV: 85 % — SIGNIFICANT CHANGE UP (ref 67–88)
SODIUM SERPL-SCNC: 141 MMOL/L — SIGNIFICANT CHANGE UP (ref 135–145)
SPECIMEN SOURCE: SIGNIFICANT CHANGE UP
SPECIMEN SOURCE: SIGNIFICANT CHANGE UP
WBC # BLD: 11.29 K/UL — HIGH (ref 3.8–10.5)
WBC # FLD AUTO: 11.29 K/UL — HIGH (ref 3.8–10.5)

## 2017-10-08 RX ORDER — POTASSIUM CHLORIDE 20 MEQ
40 PACKET (EA) ORAL ONCE
Qty: 0 | Refills: 0 | Status: COMPLETED | OUTPATIENT
Start: 2017-10-08 | End: 2017-10-08

## 2017-10-08 RX ORDER — POTASSIUM CHLORIDE 20 MEQ
40 PACKET (EA) ORAL ONCE
Qty: 0 | Refills: 0 | Status: DISCONTINUED | OUTPATIENT
Start: 2017-10-08 | End: 2017-10-08

## 2017-10-08 RX ADMIN — PANTOPRAZOLE SODIUM 40 MILLIGRAM(S): 20 TABLET, DELAYED RELEASE ORAL at 13:14

## 2017-10-08 RX ADMIN — Medication 1 GRAM(S): at 16:52

## 2017-10-08 RX ADMIN — ZINC SULFATE TAB 220 MG (50 MG ZINC EQUIVALENT) 220 MILLIGRAM(S): 220 (50 ZN) TAB at 13:13

## 2017-10-08 RX ADMIN — ERTAPENEM SODIUM 100 MILLIGRAM(S): 1 INJECTION, POWDER, LYOPHILIZED, FOR SOLUTION INTRAMUSCULAR; INTRAVENOUS at 05:42

## 2017-10-08 RX ADMIN — Medication 1 GRAM(S): at 13:23

## 2017-10-08 RX ADMIN — Medication 29 GRAM(S): at 14:20

## 2017-10-08 RX ADMIN — Medication 40 MILLIEQUIVALENT(S): at 13:32

## 2017-10-08 RX ADMIN — Medication 30 MILLIGRAM(S): at 05:42

## 2017-10-08 NOTE — PROVIDER CONTACT NOTE (CRITICAL VALUE NOTIFICATION) - ACTION/TREATMENT ORDERED:
IVIG infusing, will continue monitoring
NP to call the attending DRStarla  will continue to monitor pt.
NP states she will come to the department to assess pt
Continue to monitor, no interventions at this time.
1 U platelets after NP obtains tel consent for xfusion of blood products from pt's daughter
NP aware, KCLOR soln supplementation to be ordered.
Continue bipap
K+ repleation ordered. C/o to monitor.
NP aware. C/o to monitor
NP aware. C/o to monitor.

## 2017-10-08 NOTE — PROVIDER CONTACT NOTE (CRITICAL VALUE NOTIFICATION) - TEST AND RESULT REPORTED:
Blood Venous K+ 2.7
Blood venous K+ 2,9
CO2: 47
Pco2 72
platelet = 1
platelet: 18
Platelet level resulted as 9
platelets 6
Platelet  on CBC 16
Platelet count 19

## 2017-10-08 NOTE — PROVIDER CONTACT NOTE (CRITICAL VALUE NOTIFICATION) - PERSON GIVING RESULT:
Alyce
Anne Smerling/[facility]
Christine Cohen/ Lab
Rebecca Nava
Santino Martinez/ Lab
core lab/ Bridger Cruz
Sara Edmond/core lab
diego dadabo
Ezequiel Briceno
Melissa Morales, [facility]

## 2017-10-08 NOTE — PROGRESS NOTE ADULT - PROBLEM SELECTOR PLAN 1
compensated abg  no further diamox  hold lasix   check am bmp/bicarb/vbg  -bicarb trendng down off lasix

## 2017-10-08 NOTE — PROVIDER CONTACT NOTE (CRITICAL VALUE NOTIFICATION) - SITUATION
Pt admit for SOB and hyper K
pt with platelet count of 19
Blood venous K+ 2,9
Blood Venous K+ 2.7
CO2: 47
Pco2 72
Platelet: 18 s/p 1 unit platelet transfusion
pt has criticallylow plt in setting of known ITP; now on PO steroids; no active s/s of bleeding but highly ecchymotic, AVSS
Platelet level resulted as 9
platelets 6

## 2017-10-08 NOTE — PROGRESS NOTE ADULT - PROBLEM SELECTOR PLAN 4
pt not in clinical acute chf  severe AS, diasolic dysfunction  hold lasix, tend bicarb  f/u bicarb/vbg in am  card f/u,

## 2017-10-08 NOTE — PROGRESS NOTE ADULT - ASSESSMENT
Admitted for shortness of breath, respiratory  failure possible secondary to aspiration  Hx of  ITP, platelets decreased from 23,000 to 9,000 since admitted.  Started a higher dose of steroids for pulmonary issue.  Chest xray improved.  Should be adequate for treatment of ITP.  Fall in platelets could be related to consumption form an acute infection or a drug reaction (Piperacillin)  Platelet count on 10/5 2,000 s/p platelet transfusion. Started IV IG x 5 days on 10/5  Platelets improved to 21,000 on 10/6  Now decreased to 6,000.  So far poor response to prednisone and IV IG

## 2017-10-08 NOTE — PROGRESS NOTE ADULT - ASSESSMENT
89 yo F as above:  1. Acute respiratory failure - resp status improving, pt DNR/DNI, off BiPAP, now doing well on NC  2. Acute on chronic diast CHF - euvolemic, diuresis on hold 2/2 alkalosis  3. A fib - c/w ASA, Cardizem per Cardio  4. ITP - plts initially improved w/IVIG, now trending down again, monitor CBC, f/u Heme  5. Aspiration PNA - ID appreciated, c/w Ertapenem  6. NEWTON on CKD - per Renal  7. COPD - steroids and nebs per Pulm  8. Mechanical DVT prophylaxis  9. Dysphagia - puree diet with strict aspiration precautions

## 2017-10-08 NOTE — PROGRESS NOTE ADULT - SUBJECTIVE AND OBJECTIVE BOX
Follow-up Pulm Progress Note    No new respiratory events overnight.  Denies SOB/CP. o2 sat 95% on 2lnc    Medications:  MEDICATIONS  (STANDING):  diltiazem    Tablet 60 milliGRAM(s) Oral every 8 hours  haloperidol     Tablet 0.5 milliGRAM(s) Oral once  immune globulin gamma IVPB 17.4 Gram(s) IV Intermittent daily  pantoprazole  Injectable 40 milliGRAM(s) IV Push daily  predniSONE   Tablet 30 milliGRAM(s) Oral daily  sucralfate suspension 1 Gram(s) Oral Before meals and at bedtime  zinc sulfate 220 milliGRAM(s) Oral daily    MEDICATIONS  (PRN):  acetaminophen   Tablet. 650 milliGRAM(s) Oral once PRN Moderate Pain (4 - 6)          Vital Signs Last 24 Hrs  T(C): 36.4 (08 Oct 2017 05:01), Max: 36.8 (07 Oct 2017 21:24)  T(F): 97.5 (08 Oct 2017 05:01), Max: 98.3 (07 Oct 2017 21:24)  HR: 75 (08 Oct 2017 05:01) (75 - 78)  BP: 115/61 (08 Oct 2017 05:01) (107/63 - 115/61)  BP(mean): --  RR: 18 (08 Oct 2017 05:01) (17 - 18)  SpO2: 94% (08 Oct 2017 05:01) (94% - 98%)      VBG pH 7.43 10-08 @ 06:04    VBG pCO2 65 10-08 @ 06:04    VBG O2 sat 85 10-08 @ 06:04    VBG lactate 1.8 10-08 @ 06:04  VBG pH 7.42 10-07 @ 10:26    VBG pCO2 67 10-07 @ 10:26    VBG O2 sat 77 10-07 @ 10:26    VBG lactate -- 10-07 @ 10:26  VBG pH 7.46 10-06 @ 16:34    VBG pCO2 67 10-06 @ 16:34    VBG O2 sat 100 10-06 @ 16:34    VBG lactate 2.6 10-06 @ 16:34      10-07 @ 07:01  -  10-08 @ 07:00  --------------------------------------------------------  IN: 810 mL / OUT: 650 mL / NET: 160 mL          LABS:                        9.5    12.5  )-----------( 19       ( 07 Oct 2017 10:35 )             28.1     10-07    141  |  90<L>  |  50<H>  ----------------------------<  163<H>  3.9   |  39<H>  |  1.11    Ca    9.2      07 Oct 2017 10:35            CAPILLARY BLOOD GLUCOSE                            CULTURES: (if applicable)  Culture Results:   No growth to date. (10-03 @ 11:19)  Culture Results:   No growth to date. (10-03 @ 11:19)  Culture Results:   No growth (10-02 @ 06:42)    Most recent blood culture -- 10-03 @ 11:19   -- -- .Blood Blood-Peripheral 10-03 @ 11:19        Physical Examination:  PULM: decreased bs bilaterally, no significant sputum production  CVS: S1, S2 heard    RADIOLOGY REVIEWED  CXR: < from: Xray Chest 1 View AP -PORTABLE-Routine (10.07.17 @ 08:43) >    Impression: Left basilar airspace opacity, likely representing   atelectasis versus pneumonia.    < end of copied text >      CT chest:    TTE:< from: Transthoracic Echocardiogram (10.04.17 @ 23:22) >  Conclusions:  1. Mitral annular calcification and calcified mitral  leaflets.  2. Calcified trileaflet aortic valve with decreased  opening. Peak transaortic valve gradient equals 67 mm Hg,  mean transaortic valve gradient equals 42 mm Hg, estimated  aortic valve area equals 0.9 sqcm (by continuity equation),  aortic valve velocity time integral equals 96 cm,  consistent with severe aortic stenosis.  3. Mid septal hypertrophy.  4. Hyperdynamic left ventricle.  5. Moderate diastolic dysfunction (Stage II).  6. Left pleural effusion.    < end of copied text >

## 2017-10-08 NOTE — PROGRESS NOTE ADULT - SUBJECTIVE AND OBJECTIVE BOX
Pt is seen and examined  pt is awake and lying in bed  Generally weaker and more sleepy compared to her baseline  On questioning with daughter translating, not  expressing any specific complaints.  pt seems comfortable       MEDICATIONS  (STANDING):  diltiazem    Tablet 60 milliGRAM(s) Oral every 8 hours  haloperidol     Tablet 0.5 milliGRAM(s) Oral once  immune globulin gamma IVPB 17.4 Gram(s) IV Intermittent daily  pantoprazole  Injectable 40 milliGRAM(s) IV Push daily  predniSONE   Tablet 30 milliGRAM(s) Oral daily  sucralfate suspension 1 Gram(s) Oral Before meals and at bedtime  zinc sulfate 220 milliGRAM(s) Oral daily    MEDICATIONS  (PRN):  acetaminophen   Tablet. 650 milliGRAM(s) Oral once PRN Moderate Pain (4 - 6)    llergies    eggs (Rash)  no drug allergy (Unknown)    Intolerances    vinegar sensitivity    family states that the patient shakes when she ingests vinegar (Other)      Vital Signs Last 24 Hrs  T(C): 36.4 (08 Oct 2017 05:01), Max: 36.8 (07 Oct 2017 21:24)  T(F): 97.5 (08 Oct 2017 05:01), Max: 98.3 (07 Oct 2017 21:24)  HR: 75 (08 Oct 2017 05:01) (75 - 78)  BP: 115/61 (08 Oct 2017 05:01) (107/63 - 115/61)  BP(mean): --  RR: 18 (08 Oct 2017 11:00) (18 - 18)  SpO2: 98% (08 Oct 2017 11:00) (81% - 98%)    PHYSICAL EXAM  General: adult in NAD  HEENT: clear oropharynx, anicteric sclera, pink conjunctiva  Neck: supple  CV: normal S1/S2 with no murmur rubs or gallops  Lungs: positive air movement b/l ant lungs,clear to auscultation, no wheezes, no rales  Abdomen: soft non-tender non-distended, no hepatosplenomegaly  Ext: no clubbing cyanosis or edema  Skin: ecchymosis hemorraghic bullous on  lower leg.  Neuro: alert and oriented X 4, no focal deficits  LABS:                          8.0    11.29 )-----------( 6        ( 08 Oct 2017 09:24 )             22.7         Mean Cell Volume : 92.7 fl  Mean Cell Hemoglobin : 32.7 pg  Mean Cell Hemoglobin Concentration : 35.2 gm/dL  Auto Neutrophil # : x  Auto Lymphocyte # : x  Auto Monocyte # : x  Auto Eosinophil # : x  Auto Basophil # : x  Auto Neutrophil % : x  Auto Lymphocyte % : x  Auto Monocyte % : x  Auto Eosinophil % : x  Auto Basophil % : x      10-08    141  |  90<L>  |  55<H>  ----------------------------<  92  3.1<L>   |  37<H>  |  1.14    Ca    9.8      08 Oct 2017 09:24    RADIOLOGY & ADDITIONAL STUDIES:

## 2017-10-08 NOTE — PROGRESS NOTE ADULT - PROBLEM SELECTOR PLAN 1
Continue to monitor CBC/platelets,  Continue steroids --  30 mg/ day Prednisone  IV IGG Day #4 today 10/8  Continue gammaglobulin 0.4gm/KG daily for total of 5 days  If no improvement will start N-Plate  Continue antibiotics as per ID  Continue steroids, nebs as per pulm  Continue supportive care, out of bed, PT evaluation.

## 2017-10-08 NOTE — PROGRESS NOTE ADULT - PROBLEM SELECTOR PLAN 2
Overall improved  -Keep sp02>=90% on 2l nc  titrate to off to keep o2 sat=90%  check o2 sat on room air if >=90% then DC o2  DC bipap

## 2017-10-08 NOTE — PROVIDER CONTACT NOTE (OTHER) - RECOMMENDATIONS
please note poss new sacral dti in setting of ITP, platelet count of 1 today
Monitor pt.
Need assessment, continue to encourage the pt
continue to monitor
continue to monitor pt.
give another dose of haldol or allow patient to take off bipap and use nasal canula
give medication for agitation
place on high flow, or give haldol for agitation

## 2017-10-08 NOTE — PROVIDER CONTACT NOTE (CRITICAL VALUE NOTIFICATION) - ASSESSMENT
Pt alert to self,vs, no sob or vomiting noted. On high flow oxygen enation .pt has hx of ITP on prednisone
vss. denies chest pain, sob or palpitations. resting comfortably in bed.
Pt A&Ox1, in enhanced supervision room for safety. Pt on 2L NC, VSS, no s/s bleeding.
Pt A&Ox1. VSS.
Alert, vital stable, on Bipap
Pt A&Ox1, asymptomatic. VSS
Pt A&Ox1. Immune globulin gamma IVP @ 29 ml/hr. VSS. No S&S of bleeding.
Pt A&Ox1. On 3L NC w/ cont pulse ox. VSS.

## 2017-10-08 NOTE — PROVIDER CONTACT NOTE (CRITICAL VALUE NOTIFICATION) - NAME OF MD/NP/PA/DO NOTIFIED:
Doretha Cosme NP
Magui DOWNEY
Np Gareth
SHAYAN Servin
SHAYAN Roque
alfonzo gray
SHAYAN Chambers

## 2017-10-08 NOTE — PROGRESS NOTE ADULT - SUBJECTIVE AND OBJECTIVE BOX
CHIEF COMPLAINT:Patient is a 89y old  Female who presents with a chief complaint of shortness of breath / hypoxic respiratory failure (01 Oct 2017 22:34)    	  Interval history: no events      Allergies:  eggs (Rash)  no drug allergy (Unknown)  vinegar sensitivity    family states that the patient shakes when she ingests vinegar (Other)      PAST MEDICAL & SURGICAL HISTORY:  PNA (pneumonia)  Dysphagia  Thrombocytopenia: ITP  Atrial fibrillation and flutter: No A/C  during hospitalization in april 2014  Respiratory failure: in april 2014 was intubated  Cataract: right eye  Small bowel obstruction: s/p resection in 2010  HTN - Hypertension  S/P cholecystectomy  H/O: Hysterectomy  S/P Small Bowel Resection      FAMILY HISTORY:  No pertinent family history in first degree relatives    REVIEW OF SYSTEMS:  UTO, but no active complaints    Medications:    Medications:  MEDICATIONS  (STANDING):  diltiazem    Tablet 60 milliGRAM(s) Oral every 8 hours  haloperidol     Tablet 0.5 milliGRAM(s) Oral once  immune globulin gamma IVPB 17.4 Gram(s) IV Intermittent daily  pantoprazole  Injectable 40 milliGRAM(s) IV Push daily  predniSONE   Tablet 30 milliGRAM(s) Oral daily  sucralfate suspension 1 Gram(s) Oral Before meals and at bedtime  zinc sulfate 220 milliGRAM(s) Oral daily    MEDICATIONS  (PRN):  acetaminophen   Tablet. 650 milliGRAM(s) Oral once PRN Moderate Pain (4 - 6)    	    PHYSICAL EXAM:  T(C): 36.4 (10-08-17 @ 13:50), Max: 36.8 (10-07-17 @ 21:24)  HR: 73 (10-08-17 @ 18:17) (63 - 84)  BP: 103/65 (10-08-17 @ 18:17) (92/55 - 115/61)  RR: 18 (10-08-17 @ 13:50) (18 - 18)  SpO2: 93% (10-08-17 @ 13:50) (81% - 98%)  Wt(kg): --  I&O's Summary    07 Oct 2017 07:01  -  08 Oct 2017 07:00  --------------------------------------------------------  IN: 810 mL / OUT: 650 mL / NET: 160 mL    08 Oct 2017 07:01  -  08 Oct 2017 18:48  --------------------------------------------------------  IN: 240 mL / OUT: 500 mL / NET: -260 mL        Appearance: Frail	  HEENT:   NCAT, PERRL, EOMI	  Lymphatic: No lymphadenopathy  Cardiovascular: Normal S1 S2, RRR  Respiratory: CTA BL  Psychiatry: A & O x 3, Mood & affect appropriate  Gastrointestinal:  Soft, Non-tender, + BS  Skin: No rashes, No ecchymoses, No cyanosis	  Neurologic: Non-focal  Extremities: Normal range of motion, No clubbing, cyanosis or edema  	  	  LABS:	 	    CARDIAC MARKERS:                                8.0    11.29 )-----------( 6        ( 08 Oct 2017 09:24 )             22.7     10-08    141  |  90<L>  |  55<H>  ----------------------------<  92  3.1<L>   |  37<H>  |  1.14    Ca    9.8      08 Oct 2017 09:24      proBNP:   Lipid Profile:   HgA1c:   TSH:

## 2017-10-08 NOTE — PROVIDER CONTACT NOTE (OTHER) - BACKGROUND
pt. admitted for acute on chronic respiratory failure with hypercapnia, asp PNA, ITP, hyperkalemia, COPD.

## 2017-10-09 LAB
AGGLUTINATION: PRESENT — SIGNIFICANT CHANGE UP
ANION GAP SERPL CALC-SCNC: 8 MMOL/L — SIGNIFICANT CHANGE UP (ref 5–17)
BASE EXCESS BLDV CALC-SCNC: 12.7 MMOL/L — HIGH (ref -2–2)
BASOPHILS # BLD AUTO: 0 K/UL — SIGNIFICANT CHANGE UP (ref 0–0.2)
BASOPHILS NFR BLD AUTO: 0 % — SIGNIFICANT CHANGE UP (ref 0–2)
BUN SERPL-MCNC: 60 MG/DL — HIGH (ref 7–23)
CA-I SERPL-SCNC: 1.28 MMOL/L — SIGNIFICANT CHANGE UP (ref 1.12–1.3)
CALCIUM SERPL-MCNC: 9.1 MG/DL — SIGNIFICANT CHANGE UP (ref 8.4–10.5)
CHLORIDE BLDV-SCNC: 98 MMOL/L — SIGNIFICANT CHANGE UP (ref 96–108)
CHLORIDE SERPL-SCNC: 96 MMOL/L — SIGNIFICANT CHANGE UP (ref 96–108)
CO2 BLDV-SCNC: 42 MMOL/L — HIGH (ref 22–30)
CO2 SERPL-SCNC: 38 MMOL/L — HIGH (ref 22–31)
CREAT SERPL-MCNC: 1.42 MG/DL — HIGH (ref 0.5–1.3)
EOSINOPHIL # BLD AUTO: 0.13 K/UL — SIGNIFICANT CHANGE UP (ref 0–0.5)
EOSINOPHIL NFR BLD AUTO: 1 % — SIGNIFICANT CHANGE UP (ref 0–6)
GAS PNL BLDV: 141 MMOL/L — SIGNIFICANT CHANGE UP (ref 136–145)
GAS PNL BLDV: SIGNIFICANT CHANGE UP
GAS PNL BLDV: SIGNIFICANT CHANGE UP
GLUCOSE BLDV-MCNC: 108 MG/DL — HIGH (ref 70–99)
GLUCOSE SERPL-MCNC: 107 MG/DL — HIGH (ref 70–99)
HCO3 BLDV-SCNC: 39 MMOL/L — HIGH (ref 21–29)
HCT VFR BLD CALC: 27.2 % — LOW (ref 34.5–45)
HCT VFR BLDA CALC: 26 % — LOW (ref 39–50)
HGB BLD CALC-MCNC: 8.5 G/DL — LOW (ref 11.5–15.5)
HGB BLD-MCNC: 8.2 G/DL — LOW (ref 11.5–15.5)
LACTATE BLDV-MCNC: 1.5 MMOL/L — SIGNIFICANT CHANGE UP (ref 0.7–2)
LG PLATELETS BLD QL AUTO: SLIGHT — SIGNIFICANT CHANGE UP
LYMPHOCYTES # BLD AUTO: 1.45 K/UL — SIGNIFICANT CHANGE UP (ref 1–3.3)
LYMPHOCYTES # BLD AUTO: 11 % — LOW (ref 13–44)
MANUAL SMEAR VERIFICATION: SIGNIFICANT CHANGE UP
MCHC RBC-ENTMCNC: 25.9 PG — LOW (ref 27–34)
MCHC RBC-ENTMCNC: 30.1 GM/DL — LOW (ref 32–36)
MCV RBC AUTO: 86.1 FL — SIGNIFICANT CHANGE UP (ref 80–100)
MONOCYTES # BLD AUTO: 1.05 K/UL — HIGH (ref 0–0.9)
MONOCYTES NFR BLD AUTO: 8 % — SIGNIFICANT CHANGE UP (ref 2–14)
NEUTROPHILS # BLD AUTO: 10.51 K/UL — HIGH (ref 1.8–7.4)
NEUTROPHILS NFR BLD AUTO: 80 % — HIGH (ref 43–77)
OTHER CELLS CSF MANUAL: 8 ML/DL — LOW (ref 18–22)
PCO2 BLDV: 69 MMHG — HIGH (ref 35–50)
PH BLDV: 7.38 — SIGNIFICANT CHANGE UP (ref 7.35–7.45)
PLAT MORPH BLD: NORMAL — SIGNIFICANT CHANGE UP
PLATELET # BLD AUTO: 69 K/UL — LOW (ref 150–400)
PO2 BLDV: 39 MMHG — SIGNIFICANT CHANGE UP (ref 25–45)
POTASSIUM BLDV-SCNC: 3.6 MMOL/L — SIGNIFICANT CHANGE UP (ref 3.5–5)
POTASSIUM SERPL-MCNC: 3.7 MMOL/L — SIGNIFICANT CHANGE UP (ref 3.5–5.3)
POTASSIUM SERPL-SCNC: 3.7 MMOL/L — SIGNIFICANT CHANGE UP (ref 3.5–5.3)
RBC # BLD: 3.16 M/UL — LOW (ref 3.8–5.2)
RBC # FLD: 15.5 % — HIGH (ref 10.3–14.5)
RBC BLD AUTO: SIGNIFICANT CHANGE UP
SAO2 % BLDV: 68 % — SIGNIFICANT CHANGE UP (ref 67–88)
SODIUM SERPL-SCNC: 142 MMOL/L — SIGNIFICANT CHANGE UP (ref 135–145)
WBC # BLD: 13.14 K/UL — HIGH (ref 3.8–10.5)
WBC # FLD AUTO: 13.14 K/UL — HIGH (ref 3.8–10.5)

## 2017-10-09 PROCEDURE — 99232 SBSQ HOSP IP/OBS MODERATE 35: CPT

## 2017-10-09 RX ORDER — SODIUM CHLORIDE 9 MG/ML
1000 INJECTION INTRAMUSCULAR; INTRAVENOUS; SUBCUTANEOUS
Qty: 0 | Refills: 0 | Status: DISCONTINUED | OUTPATIENT
Start: 2017-10-09 | End: 2017-10-13

## 2017-10-09 RX ADMIN — Medication 30 MILLIGRAM(S): at 10:26

## 2017-10-09 RX ADMIN — ZINC SULFATE TAB 220 MG (50 MG ZINC EQUIVALENT) 220 MILLIGRAM(S): 220 (50 ZN) TAB at 16:27

## 2017-10-09 RX ADMIN — SODIUM CHLORIDE 50 MILLILITER(S): 9 INJECTION INTRAMUSCULAR; INTRAVENOUS; SUBCUTANEOUS at 09:00

## 2017-10-09 RX ADMIN — Medication 29 GRAM(S): at 13:54

## 2017-10-09 RX ADMIN — PANTOPRAZOLE SODIUM 40 MILLIGRAM(S): 20 TABLET, DELAYED RELEASE ORAL at 14:05

## 2017-10-09 NOTE — PROGRESS NOTE ADULT - SUBJECTIVE AND OBJECTIVE BOX
Follow-up Pulm Progress Note    No new respiratory events overnight.  Denies SOB/CP.   94% on 2L NC    Medications:  MEDICATIONS  (STANDING):  diltiazem    Tablet 60 milliGRAM(s) Oral every 8 hours  haloperidol     Tablet 0.5 milliGRAM(s) Oral once  immune globulin gamma IVPB 17.4 Gram(s) IV Intermittent daily  pantoprazole  Injectable 40 milliGRAM(s) IV Push daily  predniSONE   Tablet 30 milliGRAM(s) Oral daily  sodium chloride 0.9%. 1000 milliLiter(s) (50 mL/Hr) IV Continuous <Continuous>  sucralfate suspension 1 Gram(s) Oral Before meals and at bedtime  zinc sulfate 220 milliGRAM(s) Oral daily    MEDICATIONS  (PRN):  acetaminophen   Tablet. 650 milliGRAM(s) Oral once PRN Moderate Pain (4 - 6)          Vital Signs Last 24 Hrs  T(C): 36.4 (09 Oct 2017 04:16), Max: 36.5 (08 Oct 2017 19:42)  T(F): 97.6 (09 Oct 2017 04:16), Max: 97.7 (08 Oct 2017 19:42)  HR: 77 (09 Oct 2017 04:16) (63 - 87)  BP: 115/74 (09 Oct 2017 04:16) (92/55 - 115/74)  BP(mean): --  RR: 18 (09 Oct 2017 04:16) (18 - 18)  SpO2: 95% (09 Oct 2017 04:16) (93% - 98%) on 2L NC      VBG pH 7.38 10-09 @ 06:32    VBG pCO2 69 10-09 @ 06:32    VBG O2 sat 68 10-09 @ 06:32    VBG lactate 1.5 10-09 @ 06:32  VBG pH 7.43 10-08 @ 06:04    VBG pCO2 65 10-08 @ 06:04    VBG O2 sat 85 10-08 @ 06:04    VBG lactate 1.8 10-08 @ 06:04      10-08 @ 07:01  -  10-09 @ 07:00  --------------------------------------------------------  IN: 480 mL / OUT: 950 mL / NET: -470 mL          LABS:                        8.2    13.14 )-----------( 69       ( 09 Oct 2017 07:20 )             27.2     10-09    142  |  96  |  60<H>  ----------------------------<  107<H>  3.7   |  38<H>  |  1.42<H>    Ca    9.1      09 Oct 2017 05:23            CAPILLARY BLOOD GLUCOSE                            CULTURES: (if applicable)  Culture Results:   No growth at 5 days. (10-03 @ 11:19)  Culture Results:   No growth at 5 days. (10-03 @ 11:19)          Physical Examination:  PULM: Decreased BS at bases  CVS: S1, S2 heard    RADIOLOGY REVIEWED  CXR 10/8: Trace L basilar opacity, likely atelectasis

## 2017-10-09 NOTE — PROGRESS NOTE ADULT - PROBLEM SELECTOR PLAN 1
-- continue prednisone 30 mg daily  -- continue IVIg ---   -- no new medications started  --no heparin, no fevers, chills  -- f/u cbc today and decisions re: additional therapy depending on results

## 2017-10-09 NOTE — PROGRESS NOTE ADULT - SUBJECTIVE AND OBJECTIVE BOX
Patient is a 89y Female  being evaluated for   NEWTON, hyponatremia    drowsy in bed  arousable , no sob    PHYSICAL EXAM:  Vitals:T(C): 36.5 (10-09-17 @ 12:07), Max: 36.5 (10-08-17 @ 19:42)  HR: 74 (10-09-17 @ 12:07) (63 - 87)  BP: 106/69 (10-09-17 @ 12:07) (92/55 - 115/74)  RR: 18 (10-09-17 @ 12:07) (18 - 18)  SpO2: 94% (10-09-17 @ 12:07) (93% - 95%)  Wt(kg): --    General: no acute distress  HEENT:  NC, ext ears nl, oropharynx clear  Neck: No JVD, bruit, adenopathy or thyromegaly  Eyes: PERRL, no discharge, no icterus  Respiratory: dec bs bilat , no wheezes, rales, nl effort  Cardiovascular: regular rate, S1 and S2 no rub or gallop  Abd: : BS+, soft, NT , no rebound or guarding, no bruits  Extremities: tr edema, no cyanosis, palpable pulses      I and O's:  10-08 @ 07:01  -  10-09 @ 07:00  --------------------------------------------------------  IN: 480 mL / OUT: 950 mL / NET: -470 mL              REVIEW OF SYSTEMS:  CONSTITUTIONAL: No weakness, fevers or chills  RESPIRATORY: No cough No wheezing No hemoptysis; No shortness of breath  CARDIOVASCULAR: No chest pain No palpitations  GI : no abd pain No nausea No vomiting  GENITOURINARY: No dysuria, frequency or hematuria  All other review of systems is negative unless indicated above.    Allergies    eggs (Rash)  no drug allergy (Unknown)    Intolerances    vinegar sensitivity    family states that the patient shakes when she ingests vinegar (Other)        MEDICATIONS  (STANDING):  diltiazem    Tablet 60 milliGRAM(s) Oral every 8 hours  haloperidol     Tablet 0.5 milliGRAM(s) Oral once  immune globulin gamma IVPB 17.4 Gram(s) IV Intermittent daily  pantoprazole  Injectable 40 milliGRAM(s) IV Push daily  predniSONE   Tablet 30 milliGRAM(s) Oral daily  sodium chloride 0.9%. 1000 milliLiter(s) (50 mL/Hr) IV Continuous <Continuous>  sucralfate suspension 1 Gram(s) Oral Before meals and at bedtime  zinc sulfate 220 milliGRAM(s) Oral daily      Sodium,Serum 142    10-09 @ 05:23  Sodium,Serum 141    10-08 @ 09:24  Sodium,Serum 141    10-07 @ 10:35  Sodium,Serum 144    10-06 @ 09:34    Potassium,Serum 3.7    10-09 @ 05:23  Potassium,Serum 3.1    10-08 @ 09:24  Potassium,Serum 3.9    10-07 @ 10:35  Potassium,Serum 3.5    10-06 @ 09:34    Chloride,Serum 96    10-09 @ 05:23  Chloride,Serum 90    10-08 @ 09:24  Chloride,Serum 90    10-07 @ 10:35  Chloride,Serum 91    10-06 @ 09:34    CO2, Serum 38    10-09 @ 05:23  CO2, Serum 37    10-08 @ 09:24  CO2, Serum 39    10-07 @ 10:35  CO2, Serum 47    10-06 @ 09:34    BUN 60    10-09 @ 05:23  BUN 55    10-08 @ 09:24  BUN 50    10-07 @ 10:35  BUN 52    10-06 @ 09:34    Creatinine, Serum 1.42    10-09 @ 05:23  Creatinine, Serum 1.14    10-08 @ 09:24  Creatinine, Serum 1.11    10-07 @ 10:35  Creatinine, Serum 1.27    10-06 @ 09:34      10-09    142  |  96  |  60<H>  ----------------------------<  107<H>  3.7   |  38<H>  |  1.42<H>    Ca    9.1      09 Oct 2017 05:23                              8.2    13.14 )-----------( 69       ( 09 Oct 2017 07:20 )             27.2

## 2017-10-09 NOTE — PROGRESS NOTE ADULT - SUBJECTIVE AND OBJECTIVE BOX
CHIEF COMPLAINT:Patient is a 89y old  Female who presents with a chief complaint of shortness of breath / hypoxic respiratory failure (01 Oct 2017 22:34)    	  Interval history: no events      Allergies:  eggs (Rash)  no drug allergy (Unknown)  vinegar sensitivity    family states that the patient shakes when she ingests vinegar (Other)      PAST MEDICAL & SURGICAL HISTORY:  PNA (pneumonia)  Dysphagia  Thrombocytopenia: ITP  Atrial fibrillation and flutter: No A/C  during hospitalization in april 2014  Respiratory failure: in april 2014 was intubated  Cataract: right eye  Small bowel obstruction: s/p resection in 2010  HTN - Hypertension  S/P cholecystectomy  H/O: Hysterectomy  S/P Small Bowel Resection      FAMILY HISTORY:  No pertinent family history in first degree relatives    REVIEW OF SYSTEMS:  UTO, but no active complaints      Medications:  MEDICATIONS  (STANDING):  diltiazem    Tablet 60 milliGRAM(s) Oral every 8 hours  haloperidol     Tablet 0.5 milliGRAM(s) Oral once  pantoprazole  Injectable 40 milliGRAM(s) IV Push daily  predniSONE   Tablet 30 milliGRAM(s) Oral daily  sodium chloride 0.9%. 1000 milliLiter(s) (50 mL/Hr) IV Continuous <Continuous>  sucralfate suspension 1 Gram(s) Oral Before meals and at bedtime  zinc sulfate 220 milliGRAM(s) Oral daily    MEDICATIONS  (PRN):  acetaminophen   Tablet. 650 milliGRAM(s) Oral once PRN Moderate Pain (4 - 6)    	    PHYSICAL EXAM:  T(C): 37.3 (10-09-17 @ 15:48), Max: 37.3 (10-09-17 @ 15:48)  HR: 76 (10-09-17 @ 15:48) (70 - 87)  BP: 147/88 (10-09-17 @ 15:48) (103/65 - 147/88)  RR: 16 (10-09-17 @ 15:48) (16 - 18)  SpO2: 98% (10-09-17 @ 15:48) (94% - 98%)  Wt(kg): --  I&O's Summary    08 Oct 2017 07:01  -  09 Oct 2017 07:00  --------------------------------------------------------  IN: 480 mL / OUT: 950 mL / NET: -470 mL    09 Oct 2017 07:01  -  09 Oct 2017 16:24  --------------------------------------------------------  IN: 0 mL / OUT: 200 mL / NET: -200 mL        Appearance: Frail	  HEENT:   NCAT, PERRL, EOMI	  Lymphatic: No lymphadenopathy  Cardiovascular: Normal S1 S2, RRR  Respiratory: CTA BL  Psychiatry: A & O x 3, Mood & affect appropriate  Gastrointestinal:  Soft, Non-tender, + BS  Skin: No rashes, No ecchymoses, No cyanosis	  Neurologic: Non-focal  Extremities: Normal range of motion, No clubbing, cyanosis or edema  	  	  LABS:	 	    CARDIAC MARKERS:                                8.2    13.14 )-----------( 69       ( 09 Oct 2017 07:20 )             27.2     10-09    142  |  96  |  60<H>  ----------------------------<  107<H>  3.7   |  38<H>  |  1.42<H>    Ca    9.1      09 Oct 2017 05:23      proBNP:   Lipid Profile:   HgA1c:   TSH:

## 2017-10-09 NOTE — PROGRESS NOTE ADULT - ASSESSMENT
89 yo F as above:  1. Acute respiratory failure - resp status improving, pt DNR/DNI, off BiPAP, now doing well on NC  2. Acute on chronic diast CHF - euvolemic, diuresis on hold 2/2 alkalosis  3. A fib - c/w ASA, Cardizem per Cardio  4. ITP - plts improved, last dose IVIG today, f/u Heme  5. Aspiration PNA - ID appreciated, c/w Ertapenem  6. NEWTON on CKD - per Renal  7. COPD - steroids and nebs per Pulm  8. Mechanical DVT prophylaxis  9. Dysphagia - puree diet with strict aspiration precautions  10. Dispo - family would like pt to go to [facility] as they are stating her functional status now is significantly below baseline, PT re-eval

## 2017-10-09 NOTE — PROGRESS NOTE ADULT - SUBJECTIVE AND OBJECTIVE BOX
infectious diseases progress note:    Patient is a 89y old  Female who presents with a chief complaint of shortness of breath / hypoxic respiratory failure (01 Oct 2017 22:34)        Acute on chronic respiratory failure with hypercapnia        ROS:  CONSTITUTIONAL:  Negative fever or chills, feels well, good appetite  EYES:  Negative  blurry vision or double vision  CARDIOVASCULAR:  Negative for chest pain or palpitations  RESPIRATORY:  Negative for cough, wheezing, or SOB   GASTROINTESTINAL:  Negative for nausea, vomiting, diarrhea, constipation, or abdominal pain  GENITOURINARY:  Negative frequency, urgency or dysuria  NEUROLOGIC:  No headache, confusion, dizziness, lightheadedness    Allergies    eggs (Rash)  no drug allergy (Unknown)    Intolerances    vinegar sensitivity    family states that the patient shakes when she ingests vinegar (Other)      ANTIBIOTICS/RELEVANT:  antimicrobials    immunologic:  immune globulin gamma IVPB 17.4 Gram(s) IV Intermittent daily    OTHER:  acetaminophen   Tablet. 650 milliGRAM(s) Oral once PRN  diltiazem    Tablet 60 milliGRAM(s) Oral every 8 hours  haloperidol     Tablet 0.5 milliGRAM(s) Oral once  pantoprazole  Injectable 40 milliGRAM(s) IV Push daily  predniSONE   Tablet 30 milliGRAM(s) Oral daily  sucralfate suspension 1 Gram(s) Oral Before meals and at bedtime  zinc sulfate 220 milliGRAM(s) Oral daily      Objective:  Vital Signs Last 24 Hrs  T(C): 36.4 (09 Oct 2017 04:16), Max: 36.5 (08 Oct 2017 19:42)  T(F): 97.6 (09 Oct 2017 04:16), Max: 97.7 (08 Oct 2017 19:42)  HR: 77 (09 Oct 2017 04:16) (63 - 87)  BP: 115/74 (09 Oct 2017 04:16) (92/55 - 115/74)  BP(mean): --  RR: 18 (09 Oct 2017 04:16) (18 - 18)  SpO2: 95% (09 Oct 2017 04:16) (81% - 98%)    PHYSICAL EXAM:    Eyes:MEAGHAN, EOMI  Ear/Nose/Throat: no oral lesion, no sinus tenderness on percussion	  Neck:no JVD, no lymphadenopathy, supple  Respiratory: CTA soledad  Cardiovascular: S1S2 RRR, no murmurs  Gastrointestinal:soft, (+) BS, no HSM  Extremities:no e/e/c        LABS:                        8.0    11.29 )-----------( 6        ( 08 Oct 2017 09:24 )             22.7     10-09    142  |  96  |  60<H>  ----------------------------<  107<H>  3.7   |  38<H>  |  1.42<H>    Ca    9.1      09 Oct 2017 05:23              MICROBIOLOGY:    RECENT CULTURES:  10-03 @ 11:19 .Blood Blood-Peripheral                No growth at 5 days.          RESPIRATORY CULTURES:              RADIOLOGY & ADDITIONAL STUDIES:        Pager [phone]  After 5 pm/weekends or if no response :[phone]

## 2017-10-09 NOTE — PROGRESS NOTE ADULT - SUBJECTIVE AND OBJECTIVE BOX
Patient is a 89y old  Female who presents with a chief complaint of shortness of breath / hypoxic respiratory failure (01 Oct 2017 22:34), treated with antibiotics, improved.  Also with history of ITP with chronically low platelets which have decreased further during hospitalization and currently receiving steroids and IVIg (day #4/5)       Pt is seen and examined  pt is awake and lying in bed  pt seems comfortable;  lethargic      REVIEW OF SYSTEMS:    Unable to obtain as pt speaks little English; able to nod head minimally     MEDICATIONS  (STANDING):  diltiazem    Tablet 60 milliGRAM(s) Oral every 8 hours  haloperidol     Tablet 0.5 milliGRAM(s) Oral once  immune globulin gamma IVPB 17.4 Gram(s) IV Intermittent daily  pantoprazole  Injectable 40 milliGRAM(s) IV Push daily  predniSONE   Tablet 30 milliGRAM(s) Oral daily  sodium chloride 0.9%. 1000 milliLiter(s) (50 mL/Hr) IV Continuous <Continuous>  sucralfate suspension 1 Gram(s) Oral Before meals and at bedtime  zinc sulfate 220 milliGRAM(s) Oral daily    MEDICATIONS  (PRN):  acetaminophen   Tablet. 650 milliGRAM(s) Oral once PRN Moderate Pain (4 - 6)      Allergies    eggs (Rash)  no drug allergy (Unknown)    Intolerances    vinegar sensitivity    family states that the patient shakes when she ingests vinegar (Other)      Vital Signs Last 24 Hrs  T(C): 36.4 (09 Oct 2017 04:16), Max: 36.5 (08 Oct 2017 19:42)  T(F): 97.6 (09 Oct 2017 04:16), Max: 97.7 (08 Oct 2017 19:42)  HR: 77 (09 Oct 2017 04:16) (63 - 87)  BP: 115/74 (09 Oct 2017 04:16) (92/55 - 115/74)  BP(mean): --  RR: 18 (09 Oct 2017 04:16) (18 - 18)  SpO2: 95% (09 Oct 2017 04:16) (81% - 98%)    PHYSICAL EXAM  General: adult in NAD  HEENT: clear oropharynx, anicteric sclera, pink conjunctiva  Neck: supple  CV: normal S1/S2 with (+) G 2/6 holosystolic murmur at LSB; no rubs or gallops  Lungs: positive air movement b/l ant lungs,clear to auscultation, no wheezes, no rales  Abdomen: soft non-tender non-distended, no hepatosplenomegaly  Ext: no clubbing cyanosis; 1(+) edema  Skin: multiple bruises all over upper and lower extremities; hands are bluish color but warm with bilateral radial pulses    LABS:                        8.0    11.29 )-----------( 6        ( 08 Oct 2017 09:24 ) --- today's bloods pending             22.7       Mean Cell Volume : 92.7 fl  Mean Cell Hemoglobin : 32.7 pg  Mean Cell Hemoglobin Concentration : 35.2 gm/dL      Serial CBC's  10-08 @ 09:24  Hct-22.7 / Hgb-8.0 / Plat-6 / RBC-2.45 / WBC-11.29      Serial CBC's  10-07 @ 10:35  Hct-28.1 / Hgb-9.5 / Plat-19 / RBC-3.15 / WBC-12.5      Serial CBC's  10-06 @ 09:34  Hct-23.8 / Hgb-8.8 / Plat-21 / RBC-2.71 / WBC-14.8      Serial CBC's  10-05 @ 16:21  Hct-29.1 / Hgb-9.6 / Plat-18 / RBC-3.33 / WBC-16.6      10-09    142  |  96  |  60<H>  ----------------------------<  107<H>  3.7   |  38<H>  |  1.42<H>    Ca    9.1      09 Oct 2017 05:23                RADIOLOGY & ADDITIONAL STUDIES: no new radiology testing    Assessment

## 2017-10-09 NOTE — CHART NOTE - NSCHARTNOTEFT_GEN_A_CORE
Source: Patient [ X]    Family [ ]     other [ X] RN, Medical record    Pt seen, sleeping. Pt noted with confusion and Farsi speaking only. No family at bedside, therefore limited information collected at this time. Per RN. Pt ate well for breakfast, typically consumes 0-80% of meals.     PEr chart, Pt with acute on chronic respiratory distress, acute on chronic HF, Aspiration PNA. S/p MBS recommends Dysphagia 1 nectar thick. Pt with ITP, on IVIG.    Diet : Kosher, Dysphagia 1 nectar thick       Patient reports no GI distress      PO intake: 0-80%     Source for PO intake : staff and chart     Current Weight: Weight (kg): 88.1lbs, decrease noted   % Weight Change    Pertinent Medications: MEDICATIONS  (STANDING):  diltiazem    Tablet 60 milliGRAM(s) Oral every 8 hours  haloperidol     Tablet 0.5 milliGRAM(s) Oral once  pantoprazole  Injectable 40 milliGRAM(s) IV Push daily  predniSONE   Tablet 30 milliGRAM(s) Oral daily  sodium chloride 0.9%. 1000 milliLiter(s) (50 mL/Hr) IV Continuous <Continuous>  sucralfate suspension 1 Gram(s) Oral Before meals and at bedtime  zinc sulfate 220 milliGRAM(s) Oral daily    MEDICATIONS  (PRN):  acetaminophen   Tablet. 650 milliGRAM(s) Oral once PRN Moderate Pain (4 - 6)    Pertinent Labs:  10-09 Na142 mmol/L Glu 107 mg/dL<H> K+ 3.7 mmol/L Cr  1.42 mg/dL<H> BUN 60 mg/dL<H>    Skin: intact, +1 generalized edema.     Estimated Needs:   [ X] no change since previous assessment  [ ] recalculated:       Previous Nutrition Diagnosis:     [X ] Malnutrition          Nutrition Diagnosis is [X ] ongoing  [ ] resolved [ ] not applicable          New Nutrition Diagnosis: [ X] not applicable      Recommend    1. recommend Ensure enlive x2 nectar thick to supplement PO intake  2. Encourage adequate PO intake at meal times  3. Provide meal time assistance       Monitoring and Evaluation:     [X ] PO intake [X ] Tolerance to diet prescription [ X] weights [X ] follow up per protocol    [X ] other: RD remains available Sarah Siegler RD. Pager #[phone]

## 2017-10-09 NOTE — PROGRESS NOTE ADULT - ASSESSMENT
Imp-  89y Female s/p NEWTON  hyponatremia resolved  creatinine rising again - may be due to IVIG (completing today)  check urine lytes  IV NS 50cc/hr x 1 liter (increase tubular flow)  monitor I&O's, electrolytes and renal function

## 2017-10-09 NOTE — PROGRESS NOTE ADULT - PROBLEM SELECTOR PLAN 2
Clinically stable  -Severe AS with moderate diastolic dysfunction   -Observing off lasix for now, will need to restart prior to discharge

## 2017-10-10 LAB
ANION GAP SERPL CALC-SCNC: 8 MMOL/L — SIGNIFICANT CHANGE UP (ref 5–17)
BUN SERPL-MCNC: 54 MG/DL — HIGH (ref 7–23)
CALCIUM SERPL-MCNC: 8.7 MG/DL — SIGNIFICANT CHANGE UP (ref 8.4–10.5)
CHLORIDE SERPL-SCNC: 101 MMOL/L — SIGNIFICANT CHANGE UP (ref 96–108)
CO2 SERPL-SCNC: 35 MMOL/L — HIGH (ref 22–31)
CREAT SERPL-MCNC: 1.08 MG/DL — SIGNIFICANT CHANGE UP (ref 0.5–1.3)
GLUCOSE SERPL-MCNC: 89 MG/DL — SIGNIFICANT CHANGE UP (ref 70–99)
HCT VFR BLD CALC: 25.4 % — LOW (ref 34.5–45)
HGB BLD-MCNC: 7.5 G/DL — LOW (ref 11.5–15.5)
MCHC RBC-ENTMCNC: 25.8 PG — LOW (ref 27–34)
MCHC RBC-ENTMCNC: 29.5 GM/DL — LOW (ref 32–36)
MCV RBC AUTO: 87.3 FL — SIGNIFICANT CHANGE UP (ref 80–100)
PLATELET # BLD AUTO: 56 K/UL — LOW (ref 150–400)
POTASSIUM SERPL-MCNC: 3.5 MMOL/L — SIGNIFICANT CHANGE UP (ref 3.5–5.3)
POTASSIUM SERPL-SCNC: 3.5 MMOL/L — SIGNIFICANT CHANGE UP (ref 3.5–5.3)
RBC # BLD: 2.91 M/UL — LOW (ref 3.8–5.2)
RBC # FLD: 15.9 % — HIGH (ref 10.3–14.5)
SODIUM SERPL-SCNC: 144 MMOL/L — SIGNIFICANT CHANGE UP (ref 135–145)
WBC # BLD: 12.69 K/UL — HIGH (ref 3.8–10.5)
WBC # FLD AUTO: 12.69 K/UL — HIGH (ref 3.8–10.5)

## 2017-10-10 RX ADMIN — ZINC SULFATE TAB 220 MG (50 MG ZINC EQUIVALENT) 220 MILLIGRAM(S): 220 (50 ZN) TAB at 15:37

## 2017-10-10 RX ADMIN — PANTOPRAZOLE SODIUM 40 MILLIGRAM(S): 20 TABLET, DELAYED RELEASE ORAL at 12:24

## 2017-10-10 RX ADMIN — Medication 1 GRAM(S): at 09:18

## 2017-10-10 RX ADMIN — Medication 1 GRAM(S): at 16:52

## 2017-10-10 RX ADMIN — Medication 1 GRAM(S): at 21:56

## 2017-10-10 RX ADMIN — Medication 1 GRAM(S): at 12:23

## 2017-10-10 RX ADMIN — Medication 30 MILLIGRAM(S): at 09:15

## 2017-10-10 NOTE — PROGRESS NOTE ADULT - ASSESSMENT
89y Female s/p NEWTON  hyponatremia resolved  creatinine improved with IV fluid challenge- likely related to volume depletion/ non oliguric    ITP- completed course of  IVIG   await urine lytes  monitor I&O's, electrolytes and renal function 89y Female s/p NEWTON  hyponatremia resolved  creatinine improved with IV fluid challenge- likely related to volume depletion/ non oliguric    ITP- completed course of  IVIG   await urine lytes  monitor I&O's, electrolytes and renal function/ IV fluids prn if not taking oral fluids/ creatinine rising

## 2017-10-10 NOTE — PROGRESS NOTE ADULT - ASSESSMENT
89 yo F as above:  1. Acute respiratory failure - resp status improving, pt DNR/DNI, off BiPAP, now doing well on NC  2. Acute on chronic diast CHF - euvolemic, diuresis on hold 2/2 alkalosis  3. A fib - c/w ASA, Cardizem per Cardio  4. ITP - plts improved, last dose IVIG today, f/u Heme  5. Aspiration PNA - ID appreciated, c/w Ertapenem  6. NEWTON on CKD - per Renal  7. COPD - steroids and nebs per Pulm  8. Mechanical DVT prophylaxis  9. Dysphagia - puree diet with strict aspiration precautions  10. Dispo - family would like pt to go to [facility], [place] as they are stating her functional status now is significantly below baseline, PT re-eval

## 2017-10-10 NOTE — PROGRESS NOTE ADULT - SUBJECTIVE AND OBJECTIVE BOX
CHIEF COMPLAINT:Patient is a 89y old  Female who presents with a chief complaint of shortness of breath / hypoxic respiratory failure (01 Oct 2017 22:34)    	  Interval history: no events      Allergies:  eggs (Rash)  no drug allergy (Unknown)  vinegar sensitivity    family states that the patient shakes when she ingests vinegar (Other)      PAST MEDICAL & SURGICAL HISTORY:  PNA (pneumonia)  Dysphagia  Thrombocytopenia: ITP  Atrial fibrillation and flutter: No A/C  during hospitalization in april 2014  Respiratory failure: in april 2014 was intubated  Cataract: right eye  Small bowel obstruction: s/p resection in 2010  HTN - Hypertension  S/P cholecystectomy  H/O: Hysterectomy  S/P Small Bowel Resection      FAMILY HISTORY:  No pertinent family history in first degree relatives    REVIEW OF SYSTEMS:  UTO, but no active complaints      Medications:  MEDICATIONS  (STANDING):  diltiazem    Tablet 60 milliGRAM(s) Oral every 8 hours  haloperidol     Tablet 0.5 milliGRAM(s) Oral once  pantoprazole  Injectable 40 milliGRAM(s) IV Push daily  predniSONE   Tablet 30 milliGRAM(s) Oral daily  sodium chloride 0.9%. 1000 milliLiter(s) (50 mL/Hr) IV Continuous <Continuous>  sucralfate suspension 1 Gram(s) Oral Before meals and at bedtime  zinc sulfate 220 milliGRAM(s) Oral daily    MEDICATIONS  (PRN):  acetaminophen   Tablet. 650 milliGRAM(s) Oral once PRN Moderate Pain (4 - 6)    	    PHYSICAL EXAM:  T(C): 36.3 (10-10-17 @ 14:00), Max: 36.8 (10-10-17 @ 04:20)  HR: 79 (10-10-17 @ 14:00) (72 - 83)  BP: 124/70 (10-10-17 @ 14:00) (111/64 - 132/77)  RR: 18 (10-10-17 @ 14:00) (18 - 18)  SpO2: 94% (10-10-17 @ 14:00) (94% - 100%)  Wt(kg): --  I&O's Summary    09 Oct 2017 07:01  -  10 Oct 2017 07:00  --------------------------------------------------------  IN: 0 mL / OUT: 750 mL / NET: -750 mL    10 Oct 2017 07:01  -  10 Oct 2017 16:18  --------------------------------------------------------  IN: 240 mL / OUT: 250 mL / NET: -10 mL        Appearance: Frail	  HEENT:   NCAT, PERRL, EOMI	  Lymphatic: No lymphadenopathy  Cardiovascular: Normal S1 S2, RRR  Respiratory: CTA BL  Psychiatry: A & O x 3, Mood & affect appropriate  Gastrointestinal:  Soft, Non-tender, + BS  Skin: No rashes, No ecchymoses, No cyanosis	  Neurologic: Non-focal  Extremities: Normal range of motion, No clubbing, cyanosis or edema  	  	  LABS:	 	    CARDIAC MARKERS:                                7.5    12.69 )-----------( 56       ( 10 Oct 2017 07:32 )             25.4     10-10    144  |  101  |  54<H>  ----------------------------<  89  3.5   |  35<H>  |  1.08    Ca    8.7      10 Oct 2017 05:26      proBNP:   Lipid Profile:   HgA1c:   TSH:

## 2017-10-10 NOTE — PROGRESS NOTE ADULT - SUBJECTIVE AND OBJECTIVE BOX
Follow-up Pulm Progress Note    No new respiratory events overnight.  Denies SOB/CP.   94% on 3L NC    Medications:  MEDICATIONS  (STANDING):  diltiazem    Tablet 60 milliGRAM(s) Oral every 8 hours  haloperidol     Tablet 0.5 milliGRAM(s) Oral once  pantoprazole  Injectable 40 milliGRAM(s) IV Push daily  predniSONE   Tablet 30 milliGRAM(s) Oral daily  sodium chloride 0.9%. 1000 milliLiter(s) (50 mL/Hr) IV Continuous <Continuous>  sucralfate suspension 1 Gram(s) Oral Before meals and at bedtime  zinc sulfate 220 milliGRAM(s) Oral daily    MEDICATIONS  (PRN):  acetaminophen   Tablet. 650 milliGRAM(s) Oral once PRN Moderate Pain (4 - 6)          Vital Signs Last 24 Hrs  T(C): 36.8 (10 Oct 2017 04:20), Max: 37.3 (09 Oct 2017 15:48)  T(F): 98.2 (10 Oct 2017 04:20), Max: 99.1 (09 Oct 2017 15:48)  HR: 83 (10 Oct 2017 04:20) (70 - 83)  BP: 132/77 (10 Oct 2017 04:20) (106/69 - 147/88)  BP(mean): --  RR: 18 (10 Oct 2017 04:20) (16 - 18)  SpO2: 100% (10 Oct 2017 04:20) (94% - 100%) on 3L NC      VBG pH 7.38 10-09 @ 06:32    VBG pCO2 69 10-09 @ 06:32    VBG O2 sat 68 10-09 @ 06:32    VBG lactate 1.5 10-09 @ 06:32      10-09 @ 07:01  -  10-10 @ 07:00  --------------------------------------------------------  IN: 0 mL / OUT: 750 mL / NET: -750 mL          LABS:                        7.5    12.69 )-----------( 56       ( 10 Oct 2017 07:32 )             25.4     10-10    144  |  101  |  54<H>  ----------------------------<  89  3.5   |  35<H>  |  1.08    Ca    8.7      10 Oct 2017 05:26            CAPILLARY BLOOD GLUCOSE                            CULTURES: (if applicable)          Physical Examination:  PULM: Decreased BS at bases, grossly clear  CVS: S1, S2 RRR    RADIOLOGY REVIEWED  CXR: 10/7: L basilar opacity-atelectasis

## 2017-10-10 NOTE — PROGRESS NOTE ADULT - PROBLEM SELECTOR PLAN 1
-- plts improved after IVIg  -- check plt count today  -- if continues to remain stable, pt may be d/c'd (from Hem/onc point of view) with f/u as outpt with dr Crook ---

## 2017-10-10 NOTE — PROGRESS NOTE ADULT - SUBJECTIVE AND OBJECTIVE BOX
Patient is a 89y Female  being evaluated for NEWTON/ hyponatremia                       PHYSICAL EXAM:  Vitals:  T(F): 98.2 (10-10-17 @ 04:20), Max: 99.1 (10-09-17 @ 15:48)  HR: 83 (10-10-17 @ 04:20)  BP: 132/77 (10-10-17 @ 04:20)  BP(mean): --  RR: 18 (10-10-17 @ 04:20)  SpO2: 100% (10-10-17 @ 04:20)  Wt(kg): --  Constitutional: no acute distress  HEENT:  NC, ext ears nl, oropharynx clear,  nose nl  Neck: No JVD, bruit, adenopathy or thyromegaly  Eyes: PERRL, no discharge, no icterus  Respiratory: cta/p bilat, no wheezes, rales, nl effort  Cardiovascular: regular rate, S1 and S2 no rub or gallop  Abd: : BS+, soft, NT , no rebound or guarding, no bruits  Extremities: no edema or cyanosis, palpable pulses  Neurological: A/O x 3, nl coordination and tone    I and O's:    10-09 @ 07:01  -  10-10 @ 07:00  --------------------------------------------------------  IN: 0 mL / OUT: 750 mL / NET: -750 mL            REVIEW OF SYSTEMS:  CONSTITUTIONAL: No weakness, fevers or chills  RESPIRATORY: No cough, wheezing, hemoptysis; No shortness of breath  CARDIOVASCULAR: No chest pain or palpitations  GI : no abd pains, nausea or vomiting  GENITOURINARY: No dysuria, frequency or hematuria  All other review of systems is negative unless indicated above.    Allergies    eggs (Rash)  no drug allergy (Unknown)    Intolerances    vinegar sensitivity    family states that the patient shakes when she ingests vinegar (Other)      MEDICATIONS  (STANDING):  diltiazem    Tablet 60 milliGRAM(s) Oral every 8 hours  haloperidol     Tablet 0.5 milliGRAM(s) Oral once  pantoprazole  Injectable 40 milliGRAM(s) IV Push daily  predniSONE   Tablet 30 milliGRAM(s) Oral daily  sodium chloride 0.9%. 1000 milliLiter(s) (50 mL/Hr) IV Continuous <Continuous>  sucralfate suspension 1 Gram(s) Oral Before meals and at bedtime  zinc sulfate 220 milliGRAM(s) Oral daily      LABS:  CBC Full  -  ( 09 Oct 2017 07:20 )  WBC Count : 13.14 K/uL  Hemoglobin : 8.2 g/dL  Hematocrit : 27.2 %  Platelet Count - Automated : 69 K/uL  Mean Cell Volume : 86.1 fl  Mean Cell Hemoglobin : 25.9 pg  Mean Cell Hemoglobin Concentration : 30.1 gm/dL  Auto Neutrophil # : 10.51 K/uL  Auto Lymphocyte # : 1.45 K/uL  Auto Monocyte # : 1.05 K/uL  Auto Eosinophil # : 0.13 K/uL  Auto Basophil # : 0.00 K/uL  Auto Neutrophil % : 80.0 %  Auto Lymphocyte % : 11.0 %  Auto Monocyte % : 8.0 %  Auto Eosinophil % : 1.0 %  Auto Basophil % : 0.0 %    10-10    144  |  101  |  54<H>  ----------------------------<  89  3.5   |  35<H>  |  1.08    Ca    8.7      10 Oct 2017 05:26        Urine Studies:      Urine chemistry:   Urine Na:   Urine Creatinine:   Urine Protein/Cr ratio:  Urine K:   Urine Osm:   24 Hr urine studies:       Imp:  89y Female Patient is a 89y Female  being evaluated for NEWTON/ hyponatremia        awake but lethargic                  PHYSICAL EXAM:  Vitals:  T(F): 98.2 (10-10-17 @ 04:20), Max: 99.1 (10-09-17 @ 15:48)  HR: 83 (10-10-17 @ 04:20)  BP: 132/77 (10-10-17 @ 04:20)  BP(mean): --  RR: 18 (10-10-17 @ 04:20)  SpO2: 100% (10-10-17 @ 04:20)  Wt(kg): --  Constitutional: no acute distress  HEENT:  NC, ext ears nl, oropharynx clear,  nose nl  Neck: No JVD, bruit, adenopathy or thyromegaly  Eyes: PERRL, no discharge, no icterus  Respiratory: cta/p bilat, no wheezes, rales, nl effort  Cardiovascular: regular rate, S1 and S2 no rub or gallop  Abd: : BS+, soft, NT , no rebound or guarding, no bruits  Extremities: no edema or cyanosis, palpable pulses  Neurological:  nl coordination and tone    I and O's:    10-09 @ 07:01  -  10-10 @ 07:00  --------------------------------------------------------  IN: 0 mL / OUT: 750 mL / NET: -750 mL            REVIEW OF SYSTEMS:  patient lethargic , unable to obtain ROS    Allergies    eggs (Rash)  no drug allergy (Unknown)    Intolerances    vinegar sensitivity    family states that the patient shakes when she ingests vinegar (Other)      MEDICATIONS  (STANDING):  diltiazem    Tablet 60 milliGRAM(s) Oral every 8 hours  haloperidol     Tablet 0.5 milliGRAM(s) Oral once  pantoprazole  Injectable 40 milliGRAM(s) IV Push daily  predniSONE   Tablet 30 milliGRAM(s) Oral daily  sodium chloride 0.9%. 1000 milliLiter(s) (50 mL/Hr) IV Continuous <Continuous>  sucralfate suspension 1 Gram(s) Oral Before meals and at bedtime  zinc sulfate 220 milliGRAM(s) Oral daily      LABS:  CBC Full  -  ( 09 Oct 2017 07:20 )  WBC Count : 13.14 K/uL  Hemoglobin : 8.2 g/dL  Hematocrit : 27.2 %  Platelet Count - Automated : 69 K/uL  Mean Cell Volume : 86.1 fl  Mean Cell Hemoglobin : 25.9 pg  Mean Cell Hemoglobin Concentration : 30.1 gm/dL  Auto Neutrophil # : 10.51 K/uL  Auto Lymphocyte # : 1.45 K/uL  Auto Monocyte # : 1.05 K/uL  Auto Eosinophil # : 0.13 K/uL  Auto Basophil # : 0.00 K/uL  Auto Neutrophil % : 80.0 %  Auto Lymphocyte % : 11.0 %  Auto Monocyte % : 8.0 %  Auto Eosinophil % : 1.0 %  Auto Basophil % : 0.0 %    10-10    144  |  101  |  54<H>  ----------------------------<  89  3.5   |  35<H>  |  1.08    Ca    8.7      10 Oct 2017 05:26        Urine Studies:      Urine chemistry:   Urine Na:   Urine Creatinine:   Urine Protein/Cr ratio:  Urine K:   Urine Osm:   24 Hr urine studies:       Imp:  89y Female

## 2017-10-10 NOTE — PROGRESS NOTE ADULT - SUBJECTIVE AND OBJECTIVE BOX
Patient is a 89y old  Female with history of ITP who presents with hypoxic respiratory failure (01 Oct 2017 22:34) which has resolved and while hospitalized has been found to have decreasing platelets.  Her dose of steroids were increased and she has received IVIg. Her last platelet count was 69k.       Pt is seen and examined  pt is awake and lying in bed  pt seems comfortable and denies any complaints at this time      REVIEW OF SYSTEMS:    CONSTITUTIONAL: No weakness, fevers or chills  EYES/ENT: No visual changes;  No vertigo or throat pain   NECK: No pain or stiffness  RESPIRATORY: No cough, wheezing, hemoptysis; No shortness of breath  CARDIOVASCULAR: No chest pain or palpitations  GASTROINTESTINAL: No abdominal or epigastric pain. No nausea, vomiting, or hematemesis; No diarrhea or constipation. No melena or hematochezia.  GENITOURINARY: No dysuria, frequency or hematuria  NEUROLOGICAL: No numbness or weakness  SKIN: No itching, burning, rashes, or lesions     MEDICATIONS  (STANDING):  diltiazem    Tablet 60 milliGRAM(s) Oral every 8 hours  haloperidol     Tablet 0.5 milliGRAM(s) Oral once  pantoprazole  Injectable 40 milliGRAM(s) IV Push daily  predniSONE   Tablet 30 milliGRAM(s) Oral daily  sodium chloride 0.9%. 1000 milliLiter(s) (50 mL/Hr) IV Continuous <Continuous>  sucralfate suspension 1 Gram(s) Oral Before meals and at bedtime  zinc sulfate 220 milliGRAM(s) Oral daily    MEDICATIONS  (PRN):  acetaminophen   Tablet. 650 milliGRAM(s) Oral once PRN Moderate Pain (4 - 6)      Allergies    eggs (Rash)  no drug allergy (Unknown)    Intolerances    vinegar sensitivity    family states that the patient shakes when she ingests vinegar (Other)      Vital Signs Last 24 Hrs  T(C): 36.8 (10 Oct 2017 04:20), Max: 37.3 (09 Oct 2017 15:48)  T(F): 98.2 (10 Oct 2017 04:20), Max: 99.1 (09 Oct 2017 15:48)  HR: 83 (10 Oct 2017 04:20) (70 - 83)  BP: 132/77 (10 Oct 2017 04:20) (106/69 - 147/88)  BP(mean): --  RR: 18 (10 Oct 2017 04:20) (16 - 18)  SpO2: 100% (10 Oct 2017 04:20) (94% - 100%)      PHYSICAL EXAM:      Constitutional: pt awake, alert, oriented to person    Eyes: anicteric, PERRLA    ENMT: no nasal d/c, no mouth sores    Neck: supple, no masses, no JVD    Respiratory: clear to A & P    Cardiovascular: rrr, s1, s2 normal, (+) holosystolic murmur at LSB    Abdomen: soft, non-tender, BS (+),no rebound, no guarding    Extremities: - trace edema; (+) ecchymoses of various ages on upper extremities, hands are purple in color but warm to touch with 2 (+) pulses radial    Lymph Nodes: none appreciated          LABS:                          8.2    13.14 )-----------( 69       ( 09 Oct 2017 07:20 )             27.2       Serial CBC's  10-09 @ 07:20  Hct-27.2 / Hgb-8.2 / Plat-69 / RBC-3.16 / WBC-13.14      Serial CBC's  10-08 @ 09:24  Hct-22.7 / Hgb-8.0 / Plat-6 / RBC-2.45 / WBC-11.29      Serial CBC's  10-07 @ 10:35  Hct-28.1 / Hgb-9.5 / Plat-19 / RBC-3.15 / WBC-12.5      Serial CBC's  10-06 @ 09:34  Hct-23.8 / Hgb-8.8 / Plat-21 / RBC-2.71 / WBC-14.8      10-10    144  |  101  |  54<H>  ----------------------------<  89  3.5   |  35<H>  |  1.08    Ca    8.7      10 Oct 2017 05:26          Assessment

## 2017-10-11 DIAGNOSIS — D64.9 ANEMIA, UNSPECIFIED: ICD-10-CM

## 2017-10-11 LAB
ANION GAP SERPL CALC-SCNC: 5 MMOL/L — SIGNIFICANT CHANGE UP (ref 5–17)
BUN SERPL-MCNC: 46 MG/DL — HIGH (ref 7–23)
CALCIUM SERPL-MCNC: 9 MG/DL — SIGNIFICANT CHANGE UP (ref 8.4–10.5)
CHLORIDE SERPL-SCNC: 104 MMOL/L — SIGNIFICANT CHANGE UP (ref 96–108)
CO2 SERPL-SCNC: 36 MMOL/L — HIGH (ref 22–31)
CREAT SERPL-MCNC: 1.09 MG/DL — SIGNIFICANT CHANGE UP (ref 0.5–1.3)
GLUCOSE BLDC GLUCOMTR-MCNC: 102 MG/DL — HIGH (ref 70–99)
GLUCOSE BLDC GLUCOMTR-MCNC: 121 MG/DL — HIGH (ref 70–99)
GLUCOSE BLDC GLUCOMTR-MCNC: 137 MG/DL — HIGH (ref 70–99)
GLUCOSE BLDC GLUCOMTR-MCNC: 142 MG/DL — HIGH (ref 70–99)
GLUCOSE SERPL-MCNC: 94 MG/DL — SIGNIFICANT CHANGE UP (ref 70–99)
HCT VFR BLD CALC: 26.3 % — LOW (ref 34.5–45)
HGB BLD-MCNC: 7.7 G/DL — LOW (ref 11.5–15.5)
MCHC RBC-ENTMCNC: 26 PG — LOW (ref 27–34)
MCHC RBC-ENTMCNC: 29.3 GM/DL — LOW (ref 32–36)
MCV RBC AUTO: 88.9 FL — SIGNIFICANT CHANGE UP (ref 80–100)
PLATELET # BLD AUTO: 77 K/UL — LOW (ref 150–400)
POTASSIUM SERPL-MCNC: 3.9 MMOL/L — SIGNIFICANT CHANGE UP (ref 3.5–5.3)
POTASSIUM SERPL-SCNC: 3.9 MMOL/L — SIGNIFICANT CHANGE UP (ref 3.5–5.3)
RBC # BLD: 2.96 M/UL — LOW (ref 3.8–5.2)
RBC # FLD: 16 % — HIGH (ref 10.3–14.5)
SODIUM SERPL-SCNC: 145 MMOL/L — SIGNIFICANT CHANGE UP (ref 135–145)
WBC # BLD: 14.18 K/UL — HIGH (ref 3.8–10.5)
WBC # FLD AUTO: 14.18 K/UL — HIGH (ref 3.8–10.5)

## 2017-10-11 RX ADMIN — ZINC SULFATE TAB 220 MG (50 MG ZINC EQUIVALENT) 220 MILLIGRAM(S): 220 (50 ZN) TAB at 12:55

## 2017-10-11 RX ADMIN — Medication 1 GRAM(S): at 06:52

## 2017-10-11 RX ADMIN — Medication 1 GRAM(S): at 12:55

## 2017-10-11 RX ADMIN — Medication 30 MILLIGRAM(S): at 05:14

## 2017-10-11 RX ADMIN — Medication 1 GRAM(S): at 22:25

## 2017-10-11 RX ADMIN — Medication 1 GRAM(S): at 17:13

## 2017-10-11 RX ADMIN — PANTOPRAZOLE SODIUM 40 MILLIGRAM(S): 20 TABLET, DELAYED RELEASE ORAL at 12:55

## 2017-10-11 NOTE — PHYSICAL THERAPY INITIAL EVALUATION ADULT - ADDITIONAL COMMENTS
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 (family denies), moderate AS, HTN, dementia (baseline AAOx2), dysphagia (declined PEG tube), hx of recurrent aspiration pneumonia, ?COPD presents with shortness of breath. Patient lives alone and has 24-hr aide for 7-days a week. On day of admission p/w shortness of breath and aide checked O2-saturation which was 70%.
Per dtr, lives in apt with elevator, 24/7 HHA, able to assist with bed mobility/OOB to W/C with 1 person assist, able to feed self; non-ambulatory, owns hospital bed, W/C, rolling walker

## 2017-10-11 NOTE — PROGRESS NOTE ADULT - ASSESSMENT
89 yo F as above:  1. Acute respiratory failure - resp status improving, pt DNR/DNI, off BiPAP, now doing well on NC  2. Acute on chronic diast CHF - euvolemic, diuresis on hold 2/2 alkalosis  3. A fib - c/w ASA, Cardizem per Cardio  4. ITP - plts improved, last dose IVIG today, f/u Heme  5. Aspiration PNA - ID appreciated, c/w Ertapenem  6. NEWTON on CKD - per Renal  7. COPD - steroids and nebs per Pulm  8. Mechanical DVT prophylaxis  9. Dysphagia - puree diet with strict aspiration precautions  10. Dispo - family would like pt to go to [facility] as they are stating her functional status now is significantly below baseline, PT re-eval  11. Anemia - w/u per Heme, transfuse 1U PRBC prior to d/c

## 2017-10-11 NOTE — PROGRESS NOTE ADULT - SUBJECTIVE AND OBJECTIVE BOX
Pt is seen and examined  pt is awake and lying in bed/  pt seems comfortable and denies any complaints at this time  Ramezter concerned regarding  bed sore        MEDICATIONS  (STANDING):  diltiazem    Tablet 60 milliGRAM(s) Oral every 8 hours  haloperidol     Tablet 0.5 milliGRAM(s) Oral once  pantoprazole  Injectable 40 milliGRAM(s) IV Push daily  predniSONE   Tablet 30 milliGRAM(s) Oral daily  sodium chloride 0.9%. 1000 milliLiter(s) (50 mL/Hr) IV Continuous <Continuous>  sucralfate suspension 1 Gram(s) Oral Before meals and at bedtime  zinc sulfate 220 milliGRAM(s) Oral daily    MEDICATIONS  (PRN):  acetaminophen   Tablet. 650 milliGRAM(s) Oral once PRN Moderate Pain (4 - 6)      Allergies    eggs (Rash)  no drug allergy (Unknown)    Intolerances    vinegar sensitivity    family states that the patient shakes when she ingests vinegar (Other)      Vital Signs Last 24 Hrs  T(C): 36.5 (11 Oct 2017 04:22), Max: 36.6 (10 Oct 2017 20:58)  T(F): 97.7 (11 Oct 2017 04:22), Max: 97.9 (10 Oct 2017 20:58)  HR: 62 (11 Oct 2017 04:22) (62 - 79)  BP: 143/69 (11 Oct 2017 04:22) (124/70 - 143/69)  BP(mean): --  RR: 16 (11 Oct 2017 04:22) (16 - 18)  SpO2: 98% (11 Oct 2017 04:22) (94% - 98%)    PHYSICAL EXAM  General: adult in NAD  HEENT:  anicteric sclera, pink conjunctiva  Neck: supple  CV: normal S1/S2 with no murmur rubs or gallops  Lungs: positive air movement b/l ant lungs,  clear to auscultation, no wheezes, no rales  Abdomen: soft non-tender non-distended, no hepatosplenomegaly  Ext: no clubbing cyanosis or edema  Skin: ecchymosis on legs, no rashes   Neuro: alert  no focal deficits  LABS:                          7.5    12.69 )-----------( 56       ( 10 Oct 2017 07:32 )             25.4         Mean Cell Volume : 87.3 fl  Mean Cell Hemoglobin : 25.8 pg  Mean Cell Hemoglobin Concentration : 29.5 gm/dL  Auto Neutrophil # : x  Auto Lymphocyte # : x  Auto Monocyte # : x  Auto Eosinophil # : x  Auto Basophil # : x  Auto Neutrophil % : x  Auto Lymphocyte % : x  Auto Monocyte % : x  Auto Eosinophil % : x  Auto Basophil % : x      10-11    145  |  104  |  46<H>  ----------------------------<  94  3.9   |  36<H>  |  1.09    Ca    9.0      11 Oct 2017 06:10

## 2017-10-11 NOTE — PHYSICAL THERAPY INITIAL EVALUATION ADULT - REFERRING PHYSICIAN, REHAB EVAL
Johnathon Lewis. Consult- PT evaluate and treat, Activity- Ambulate with assist
Johnathon Lewis. Consult- PT evaluate and treat, Activity- Ambulate with assist

## 2017-10-11 NOTE — PROVIDER CONTACT NOTE (OTHER) - ACTION/TREATMENT ORDERED:
Wound Care to see pt./ ANP. Barrier Cream applied to sacrum. Turned & Re-positioned pt. Q2H. Cont. to monitor pt.
PA aware, no further instructions
NP aware. Continue to monitor pt. until BMP results are available then we will reassess patient and supplement if needed
PA aware. will continue to monitor pt.
PA informed, Haldol 0.5mg PO ordered, will try to put IV once pt become calm and cooperative, Will continue to monitor
continue to monitor
give haldol 0.5mg po one time now for agitation
place on high flow, monitor respiratory status
take off bipap and use nasal canula

## 2017-10-11 NOTE — PROVIDER CONTACT NOTE (OTHER) - ASSESSMENT
Pt. had DTI to Sacrum upon admission.
Pt converted from NSR to A.flutter rate controlled. HR 96, bp 105/61 no c/o CP, or palpitations
Pt is alert and oriented x1-2, confused, Pt pulled off IV line, when IV RN trying to place a new IV lock , pt became combative, scratched the staff and trying to bite staff. Pt keeps on taking off Venti mask. Unable to place IV lock and draw stat BMP
pt a&ox1-2 , vss no c/o c/p , SOB ,dizziness, headache.
pt agreed to put on bipapa but after 2 hours pt started to be noncompliant with bipap, daughter unable to convince mother to keep bipap on. pt agreed to keep nasal canula on. explained risks and benefits with no effect. pt baseline mental status
pt noncompliant with bipap. daughter at the bedside unable to convince patient to put on bipap
pt repeatedly removing Bipap, explained risks and benefits with no effect. Pt a&ox1-2 at baseline. pt previously tolerating high flow
pt. a&ox2, vss no c/o c/p, SOB, dizziness, headache.

## 2017-10-11 NOTE — PHYSICAL THERAPY INITIAL EVALUATION ADULT - PERTINENT HX OF CURRENT PROBLEM, REHAB EVAL
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 (family denies), moderate AS, HTN, dementia (baseline AAOx2), dysphagia (declined PEG tube), hx of recurrent aspiration pneumonia, ?COPD presents with shortness of breath. Patient lives alone and has 24-hr aide for 7-days a week. On day of admission p/w shortness of breath and aide checked O2-saturation which was 70%.

## 2017-10-11 NOTE — PHYSICAL THERAPY INITIAL EVALUATION ADULT - DISCHARGE DISPOSITION, PT EVAL
DC subacute rehab for strengthening, improve overall fxl mobility; dtr insisting pt go to rehab; if pt to go home, home PT, assist from family/HHA (24/7) for all mobility/ADLs, owns hospital bed/W/C/rolling walker, BEN veloz.

## 2017-10-11 NOTE — PROGRESS NOTE ADULT - PROBLEM SELECTOR PLAN 2
Hgb  drop noted.  Anemia work up ordered  Considering  her  age  would transfsue to a hgb > 8.o prior to discharge.

## 2017-10-11 NOTE — PROGRESS NOTE ADULT - SUBJECTIVE AND OBJECTIVE BOX
Patient is a 89y Female  being evaluated for NEWTON/ hyponatremia        pt resting comfortably             PHYSICAL EXAM:  Vitals:  T(F): 98.2 (10-10-17 @ 04:20), Max: 99.1 (10-09-17 @ 15:48)  HR: 83 (10-10-17 @ 04:20)  BP: 132/77 (10-10-17 @ 04:20)  BP(mean): --  RR: 18 (10-10-17 @ 04:20)  SpO2: 100% (10-10-17 @ 04:20)  Wt(kg): --  Constitutional: no acute distress  I and O's:    10-09 @ 07:01  -  10-10 @ 07:00  --------------------------------------------------------  IN: 0 mL / OUT: 750 mL / NET: -750 mL            REVIEW OF SYSTEMS:  patient lethargic , unable to obtain ROS    Allergies    eggs (Rash)  no drug allergy (Unknown)    Intolerances    vinegar sensitivity    family states that the patient shakes when she ingests vinegar (Other)      MEDICATIONS  (STANDING):  diltiazem    Tablet 60 milliGRAM(s) Oral every 8 hours  haloperidol     Tablet 0.5 milliGRAM(s) Oral once  pantoprazole  Injectable 40 milliGRAM(s) IV Push daily  predniSONE   Tablet 30 milliGRAM(s) Oral daily  sodium chloride 0.9%. 1000 milliLiter(s) (50 mL/Hr) IV Continuous <Continuous>  sucralfate suspension 1 Gram(s) Oral Before meals and at bedtime  zinc sulfate 220 milliGRAM(s) Oral daily    MEDICATIONS  (PRN):  acetaminophen   Tablet. 650 milliGRAM(s) Oral once PRN Moderate Pain (4 - 6)    LABS:  CBC Full  -  ( 09 Oct 2017 07:20 )  WBC Count : 13.14 K/uL  Hemoglobin : 8.2 g/dL  Hematocrit : 27.2 %  Platelet Count - Automated : 69 K/uL  Mean Cell Volume : 86.1 fl  Mean Cell Hemoglobin : 25.9 pg  Mean Cell Hemoglobin Concentration : 30.1 gm/dL  Auto Neutrophil # : 10.51 K/uL  Auto Lymphocyte # : 1.45 K/uL  Auto Monocyte # : 1.05 K/uL  Auto Eosinophil # : 0.13 K/uL  Auto Basophil # : 0.00 K/uL  Auto Neutrophil % : 80.0 %  Auto Lymphocyte % : 11.0 %  Auto Monocyte % : 8.0 %  Auto Eosinophil % : 1.0 %  Auto Basophil % : 0.0 %    10-10    144  |  101  |  54<H>  ----------------------------<  89  3.5   |  35<H>  |  1.08    Ca    8.7      10 Oct 2017 05:26        Urine Studies:      Urine chemistry:   Urine Na:   Urine Creatinine:   Urine Protein/Cr ratio:  Urine K:   Urine Osm:   24 Hr urine studies:       Imp:  89y Female

## 2017-10-11 NOTE — PROGRESS NOTE ADULT - PROBLEM SELECTOR PLAN 1
-Lasix stopped on 10/6  -Clinically not volume overloaded  -Check pro-BNP in AM  -May need low dose lasix to go home with

## 2017-10-11 NOTE — PROGRESS NOTE ADULT - SUBJECTIVE AND OBJECTIVE BOX
Follow-up Pulm Progress Note    No new respiratory events overnight.  Denies SOB/CP.   94% on 2L NC  No complaints     Medications:  MEDICATIONS  (STANDING):  diltiazem    Tablet 60 milliGRAM(s) Oral every 8 hours  haloperidol     Tablet 0.5 milliGRAM(s) Oral once  pantoprazole  Injectable 40 milliGRAM(s) IV Push daily  predniSONE   Tablet 30 milliGRAM(s) Oral daily  sodium chloride 0.9%. 1000 milliLiter(s) (50 mL/Hr) IV Continuous <Continuous>  sucralfate suspension 1 Gram(s) Oral Before meals and at bedtime  zinc sulfate 220 milliGRAM(s) Oral daily    MEDICATIONS  (PRN):  acetaminophen   Tablet. 650 milliGRAM(s) Oral once PRN Moderate Pain (4 - 6)          Vital Signs Last 24 Hrs  T(C): 36.5 (11 Oct 2017 04:22), Max: 36.6 (10 Oct 2017 20:58)  T(F): 97.7 (11 Oct 2017 04:22), Max: 97.9 (10 Oct 2017 20:58)  HR: 62 (11 Oct 2017 04:22) (62 - 79)  BP: 143/69 (11 Oct 2017 04:22) (124/70 - 143/69)  BP(mean): --  RR: 16 (11 Oct 2017 04:22) (16 - 18)  SpO2: 98% (11 Oct 2017 04:22) (94% - 98%) on 2L NC          10-10 @ 07:01  -  10-11 @ 07:00  --------------------------------------------------------  IN: 600 mL / OUT: 900 mL / NET: -300 mL          LABS:                        7.5    12.69 )-----------( 56       ( 10 Oct 2017 07:32 )             25.4     10-11    145  |  104  |  46<H>  ----------------------------<  94  3.9   |  36<H>  |  1.09    Ca    9.0      11 Oct 2017 06:10            CAPILLARY BLOOD GLUCOSE  142 (11 Oct 2017 11:22)      POCT Blood Glucose.: 142 mg/dL (11 Oct 2017 11:22)            Physical Examination:  PULM: Decreased BS L base  CVS: S1, S2 RRR    RADIOLOGY REVIEWED  CXR: L basilar opacity

## 2017-10-12 ENCOUNTER — TRANSCRIPTION ENCOUNTER (OUTPATIENT)
Age: 82
End: 2017-10-12

## 2017-10-12 DIAGNOSIS — D69.3 IMMUNE THROMBOCYTOPENIC PURPURA: ICD-10-CM

## 2017-10-12 DIAGNOSIS — E87.0 HYPEROSMOLALITY AND HYPERNATREMIA: ICD-10-CM

## 2017-10-12 LAB
ANION GAP SERPL CALC-SCNC: 6 MMOL/L — SIGNIFICANT CHANGE UP (ref 5–17)
ANION GAP SERPL CALC-SCNC: 9 MMOL/L — SIGNIFICANT CHANGE UP (ref 5–17)
BUN SERPL-MCNC: 36 MG/DL — HIGH (ref 7–23)
BUN SERPL-MCNC: 37 MG/DL — HIGH (ref 7–23)
CALCIUM SERPL-MCNC: 8.8 MG/DL — SIGNIFICANT CHANGE UP (ref 8.4–10.5)
CALCIUM SERPL-MCNC: 9.1 MG/DL — SIGNIFICANT CHANGE UP (ref 8.4–10.5)
CHLORIDE SERPL-SCNC: 105 MMOL/L — SIGNIFICANT CHANGE UP (ref 96–108)
CHLORIDE SERPL-SCNC: 107 MMOL/L — SIGNIFICANT CHANGE UP (ref 96–108)
CO2 SERPL-SCNC: 35 MMOL/L — HIGH (ref 22–31)
CO2 SERPL-SCNC: 37 MMOL/L — HIGH (ref 22–31)
CREAT SERPL-MCNC: 0.84 MG/DL — SIGNIFICANT CHANGE UP (ref 0.5–1.3)
CREAT SERPL-MCNC: 0.89 MG/DL — SIGNIFICANT CHANGE UP (ref 0.5–1.3)
FERRITIN SERPL-MCNC: 49 NG/ML — SIGNIFICANT CHANGE UP (ref 15–150)
GLUCOSE BLDC GLUCOMTR-MCNC: 110 MG/DL — HIGH (ref 70–99)
GLUCOSE BLDC GLUCOMTR-MCNC: 174 MG/DL — HIGH (ref 70–99)
GLUCOSE BLDC GLUCOMTR-MCNC: 188 MG/DL — HIGH (ref 70–99)
GLUCOSE BLDC GLUCOMTR-MCNC: 193 MG/DL — HIGH (ref 70–99)
GLUCOSE SERPL-MCNC: 181 MG/DL — HIGH (ref 70–99)
GLUCOSE SERPL-MCNC: 92 MG/DL — SIGNIFICANT CHANGE UP (ref 70–99)
HAPTOGLOB SERPL-MCNC: 107 MG/DL — SIGNIFICANT CHANGE UP (ref 34–200)
HCT VFR BLD CALC: 22.2 % — LOW (ref 34.5–45)
HGB BLD-MCNC: 8 G/DL — LOW (ref 11.5–15.5)
IRON SATN MFR SERPL: 16 % — SIGNIFICANT CHANGE UP (ref 14–50)
IRON SATN MFR SERPL: 36 UG/DL — SIGNIFICANT CHANGE UP (ref 30–160)
MCHC RBC-ENTMCNC: 33.9 PG — SIGNIFICANT CHANGE UP (ref 27–34)
MCHC RBC-ENTMCNC: 36 GM/DL — SIGNIFICANT CHANGE UP (ref 32–36)
MCV RBC AUTO: 94.1 FL — SIGNIFICANT CHANGE UP (ref 80–100)
NT-PROBNP SERPL-SCNC: 3983 PG/ML — HIGH (ref 0–300)
PLATELET # BLD AUTO: 96 K/UL — LOW (ref 150–400)
POTASSIUM SERPL-MCNC: 4.3 MMOL/L — SIGNIFICANT CHANGE UP (ref 3.5–5.3)
POTASSIUM SERPL-MCNC: 4.6 MMOL/L — SIGNIFICANT CHANGE UP (ref 3.5–5.3)
POTASSIUM SERPL-SCNC: 4.3 MMOL/L — SIGNIFICANT CHANGE UP (ref 3.5–5.3)
POTASSIUM SERPL-SCNC: 4.6 MMOL/L — SIGNIFICANT CHANGE UP (ref 3.5–5.3)
RBC # BLD: 2.36 M/UL — LOW (ref 3.8–5.2)
RBC # BLD: 2.36 M/UL — LOW (ref 3.8–5.2)
RBC # FLD: 16.7 % — HIGH (ref 10.3–14.5)
RETICS #: 59.9 K/UL — SIGNIFICANT CHANGE UP (ref 25–125)
RETICS/RBC NFR: 2.6 % — HIGH (ref 0.5–2.5)
SODIUM SERPL-SCNC: 149 MMOL/L — HIGH (ref 135–145)
SODIUM SERPL-SCNC: 150 MMOL/L — HIGH (ref 135–145)
TIBC SERPL-MCNC: 221 UG/DL — SIGNIFICANT CHANGE UP (ref 220–430)
UIBC SERPL-MCNC: 185 UG/DL — SIGNIFICANT CHANGE UP (ref 110–370)
VIT B12 SERPL-MCNC: 770 PG/ML — SIGNIFICANT CHANGE UP (ref 243–894)
WBC # BLD: 11.16 K/UL — HIGH (ref 3.8–10.5)
WBC # FLD AUTO: 11.16 K/UL — HIGH (ref 3.8–10.5)

## 2017-10-12 PROCEDURE — 71010: CPT | Mod: 26

## 2017-10-12 RX ORDER — ACETAMINOPHEN 500 MG
1 TABLET ORAL
Qty: 0 | Refills: 0 | COMMUNITY

## 2017-10-12 RX ORDER — ZINC SULFATE TAB 220 MG (50 MG ZINC EQUIVALENT) 220 (50 ZN) MG
1 TAB ORAL
Qty: 0 | Refills: 0 | COMMUNITY

## 2017-10-12 RX ORDER — ZINC SULFATE TAB 220 MG (50 MG ZINC EQUIVALENT) 220 (50 ZN) MG
1 TAB ORAL
Qty: 0 | Refills: 0 | COMMUNITY
Start: 2017-10-12

## 2017-10-12 RX ORDER — FUROSEMIDE 40 MG
20 TABLET ORAL DAILY
Qty: 0 | Refills: 0 | Status: DISCONTINUED | OUTPATIENT
Start: 2017-10-12 | End: 2017-10-13

## 2017-10-12 RX ORDER — SODIUM CHLORIDE 9 MG/ML
1000 INJECTION, SOLUTION INTRAVENOUS
Qty: 0 | Refills: 0 | Status: DISCONTINUED | OUTPATIENT
Start: 2017-10-12 | End: 2017-10-13

## 2017-10-12 RX ADMIN — SODIUM CHLORIDE 50 MILLILITER(S): 9 INJECTION, SOLUTION INTRAVENOUS at 17:27

## 2017-10-12 RX ADMIN — Medication 1 GRAM(S): at 22:38

## 2017-10-12 RX ADMIN — Medication 1 GRAM(S): at 06:01

## 2017-10-12 RX ADMIN — SODIUM CHLORIDE 50 MILLILITER(S): 9 INJECTION, SOLUTION INTRAVENOUS at 20:15

## 2017-10-12 RX ADMIN — ZINC SULFATE TAB 220 MG (50 MG ZINC EQUIVALENT) 220 MILLIGRAM(S): 220 (50 ZN) TAB at 14:05

## 2017-10-12 RX ADMIN — PANTOPRAZOLE SODIUM 40 MILLIGRAM(S): 20 TABLET, DELAYED RELEASE ORAL at 14:05

## 2017-10-12 RX ADMIN — Medication 1 GRAM(S): at 17:28

## 2017-10-12 RX ADMIN — Medication 1 GRAM(S): at 14:05

## 2017-10-12 RX ADMIN — Medication 20 MILLIGRAM(S): at 17:28

## 2017-10-12 RX ADMIN — Medication 30 MILLIGRAM(S): at 05:58

## 2017-10-12 NOTE — DISCHARGE NOTE ADULT - HOSPITAL COURSE
Patient is a 89 year old female with a PMHx of Afib/Aflutter (not on AC), diastolic heart failure, ITP on chronic prednisone, hx of pancreatic neoplasm (previously declined further work up), moderate AS, HTN, dementia (baseline AAOx2), dysphagia (declined PEG tube), hx of recurrent aspiration pneumonia, ?COPD presents with shortness of breath.  She was brought to the ER for shortness of breath and hypoxia with pulse ox of 70% at home.  Uses home O2 at 2L nasal cannula.  ABG in ER revealed hypercarbic respiratory failure - placed on BiPAP.  Patient was found with hyperkalemia with a potassium of 7.3 and was given Kayexalate.   Troponin elevated and remains stable in the setting of demand ischemia and hypoxemia.    Patient diagnosed with pulmonary edema secondary to acute on chronic diastolic heart failure which was treated with IV Lasix but was discontinued due to uptrending metabolic alkalosis.  Lasix continued to be on hold as serum CO2 remains high.  To be re-evaluated to reinstate as outpatient.  Treated with Vancomycin and Zosyn for multi focal pneumonia but patient's platelets dropped to 2,000 in the setting of ITP secondary to infectious process and Zosyn.  Zosyn discontinued and pneumonia was treated with Ertepenem.  For ITP/low platelets, patient was treated with IVIG x5 days and platelets improved to 96,000.  On admission patient was hyponatremic and with acute renal failure secondary to infection and heart failure.  NEWTON now resolved.    Felipe was inserted on admission for output monitoring.  Given trial of void on 10/12/17. Patient is a 89 year old female with a PMHx of Afib/Aflutter (not on AC), diastolic heart failure, ITP on chronic prednisone, hx of pancreatic neoplasm (previously declined further work up), moderate AS, HTN, dementia (baseline AAOx2), dysphagia (declined PEG tube), hx of recurrent aspiration pneumonia, ?COPD presents with shortness of breath.  She was brought to the ER for shortness of breath and hypoxia with pulse ox of 70% at home.  Uses home O2 at 2L nasal cannula.  ABG in ER revealed hypercarbic respiratory failure - placed on BiPAP.  Patient was found with hyperkalemia with a potassium of 7.3 and was given Kayexalate.   Troponin elevated and remains stable in the setting of demand ischemia and hypoxemia.    Patient diagnosed with pulmonary edema secondary to acute on chronic diastolic heart failure which was treated with IV Lasix but was discontinued due to uptrending metabolic alkalosis.  Lasix continued to be on hold as serum CO2 remained high. Lasix 20mg po resumed  Treated with Vancomycin and Zosyn for multi focal pneumonia but patient's platelets dropped to 2,000 in the setting of ITP secondary to infectious process and Zosyn.  Zosyn discontinued and pneumonia was treated with Ertepenem.  For ITP/low platelets, patient was treated with IVIG x5 days and platelets improved to 96,000.  On admission patient was hyponatremic and with acute renal failure secondary to infection and heart failure.  NEWTON now resolved.    Felipe was inserted on admission for output monitoring.  Given trial of void on 10/12/17 - voiding  Hypernatremic to 150 - slowly down trending - day of discharge sodium is 148 - Discussed with renal attending (Dr Wood) - plan to keep patient well hydrated and follow up BMP but cleared for discharge to rehab  10/1     Acute Respiratory failure with hypoxia and hypercapnia                   BIPAP support.  PNA- cont Vanco & Zosyn                                                                                      Acute pulmonary edema- given Lasix 40 mg IV in ED              TTE in 6/2017 shows EF 75% and moderate AS              Hyperkalemia K improved after Ca gluconate insulin dextrose Lasix cocktail  10/2      Duonebs  q 6 and solumedrol 20 mg IVP q 8                ABG on Bipap PCO2 74 %   10/2      Haldol 0.5 mg PO for agitation, scratching staff and trying to bite   	pulling at venti mask, needs oxygenation support, will try high flow nasal canula   10/3      Platelets - 9 likley sec acute infection or a drug reaction, If continues to drop may need to change Zosyn               Acidotic on VBG - Pt awake during the day - Please apply BIPAP nocturnal  10/4: Renal (Dr Salcedo): hx of hyponatremia on prev admissions and CKD  		now with NEWTON,   		hyperkalemia - K improving  		hyponatremia improving  		on IV lasix for diastolic CHF  		avoid ACE/ARB, NSAIDS/ potassium supplements  	ID: (Dr Colvin): likely aspiration pna ,very lethargic  continue zosyn to [possible aspiration pna)     		prognosis poor  DNR   NPO.    		seems slightly more responsive  we can hold vanco and continue zosyn alone  	- diet changed to dysphagia / honey thick w/ aspiration precautions / Ensure  	- Plt = 1 - platelets ordered - daughter called for consent -   10/5: TTE (done PM 10/4) - severe AS  	- 8 day (total course)abx  - for presumed asp PNA  - d/c after dose on 10/9          Heme (Dr Crook): If platelets do not improve (> 20) would will give a course of IV gamma globulins 0.4gm/KG daily x 5 days.  		Would hold on platelet transfusions unless  less than 5,000 or overt bleeding.   	- Plt = 2 - plt x 1 unit / start gamma glob 0.4mg/kg daily  x 5 doses  	- Family agreeable to swallow eval - daughter wants to be present - MBS ordered - possibly tomorrow  10/6: decreased prednisone to 30mg daily - start tomorrow   	- MBS - dysphagia 1 w/ nectar thick, aspiration precautions  10/7	Cardiac cath on Monday   10/8 	AM VBG K is low 2.7, confirm with bmp for repletion  	(Day NP) Heme/Onc- (dr crook) Continue gammaglobulin 0.4gm/KG daily (4/5) for total of 5 days If no improvement will start N-Plate  10/9: PT re-eval requested - family would like to attempt rehab   	- plt improved to 69 today  10/10: Plt remain "stable"  	- cleared for d/c by heme  10/12    Sodium 149 - D5W 250cc today               Felipe removed and patient voided               D/C in AM if sodium improves Patient is a 89 year old female with a PMHx of Afib/Aflutter (not on AC), diastolic heart failure, ITP on chronic prednisone, hx of pancreatic neoplasm (previously declined further work up), moderate AS, HTN, dementia (baseline AAOx2), dysphagia (declined PEG tube), hx of recurrent aspiration pneumonia, ?COPD presents with shortness of breath.  She was brought to the ER for shortness of breath and hypoxia with pulse ox of 70% at home.  Uses home O2 at 2L nasal cannula.  ABG in ER revealed hypercarbic respiratory failure - placed on BiPAP.  Patient was found with hyperkalemia with a potassium of 7.3 and was given Kayexalate.   Troponin elevated and remains stable in the setting of demand ischemia and hypoxemia.    Patient diagnosed with pulmonary edema secondary to acute on chronic diastolic heart failure which was treated with IV Lasix but was discontinued due to uptrending metabolic alkalosis.  Lasix continued to be on hold as serum CO2 remained high. Lasix 20mg po resumed  Treated with Vancomycin and Zosyn for multi focal pneumonia but patient's platelets dropped to 2,000 in the setting of ITP secondary to infectious process and Zosyn.  Zosyn discontinued and pneumonia was treated with Ertepenem.  For ITP/low platelets, patient was treated with IVIG x5 days and platelets improved to 96,000.  On admission patient was hyponatremic and with acute renal failure secondary to infection and heart failure.  NEWTON now resolved.    Felipe was inserted on admission for output monitoring.  Given trial of void on 10/12/17 - voiding  Hypernatremic to 150 - slowly down trending - day of discharge sodium is 148 - Discussed with renal attending (Dr Wood) - plan to keep patient well hydrated and follow up BMP but cleared for discharge to rehab  10/1     Acute Respiratory failure with hypoxia and hypercapnia   2/2 sepsis due to aspiration pneumonia and CHF exacebration              BIPAP support.  PNA- cont Vanco & Zosyn                                                                                      Acute pulmonary edema- given Lasix 40 mg IV in ED              TTE in 6/2017 shows EF 75% and moderate AS              Hyperkalemia K improved after Ca gluconate insulin dextrose Lasix cocktail  10/2      Duonebs  q 6 and solumedrol 20 mg IVP q 8                ABG on Bipap PCO2 74 %   10/2      Haldol 0.5 mg PO for agitation, scratching staff and trying to bite   	pulling at venti mask, needs oxygenation support, will try high flow nasal canula   10/3      Platelets - 9 likley sec acute infection or a drug reaction, If continues to drop may need to change Zosyn               Acidotic on VBG - Pt awake during the day - Please apply BIPAP nocturnal  10/4: Renal (Dr Salcedo): hx of hyponatremia on prev admissions and CKD  		now with NWETON,   		hyperkalemia - K improving  		hyponatremia improving  		on IV lasix for diastolic CHF  		avoid ACE/ARB, NSAIDS/ potassium supplements  	ID: (Dr Colvin): likely aspiration pna ,very lethargic  continue zosyn to [possible aspiration pna)     		prognosis poor  DNR   NPO.    		seems slightly more responsive  we can hold vanco and continue zosyn alone  	- diet changed to dysphagia / honey thick w/ aspiration precautions / Ensure  	- Plt = 1 - platelets ordered - daughter called for consent -   10/5: TTE (done PM 10/4) - severe AS  	- 8 day (total course)abx  - for presumed asp PNA  - d/c after dose on 10/9          Heme (Dr Crook): If platelets do not improve (> 20) would will give a course of IV gamma globulins 0.4gm/KG daily x 5 days.  		Would hold on platelet transfusions unless  less than 5,000 or overt bleeding.   	- Plt = 2 - plt x 1 unit / start gamma glob 0.4mg/kg daily  x 5 doses  	- Family agreeable to swallow eval - daughter wants to be present - MBS ordered - possibly tomorrow  10/6: decreased prednisone to 30mg daily - start tomorrow   	- MBS - dysphagia 1 w/ nectar thick, aspiration precautions  10/7	Cardiac cath on Monday   10/8 	AM VBG K is low 2.7, confirm with bmp for repletion  	(Day NP) Heme/Onc- (dr crook) Continue gammaglobulin 0.4gm/KG daily (4/5) for total of 5 days If no improvement will start N-Plate  10/9: PT re-eval requested - family would like to attempt rehab   	- plt improved to 69 today  10/10: Plt remain "stable"  	- cleared for d/c by heme  10/12    Sodium 149 - D5W 250cc today               Felipe removed and patient voided               D/C in AM if sodium improves

## 2017-10-12 NOTE — DISCHARGE NOTE ADULT - THE PATIENT HAS
difficulty decision making/difficulty remembering no difficulty concentrating/difficulty decision making/difficulty remembering

## 2017-10-12 NOTE — DISCHARGE NOTE ADULT - ADDITIONAL INSTRUCTIONS
Follow up with cardiologist 1 week after discharge and consider re-starting Lasix.  Follow up with pulmonology Dr. Villatoro in 1 week.  Follow up with hematologist Dr. Crook in 1 week.  Follow up with PMD in 1 week. Discharge to reheab facility - follow up with your physicians when you are discharged from rehab:

## 2017-10-12 NOTE — PROGRESS NOTE ADULT - SUBJECTIVE AND OBJECTIVE BOX
Patient is a 89y old  Female who presents with a chief complaint of shortness of breath / hypoxic respiratory failure (12 Oct 2017 12:32), h/o ITP, while hospitalized had prednisone dose increased and also IVIg, platelets have improved       Pt is seen and examined  pt is awake and lying in bed  pt seems comfortable, sleeping; son at bedside  and stated she conversed with him earlier and did not rcomplain fo any problems    REVIEW OF SYSTEMS:    CONSTITUTIONAL: No weakness, fevers or chills  EYES/ENT: No visual changes;  No vertigo or throat pain   NECK: No pain or stiffness  RESPIRATORY: No cough, wheezing, hemoptysis; No shortness of breath  CARDIOVASCULAR: No chest pain or palpitations  GASTROINTESTINAL: No abdominal or epigastric pain. No nausea, vomiting, or hematemesis; No diarrhea or constipation. No melena or hematochezia.  GENITOURINARY: No dysuria, frequency or hematuria  NEUROLOGICAL: No numbness or weakness  SKIN: No itching, burning, rashes, or lesions     MEDICATIONS  (STANDING):  dextrose 5%. 1000 milliLiter(s) (50 mL/Hr) IV Continuous <Continuous>  diltiazem    Tablet 60 milliGRAM(s) Oral every 8 hours  furosemide    Tablet 20 milliGRAM(s) Oral daily  haloperidol     Tablet 0.5 milliGRAM(s) Oral once  pantoprazole  Injectable 40 milliGRAM(s) IV Push daily  predniSONE   Tablet 30 milliGRAM(s) Oral daily  sodium chloride 0.9%. 1000 milliLiter(s) (50 mL/Hr) IV Continuous <Continuous>  sucralfate suspension 1 Gram(s) Oral Before meals and at bedtime  zinc sulfate 220 milliGRAM(s) Oral daily    MEDICATIONS  (PRN):  acetaminophen   Tablet. 650 milliGRAM(s) Oral once PRN Moderate Pain (4 - 6)      Allergies    eggs (Rash)  no drug allergy (Unknown)    Intolerances    vinegar sensitivity    family states that the patient shakes when she ingests vinegar (Other)      Vital Signs Last 24 Hrs  T(C): 36.4 (12 Oct 2017 11:47), Max: 36.4 (12 Oct 2017 04:46)  T(F): 97.6 (12 Oct 2017 11:47), Max: 97.6 (12 Oct 2017 11:47)  HR: 66 (12 Oct 2017 14:13) (59 - 77)  BP: 124/63 (12 Oct 2017 14:13) (124/63 - 137/72)  BP(mean): --  RR: 18 (12 Oct 2017 11:47) (18 - 18)  SpO2: 100% (12 Oct 2017 11:47) (99% - 100%)    PHYSICAL EXAM  General: adult in NAD  HEENT: clear oropharynx, anicteric sclera, pink conjunctiva  Neck: supple  CV: normal S1/S2 with no murmur rubs or gallops  Lungs: positive air movement b/l ant lungs,clear to auscultation, no wheezes, no rales  Abdomen: soft non-tender non-distended, no hepatosplenomegaly  Ext: no clubbing cyanosis or edema  Skin: no rashes and no petechiae  Neuro: alert and oriented X 4, no focal deficits  LABS:                          8.0    11.16 )-----------( 96       ( 12 Oct 2017 07:26 )             22.2         Mean Cell Volume : 94.1 fl  Mean Cell Hemoglobin : 33.9 pg  Mean Cell Hemoglobin Concentration : 36.0 gm/dL    Serial CBC's  10-12 @ 07:26  Hct-22.2 / Hgb-8.0 / Plat-96 / RBC-2.36 / WBC-11.16      Serial CBC's  10-11 @ 07:12  Hct-26.3 / Hgb-7.7 / Plat-77 / RBC-2.96 / WBC-14.18      Serial CBC's  10-10 @ 07:32  Hct-25.4 / Hgb-7.5 / Plat-56 / RBC-2.91 / WBC-12.69      Serial CBC's  10-09 @ 07:20  Hct-27.2 / Hgb-8.2 / Plat-69 / RBC-3.16 / WBC-13.14        10-12    149<H>  |  105  |  36<H>  ----------------------------<  181<H>  4.6   |  35<H>  |  0.89    Ca    9.1      12 Oct 2017 12:59    Iron with Total Binding Capacity in AM (10.12.17 @ 07:23)    % Saturation, Iron: 16 %    Iron - Total Binding Capacity.: 221 ug/dL    Iron Total, Serum: 36 ug/dL    Unsaturated Iron Binding Capacity: 185 ug/dL    Ferritin, Serum in AM (10.12.17 @ 07:18)    Ferritin, Serum: 49.0 ng/mL    Vitamin B12, Serum in AM (10.12.17 @ 07:18)    Vitamin B12, Serum: 770 pg/mL    Haptoglobin, Serum in AM (10.12.17 @ 07:23)    Haptoglobin, Serum: 107 mg/dL    Reticulocyte Count in AM (10.12.17 @ 07:26)    RBC Count: 2.36: Test Repeated Specimen integrity verified. M/uL    Reticulocyte Percent: 2.6: Reference ranges updated as of September 25th, 2017. %    Absolute Reticulocytes: 59.9 K/uL      Assessment

## 2017-10-12 NOTE — PROGRESS NOTE ADULT - SUBJECTIVE AND OBJECTIVE BOX
CHIEF COMPLAINT:Patient is a 89y old  Female who presents with a chief complaint of shortness of breath / hypoxic respiratory failure (01 Oct 2017 22:34)    	  Interval history: no events      Allergies:  eggs (Rash)  no drug allergy (Unknown)  vinegar sensitivity    family states that the patient shakes when she ingests vinegar (Other)      PAST MEDICAL & SURGICAL HISTORY:  PNA (pneumonia)  Dysphagia  Thrombocytopenia: ITP  Atrial fibrillation and flutter: No A/C  during hospitalization in april 2014  Respiratory failure: in april 2014 was intubated  Cataract: right eye  Small bowel obstruction: s/p resection in 2010  HTN - Hypertension  S/P cholecystectomy  H/O: Hysterectomy  S/P Small Bowel Resection      FAMILY HISTORY:  No pertinent family history in first degree relatives    REVIEW OF SYSTEMS:  UTO, but no active complaints        Medications:  MEDICATIONS  (STANDING):  dextrose 5%. 1000 milliLiter(s) (50 mL/Hr) IV Continuous <Continuous>  diltiazem    Tablet 60 milliGRAM(s) Oral every 8 hours  furosemide    Tablet 20 milliGRAM(s) Oral daily  haloperidol     Tablet 0.5 milliGRAM(s) Oral once  pantoprazole  Injectable 40 milliGRAM(s) IV Push daily  predniSONE   Tablet 20 milliGRAM(s) Oral daily  sodium chloride 0.9%. 1000 milliLiter(s) (50 mL/Hr) IV Continuous <Continuous>  sucralfate suspension 1 Gram(s) Oral Before meals and at bedtime  zinc sulfate 220 milliGRAM(s) Oral daily    MEDICATIONS  (PRN):  acetaminophen   Tablet. 650 milliGRAM(s) Oral once PRN Moderate Pain (4 - 6)    	    PHYSICAL EXAM:  T(C): 36.4 (10-12-17 @ 11:47), Max: 36.4 (10-12-17 @ 04:46)  HR: 66 (10-12-17 @ 14:13) (59 - 77)  BP: 124/63 (10-12-17 @ 14:13) (124/63 - 137/72)  RR: 18 (10-12-17 @ 11:47) (18 - 18)  SpO2: 100% (10-12-17 @ 11:47) (100% - 100%)  Wt(kg): --  I&O's Summary    11 Oct 2017 07:01  -  12 Oct 2017 07:00  --------------------------------------------------------  IN: 420 mL / OUT: 750 mL / NET: -330 mL    12 Oct 2017 07:01  -  12 Oct 2017 21:36  --------------------------------------------------------  IN: 480 mL / OUT: 400 mL / NET: 80 mL          Appearance: Frail	  HEENT:   NCAT, PERRL, EOMI	  Lymphatic: No lymphadenopathy  Cardiovascular: Normal S1 S2, RRR  Respiratory: CTA BL  Psychiatry: A & O x 3, Mood & affect appropriate  Gastrointestinal:  Soft, Non-tender, + BS  Skin: No rashes, No ecchymoses, No cyanosis	  Neurologic: Non-focal  Extremities: Normal range of motion, No clubbing, cyanosis or edema  	  	  LABS:	 	    CARDIAC MARKERS:                                8.0    11.16 )-----------( 96       ( 12 Oct 2017 07:26 )             22.2     10-12    149<H>  |  105  |  36<H>  ----------------------------<  181<H>  4.6   |  35<H>  |  0.89    Ca    9.1      12 Oct 2017 12:59      proBNP: Serum Pro-Brain Natriuretic Peptide: 3983 pg/mL (10-12 @ 06:20)    Lipid Profile:   HgA1c:   TSH:

## 2017-10-12 NOTE — DISCHARGE NOTE ADULT - PATIENT PORTAL LINK FT
“You can access the [facility] Patient Portal, offered by [facility], by registering with the following website: [email or web address]”

## 2017-10-12 NOTE — PROGRESS NOTE ADULT - PROBLEM SELECTOR PLAN 1
-Lasix stopped on 10/6  -Clinically not volume overloaded  -BNP elevated with mild hypoxia  check cxr today  -? resume lasix -Lasix stopped on 10/6  -Clinically not volume overloaded  -mild BNP elevated with mild hypoxia  check cxr today  -?resume lasix

## 2017-10-12 NOTE — PROGRESS NOTE ADULT - PROBLEM SELECTOR PLAN 2
- hb on admission was 10.8 - hb dropped to low of 7.7 and today rebounded to 8.0  - anemia workup was unrevealing of any cause of anemia  - no evidence of hemolysis  - would start MVI with iron

## 2017-10-12 NOTE — PROGRESS NOTE ADULT - ATTENDING COMMENTS
agree with above

## 2017-10-12 NOTE — DISCHARGE NOTE ADULT - MEDICATION SUMMARY - MEDICATIONS TO STOP TAKING
I will STOP taking the medications listed below when I get home from the hospital:    magnesium citrate  -- 160 milligram(s) by mouth once a day

## 2017-10-12 NOTE — DISCHARGE NOTE ADULT - CARE PROVIDER_API CALL
Jens Salcedo), Internal Medicine; Nephrology  [address] [facility]  [address]  [place] [address]  Phone: [phone]  Fax: [phone]    Mejia Crook (MD), Hematology; Internal Medicine; Medical Oncology  [address] [facility]  [address]  [place] [address]  Phone: [phone]  Fax: [phone]    Lionel Villatoro (DO), Critical Care Medicine; Internal Medicine; Pulmonary Disease  [address] [place] [address] [address]  [place] [address]  Phone: [phone]  Fax: [phone]    Simeon Damian (MD; PhD), Medicine  Cardiology  [address] [place] [address]  Phone: [phone]  Fax: [phone]

## 2017-10-12 NOTE — DISCHARGE NOTE ADULT - CARE PLAN
Principal Discharge DX:	Acute on chronic diastolic heart failure  Goal:	To remain stable.  Instructions for follow-up, activity and diet:	Lasix on hold for metabolic alkalosis.  Re-evaluate and reinstate by cardiologist.  Weigh yourself daily.  If you gain 3lbs in 3 days, or 5lbs in a week call your Health Care Provider.  Do not eat or drink foods containing more than 2000mg of salt (sodium) in your diet every day.  Call your Health Care Provider if you have any swelling or increased swelling in your feet, ankles, and/or stomach.  Take all of your medication as directed.  If you become dizzy call your Health Care Provider.  Secondary Diagnosis:	Multifocal pneumonia  Instructions for follow-up, activity and diet:	S/P antibiotics.  Pneumonia is a lung infection that can cause a fever, cough, and trouble breathing.  Nutrition is important, eat small frequent meals.  Call your health care provider upon arrival home from hospital and make a follow up appointment for one week.  If your cough worsens, you develop fever greater than 101', you have shaking chills, a fast heartbeat, trouble breathing and/or feel your are breathing much faster than usual, call your healthcare provider.  Make sure you wash your hands frequently.  Secondary Diagnosis:	NEWTON (acute kidney injury)  Instructions for follow-up, activity and diet:	Resolved.  Follow up BMP in 2-3 days.  Secondary Diagnosis:	Idiopathic thrombocytopenia purpura  Instructions for follow-up, activity and diet:	Platelets on 10/12/17 96,000.   Continue supportive care with long term steroids.  Follow up with hematologist.  Secondary Diagnosis:	Chronic obstructive pulmonary disease with acute exacerbation  Instructions for follow-up, activity and diet:	Continue with oxygen at 2L nasal cannula.  Call your Health Care provider upon arrival home to make a follow up appointment within one week.  Take all inhalers as prescribed by your Health Care Provider.  Take steroids as prescribed by your Health Care Provider.  If your cough increases infrequency and severity and/or you have shortness of breath or increased shortness of breath call your Health Care Provider.  If you develop fever, chills, night sweats, malaise, and/or change in mental status call your Health care Provider.  Nutrition is very important.  Eat small frequent meals.  Increase your activity as tolerated.  Do not stay in bed all day  Secondary Diagnosis:	Atrial fibrillation and flutter  Instructions for follow-up, activity and diet:	Continue Aspirin and Cardizem.  Follow up with cardiologist.  Secondary Diagnosis:	Dysphagia, unspecified type  Instructions for follow-up, activity and diet:	Continue with puree nectar thick consistency diet.    Strict aspiration precautions. Principal Discharge DX:	Acute on chronic diastolic heart failure  Goal:	To remain stable.  Instructions for follow-up, activity and diet:	Lasix on hold for metabolic alkalosis.  Re-evaluate and reinstate by cardiologist.  Weigh yourself daily.  If you gain 3lbs in 3 days, or 5lbs in a week call your Health Care Provider.  Do not eat or drink foods containing more than 2000mg of salt (sodium) in your diet every day.  Call your Health Care Provider if you have any swelling or increased swelling in your feet, ankles, and/or stomach.  Take all of your medication as directed.  If you become dizzy call your Health Care Provider.  Secondary Diagnosis:	Multifocal pneumonia  Goal:	resolved  Instructions for follow-up, activity and diet:	S/P antibiotics.  Pneumonia is a lung infection that can cause a fever, cough, and trouble breathing.  Nutrition is important, eat small frequent meals.  Call your health care provider upon arrival home from hospital and make a follow up appointment for one week.  If your cough worsens, you develop fever greater than 101', you have shaking chills, a fast heartbeat, trouble breathing and/or feel your are breathing much faster than usual, call your healthcare provider.  Make sure you wash your hands frequently.  Secondary Diagnosis:	NEWTON (acute kidney injury)  Goal:	resolving  Instructions for follow-up, activity and diet:	Resolved.  Follow up BMP in 2-3 days.  Secondary Diagnosis:	Idiopathic thrombocytopenia purpura  Goal:	disease management  Instructions for follow-up, activity and diet:	Platelets on 10/12/17 96,000 - Platelets stable at 52,000 on 10/13/17  Continue supportive care with long term steroids.  Follow up with hematologist.  Secondary Diagnosis:	Chronic obstructive pulmonary disease with acute exacerbation  Goal:	symptom management  Instructions for follow-up, activity and diet:	Continue with oxygen at 2L nasal cannula.  Call your Health Care provider upon arrival home to make a follow up appointment within one week.  Take all inhalers as prescribed by your Health Care Provider.  Take steroids as prescribed by your Health Care Provider.  If your cough increases infrequency and severity and/or you have shortness of breath or increased shortness of breath call your Health Care Provider.  If you develop fever, chills, night sweats, malaise, and/or change in mental status call your Health care Provider.  Nutrition is very important.  Eat small frequent meals.  Increase your activity as tolerated.  Do not stay in bed all day  Secondary Diagnosis:	Atrial fibrillation and flutter  Goal:	rate control  Instructions for follow-up, activity and diet:	Continue Aspirin and Cardizem.  Follow up with cardiologist.  Secondary Diagnosis:	Dysphagia, unspecified type  Goal:	no aspiration  Instructions for follow-up, activity and diet:	Continue with puree nectar thick consistency diet.    Strict aspiration precautions.

## 2017-10-12 NOTE — PROGRESS NOTE ADULT - PROBLEM SELECTOR PLAN 2
Clinically stable  -Severe AS with moderate diastolic dysfunction   -elevated bnp/mild hypoxia  -check cxr,   -?resume lasix Clinically stable  -Severe AS with moderate diastolic dysfunction   -mild elevated bnp/mild hypoxia  -check cxr,   -?resume lasix

## 2017-10-12 NOTE — CHART NOTE - NSCHARTNOTEFT_GEN_A_CORE
Source: Patient [X ]    Family [ X]     other [X ]    Pt seen for malnutrition follow up. Daughter at bedside provided all information. Reports a fair to good PO intake, consumes % of meals. RD noted lunch tray with 50% consumed thus far. Daughter states she did not receive Ensure enlive supplements yet. RD noted, Pt ordered for Ensure pudding x2 daily; RD contacted kitchen to seen puddings. Daughter concerned regarding BG levels, noted Pt on steroids, daughter understood.     Per chart Pt with acute on chronic respiratory distress, acute on chronic HF, Aspiration PNA. Pt with ITP, s/p IVIG and metabolic alkalosis.     Diet : Kosher, Dysphagia 1 nectar thick       Patient reports No GI distress      PO intake:  50%-100%     Source for PO intake [ ] Patient [ X] family [ X] chart [ ] staff [ ] othe      Current Weight: 88.6lbs, stable   % Weight Change    Pertinent Medications: MEDICATIONS  (STANDING):  diltiazem    Tablet 60 milliGRAM(s) Oral every 8 hours  haloperidol     Tablet 0.5 milliGRAM(s) Oral once  pantoprazole  Injectable 40 milliGRAM(s) IV Push daily  predniSONE   Tablet 30 milliGRAM(s) Oral daily  sodium chloride 0.9%. 1000 milliLiter(s) (50 mL/Hr) IV Continuous <Continuous>  sucralfate suspension 1 Gram(s) Oral Before meals and at bedtime  zinc sulfate 220 milliGRAM(s) Oral daily    MEDICATIONS  (PRN):  acetaminophen   Tablet. 650 milliGRAM(s) Oral once PRN Moderate Pain (4 - 6)    Pertinent Labs:  10-12 Na150 mmol/L<H> Glu 92 mg/dL K+ 4.3 mmol/L Cr  0.84 mg/dL BUN 37 mg/dL<H> BG levels; 10/11: 110-193 10/12: 102-142      Skin: sacral DTI an Stage 2 sacral pressure ulcer    Estimated Needs:   [X ] no change since previous assessment  [ ] recalculated:       Previous Nutrition Diagnosis:    [ X] Malnutrition          Nutrition Diagnosis is [X ] ongoing  [ ] resolved [ ] not applicable          New Nutrition Diagnosis: [ X] not applicable      Recommend    1. Provide food preferences as requested by Pt/family within diet restrictions    2. Encourage PO intake during meal times   3 continue Ensure pudding x2       Monitoring and Evaluation:     [ X] PO intake [X ] Tolerance to diet prescription [ X weights [X ] follow up per protocol    [ X] other: RD remains available Sarah Siegler RD. Pager #[phone]

## 2017-10-12 NOTE — PROGRESS NOTE ADULT - SUBJECTIVE AND OBJECTIVE BOX
Follow-up Pulm Progress Note    No new respiratory events overnight.  Denies SOB/CP. o2 sat 86% on room air-rest,  02 sat 93% on 2lnc    Medications:  MEDICATIONS  (STANDING):  diltiazem    Tablet 60 milliGRAM(s) Oral every 8 hours  haloperidol     Tablet 0.5 milliGRAM(s) Oral once  pantoprazole  Injectable 40 milliGRAM(s) IV Push daily  predniSONE   Tablet 30 milliGRAM(s) Oral daily  sodium chloride 0.9%. 1000 milliLiter(s) (50 mL/Hr) IV Continuous <Continuous>  sucralfate suspension 1 Gram(s) Oral Before meals and at bedtime  zinc sulfate 220 milliGRAM(s) Oral daily    MEDICATIONS  (PRN):  acetaminophen   Tablet. 650 milliGRAM(s) Oral once PRN Moderate Pain (4 - 6)          Vital Signs Last 24 Hrs  T(C): 36.4 (12 Oct 2017 04:46), Max: 36.4 (12 Oct 2017 04:46)  T(F): 97.5 (12 Oct 2017 04:46), Max: 97.5 (12 Oct 2017 04:46)  HR: 77 (12 Oct 2017 04:46) (76 - 77)  BP: 137/72 (12 Oct 2017 04:46) (131/69 - 137/72)  BP(mean): --  RR: 18 (12 Oct 2017 04:46) (18 - 18)  SpO2: 100% (12 Oct 2017 04:46) (99% - 100%)          10-11 @ 07:01  -  10-12 @ 07:00  --------------------------------------------------------  IN: 420 mL / OUT: 750 mL / NET: -330 mL          LABS:                        8.0    11.16 )-----------( 96       ( 12 Oct 2017 07:26 )             22.2     10-12    150<H>  |  107  |  37<H>  ----------------------------<  92  4.3   |  37<H>  |  0.84    Ca    8.8      12 Oct 2017 06:20            CAPILLARY BLOOD GLUCOSE  110 (12 Oct 2017 07:51)      POCT Blood Glucose.: 110 mg/dL (12 Oct 2017 07:50)          Serum Pro-Brain Natriuretic Peptide: 3983 pg/mL (10-12-17 @ 06:20)                CULTURES: Culture - Blood (10.03.17 @ 11:19)    Specimen Source: .Blood Blood-Peripheral    Culture Results:   No growth at 5 days.  Culture - Blood (10.03.17 @ 11:19)    Specimen Source: .Blood Blood-Peripheral    Culture Results:   No growth at 5 days.  Culture - Urine (10.02.17 @ 06:42)    Specimen Source: .Urine Clean Catch (Midstream)    Culture Results:   No growth                      Physical Examination:  PULM: decreased bs bilaterally, no significant sputum production  CVS: S1, S2 heard    RADIOLOGY REVIEWED  CXR: < from: Xray Chest 1 View AP -PORTABLE-Routine (10.07.17 @ 08:43) >  Impression: Left basilar airspace opacity, likely representing   atelectasis versus pneumonia.    < end of copied text >

## 2017-10-12 NOTE — DISCHARGE NOTE ADULT - MEDICATION SUMMARY - MEDICATIONS TO TAKE
I will START or STAY ON the medications listed below when I get home from the hospital:    predniSONE 20 mg oral tablet  -- 1 tab(s) by mouth once a day  -- Indication: For Idiopathic thrombocytopenia purpura    dilTIAZem 60 mg oral tablet  -- 1 tab(s) by mouth every 8 hours  -- Indication: For Atrial fibrillation and flutter    furosemide 20 mg oral tablet  -- 1 tab(s) by mouth once a day  -- Indication: For Acute on chronic diastolic heart failure    zinc sulfate 220 mg oral capsule  -- 1 cap(s) by mouth once a day  -- Indication: For suppliment    sucralfate 1 g/10 mL oral suspension  -- 10 milliliter(s) by mouth 4 times a day (before meals and at bedtime)  -- Indication: For Prophylaxis    pantoprazole 40 mg oral delayed release tablet  -- 1 tab(s) by mouth once a day  -- Indication: For Prophylaxis    vitamin A  -- 1 cap(s) by mouth once a day  -- Indication: For suppliment

## 2017-10-12 NOTE — DISCHARGE NOTE ADULT - PLAN OF CARE
To remain stable. Lasix on hold for metabolic alkalosis.  Re-evaluate and reinstate by cardiologist.  Weigh yourself daily.  If you gain 3lbs in 3 days, or 5lbs in a week call your Health Care Provider.  Do not eat or drink foods containing more than 2000mg of salt (sodium) in your diet every day.  Call your Health Care Provider if you have any swelling or increased swelling in your feet, ankles, and/or stomach.  Take all of your medication as directed.  If you become dizzy call your Health Care Provider. S/P antibiotics.  Pneumonia is a lung infection that can cause a fever, cough, and trouble breathing.  Nutrition is important, eat small frequent meals.  Call your health care provider upon arrival home from hospital and make a follow up appointment for one week.  If your cough worsens, you develop fever greater than 101', you have shaking chills, a fast heartbeat, trouble breathing and/or feel your are breathing much faster than usual, call your healthcare provider.  Make sure you wash your hands frequently. Resolved.  Follow up BMP in 2-3 days. Platelets on 10/12/17 96,000.   Continue supportive care with long term steroids.  Follow up with hematologist. Continue with oxygen at 2L nasal cannula.  Call your Health Care provider upon arrival home to make a follow up appointment within one week.  Take all inhalers as prescribed by your Health Care Provider.  Take steroids as prescribed by your Health Care Provider.  If your cough increases infrequency and severity and/or you have shortness of breath or increased shortness of breath call your Health Care Provider.  If you develop fever, chills, night sweats, malaise, and/or change in mental status call your Health care Provider.  Nutrition is very important.  Eat small frequent meals.  Increase your activity as tolerated.  Do not stay in bed all day Continue Aspirin and Cardizem.  Follow up with cardiologist. Continue with puree nectar thick consistency diet.    Strict aspiration precautions. resolved resolving disease management Platelets on 10/12/17 96,000 - Platelets stable at 52,000 on 10/13/17  Continue supportive care with long term steroids.  Follow up with hematologist. symptom management rate control no aspiration

## 2017-10-12 NOTE — DISCHARGE NOTE ADULT - SECONDARY DIAGNOSIS.
Multifocal pneumonia NEWTON (acute kidney injury) Idiopathic thrombocytopenia purpura Chronic obstructive pulmonary disease with acute exacerbation Atrial fibrillation and flutter Dysphagia, unspecified type

## 2017-10-12 NOTE — PROGRESS NOTE ADULT - ASSESSMENT
89 yo F as above:  1. Acute respiratory failure - resp status improving, pt DNR/DNI, off BiPAP, now doing well on NC  2. Acute on chronic diast CHF - euvolemic, diuresis on hold 2/2 alkalosis  3. A fib - c/w ASA, Cardizem per Cardio  4. ITP - plts improved, last dose IVIG today, f/u Heme  5. Aspiration PNA - ID appreciated, c/w Ertapenem  6. NEWTON on CKD - per Renal  7. COPD - steroids and nebs per Pulm  8. Mechanical DVT prophylaxis  9. Dysphagia - puree diet with strict aspiration precautions  10. Dispo - family would like pt to go to [facility] as they are stating her functional status now is significantly below baseline, PT re-eval  11. Anemia - w/u per Heme, transfuse 1U PRBC prior to d/c  12. Hypernatremia - increase free water, small bolus D5W

## 2017-10-13 VITALS
DIASTOLIC BLOOD PRESSURE: 62 MMHG | OXYGEN SATURATION: 96 % | SYSTOLIC BLOOD PRESSURE: 99 MMHG | HEART RATE: 82 BPM | TEMPERATURE: 98 F | RESPIRATION RATE: 18 BRPM

## 2017-10-13 LAB
ANION GAP SERPL CALC-SCNC: 11 MMOL/L — SIGNIFICANT CHANGE UP (ref 5–17)
BUN SERPL-MCNC: 36 MG/DL — HIGH (ref 7–23)
CALCIUM SERPL-MCNC: 8.9 MG/DL — SIGNIFICANT CHANGE UP (ref 8.4–10.5)
CHLORIDE SERPL-SCNC: 104 MMOL/L — SIGNIFICANT CHANGE UP (ref 96–108)
CO2 SERPL-SCNC: 33 MMOL/L — HIGH (ref 22–31)
CREAT SERPL-MCNC: 0.91 MG/DL — SIGNIFICANT CHANGE UP (ref 0.5–1.3)
GLUCOSE BLDC GLUCOMTR-MCNC: 172 MG/DL — HIGH (ref 70–99)
GLUCOSE BLDC GLUCOMTR-MCNC: 246 MG/DL — HIGH (ref 70–99)
GLUCOSE BLDC GLUCOMTR-MCNC: 94 MG/DL — SIGNIFICANT CHANGE UP (ref 70–99)
GLUCOSE SERPL-MCNC: 89 MG/DL — SIGNIFICANT CHANGE UP (ref 70–99)
HCT VFR BLD CALC: 25.7 % — LOW (ref 34.5–45)
HCT VFR BLD CALC: 27.8 % — LOW (ref 34.5–45)
HGB BLD-MCNC: 8.2 G/DL — LOW (ref 11.5–15.5)
HGB BLD-MCNC: 8.8 G/DL — LOW (ref 11.5–15.5)
MCHC RBC-ENTMCNC: 28.4 PG — SIGNIFICANT CHANGE UP (ref 27–34)
MCHC RBC-ENTMCNC: 28.4 PG — SIGNIFICANT CHANGE UP (ref 27–34)
MCHC RBC-ENTMCNC: 31.5 GM/DL — LOW (ref 32–36)
MCHC RBC-ENTMCNC: 31.7 GM/DL — LOW (ref 32–36)
MCV RBC AUTO: 89.6 FL — SIGNIFICANT CHANGE UP (ref 80–100)
MCV RBC AUTO: 90 FL — SIGNIFICANT CHANGE UP (ref 80–100)
PLATELET # BLD AUTO: 50 K/UL — LOW (ref 150–400)
PLATELET # BLD AUTO: 52 K/UL — LOW (ref 150–400)
POTASSIUM SERPL-MCNC: 4.4 MMOL/L — SIGNIFICANT CHANGE UP (ref 3.5–5.3)
POTASSIUM SERPL-SCNC: 4.4 MMOL/L — SIGNIFICANT CHANGE UP (ref 3.5–5.3)
RBC # BLD: 2.87 M/UL — LOW (ref 3.8–5.2)
RBC # BLD: 3.09 M/UL — LOW (ref 3.8–5.2)
RBC # FLD: 14.7 % — HIGH (ref 10.3–14.5)
RBC # FLD: 15 % — HIGH (ref 10.3–14.5)
SODIUM SERPL-SCNC: 148 MMOL/L — HIGH (ref 135–145)
WBC # BLD: 13.7 K/UL — HIGH (ref 3.8–10.5)
WBC # BLD: 15 K/UL — HIGH (ref 3.8–10.5)
WBC # FLD AUTO: 13.7 K/UL — HIGH (ref 3.8–10.5)
WBC # FLD AUTO: 15 K/UL — HIGH (ref 3.8–10.5)

## 2017-10-13 PROCEDURE — 84145 PROCALCITONIN (PCT): CPT

## 2017-10-13 PROCEDURE — 84295 ASSAY OF SERUM SODIUM: CPT

## 2017-10-13 PROCEDURE — 82435 ASSAY OF BLOOD CHLORIDE: CPT

## 2017-10-13 PROCEDURE — 87633 RESP VIRUS 12-25 TARGETS: CPT

## 2017-10-13 PROCEDURE — 80048 BASIC METABOLIC PNL TOTAL CA: CPT

## 2017-10-13 PROCEDURE — 82947 ASSAY GLUCOSE BLOOD QUANT: CPT

## 2017-10-13 PROCEDURE — 82803 BLOOD GASES ANY COMBINATION: CPT

## 2017-10-13 PROCEDURE — 96375 TX/PRO/DX INJ NEW DRUG ADDON: CPT

## 2017-10-13 PROCEDURE — 83970 ASSAY OF PARATHORMONE: CPT

## 2017-10-13 PROCEDURE — 82436 ASSAY OF URINE CHLORIDE: CPT

## 2017-10-13 PROCEDURE — 36600 WITHDRAWAL OF ARTERIAL BLOOD: CPT

## 2017-10-13 PROCEDURE — 96374 THER/PROPH/DIAG INJ IV PUSH: CPT

## 2017-10-13 PROCEDURE — 83935 ASSAY OF URINE OSMOLALITY: CPT

## 2017-10-13 PROCEDURE — 83010 ASSAY OF HAPTOGLOBIN QUANT: CPT

## 2017-10-13 PROCEDURE — 83735 ASSAY OF MAGNESIUM: CPT

## 2017-10-13 PROCEDURE — 87798 DETECT AGENT NOS DNA AMP: CPT

## 2017-10-13 PROCEDURE — 83880 ASSAY OF NATRIURETIC PEPTIDE: CPT

## 2017-10-13 PROCEDURE — 82565 ASSAY OF CREATININE: CPT

## 2017-10-13 PROCEDURE — 93306 TTE W/DOPPLER COMPLETE: CPT

## 2017-10-13 PROCEDURE — 81001 URINALYSIS AUTO W/SCOPE: CPT

## 2017-10-13 PROCEDURE — 86901 BLOOD TYPING SEROLOGIC RH(D): CPT

## 2017-10-13 PROCEDURE — 84100 ASSAY OF PHOSPHORUS: CPT

## 2017-10-13 PROCEDURE — 97162 PT EVAL MOD COMPLEX 30 MIN: CPT

## 2017-10-13 PROCEDURE — 84484 ASSAY OF TROPONIN QUANT: CPT

## 2017-10-13 PROCEDURE — 83605 ASSAY OF LACTIC ACID: CPT

## 2017-10-13 PROCEDURE — 82962 GLUCOSE BLOOD TEST: CPT

## 2017-10-13 PROCEDURE — 36430 TRANSFUSION BLD/BLD COMPNT: CPT

## 2017-10-13 PROCEDURE — 84132 ASSAY OF SERUM POTASSIUM: CPT

## 2017-10-13 PROCEDURE — 82607 VITAMIN B-12: CPT

## 2017-10-13 PROCEDURE — 71045 X-RAY EXAM CHEST 1 VIEW: CPT

## 2017-10-13 PROCEDURE — 82553 CREATINE MB FRACTION: CPT

## 2017-10-13 PROCEDURE — 80053 COMPREHEN METABOLIC PANEL: CPT

## 2017-10-13 PROCEDURE — 83550 IRON BINDING TEST: CPT

## 2017-10-13 PROCEDURE — 87086 URINE CULTURE/COLONY COUNT: CPT

## 2017-10-13 PROCEDURE — 82310 ASSAY OF CALCIUM: CPT

## 2017-10-13 PROCEDURE — 82728 ASSAY OF FERRITIN: CPT

## 2017-10-13 PROCEDURE — 85014 HEMATOCRIT: CPT

## 2017-10-13 PROCEDURE — 94660 CPAP INITIATION&MGMT: CPT

## 2017-10-13 PROCEDURE — 87581 M.PNEUMON DNA AMP PROBE: CPT

## 2017-10-13 PROCEDURE — 94640 AIRWAY INHALATION TREATMENT: CPT

## 2017-10-13 PROCEDURE — 93005 ELECTROCARDIOGRAM TRACING: CPT

## 2017-10-13 PROCEDURE — 85027 COMPLETE CBC AUTOMATED: CPT

## 2017-10-13 PROCEDURE — 84300 ASSAY OF URINE SODIUM: CPT

## 2017-10-13 PROCEDURE — 99291 CRITICAL CARE FIRST HOUR: CPT | Mod: 25

## 2017-10-13 PROCEDURE — 82330 ASSAY OF CALCIUM: CPT

## 2017-10-13 PROCEDURE — P9037: CPT

## 2017-10-13 PROCEDURE — 86850 RBC ANTIBODY SCREEN: CPT

## 2017-10-13 PROCEDURE — 74230 X-RAY XM SWLNG FUNCJ C+: CPT

## 2017-10-13 PROCEDURE — 86900 BLOOD TYPING SEROLOGIC ABO: CPT

## 2017-10-13 PROCEDURE — 87040 BLOOD CULTURE FOR BACTERIA: CPT

## 2017-10-13 PROCEDURE — 85045 AUTOMATED RETICULOCYTE COUNT: CPT

## 2017-10-13 PROCEDURE — 87486 CHLMYD PNEUM DNA AMP PROBE: CPT

## 2017-10-13 RX ORDER — FUROSEMIDE 40 MG
1 TABLET ORAL
Qty: 0 | Refills: 0 | COMMUNITY
Start: 2017-10-13

## 2017-10-13 RX ORDER — MULTIVIT WITH MIN/MFOLATE/K2 340-15/3 G
160 POWDER (GRAM) ORAL
Qty: 0 | Refills: 0 | COMMUNITY

## 2017-10-13 RX ADMIN — Medication 1 GRAM(S): at 06:28

## 2017-10-13 RX ADMIN — Medication 1 GRAM(S): at 13:49

## 2017-10-13 RX ADMIN — Medication 1 GRAM(S): at 17:54

## 2017-10-13 RX ADMIN — ZINC SULFATE TAB 220 MG (50 MG ZINC EQUIVALENT) 220 MILLIGRAM(S): 220 (50 ZN) TAB at 13:50

## 2017-10-13 RX ADMIN — PANTOPRAZOLE SODIUM 40 MILLIGRAM(S): 20 TABLET, DELAYED RELEASE ORAL at 13:49

## 2017-10-13 RX ADMIN — Medication 20 MILLIGRAM(S): at 06:28

## 2017-10-13 RX ADMIN — Medication 20 MILLIGRAM(S): at 06:27

## 2017-10-13 NOTE — PROGRESS NOTE ADULT - ASSESSMENT
89 yo F as above:  1. Acute respiratory failure - resp status improving, pt DNR/DNI, off BiPAP, now doing well on NC  2. Acute on chronic diast CHF - euvolemic, diuresis on hold 2/2 alkalosis  3. A fib - c/w ASA, Cardizem per Cardio  4. ITP - plts improved, last dose IVIG today, f/u Heme  5. Aspiration PNA - ID appreciated, completed Ertapenem  6. NEWTON on CKD - per Renal  7. COPD - steroids and nebs per Pulm  8. Mechanical DVT prophylaxis  9. Dysphagia - puree diet with strict aspiration precautions  10. Dispo - family would like pt to go to [facility] as they are stating her functional status now is significantly below baseline, PT re-eval  11. Anemia - w/u per Heme, transfuse 1U PRBC prior to d/c  12. Hypernatremia - increase free water, no objections to d/c from Renal, repeat BPM in [facility]

## 2017-10-13 NOTE — PROGRESS NOTE ADULT - SUBJECTIVE AND OBJECTIVE BOX
CHIEF COMPLAINT:Patient is a 89y old  Female who presents with a chief complaint of shortness of breath / hypoxic respiratory failure (01 Oct 2017 22:34)    	  Interval history: no events      Allergies:  eggs (Rash)  no drug allergy (Unknown)  vinegar sensitivity    family states that the patient shakes when she ingests vinegar (Other)      PAST MEDICAL & SURGICAL HISTORY:  PNA (pneumonia)  Dysphagia  Thrombocytopenia: ITP  Atrial fibrillation and flutter: No A/C  during hospitalization in april 2014  Respiratory failure: in april 2014 was intubated  Cataract: right eye  Small bowel obstruction: s/p resection in 2010  HTN - Hypertension  S/P cholecystectomy  H/O: Hysterectomy  S/P Small Bowel Resection      FAMILY HISTORY:  No pertinent family history in first degree relatives    REVIEW OF SYSTEMS:  UTO, but no active complaints          Medications:  MEDICATIONS  (STANDING):  dextrose 5%. 1000 milliLiter(s) (50 mL/Hr) IV Continuous <Continuous>  diltiazem    Tablet 60 milliGRAM(s) Oral every 8 hours  furosemide    Tablet 20 milliGRAM(s) Oral daily  haloperidol     Tablet 0.5 milliGRAM(s) Oral once  pantoprazole  Injectable 40 milliGRAM(s) IV Push daily  predniSONE   Tablet 20 milliGRAM(s) Oral daily  sodium chloride 0.9%. 1000 milliLiter(s) (50 mL/Hr) IV Continuous <Continuous>  sucralfate suspension 1 Gram(s) Oral Before meals and at bedtime  zinc sulfate 220 milliGRAM(s) Oral daily    MEDICATIONS  (PRN):  acetaminophen   Tablet. 650 milliGRAM(s) Oral once PRN Moderate Pain (4 - 6)    	    PHYSICAL EXAM:  T(C): 36.4 (10-13-17 @ 13:02), Max: 36.6 (10-12-17 @ 21:28)  HR: 82 (10-13-17 @ 13:02) (72 - 82)  BP: 99/62 (10-13-17 @ 13:02) (99/62 - 117/69)  RR: 18 (10-13-17 @ 13:02) (18 - 18)  SpO2: 96% (10-13-17 @ 13:02) (96% - 100%)  Wt(kg): --  I&O's Summary    12 Oct 2017 07:01  -  13 Oct 2017 07:00  --------------------------------------------------------  IN: 600 mL / OUT: 400 mL / NET: 200 mL    13 Oct 2017 07:01  -  13 Oct 2017 20:49  --------------------------------------------------------  IN: 660 mL / OUT: 175 mL / NET: 485 mL        Appearance: Frail	  HEENT:   NCAT, PERRL, EOMI	  Lymphatic: No lymphadenopathy  Cardiovascular: Normal S1 S2, RRR  Respiratory: CTA BL  Psychiatry: A & O x 3, Mood & affect appropriate  Gastrointestinal:  Soft, Non-tender, + BS  Skin: No rashes, No ecchymoses, No cyanosis	  Neurologic: Non-focal  Extremities: Normal range of motion, No clubbing, cyanosis or edema  	  	  LABS:	 	    CARDIAC MARKERS:                                8.8    15.0  )-----------( 52       ( 13 Oct 2017 09:53 )             27.8     10-13    148<H>  |  104  |  36<H>  ----------------------------<  89  4.4   |  33<H>  |  0.91    Ca    8.9      13 Oct 2017 06:08      proBNP:   Lipid Profile:   HgA1c:   TSH:

## 2017-10-13 NOTE — PROGRESS NOTE ADULT - SUBJECTIVE AND OBJECTIVE BOX
Patient is a 89y old  Female who presents with a chief complaint of shortness of breath / hypoxic respiratory failure (12 Oct 2017 12:32), resolved and h/o ITP with drop in plt counts while hospitalized; s/p IVIg, currently on steroids       Pt is seen and examined  pt is awake and lying in bed, daughter at bedside  pt seems comfortable and denies any complaints at this time      REVIEW OF SYSTEMS:    CONSTITUTIONAL: No weakness, fevers or chills  EYES/ENT: No visual changes;  No vertigo or throat pain   NECK: No pain or stiffness  RESPIRATORY: No cough, wheezing, hemoptysis; No shortness of breath  CARDIOVASCULAR: No chest pain or palpitations  GASTROINTESTINAL: No abdominal or epigastric pain. No nausea, vomiting, or hematemesis; No diarrhea or constipation. No melena or hematochezia.  GENITOURINARY: No dysuria, frequency or hematuria  NEUROLOGICAL: No numbness or weakness  SKIN: No itching, burning, rashes, or lesions     MEDICATIONS  (STANDING):  dextrose 5%. 1000 milliLiter(s) (50 mL/Hr) IV Continuous <Continuous>  diltiazem    Tablet 60 milliGRAM(s) Oral every 8 hours  furosemide    Tablet 20 milliGRAM(s) Oral daily  haloperidol     Tablet 0.5 milliGRAM(s) Oral once  pantoprazole  Injectable 40 milliGRAM(s) IV Push daily  predniSONE   Tablet 20 milliGRAM(s) Oral daily  sodium chloride 0.9%. 1000 milliLiter(s) (50 mL/Hr) IV Continuous <Continuous>  sucralfate suspension 1 Gram(s) Oral Before meals and at bedtime  zinc sulfate 220 milliGRAM(s) Oral daily    MEDICATIONS  (PRN):  acetaminophen   Tablet. 650 milliGRAM(s) Oral once PRN Moderate Pain (4 - 6)      Allergies    eggs (Rash)  no drug allergy (Unknown)    Intolerances    vinegar sensitivity    family states that the patient shakes when she ingests vinegar (Other)      Vital Signs Last 24 Hrs  T(C): 36.5 (13 Oct 2017 03:54), Max: 36.6 (12 Oct 2017 21:28)  T(F): 97.7 (13 Oct 2017 03:54), Max: 97.9 (12 Oct 2017 21:28)  HR: 78 (13 Oct 2017 03:54) (59 - 78)  BP: 117/69 (13 Oct 2017 03:54) (111/59 - 128/62)  BP(mean): --  RR: 18 (13 Oct 2017 03:54) (18 - 18)  SpO2: 100% (13 Oct 2017 03:54) (100% - 100%)    PHYSICAL EXAM  General: adult in NAD  HEENT: clear oropharynx, anicteric sclera, pink conjunctiva  Neck: supple  CV: normal S1/S2 with no murmur rubs or gallops  Lungs: positive air movement b/l ant lungs,clear to auscultation, no wheezes, no rales  Abdomen: soft non-tender non-distended, no hepatosplenomegaly  Ext: no clubbing cyanosis or edema  Skin: no rashes and no petechiae; multiple bruises over upper and lower extremities of various ages  Neuro: alert and oriented X 4, no focal deficits    LABS:                          8.2    13.7  )-----------( 50       ( 13 Oct 2017 06:08 )             25.7       Mean Cell Volume : 89.6 fl  Mean Cell Hemoglobin : 28.4 pg  Mean Cell Hemoglobin Concentration : 31.7 gm/dL        Serial CBC's  10-13 @ 06:08  Hct-25.7 / Hgb-8.2 / Plat-50 / RBC-2.87 / WBC-13.7    Serial CBC's  10-12 @ 07:26  Hct-22.2 / Hgb-8.0 / Plat-96 / RBC-2.36 / WBC-11.16      Serial CBC's  10-11 @ 07:12  Hct-26.3 / Hgb-7.7 / Plat-77 / RBC-2.96 / WBC-14.18      Serial CBC's  10-10 @ 07:32  Hct-25.4 / Hgb-7.5 / Plat-56 / RBC-2.91 / WBC-12.69        10-13    148<H>  |  104  |  36<H>  ----------------------------<  89  4.4   |  33<H>  |  0.91    Ca    8.9      13 Oct 2017 06:08            Assessment

## 2017-10-13 NOTE — PROGRESS NOTE ADULT - PROBLEM SELECTOR PLAN 1
- plts decreased to 50K  - repeat Plts STAT   - if low would continue with 30 mg prednisone  - as per Dr Crook note, pt's platelets generally range from 30-75K ---   - long d/w pt's daughter at bedside -- if plts remain at 50k, no objection to d/c to rehab with f/u of plts on a weekly basis --- cbc may be done at rehab w/ f/u w/ dr Crook   - d/w daughter that there are other therapies which can be given for ITP as o/p but they require close f/u; daughter states it is difficult for pt and family to reach office

## 2017-10-13 NOTE — PROGRESS NOTE ADULT - SUBJECTIVE AND OBJECTIVE BOX
Follow-up Pulm Progress Note    No new respiratory events overnight.  Denies SOB/CP. o2 sat 100% on 2l nc -cough-mucous, s/p ivf d5w for elevated na    Medications:  MEDICATIONS  (STANDING):  dextrose 5%. 1000 milliLiter(s) (50 mL/Hr) IV Continuous <Continuous>  diltiazem    Tablet 60 milliGRAM(s) Oral every 8 hours  furosemide    Tablet 20 milliGRAM(s) Oral daily  haloperidol     Tablet 0.5 milliGRAM(s) Oral once  pantoprazole  Injectable 40 milliGRAM(s) IV Push daily  predniSONE   Tablet 20 milliGRAM(s) Oral daily  sodium chloride 0.9%. 1000 milliLiter(s) (50 mL/Hr) IV Continuous <Continuous>  sucralfate suspension 1 Gram(s) Oral Before meals and at bedtime  zinc sulfate 220 milliGRAM(s) Oral daily    MEDICATIONS  (PRN):  acetaminophen   Tablet. 650 milliGRAM(s) Oral once PRN Moderate Pain (4 - 6)          Vital Signs Last 24 Hrs  T(C): 36.5 (13 Oct 2017 03:54), Max: 36.6 (12 Oct 2017 21:28)  T(F): 97.7 (13 Oct 2017 03:54), Max: 97.9 (12 Oct 2017 21:28)  HR: 78 (13 Oct 2017 03:54) (59 - 78)  BP: 117/69 (13 Oct 2017 03:54) (111/59 - 128/62)  BP(mean): --  RR: 18 (13 Oct 2017 03:54) (18 - 18)  SpO2: 100% (13 Oct 2017 03:54) (100% - 100%)          10-12 @ 07:01  -  10-13 @ 07:00  --------------------------------------------------------  IN: 600 mL / OUT: 400 mL / NET: 200 mL          LABS:                        8.8    15.0  )-----------( 52       ( 13 Oct 2017 09:53 )             27.8     10-13    148<H>  |  104  |  36<H>  ----------------------------<  89  4.4   |  33<H>  |  0.91    Ca    8.9      13 Oct 2017 06:08            CAPILLARY BLOOD GLUCOSE  188 (12 Oct 2017 21:28)      POCT Blood Glucose.: 94 mg/dL (13 Oct 2017 08:03)          Serum Pro-Brain Natriuretic Peptide: 3983 pg/mL (10-12-17 @ 06:20)                CULTURES: (if applicable)          Physical Examination:  PULM: dcreased bs bilaterally, no significant sputum production  CVS: S1, S2 heard    RADIOLOGY REVIEWED  CXR: < from: Xray Chest 1 View AP- PORTABLE-Urgent (10.12.17 @ 13:28) >  IMPRESSION:   Left pleural effusion.    < end of copied text >

## 2017-10-13 NOTE — PROGRESS NOTE ADULT - PROBLEM SELECTOR PLAN 2
- hb on admission was 10.8 - hb dropped to low of 7.7 and today rebounded to 8.2 -- cont MVI with iron  - anemia workup was unrevealing of any cause of anemia  - no evidence of hemolysis

## 2017-10-21 ENCOUNTER — INPATIENT (INPATIENT)
Facility: HOSPITAL | Age: 82
LOS: 8 days | Discharge: NURSING, REHAB OR LONG-TERM CARE | DRG: 813 | End: 2017-10-30
Attending: INTERNAL MEDICINE | Admitting: INTERNAL MEDICINE
Payer: MEDICARE

## 2017-10-21 VITALS — RESPIRATION RATE: 16 BRPM | HEART RATE: 74 BPM | SYSTOLIC BLOOD PRESSURE: 114 MMHG | DIASTOLIC BLOOD PRESSURE: 68 MMHG

## 2017-10-21 DIAGNOSIS — J96.12 CHRONIC RESPIRATORY FAILURE WITH HYPERCAPNIA: ICD-10-CM

## 2017-10-21 DIAGNOSIS — Z71.89 OTHER SPECIFIED COUNSELING: ICD-10-CM

## 2017-10-21 DIAGNOSIS — R13.10 DYSPHAGIA, UNSPECIFIED: ICD-10-CM

## 2017-10-21 DIAGNOSIS — D69.3 IMMUNE THROMBOCYTOPENIC PURPURA: ICD-10-CM

## 2017-10-21 DIAGNOSIS — D69.6 THROMBOCYTOPENIA, UNSPECIFIED: ICD-10-CM

## 2017-10-21 DIAGNOSIS — J44.9 CHRONIC OBSTRUCTIVE PULMONARY DISEASE, UNSPECIFIED: ICD-10-CM

## 2017-10-21 DIAGNOSIS — I35.0 NONRHEUMATIC AORTIC (VALVE) STENOSIS: ICD-10-CM

## 2017-10-21 DIAGNOSIS — Z29.9 ENCOUNTER FOR PROPHYLACTIC MEASURES, UNSPECIFIED: ICD-10-CM

## 2017-10-21 DIAGNOSIS — L98.429 NON-PRESSURE CHRONIC ULCER OF BACK WITH UNSPECIFIED SEVERITY: ICD-10-CM

## 2017-10-21 DIAGNOSIS — I50.32 CHRONIC DIASTOLIC (CONGESTIVE) HEART FAILURE: ICD-10-CM

## 2017-10-21 DIAGNOSIS — I48.91 UNSPECIFIED ATRIAL FIBRILLATION: ICD-10-CM

## 2017-10-21 LAB
ALBUMIN SERPL ELPH-MCNC: 3.2 G/DL — LOW (ref 3.3–5)
ALP SERPL-CCNC: 62 U/L — SIGNIFICANT CHANGE UP (ref 40–120)
ALT FLD-CCNC: 30 U/L RC — SIGNIFICANT CHANGE UP (ref 10–45)
ANION GAP SERPL CALC-SCNC: 10 MMOL/L — SIGNIFICANT CHANGE UP (ref 5–17)
ANISOCYTOSIS BLD QL: SLIGHT — SIGNIFICANT CHANGE UP
APTT BLD: 27.7 SEC — SIGNIFICANT CHANGE UP (ref 27.5–37.4)
AST SERPL-CCNC: 35 U/L — SIGNIFICANT CHANGE UP (ref 10–40)
BASE EXCESS BLDV CALC-SCNC: 13 MMOL/L — HIGH (ref -2–2)
BASO STIPL BLD QL SMEAR: SLIGHT — SIGNIFICANT CHANGE UP
BASOPHILS # BLD AUTO: 0 K/UL — SIGNIFICANT CHANGE UP (ref 0–0.2)
BASOPHILS NFR BLD AUTO: 0.2 % — SIGNIFICANT CHANGE UP (ref 0–2)
BILIRUB SERPL-MCNC: 0.7 MG/DL — SIGNIFICANT CHANGE UP (ref 0.2–1.2)
BLD GP AB SCN SERPL QL: NEGATIVE — SIGNIFICANT CHANGE UP
BUN SERPL-MCNC: 28 MG/DL — HIGH (ref 7–23)
CA-I SERPL-SCNC: 1.17 MMOL/L — SIGNIFICANT CHANGE UP (ref 1.12–1.3)
CALCIUM SERPL-MCNC: 8.8 MG/DL — SIGNIFICANT CHANGE UP (ref 8.4–10.5)
CHLORIDE BLDV-SCNC: 99 MMOL/L — SIGNIFICANT CHANGE UP (ref 96–108)
CHLORIDE SERPL-SCNC: 96 MMOL/L — SIGNIFICANT CHANGE UP (ref 96–108)
CO2 BLDV-SCNC: 42 MMOL/L — HIGH (ref 22–30)
CO2 SERPL-SCNC: 35 MMOL/L — HIGH (ref 22–31)
CREAT SERPL-MCNC: 0.81 MG/DL — SIGNIFICANT CHANGE UP (ref 0.5–1.3)
DAT C3-SP REAG RBC QL: POSITIVE — SIGNIFICANT CHANGE UP
DAT POLY-SP REAG RBC QL: POSITIVE — SIGNIFICANT CHANGE UP
DIRECT COOMBS IGG: NEGATIVE — SIGNIFICANT CHANGE UP
EOSINOPHIL # BLD AUTO: 0.2 K/UL — SIGNIFICANT CHANGE UP (ref 0–0.5)
EOSINOPHIL NFR BLD AUTO: 1.7 % — SIGNIFICANT CHANGE UP (ref 0–6)
GAS PNL BLDV: 139 MMOL/L — SIGNIFICANT CHANGE UP (ref 136–145)
GAS PNL BLDV: SIGNIFICANT CHANGE UP
GLUCOSE BLDV-MCNC: 80 MG/DL — SIGNIFICANT CHANGE UP (ref 70–99)
GLUCOSE SERPL-MCNC: 83 MG/DL — SIGNIFICANT CHANGE UP (ref 70–99)
HCO3 BLDV-SCNC: 40 MMOL/L — HIGH (ref 21–29)
HCT VFR BLD CALC: 31.3 % — LOW (ref 34.5–45)
HCT VFR BLDA CALC: 31 % — LOW (ref 39–50)
HGB BLD CALC-MCNC: 10.1 G/DL — LOW (ref 11.5–15.5)
HGB BLD-MCNC: 10 G/DL — LOW (ref 11.5–15.5)
INR BLD: 0.9 RATIO — SIGNIFICANT CHANGE UP (ref 0.88–1.16)
LACTATE BLDV-MCNC: 1.3 MMOL/L — SIGNIFICANT CHANGE UP (ref 0.7–2)
LYMPHOCYTES # BLD AUTO: 1.7 K/UL — SIGNIFICANT CHANGE UP (ref 1–3.3)
LYMPHOCYTES # BLD AUTO: 15.7 % — SIGNIFICANT CHANGE UP (ref 13–44)
MCHC RBC-ENTMCNC: 28.6 PG — SIGNIFICANT CHANGE UP (ref 27–34)
MCHC RBC-ENTMCNC: 32 GM/DL — SIGNIFICANT CHANGE UP (ref 32–36)
MCV RBC AUTO: 89.2 FL — SIGNIFICANT CHANGE UP (ref 80–100)
MONOCYTES # BLD AUTO: 0.5 K/UL — SIGNIFICANT CHANGE UP (ref 0–0.9)
MONOCYTES NFR BLD AUTO: 4.6 % — SIGNIFICANT CHANGE UP (ref 2–14)
NEUTROPHILS # BLD AUTO: 8.6 K/UL — HIGH (ref 1.8–7.4)
NEUTROPHILS NFR BLD AUTO: 77.9 % — HIGH (ref 43–77)
PCO2 BLDV: 70 MMHG — HIGH (ref 35–50)
PH BLDV: 7.38 — SIGNIFICANT CHANGE UP (ref 7.35–7.45)
PLAT MORPH BLD: NORMAL — SIGNIFICANT CHANGE UP
PLATELET # BLD AUTO: 5 K/UL — CRITICAL LOW (ref 150–400)
PO2 BLDV: 27 MMHG — SIGNIFICANT CHANGE UP (ref 25–45)
POLYCHROMASIA BLD QL SMEAR: SLIGHT — SIGNIFICANT CHANGE UP
POTASSIUM BLDV-SCNC: 3.8 MMOL/L — SIGNIFICANT CHANGE UP (ref 3.5–5)
POTASSIUM SERPL-MCNC: 3.8 MMOL/L — SIGNIFICANT CHANGE UP (ref 3.5–5.3)
POTASSIUM SERPL-SCNC: 3.8 MMOL/L — SIGNIFICANT CHANGE UP (ref 3.5–5.3)
PROT SERPL-MCNC: 7.2 G/DL — SIGNIFICANT CHANGE UP (ref 6–8.3)
PROTHROM AB SERPL-ACNC: 9.7 SEC — LOW (ref 9.8–12.7)
RBC # BLD: 3.51 M/UL — LOW (ref 3.8–5.2)
RBC # FLD: 16.5 % — HIGH (ref 10.3–14.5)
RBC BLD AUTO: ABNORMAL
RH IG SCN BLD-IMP: POSITIVE — SIGNIFICANT CHANGE UP
SAO2 % BLDV: 41 % — LOW (ref 67–88)
SODIUM SERPL-SCNC: 141 MMOL/L — SIGNIFICANT CHANGE UP (ref 135–145)
WBC # BLD: 11.3 K/UL — HIGH (ref 3.8–10.5)
WBC # FLD AUTO: 11.3 K/UL — HIGH (ref 3.8–10.5)

## 2017-10-21 PROCEDURE — 99223 1ST HOSP IP/OBS HIGH 75: CPT

## 2017-10-21 PROCEDURE — 93010 ELECTROCARDIOGRAM REPORT: CPT

## 2017-10-21 PROCEDURE — 99497 ADVNCD CARE PLAN 30 MIN: CPT | Mod: 25

## 2017-10-21 PROCEDURE — 99285 EMERGENCY DEPT VISIT HI MDM: CPT | Mod: 25

## 2017-10-21 PROCEDURE — 71010: CPT | Mod: 26

## 2017-10-21 RX ORDER — SUCRALFATE 1 G
1 TABLET ORAL
Qty: 0 | Refills: 0 | Status: DISCONTINUED | OUTPATIENT
Start: 2017-10-21 | End: 2017-10-30

## 2017-10-21 RX ORDER — IMMUNE GLOBULIN,GAMMA(IGG) 5 %
17.4 VIAL (ML) INTRAVENOUS ONCE
Qty: 0 | Refills: 0 | Status: COMPLETED | OUTPATIENT
Start: 2017-10-21 | End: 2017-10-21

## 2017-10-21 RX ORDER — IPRATROPIUM/ALBUTEROL SULFATE 18-103MCG
3 AEROSOL WITH ADAPTER (GRAM) INHALATION EVERY 6 HOURS
Qty: 0 | Refills: 0 | Status: DISCONTINUED | OUTPATIENT
Start: 2017-10-21 | End: 2017-10-30

## 2017-10-21 RX ORDER — FOLIC ACID/VIT B COMPLEX AND C 400 MCG
10000 TABLET ORAL DAILY
Qty: 0 | Refills: 0 | Status: DISCONTINUED | OUTPATIENT
Start: 2017-10-21 | End: 2017-10-30

## 2017-10-21 RX ORDER — BUDESONIDE AND FORMOTEROL FUMARATE DIHYDRATE 160; 4.5 UG/1; UG/1
2 AEROSOL RESPIRATORY (INHALATION)
Qty: 0 | Refills: 0 | Status: DISCONTINUED | OUTPATIENT
Start: 2017-10-21 | End: 2017-10-30

## 2017-10-21 RX ORDER — IMMUNE GLOBULIN,GAMMA(IGG) 5 %
17.4 VIAL (ML) INTRAVENOUS DAILY
Qty: 0 | Refills: 0 | Status: DISCONTINUED | OUTPATIENT
Start: 2017-10-21 | End: 2017-10-21

## 2017-10-21 RX ORDER — FUROSEMIDE 40 MG
20 TABLET ORAL DAILY
Qty: 0 | Refills: 0 | Status: DISCONTINUED | OUTPATIENT
Start: 2017-10-21 | End: 2017-10-30

## 2017-10-21 RX ORDER — PANTOPRAZOLE SODIUM 20 MG/1
40 TABLET, DELAYED RELEASE ORAL
Qty: 0 | Refills: 0 | Status: DISCONTINUED | OUTPATIENT
Start: 2017-10-21 | End: 2017-10-30

## 2017-10-21 RX ORDER — IMMUNE GLOBULIN,GAMMA(IGG) 5 %
17 VIAL (ML) INTRAVENOUS DAILY
Qty: 0 | Refills: 0 | Status: DISCONTINUED | OUTPATIENT
Start: 2017-10-21 | End: 2017-10-21

## 2017-10-21 RX ORDER — IMMUNE GLOBULIN,GAMMA(IGG) 5 %
17.4 VIAL (ML) INTRAVENOUS ONCE
Qty: 0 | Refills: 0 | Status: DISCONTINUED | OUTPATIENT
Start: 2017-10-22 | End: 2017-10-22

## 2017-10-21 RX ORDER — ZINC SULFATE TAB 220 MG (50 MG ZINC EQUIVALENT) 220 (50 ZN) MG
220 TAB ORAL DAILY
Qty: 0 | Refills: 0 | Status: DISCONTINUED | OUTPATIENT
Start: 2017-10-21 | End: 2017-10-30

## 2017-10-21 RX ADMIN — Medication 30 MILLIGRAM(S): at 13:26

## 2017-10-21 RX ADMIN — Medication 1 GRAM(S): at 19:21

## 2017-10-21 RX ADMIN — BUDESONIDE AND FORMOTEROL FUMARATE DIHYDRATE 2 PUFF(S): 160; 4.5 AEROSOL RESPIRATORY (INHALATION) at 22:04

## 2017-10-21 RX ADMIN — Medication 1 GRAM(S): at 22:05

## 2017-10-21 RX ADMIN — Medication 29 GRAM(S): at 23:03

## 2017-10-21 NOTE — H&P ADULT - PROBLEM SELECTOR PLAN 10
face to face 25 minutes discussed with daughter Sean bedside who provided MOLST form from previous admission indicating that patient is DNR/DNI which were patients previous wishes. MOLST also signed by son Gianna. They are OK with using BIPAP if patient has any decompensation in respiratory status.  DNR/DNI  prognosis guarded

## 2017-10-21 NOTE — H&P ADULT - PROBLEM SELECTOR PLAN 2
HR controlled  c/w diltiazem 60mg q8, would consider dilt ER if HR stable on this regimen  not on anticoagulation due to ITP

## 2017-10-21 NOTE — H&P ADULT - PROBLEM SELECTOR PLAN 4
stage II diastolic heart failure, currently euvolemic  -c/w lasix 20mg daily  -not on ace or betablocker and will not start as severe AS stenosis to order to keep SBP >120 and avoid hypotension due to copd, stable at this time at baseline values with no sob, see plan above in COPD

## 2017-10-21 NOTE — ED PROVIDER NOTE - OBJECTIVE STATEMENT
89yo F with PM 89yo F with PMH A.fib no AC, HTN, SBO s/p resection 2010, ITP s/p IVIG, recent admission 89yo Hungarian speaking F, bedbound, with PMH A.fib no AC, HTN, SBO s/p resection 2010, ITP s/p IVIG, recent admission 10/1/17-10/13/17 for respiratory failure, BIBEMS from [facility] c/o low platelets. Per labs on 10/20/17: PLT count of 3. Pt A&O2, prefers daughter to translate at bedside. Per pt's daughter, pt at baseline. Pt c/o lower back pain, currently with sacral ulcer. Denies fever, CP, SOB, abd pain, n/v/d, dysuria/hematuria  Hematologist Dr. Mejia Crook  PCP Denisse 89yo Estonian speaking F, bedbound, with PMH A.fib no AC, HTN, SBO s/p resection 2010, aspiration PNA, ITP s/p IVIG, recent admission 10/1/17-10/13/17 for respiratory failure, BIBEMS from [facility] c/o low platelets. Per labs on 10/20/17: PLT count of 3. Pt A&O2, prefers daughter to translate at bedside. Per pt's daughter, pt at baseline. Pt c/o lower back pain, currently with sacral ulcer. Denies fever, CP, SOB, abd pain, n/v/d, dysuria/hematuria  Hematologist Dr. Mejia Crook  PCP Denisse 89yo Sami speaking F, bedbound, with PMH A.fib no AC, HTN, SBO s/p resection 2010, aspiration PNA, ITP s/p IVIG, recent admission 10/1/17-10/13/17 for respiratory failure, BIBEMS from [facility] c/o low platelets. Per labs on 10/20/17: PLT count of 3. Per transfer records, pt on prednisone 20mg daily. Pt A&O2, prefers daughter to translate at bedside. Per pt's daughter, pt at baseline. Pt c/o lower back pain, currently with sacral ulcer. Denies fever, CP, SOB, abd pain, n/v/d, dysuria/hematuria  Hematologist Dr. Mejia Crook  PCP Denisse

## 2017-10-21 NOTE — ED ADULT NURSE REASSESSMENT NOTE - NS ED NURSE REASSESS COMMENT FT1
Report taken from Gracie NOLASCO VSS. Pt and family updated on status. Pt AOx2 calm, comfort care provided.

## 2017-10-21 NOTE — H&P ADULT - PROBLEM SELECTOR PLAN 5
severe AS per echo 10/2017  keep SBP >120  outpatient cards followup stage II diastolic heart failure, currently euvolemic  -c/w lasix 20mg daily  -not on ace or betablocker and will not start as severe AS stenosis to order to keep SBP >120 and avoid hypotension stage II diastolic heart failure, currently euvolemic  -c/w lasix 20mg daily  -not on ace or betablocker and will not start as severe AS stenosis to order to keep SBP >120 and avoid hypotension  -pt is DNR/DNI (BIPAP is ok though if needed)

## 2017-10-21 NOTE — H&P ADULT - PROBLEM SELECTOR PLAN 7
last admission was seen by speech was put on Dysphagia I with nectar thickened liquids  -pt and family refusing PEG, aware of aspiration risk  -aspiration precaution will need wound care consult  zinc oxide and vitamin A

## 2017-10-21 NOTE — H&P ADULT - PROBLEM SELECTOR PROBLEM 6
Skin ulcer of sacrum, unspecified ulcer stage Aortic valve stenosis, etiology of cardiac valve disease unspecified

## 2017-10-21 NOTE — H&P ADULT - PROBLEM SELECTOR PLAN 3
stable not sob, chronic co2 retainer with baseline Pc02 in 70s and bicarb @ 35 with normal pH on VBG, compensating well  -not on any inhalers, would benefit given advanced COPD from Symbicort will start now  -gita prn  -goal 02 sat 89-94%, supplemental 02 as needed

## 2017-10-21 NOTE — H&P ADULT - NSHPLABSRESULTS_GEN_ALL_CORE
personally reviewed labs notable for:  platelets of 5 from 50 (1 week ago)  Hgb 10  BMP with bicarb of 35 (baseline)  vbg 7.38, 70 (baseline), 40  lactate 1.3    personally reviewed Ekg: NSR @ 62 with 1st degree AV block. TW flattening in II, III    personally reviewed CXR: small left pleural effusion relatively unchanged (f/u official read)    reviewed heme note    2d echo:  Conclusions:  1. Mitral annular calcification and calcified mitral  leaflets.  2. Calcified trileaflet aortic valve with decreased  opening. Peak transaortic valve gradient equals 67 mm Hg,  mean transaortic valve gradient equals 42 mm Hg, estimated  aortic valve area equals 0.9 sqcm (by continuity equation),  aortic valve velocity time integral equals 96 cm,  consistent with severe aortic stenosis.  3. Mid septal hypertrophy.  4. Hyperdynamic left ventricle.  5. Moderate diastolic dysfunction (Stage II).

## 2017-10-21 NOTE — ED ADULT NURSE NOTE - OBJECTIVE STATEMENT
pt 89 y/o femeale sent from [facility] for low platletes hx thrombocytopenia pt alert oriented x 2 farsi speaking accompanied by daughter pt with b/l breath sounds NSR on moniter pt was discharged from here 1 week agopt very thin with multiple echymosis to extremites pt non ambulatory per daughter

## 2017-10-21 NOTE — CONSULT NOTE ADULT - SUBJECTIVE AND OBJECTIVE BOX
Patient is a 89y old  Female who presents from Nursing home with platelet count of 3K    HPI: 89 y/o female with H/O ITP for many years with recent hospitalization for PNA with respiratory failure. During that hospital admission, pt's platelet counts dropped and she was treated with IVIG with improvement of platelets to her baseline (approx 40-60k).  She has been on chronic steroids as an outpatient. Due to her advanced age and debility, she has been unable to f/u on a regular basis as an outpt.     Pt's daughter at bedside. She denies any epistaxis, hematuria, hematochezia, melena.  She has bruising on upper and lower extremities which were present on prior admission.       ROS:  Negative except for: fevers, chills, night sweats    PAST MEDICAL & SURGICAL HISTORY:  PNA (pneumonia) - Early Oct 2017  Dysphagia  Thrombocytopenia: ITP  Atrial fibrillation and flutter: No A/C  during hospitalization in april 2014  Respiratory failure: April 2014 was intubated  Cataract: right eye  Small bowel obstruction: s/p resection in 2010  HTN - Hypertension  S/P cholecystectomy  H/O: Hysterectomy  S/P Small Bowel Resection      SOCIAL HISTORY: non-contributory    FAMILY HISTORY:  No pertinent family history in first degree relatives of blood dyscrasias      MEDICATIONS  (STANDING):  predniSONE   Tablet 20 milliGRAM(s) Oral Once/day    MEDICATIONS  (PRN): none      Allergies    eggs (Rash)  no drug allergy (Unknown)    Intolerances    vinegar sensitivity    family states that the patient shakes when she ingests vinegar (Other)      Vital Signs Last 24 Hrs  T(C): 36.5 (21 Oct 2017 11:15), Max: 36.5 (21 Oct 2017 11:15)  T(F): 97.7 (21 Oct 2017 11:15), Max: 97.7 (21 Oct 2017 11:15)  HR: 74 (21 Oct 2017 11:15) (72 - 74)  BP: 145/71 (21 Oct 2017 11:15) (114/68 - 145/71)  BP(mean): --  RR: 16 (21 Oct 2017 11:15) (16 - 20)  SpO2: 100% (21 Oct 2017 11:15) (100% - 100%)    REVIEW OF SYSTEMS:    CONSTITUTIONAL: No weakness, fevers or chills  EYES/ENT: No visual changes;  No vertigo or throat pain   NECK: No pain or stiffness  RESPIRATORY: No cough, wheezing, hemoptysis; No shortness of breath  CARDIOVASCULAR: No chest pain or palpitations  GASTROINTESTINAL: No abdominal or epigastric pain. No nausea, vomiting, or hematemesis; No diarrhea or constipation. No melena or hematochezia.  GENITOURINARY: No dysuria, frequency or hematuria  NEUROLOGICAL: No numbness or weakness  SKIN: No itching, burning, rashes, or lesions; (+) Sacral decubitus from prior admission with dressing    PHYSICAL EXAM  General: adult in NAD  HEENT: clear oropharynx, anicteric sclera, pink conjunctiva  Neck: supple  CV: normal S1/S2 with (+) murmur no rubs or gallops  Lungs: positive air movement b/l ant lungs,clear to auscultation, no wheezes, no rales  Abdomen: soft non-tender non-distended, no hepatosplenomegaly  Ext: no clubbing cyanosis or edema  Skin: (+) ecchymoses over upper and lower extremities; both hands with purple discoloration, (+) ecchymoses on right upper inner thigh; (+) longitudinal ecchymoses over RLE with dressing  Neuro: alert and oriented as per daughter, no focal deficits      LABS:                          10.0   11.3  )-----------( 5        ( 21 Oct 2017 11:11 )             31.3         Mean Cell Volume : 89.2 fl  Mean Cell Hemoglobin : 28.6 pg  Mean Cell Hemoglobin Concentration : 32.0 gm/dL  Auto Neutrophil # : 8.6 K/uL  Auto Lymphocyte # : 1.7 K/uL  Auto Monocyte # : 0.5 K/uL  Auto Eosinophil # : 0.2 K/uL  Auto Basophil # : 0.0 K/uL        10-21    141  |  96  |  28<H>  ----------------------------<  83  3.8   |  35<H>  |  0.81    Ca    8.8      21 Oct 2017 11:11    TPro  7.2  /  Alb  3.2<L>  /  TBili  0.7  /  DBili  x   /  AST  35  /  ALT  30  /  AlkPhos  62  10-21      PT/INR - ( 21 Oct 2017 11:11 )   PT: 9.7 sec;   INR: 0.90 ratio         PTT - ( 21 Oct 2017 11:11 )  PTT:27.7 sec      Complete Blood Count (10.13.17 @ 09:53)    WBC Count: 15.0 K/uL    RBC Count: 3.09 M/uL    Hemoglobin: 8.8 g/dL    Hematocrit: 27.8 %    Mean Cell Volume: 90.0 fl    Mean Cell Hemoglobin: 28.4 pg    Mean Cell Hemoglobin Conc: 31.5 gm/dL    Red Cell Distrib Width: 14.7 %    Platelet Count - Automated: 52 K/uL

## 2017-10-21 NOTE — H&P ADULT - PROBLEM SELECTOR PLAN 6
will need wound care consult  zinc oxide and vitamin A severe AS per echo 10/2017  keep SBP >120  outpatient cards followup

## 2017-10-21 NOTE — H&P ADULT - PROBLEM SELECTOR PROBLEM 5
Aortic valve stenosis, etiology of cardiac valve disease unspecified Chronic diastolic heart failure

## 2017-10-21 NOTE — H&P ADULT - SKIN COMMENTS
scattered areas of old eccymosis on b/l U and L extremities, scattered areas of old ecchymosis on b/l U and L extremities,

## 2017-10-21 NOTE — ED PROVIDER NOTE - ATTENDING CONTRIBUTION TO CARE
------------ATTENDING NOTE------------   89 yo F w/ daughter (easily translating, declined additional translation services), sent to ED by Rehab for concerns of thrombocytopenia, no active bleeding, HD stable, has known ITP, awaiting labs and d/w outpt team/Heme -->  - Ho Loo MD   ----------------------------------------------------------- ------------ATTENDING NOTE------------   89 yo F w/ daughter (easily translating, declined additional translation services), sent to ED by Rehab for concerns of thrombocytopenia, no active bleeding, HD stable, has known ITP, awaiting labs and d/w outpt team/Heme --> HD stable, no ED bleeding, IVIG/steroids, admitted.  - Ho Loo MD   -----------------------------------------------------------

## 2017-10-21 NOTE — H&P ADULT - ASSESSMENT
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 (family denies), moderate AS, HTN, dementia (baseline AAOx2), dysphagia (declined PEG tube), hx of recurrent aspiration pneumonia, sacral ulcer, COPD, severe aortic stenosis sent from rehab for low platelets, no bleeding, found to have platelets of 5 likely with acute on chronic ITP 89 yo F w/ hx of Afib/Aflutter (not on AC), diastolic heart failure, ITP on chronic prednisone, hx of pancreatic neoplasm? (family denies), moderate AS, HTN, dementia (baseline AAOx2), dysphagia (declined PEG tube), hx of recurrent aspiration pneumonia, sacral ulcer, COPD, severe aortic stenosis sent from rehab for low platelets, no bleeding, found to have platelets of 5 likely with thrombocytopenia likely due to acute on chronic ITP

## 2017-10-21 NOTE — H&P ADULT - PROBLEM SELECTOR PLAN 1
platelets of 5 from 50 about 1 week ago, recently treated with IVIG on steroids on last admitted. No current bleeding  -appreciate heme recs, increase steroids to 40 mg daily  -start IVIg 400 mg/kg daily x 5 days  -monitor cbc  -monitor for bleeding  -if any changes of mental status, stat CT head severe thrombocytopenia, likely with acute on chronic exacerbation of ITP  -platelets of 5 from 50 about 1 week ago, recently treated with IVIG on steroids on last admitted. No current bleeding  -appreciate heme recs, increase steroids to 40 mg daily  -start IVIg 400 mg/kg daily x 5 days  -monitor cbc  -monitor for bleeding  -if any changes of mental status, stat CT head severe thrombocytopenia, likely due to acute on chronic exacerbation of ITP  -platelets of 5 from 50 about 1 week ago, recently treated with IVIG on steroids on last admitted. No current bleeding  -appreciate heme recs, increase steroids to 40 mg daily  -start IVIg 400 mg/kg daily x 5 days  -monitor cbc  -monitor for bleeding  -if any changes of mental status, stat CT head

## 2017-10-21 NOTE — ED PROVIDER NOTE - PROGRESS NOTE DETAILS
Paged covering hematologist Dr. King. - Denisha Rodriguez PA-C Spoke with Dr. King, recommending increasing prednisone to 50mg x 1 and will come see patient and order IVIG. Spoke with nursing home to confirm pt received her daily dose of prednisone 20mg today at 9AM. - Denisha Rodriguez PA-C

## 2017-10-21 NOTE — CONSULT NOTE ADULT - PROBLEM SELECTOR RECOMMENDATION 9
Drop in counts not uncommon with ITP after receiving IVIg.    ---Would increase steroids to 40 mg daily.  --- IVIg 400 mg/kg daily x 5 days    -- Dr Crook was considering starting other therapies for ITP such as romiplastin , eltrombopag --- he will discuss with family in next few days once counts have improved    D/w Daughter at bedside

## 2017-10-21 NOTE — H&P ADULT - HISTORY OF PRESENT ILLNESS
translation bedside by daughter per patient preference.    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 (family denies), moderate AS, HTN, dementia (baseline AAOx2), dysphagia (declined PEG tube), hx of recurrent aspiration pneumonia, ?COPD  sent from rehab for low platelet levels. Patient recently discharged from [facility] for treatment of acute hypoxic and hypercarbic respiratory failure due CHF exacerbation and PNA and for management of chronic ITP with steroids, IVIG with platelets around 50 on discharge. Currently patient denies any bleeding, melena, hematochezia, hematuria. No CNS changes or headaches. translation bedside by daughter per patient preference.    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 (family denies), moderate AS, HTN, dementia (baseline AAOx2), dysphagia (declined PEG tube), hx of recurrent aspiration pneumonia, sacral ulcer, COPD, severe aortic stenosis  sent from rehab for low platelet levels. Patient recently discharged on 10/13 from [facility] for treatment of acute hypoxic and hypercarbic respiratory failure due CHF exacerbation and PNA and for management of chronic ITP with steroids, IVIG with platelets around 50 on discharge. Currently patient denies any bleeding, melena, hematochezia, hematuria. No CNS changes or headaches. No cp, sob. Reports pain at sacral ulcer site, no drainage or bleeding from site. Does report some bruising on b/l lower and upper extremities which are improving since her last admission.    Ed vitalS: 97.4, 74, 134/75, 16, 100% on 2L NC translation bedside by daughter per patient preference.    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 (family denies), moderate AS, HTN, dementia (baseline AAOx2), dysphagia (declined PEG tube), hx of recurrent aspiration pneumonia, sacral ulcer, COPD, severe aortic stenosis  sent from rehab for low platelet levels. Patient recently discharged on 10/13 from [facility] for treatment of acute hypoxic and hypercarbic respiratory failure due CHF exacerbation and PNA and for management of chronic ITP with steroids, IVIG with platelets around 50 on discharge. Currently patient denies any bleeding, melena, hematochezia, hematuria. No CNS changes or headaches. No cp, sob, cough. Reports pain at sacral ulcer site, no drainage or bleeding from site. Does report some bruising on b/l lower and upper extremities which are improving since her last admission.    Ed vitalS: 97.4, 74, 134/75, 16, 100% on 2L NC translation bedside by daughter per patient preference.    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 (family denies), moderate AS, HTN, dementia (baseline AAOx2), dysphagia (declined PEG tube), hx of recurrent aspiration pneumonia, sacral ulcer, COPD, severe aortic stenosis  sent from rehab for low platelet levels. Patient recently discharged on 10/13 from [facility] for treatment of acute hypoxic and hypercarbic respiratory failure due CHF exacerbation and PNA and for management of chronic ITP with steroids, IVIG with platelets around 50 on discharge. Currently patient denies any bleeding, melena, hematochezia, hematuria. No CNS changes or headaches. No cp, sob, cough. Reports pain at sacral ulcer site, no drainage or bleeding from site. Does report some bruising on b/l lower and upper extremities which are improving since her last admission.    Ed vitalS: 97.4, 74, 134/75, 16, 100% on 2L NC  she was given prednisone 30mg in ER.

## 2017-10-22 LAB
HCT VFR BLD CALC: 27.5 % — LOW (ref 34.5–45)
HGB BLD-MCNC: 8.9 G/DL — LOW (ref 11.5–15.5)
MCHC RBC-ENTMCNC: 28.7 PG — SIGNIFICANT CHANGE UP (ref 27–34)
MCHC RBC-ENTMCNC: 32.4 GM/DL — SIGNIFICANT CHANGE UP (ref 32–36)
MCV RBC AUTO: 88.6 FL — SIGNIFICANT CHANGE UP (ref 80–100)
PLATELET # BLD AUTO: 25 K/UL — LOW (ref 150–400)
RBC # BLD: 3.11 M/UL — LOW (ref 3.8–5.2)
RBC # FLD: 16.6 % — HIGH (ref 10.3–14.5)
WBC # BLD: 9.4 K/UL — SIGNIFICANT CHANGE UP (ref 3.8–10.5)
WBC # FLD AUTO: 9.4 K/UL — SIGNIFICANT CHANGE UP (ref 3.8–10.5)

## 2017-10-22 RX ORDER — IMMUNE GLOBULIN,GAMMA(IGG) 5 %
17.4 VIAL (ML) INTRAVENOUS EVERY 24 HOURS
Qty: 0 | Refills: 0 | Status: COMPLETED | OUTPATIENT
Start: 2017-10-22 | End: 2017-10-26

## 2017-10-22 RX ADMIN — BUDESONIDE AND FORMOTEROL FUMARATE DIHYDRATE 2 PUFF(S): 160; 4.5 AEROSOL RESPIRATORY (INHALATION) at 05:43

## 2017-10-22 RX ADMIN — BUDESONIDE AND FORMOTEROL FUMARATE DIHYDRATE 2 PUFF(S): 160; 4.5 AEROSOL RESPIRATORY (INHALATION) at 18:18

## 2017-10-22 RX ADMIN — Medication 20 MILLIGRAM(S): at 05:44

## 2017-10-22 RX ADMIN — Medication 1 GRAM(S): at 05:45

## 2017-10-22 RX ADMIN — Medication 1 GRAM(S): at 16:02

## 2017-10-22 RX ADMIN — PANTOPRAZOLE SODIUM 40 MILLIGRAM(S): 20 TABLET, DELAYED RELEASE ORAL at 05:44

## 2017-10-22 RX ADMIN — Medication 40 MILLIGRAM(S): at 05:44

## 2017-10-22 RX ADMIN — Medication 1 GRAM(S): at 11:49

## 2017-10-22 RX ADMIN — Medication 10000 UNIT(S): at 11:49

## 2017-10-22 RX ADMIN — Medication 1 GRAM(S): at 22:36

## 2017-10-22 RX ADMIN — ZINC SULFATE TAB 220 MG (50 MG ZINC EQUIVALENT) 220 MILLIGRAM(S): 220 (50 ZN) TAB at 11:49

## 2017-10-22 NOTE — DIETITIAN INITIAL EVALUATION ADULT. - OTHER INFO
Nutrition consult received for chewing/swallowing difficulty. Per chart, pt 89 yo F w/ hx of Afib/Aflutter, diastolic heart failure, ITP on chronic prednisone, hx of pancreatic neoplasm, moderate AS, HTN, dementia (baseline AAOx2), dysphagia (declined PEG tube), hx of recurrent aspiration pneumonia, sacral ulcer, COPD, severe aortic stenosis  sent from rehab for low platelet levels. Pt eating breakfast with total assistance from daughter at time of RD visit. Per daughter, no GI distress. Pt's daughter reports UBW 90 pounds, per previous RD note (10/4/17) 92 pounds, current weight 86.9 pounds- 5.43% weight lossx2 weeks. Pt currently tolerating Dysphagia I Pureed- Honey consistency fluid. Pt/pt's daughter amenable to Ensure Enlive- 2 per day (provides additional 700cal, 40gm protein), Ensure Pudding 1x/day (Provides additional 170 kcal and 4gm Protein),and 2 Prosource daily to promote wound healing (provides 120 calories and 30 grams of protein)

## 2017-10-22 NOTE — DIETITIAN INITIAL EVALUATION ADULT. - PROBLEM SELECTOR PLAN 1
severe thrombocytopenia, likely due to acute on chronic exacerbation of ITP  -platelets of 5 from 50 about 1 week ago, recently treated with IVIG on steroids on last admitted. No current bleeding  -appreciate heme recs, increase steroids to 40 mg daily  -start IVIg 400 mg/kg daily x 5 days  -monitor cbc  -monitor for bleeding  -if any changes of mental status, stat CT head

## 2017-10-22 NOTE — DIETITIAN INITIAL EVALUATION ADULT. - NS AS NUTRI INTERV MEDICAL AND FOOD SUPPLEMENTS
Ensure Enlive- 2 per day (provides additional 700cal, 40gm protein), Ensure Pudding 1x/day (Provides additional 170 kcal and 4gm Protein),and 2 Prosource daily to promote wound healing (provides 120 calories and 30 grams of protein) Ensure Enlive thickened appropriately- 2 per day (provides additional 700cal, 40gm protein), Ensure Pudding 1x/day (Provides additional 170 kcal and 4gm Protein),and 2 Prosource daily to promote wound healing (provides 120 calories and 30 grams of protein)

## 2017-10-22 NOTE — CHART NOTE - NSCHARTNOTEFT_GEN_A_CORE
Upon Nutritional Assessment by the Registered Dietitian your patient was determined to meet criteria / has evidence of the following diagnosis/diagnoses:          [ ]  Mild Protein Calorie Malnutrition        [ ]  Moderate Protein Calorie Malnutrition        [ X] Severe Protein Calorie Malnutrition        [ ] Unspecified Protein Calorie Malnutrition        [X ] Underweight / BMI <19        [ ] Morbid Obesity / BMI > 40      Findings as based on:  [X ] Comprehensive nutrition assessment   [X ] Nutrition Focused Physical Exam: severe muscle and fat wasting (orbital, temporal, clavicle, buccal)  [ X] Other:  Pt with 5.43% wt loss x 2weeks, BMI 15.9, and suboptimal po intake     Nutrition Plan/Recommendations:    1. 2 Ensure Enlive- per day (provides additional 700cal, 40gm protein)   2. Prosource 2x daily  (provides 120 calories and 30 grams of protein)   3. Ensure Pudding 1x/day (Provides additional 170 kcal and 4gm Protein)  4. Continue to provide total feeding assistance and  food preferences as requested by Pt/family within diet restrictions procedures     PROVIDER Section:     By signing this assessment you are acknowledging and agree with the diagnosis/diagnoses assigned by the Registered Dietitian    Comments:

## 2017-10-22 NOTE — DIETITIAN INITIAL EVALUATION ADULT. - ENERGY NEEDS
Ht: 62 inches Wt: 86.9 pounds BMI:15.9 kg/m2 IBW: 110 pounds(+/-10%) %IBW 79%  No edema. Stage III left buttock pressure ulcer, Stage II right buttock pressure ulcers documented.

## 2017-10-22 NOTE — DIETITIAN INITIAL EVALUATION ADULT. - PROBLEM SELECTOR PLAN 8
last admission was seen by speech was put on Dysphagia I with nectar thickened liquids  -pt and family refusing PEG, aware of aspiration risk  -aspiration precaution

## 2017-10-22 NOTE — DIETITIAN INITIAL EVALUATION ADULT. - SOURCE
other (specify)/medical record, previous RD notes, pt's daughter at bedside/family/significant other

## 2017-10-22 NOTE — DIETITIAN INITIAL EVALUATION ADULT. - PHYSICAL APPEARANCE
other (specify)/emaciated- pt with visual severe muscle and fat wasting (orbital, temporal, clavicle, buccal).

## 2017-10-22 NOTE — DIETITIAN INITIAL EVALUATION ADULT. - NS AS NUTRI INTERV MEALS SNACK
1. Provide food preferences as requested by Pt/family within diet restrictions  2. Encourage PO intake during meal times

## 2017-10-22 NOTE — DIETITIAN INITIAL EVALUATION ADULT. - ORAL INTAKE PTA
Per pt's daughter, pt with varied appetite and intake PTA. Pt has HHA that cooks/prepares meals. Pt drinks Ensure daily. Pt takes Zinc, Caltrate, magnesium, MVI PTA. Confirmed allergy to eggs and vinegar./fair

## 2017-10-22 NOTE — CHART NOTE - NSCHARTNOTEFT_GEN_A_CORE
Called by RN to clarify wording on order for IVIG. Pt received first dose yesterday 10/21. Order fixed to say for 4 more doses. Pt will be getting second dose tonight. This is per heme/onc consult note from 10/21 - pt to receive 5 doses in total.   RN aware.    Alanna Fried ANP-BC  [id number]

## 2017-10-22 NOTE — DIETITIAN INITIAL EVALUATION ADULT. - PROBLEM SELECTOR PLAN 5
stage II diastolic heart failure, currently euvolemic  -c/w lasix 20mg daily  -not on ace or betablocker and will not start as severe AS stenosis to order to keep SBP >120 and avoid hypotension  -pt is DNR/DNI (BIPAP is ok though if needed)

## 2017-10-23 ENCOUNTER — TRANSCRIPTION ENCOUNTER (OUTPATIENT)
Age: 82
End: 2017-10-23

## 2017-10-23 LAB
HCT VFR BLD CALC: 27.3 % — LOW (ref 34.5–45)
HGB BLD-MCNC: 8.5 G/DL — LOW (ref 11.5–15.5)
MCHC RBC-ENTMCNC: 26.4 PG — LOW (ref 27–34)
MCHC RBC-ENTMCNC: 31.1 GM/DL — LOW (ref 32–36)
MCV RBC AUTO: 84.8 FL — SIGNIFICANT CHANGE UP (ref 80–100)
PLATELET # BLD AUTO: 39 K/UL — LOW (ref 150–400)
RBC # BLD: 3.22 M/UL — LOW (ref 3.8–5.2)
RBC # FLD: 17.7 % — HIGH (ref 10.3–14.5)
WBC # BLD: 10.6 K/UL — HIGH (ref 3.8–10.5)
WBC # FLD AUTO: 10.6 K/UL — HIGH (ref 3.8–10.5)

## 2017-10-23 RX ADMIN — Medication 40 MILLIGRAM(S): at 06:06

## 2017-10-23 RX ADMIN — BUDESONIDE AND FORMOTEROL FUMARATE DIHYDRATE 2 PUFF(S): 160; 4.5 AEROSOL RESPIRATORY (INHALATION) at 18:00

## 2017-10-23 RX ADMIN — Medication 20 MILLIGRAM(S): at 06:05

## 2017-10-23 RX ADMIN — Medication 10000 UNIT(S): at 11:11

## 2017-10-23 RX ADMIN — Medication 29 GRAM(S): at 00:06

## 2017-10-23 RX ADMIN — BUDESONIDE AND FORMOTEROL FUMARATE DIHYDRATE 2 PUFF(S): 160; 4.5 AEROSOL RESPIRATORY (INHALATION) at 06:05

## 2017-10-23 RX ADMIN — Medication 1 GRAM(S): at 06:06

## 2017-10-23 RX ADMIN — ZINC SULFATE TAB 220 MG (50 MG ZINC EQUIVALENT) 220 MILLIGRAM(S): 220 (50 ZN) TAB at 11:11

## 2017-10-23 RX ADMIN — Medication 1 GRAM(S): at 22:51

## 2017-10-23 RX ADMIN — Medication 1 GRAM(S): at 18:00

## 2017-10-23 RX ADMIN — PANTOPRAZOLE SODIUM 40 MILLIGRAM(S): 20 TABLET, DELAYED RELEASE ORAL at 06:05

## 2017-10-23 RX ADMIN — Medication 1 GRAM(S): at 11:10

## 2017-10-23 NOTE — DISCHARGE NOTE ADULT - CARE PROVIDER_API CALL
Mejia Crook), Hematology; Internal Medicine; Medical Oncology  [address]  [place] [address]  Phone: [phone]  Fax: [phone]    Jens Slacedo), Internal Medicine; Nephrology  [address]  [place] [address]  Phone: [phone]  Fax: [phone]

## 2017-10-23 NOTE — CONSULT NOTE ADULT - ASSESSMENT
A/P:  Lt buttock w/Incontinence associated dermatitis w/ partial thickness skin loss- TRAID w/ pericare  RLE wound- ADAPTIC    con't Nutrition (as tolerated)  con't Offloading   con't Pericare  Care as per medicine will follow w/ you  Upon discharge f/u as outpatient at [facility] [address] [place] [address] [phone]  D/w team & attng  Thank you for this consult  Aparna Lu PA-C CWS [id number]

## 2017-10-23 NOTE — DISCHARGE NOTE ADULT - HOSPITAL COURSE
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 sent from rehab for low platelets, no bleeding, found to have platelets of 5 likely with thrombocytopenia likely due to acute on chronic ITP. Seen by hematology, received 5 day course of IVIG. Continued Prednisone. Hematology will plan to start Promacta as prior response to Prednisone, gamma globulins was only short lived. d/c reed. outpt f/u pmd, hematology. 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 sent from rehab for low platelets, no bleeding, found to have platelets of 5 likely with thrombocytopenia likely due to acute on chronic ITP. Seen by hematology, received 5 day course of IVIG. Continued Prednisone with slow tapering depending on plt count. Hematology will plan to start Promacta as outpatient after rehab if plts continue to drop, prior response to Prednisone, gamma globulins was only short lived. d/c reed. outpt f/u pmd, hematology.

## 2017-10-23 NOTE — DISCHARGE NOTE ADULT - PLAN OF CARE
resolution of symptoms Follow up with Hematologist to continue treatment Atrial fibrillation is the most common heart rhythm problem.  The condition puts you at risk for has stroke and heart attack  It helps if you control your blood pressure, not drink more than 1-2 alcohol drinks per day, cut down on caffeine, getting treatment for over active thyroid gland, and get regular exercise  Call your doctor if you feel your heart racing or beating unusually, chest tightness or pain, lightheaded, faint, shortness of breath especially with exercise  It is important to take your heart medication as prescribed  You may be on anticoagulation which is very important to take as directed - you may need blood work to monitor drug levels Weigh yourself daily.  If you gain 3lbs in 3 days, or 5lbs in a week call your Health Care Provider.  Do not eat or drink foods containing more than 2000mg of salt (sodium) in your diet every day.  Call your Health Care Provider if you have any swelling or increased swelling in your feet, ankles, and/or stomach.  Take all of your medication as directed.  If you become dizzy call your Health Care Provider. wound care: medihoney to slough, florencia foam, tegaderm to secure to L buttock,  apply adaptic with guaze followed by kerlix to R LE shin wound Blood work CBC 3x a week to monitor platelet count   Follow up with Hematologist

## 2017-10-23 NOTE — PHYSICAL THERAPY INITIAL EVALUATION ADULT - ADDITIONAL COMMENTS
Patient recently discharged on 10/13 from [facility] for treatment of acute hypoxic and hypercarbic respiratory failure due CHF exacerbation and PNA and for management of chronic ITP with steroids, IVIG with platelets around 50 on discharge. Hospital Course:Chest x ray 10/21/17Persistent mild left pleural effusion with associated left basal atelectasis.The right lung is clear As per patient daughter at bedside, patient lived in an apartment no stairs 24/7 aides however pt recently discharged to rehab, at home prior to rehab pt was able to sit at EOB independently and aide was able to minimally assist patient to stand and pivot from bed to chair.   Patient recently discharged on 10/13 from [facility] for treatment of acute hypoxic and hypercarbic respiratory failure due CHF exacerbation and PNA and for management of chronic ITP with steroids, IVIG with platelets around 50 on discharge. Hospital Course:Chest x ray 10/21/17Persistent mild left pleural effusion with associated left basal atelectasis.The right lung is clear

## 2017-10-23 NOTE — PHYSICAL THERAPY INITIAL EVALUATION ADULT - PERTINENT HX OF CURRENT PROBLEM, REHAB EVAL
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 (family denies), moderate AS, HTN, dementia (baseline AAOx2), dysphagia (declined PEG tube), hx of recurrent aspiration pneumonia, sacral ulcer, COPD, severe aortic stenosis  sent from rehab for low platelet levels. see below in additional comments

## 2017-10-23 NOTE — DISCHARGE NOTE ADULT - CARE PLAN
Principal Discharge DX:	Thrombocytopenia  Goal:	resolution of symptoms  Instructions for follow-up, activity and diet:	Follow up with Hematologist to continue treatment  Secondary Diagnosis:	Atrial fibrillation and flutter  Instructions for follow-up, activity and diet:	Atrial fibrillation is the most common heart rhythm problem.  The condition puts you at risk for has stroke and heart attack  It helps if you control your blood pressure, not drink more than 1-2 alcohol drinks per day, cut down on caffeine, getting treatment for over active thyroid gland, and get regular exercise  Call your doctor if you feel your heart racing or beating unusually, chest tightness or pain, lightheaded, faint, shortness of breath especially with exercise  It is important to take your heart medication as prescribed  You may be on anticoagulation which is very important to take as directed - you may need blood work to monitor drug levels  Secondary Diagnosis:	Chronic diastolic heart failure  Instructions for follow-up, activity and diet:	Weigh yourself daily.  If you gain 3lbs in 3 days, or 5lbs in a week call your Health Care Provider.  Do not eat or drink foods containing more than 2000mg of salt (sodium) in your diet every day.  Call your Health Care Provider if you have any swelling or increased swelling in your feet, ankles, and/or stomach.  Take all of your medication as directed.  If you become dizzy call your Health Care Provider. Principal Discharge DX:	Thrombocytopenia  Goal:	resolution of symptoms  Instructions for follow-up, activity and diet:	Follow up with Hematologist to continue treatment  Secondary Diagnosis:	Atrial fibrillation and flutter  Instructions for follow-up, activity and diet:	Atrial fibrillation is the most common heart rhythm problem.  The condition puts you at risk for has stroke and heart attack  It helps if you control your blood pressure, not drink more than 1-2 alcohol drinks per day, cut down on caffeine, getting treatment for over active thyroid gland, and get regular exercise  Call your doctor if you feel your heart racing or beating unusually, chest tightness or pain, lightheaded, faint, shortness of breath especially with exercise  It is important to take your heart medication as prescribed  You may be on anticoagulation which is very important to take as directed - you may need blood work to monitor drug levels  Secondary Diagnosis:	Chronic diastolic heart failure  Instructions for follow-up, activity and diet:	Weigh yourself daily.  If you gain 3lbs in 3 days, or 5lbs in a week call your Health Care Provider.  Do not eat or drink foods containing more than 2000mg of salt (sodium) in your diet every day.  Call your Health Care Provider if you have any swelling or increased swelling in your feet, ankles, and/or stomach.  Take all of your medication as directed.  If you become dizzy call your Health Care Provider.  Secondary Diagnosis:	Wound  Instructions for follow-up, activity and diet:	wound care: medihoney to slough, florencia foam, tegaderm to secure to L buttock,  apply adaptic with guaze followed by kerlix to R LE shin wound Principal Discharge DX:	Thrombocytopenia  Goal:	resolution of symptoms  Instructions for follow-up, activity and diet:	Blood work CBC 3x a week to monitor platelet count   Follow up with Hematologist  Secondary Diagnosis:	Atrial fibrillation and flutter  Instructions for follow-up, activity and diet:	Atrial fibrillation is the most common heart rhythm problem.  The condition puts you at risk for has stroke and heart attack  It helps if you control your blood pressure, not drink more than 1-2 alcohol drinks per day, cut down on caffeine, getting treatment for over active thyroid gland, and get regular exercise  Call your doctor if you feel your heart racing or beating unusually, chest tightness or pain, lightheaded, faint, shortness of breath especially with exercise  It is important to take your heart medication as prescribed  You may be on anticoagulation which is very important to take as directed - you may need blood work to monitor drug levels  Secondary Diagnosis:	Chronic diastolic heart failure  Instructions for follow-up, activity and diet:	Weigh yourself daily.  If you gain 3lbs in 3 days, or 5lbs in a week call your Health Care Provider.  Do not eat or drink foods containing more than 2000mg of salt (sodium) in your diet every day.  Call your Health Care Provider if you have any swelling or increased swelling in your feet, ankles, and/or stomach.  Take all of your medication as directed.  If you become dizzy call your Health Care Provider.  Secondary Diagnosis:	Wound  Instructions for follow-up, activity and diet:	wound care: medihoney to slough, allevyne foam, tegaderm to secure to L buttock,  apply adaptic with guaze followed by kerlix to R LE shin wound

## 2017-10-23 NOTE — DISCHARGE NOTE ADULT - PATIENT PORTAL LINK FT
“You can access the Follow[facility] Patient Portal, offered by [facility], by registering with the following website: [email or web address]”

## 2017-10-23 NOTE — DISCHARGE NOTE ADULT - MEDICATION SUMMARY - MEDICATIONS TO TAKE
I will START or STAY ON the medications listed below when I get home from the hospital:    predniSONE 10 mg oral tablet  -- 3 tab(s) by mouth once a day  -- Indication: For Thrombocytopenia    dilTIAZem 60 mg oral tablet  -- 1 tab(s) by mouth every 8 hours  -- Indication: For Af    budesonide-formoterol 160 mcg-4.5 mcg/inh inhalation aerosol  -- 2 puff(s) inhaled 2 times a day  -- Indication: For Sob    ipratropium-albuterol 0.5 mg-2.5 mg/3 mLinhalation solution  -- 3 milliliter(s) inhaled every 6 hours, As needed, Shortness of Breath and/or Wheezing  -- Indication: For Sob    furosemide 20 mg oral tablet  -- 1 tab(s) by mouth once a day  -- Indication: For Chf    zinc sulfate 220 mg oral capsule  -- 1 cap(s) by mouth once a day  -- Indication: For vitamin    sucralfate 1 g/10 mL oral suspension  -- 10 milliliter(s) by mouth 4 times a day (before meals and at bedtime)  -- Indication: For gerd    pantoprazole 40 mg oral delayed release tablet  -- 1 tab(s) by mouth once a day  -- Indication: For gerd    vitamin A 10,000 units oral capsule  -- 1 cap(s) by mouth once a day  -- Indication: For vitamin I will START or STAY ON the medications listed below when I get home from the hospital:    predniSONE 10 mg oral tablet  -- 2 tab(s) by mouth once a day  -- Indication: For Thrombocytopenia    dilTIAZem 60 mg oral tablet  -- 1 tab(s) by mouth every 8 hours  -- Indication: For Af    budesonide-formoterol 160 mcg-4.5 mcg/inh inhalation aerosol  -- 2 puff(s) inhaled 2 times a day  -- Indication: For Sob    ipratropium-albuterol 0.5 mg-2.5 mg/3 mLinhalation solution  -- 3 milliliter(s) inhaled every 6 hours, As needed, Shortness of Breath and/or Wheezing  -- Indication: For Sob    furosemide 20 mg oral tablet  -- 1 tab(s) by mouth once a day  -- Indication: For Chf    zinc sulfate 220 mg oral capsule  -- 1 cap(s) by mouth once a day  -- Indication: For vitamin    sucralfate 1 g/10 mL oral suspension  -- 10 milliliter(s) by mouth 4 times a day (before meals and at bedtime)  -- Indication: For gerd    pantoprazole 40 mg oral delayed release tablet  -- 1 tab(s) by mouth once a day  -- Indication: For gerd    vitamin A 10,000 units oral capsule  -- 1 cap(s) by mouth once a day  -- Indication: For vitamin

## 2017-10-23 NOTE — CONSULT NOTE ADULT - SUBJECTIVE AND OBJECTIVE BOX
Wound SURGERY CONSULT NOTE    HPI:  translation bedside by daughter per patient preference.  but pt expressed comprehension of english spoken.    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 (family denies), moderate AS, HTN, dementia (baseline AAOx2), dysphagia (declined PEG tube), hx of recurrent aspiration pneumonia, sacral ulcer, COPD, severe aortic stenosis  sent from rehab for low platelet levels. Patient recently discharged on 10/13 from [facility] for treatment of acute hypoxic and hypercarbic respiratory failure due CHF exacerbation and PNA and for management of chronic ITP with steroids, IVIG with platelets around 50 on discharge. Currently patient denies any bleeding, melena, hematochezia, hematuria. No CNS changes or headaches. No cp, sob, cough. Reports pain at sacral ulcer site, no drainage or bleeding from site. Does report some bruising on b/l lower and upper extremities which are improving since her last admission.  Pt on chronic steriods, daughter noted frail skin w/ new wound RLE.  Denies remembering trauma.  She states when she has had a wound w/ dry scab they leave alone afraid that it would bleed because of her platelets.    Pt is increasingly sedentary and incontinent.  Upon admission offloading and pericare protocol initiated and foam placed on buttock and DSD placed RLE wound.        PAST MEDICAL & SURGICAL HISTORY:  PNA (pneumonia)  Dysphagia  Thrombocytopenia: ITP  Atrial fibrillation and flutter: No A/C  during hospitalization in april 2014  Respiratory failure: in april 2014 was intubated  Cataract: right eye  Small bowel obstruction: s/p resection in 2010  HTN - Hypertension  S/P cholecystectomy  s/p: Hysterectomy  S/P Small Bowel Resection      REVIEW OF SYSTEMS    Skin/ Musculoskeletal:	Wound see HPi  All other systems negative    MEDICATIONS  (STANDING):  buDESOnide 160 MICROgram(s)/formoterol 4.5 MICROgram(s) Inhaler 2 Puff(s) Inhalation two times a day  diltiazem    Tablet 60 milliGRAM(s) Oral every 8 hours  furosemide    Tablet 20 milliGRAM(s) Oral daily  immune globulin gamma IVPB 17.4 Gram(s) IV Intermittent every 24 hours  pantoprazole    Tablet 40 milliGRAM(s) Oral before breakfast  predniSONE   Tablet 40 milliGRAM(s) Oral daily  sucralfate suspension 1 Gram(s) Oral Before meals and at bedtime  vitamin A 90514 Unit(s) Oral daily  zinc sulfate 220 milliGRAM(s) Oral daily    MEDICATIONS  (PRN):  ALBUTerol/ipratropium for Nebulization 3 milliLiter(s) Nebulizer every 6 hours PRN Shortness of Breath and/or Wheezing      Allergies    eggs (Rash)  no drug allergy (Unknown)    Intolerances    vinegar sensitivity    family states that the patient shakes when she ingests vinegar (Other)      SOCIAL HISTORY:  ; (+)HHA/ SOM; Denies smoking, ETOH, drugs    FAMILY HISTORY:  No pertinent family history in first degree relatives      Vital Signs Last 24 Hrs  T(C): 36.3 (23 Oct 2017 11:30), Max: 36.6 (22 Oct 2017 21:58)  T(F): 97.4 (23 Oct 2017 11:30), Max: 97.8 (22 Oct 2017 21:58)  HR: 73 (23 Oct 2017 15:11) (63 - 88)  BP: 124/61 (23 Oct 2017 15:11) (101/64 - 136/73)  BP(mean): --  RR: 18 (23 Oct 2017 11:30) (16 - 18)  SpO2: 96% (23 Oct 2017 11:30) (92% - 96%)    NAD /   A&Ox3  cachectic/ frail  Versa Care P500 bed    Cardiovascular: RRR (+)m    Respiratory: CTA    Gastrointestinal soft NT/ND (+)BS    Neurology  weakened strength & sensation grossly intact    Musculoskeletal/Vascular:  FROM  No edema,  Rt knee w/ mild swelling  Knees and hands w/ arthritic changes noted  (+) DP/PT pulses noted  RLE w/ dried adherent dried coagulated blood 4cm x 1cm x 0cm  No odor, drainage erythema, increased warmth, tenderness, induration, fluctuance    Skin: frail,  ecchymosis w/o hematoma    Bilateral buttocks w/ incontinence associated dermatitis- Lt buttock w/ 1.5cm x 1cm x 0.2cm partital thickness wound  moist & granular w/ thin layer of fibrinous exudate  No drainage  No odor, erythema, increased warmth, tenderness, induration, fluctuance    LABS:                        8.5    10.60 )-----------( 39       ( 23 Oct 2017 07:26 )             27.3     Albumin, Serum: 3.2 g/dL (10-21 @ 11:11)      RADIOLOGY & ADDITIONAL STUDIES:  Cultures:  < from: Xray Chest 1 View AP- PORTABLE-Urgent (10.21.17 @ 14:54) >  IMPRESSION:   Persistent mild left pleural effusion with associated left basal   atelectasis.  Theright lung is clear

## 2017-10-24 LAB
ANION GAP SERPL CALC-SCNC: 9 MMOL/L — SIGNIFICANT CHANGE UP (ref 5–17)
BUN SERPL-MCNC: 38 MG/DL — HIGH (ref 7–23)
CALCIUM SERPL-MCNC: 8.3 MG/DL — LOW (ref 8.4–10.5)
CHLORIDE SERPL-SCNC: 95 MMOL/L — LOW (ref 96–108)
CO2 SERPL-SCNC: 33 MMOL/L — HIGH (ref 22–31)
CREAT SERPL-MCNC: 1.06 MG/DL — SIGNIFICANT CHANGE UP (ref 0.5–1.3)
GLUCOSE SERPL-MCNC: 98 MG/DL — SIGNIFICANT CHANGE UP (ref 70–99)
HBA1C BLD-MCNC: 5.5 % — SIGNIFICANT CHANGE UP (ref 4–5.6)
HCT VFR BLD CALC: 28.1 % — LOW (ref 34.5–45)
HGB BLD-MCNC: 8.5 G/DL — LOW (ref 11.5–15.5)
MCHC RBC-ENTMCNC: 26 PG — LOW (ref 27–34)
MCHC RBC-ENTMCNC: 30.2 GM/DL — LOW (ref 32–36)
MCV RBC AUTO: 85.9 FL — SIGNIFICANT CHANGE UP (ref 80–100)
PLATELET # BLD AUTO: 33 K/UL — LOW (ref 150–400)
POTASSIUM SERPL-MCNC: 4 MMOL/L — SIGNIFICANT CHANGE UP (ref 3.5–5.3)
POTASSIUM SERPL-SCNC: 4 MMOL/L — SIGNIFICANT CHANGE UP (ref 3.5–5.3)
RBC # BLD: 3.27 M/UL — LOW (ref 3.8–5.2)
RBC # FLD: 17.6 % — HIGH (ref 10.3–14.5)
SODIUM SERPL-SCNC: 137 MMOL/L — SIGNIFICANT CHANGE UP (ref 135–145)
WBC # BLD: 7.75 K/UL — SIGNIFICANT CHANGE UP (ref 3.8–10.5)
WBC # FLD AUTO: 7.75 K/UL — SIGNIFICANT CHANGE UP (ref 3.8–10.5)

## 2017-10-24 RX ADMIN — Medication 10000 UNIT(S): at 11:22

## 2017-10-24 RX ADMIN — Medication 1 GRAM(S): at 17:02

## 2017-10-24 RX ADMIN — Medication 30 MILLIGRAM(S): at 11:26

## 2017-10-24 RX ADMIN — Medication 20 MILLIGRAM(S): at 06:56

## 2017-10-24 RX ADMIN — Medication 1 GRAM(S): at 06:56

## 2017-10-24 RX ADMIN — Medication 40 MILLIGRAM(S): at 06:56

## 2017-10-24 RX ADMIN — Medication 1 GRAM(S): at 11:22

## 2017-10-24 RX ADMIN — BUDESONIDE AND FORMOTEROL FUMARATE DIHYDRATE 2 PUFF(S): 160; 4.5 AEROSOL RESPIRATORY (INHALATION) at 06:56

## 2017-10-24 RX ADMIN — PANTOPRAZOLE SODIUM 40 MILLIGRAM(S): 20 TABLET, DELAYED RELEASE ORAL at 06:56

## 2017-10-24 RX ADMIN — Medication 29 GRAM(S): at 02:08

## 2017-10-24 RX ADMIN — Medication 1 GRAM(S): at 22:25

## 2017-10-24 RX ADMIN — ZINC SULFATE TAB 220 MG (50 MG ZINC EQUIVALENT) 220 MILLIGRAM(S): 220 (50 ZN) TAB at 11:22

## 2017-10-24 RX ADMIN — BUDESONIDE AND FORMOTEROL FUMARATE DIHYDRATE 2 PUFF(S): 160; 4.5 AEROSOL RESPIRATORY (INHALATION) at 17:02

## 2017-10-25 LAB
ANION GAP SERPL CALC-SCNC: 10 MMOL/L — SIGNIFICANT CHANGE UP (ref 5–17)
BUN SERPL-MCNC: 38 MG/DL — HIGH (ref 7–23)
CALCIUM SERPL-MCNC: 8.8 MG/DL — SIGNIFICANT CHANGE UP (ref 8.4–10.5)
CHLORIDE SERPL-SCNC: 94 MMOL/L — LOW (ref 96–108)
CO2 SERPL-SCNC: 32 MMOL/L — HIGH (ref 22–31)
CREAT SERPL-MCNC: 1.12 MG/DL — SIGNIFICANT CHANGE UP (ref 0.5–1.3)
GLUCOSE SERPL-MCNC: 113 MG/DL — HIGH (ref 70–99)
HCT VFR BLD CALC: 28.1 % — LOW (ref 34.5–45)
HGB BLD-MCNC: 8.7 G/DL — LOW (ref 11.5–15.5)
MCHC RBC-ENTMCNC: 26.7 PG — LOW (ref 27–34)
MCHC RBC-ENTMCNC: 31 GM/DL — LOW (ref 32–36)
MCV RBC AUTO: 86.2 FL — SIGNIFICANT CHANGE UP (ref 80–100)
PLATELET # BLD AUTO: 35 K/UL — LOW (ref 150–400)
PLATELET # BLD AUTO: SIGNIFICANT CHANGE UP (ref 150–400)
POTASSIUM SERPL-MCNC: 3.8 MMOL/L — SIGNIFICANT CHANGE UP (ref 3.5–5.3)
POTASSIUM SERPL-SCNC: 3.8 MMOL/L — SIGNIFICANT CHANGE UP (ref 3.5–5.3)
RBC # BLD: 3.26 M/UL — LOW (ref 3.8–5.2)
RBC # FLD: 17.8 % — HIGH (ref 10.3–14.5)
SODIUM SERPL-SCNC: 136 MMOL/L — SIGNIFICANT CHANGE UP (ref 135–145)
WBC # BLD: 8.61 K/UL — SIGNIFICANT CHANGE UP (ref 3.8–10.5)
WBC # FLD AUTO: 8.61 K/UL — SIGNIFICANT CHANGE UP (ref 3.8–10.5)

## 2017-10-25 PROCEDURE — 99232 SBSQ HOSP IP/OBS MODERATE 35: CPT

## 2017-10-25 RX ORDER — BUDESONIDE AND FORMOTEROL FUMARATE DIHYDRATE 160; 4.5 UG/1; UG/1
2 AEROSOL RESPIRATORY (INHALATION)
Qty: 0 | Refills: 0 | COMMUNITY
Start: 2017-10-25

## 2017-10-25 RX ORDER — FOLIC ACID/VIT B COMPLEX AND C 400 MCG
1 TABLET ORAL
Qty: 0 | Refills: 0 | COMMUNITY

## 2017-10-25 RX ORDER — IPRATROPIUM/ALBUTEROL SULFATE 18-103MCG
3 AEROSOL WITH ADAPTER (GRAM) INHALATION
Qty: 0 | Refills: 0 | COMMUNITY
Start: 2017-10-25

## 2017-10-25 RX ORDER — FOLIC ACID/VIT B COMPLEX AND C 400 MCG
1 TABLET ORAL
Qty: 0 | Refills: 0 | COMMUNITY
Start: 2017-10-25

## 2017-10-25 RX ADMIN — Medication 30 MILLIGRAM(S): at 06:50

## 2017-10-25 RX ADMIN — Medication 20 MILLIGRAM(S): at 06:50

## 2017-10-25 RX ADMIN — Medication 10000 UNIT(S): at 11:21

## 2017-10-25 RX ADMIN — Medication 1 GRAM(S): at 22:06

## 2017-10-25 RX ADMIN — PANTOPRAZOLE SODIUM 40 MILLIGRAM(S): 20 TABLET, DELAYED RELEASE ORAL at 06:50

## 2017-10-25 RX ADMIN — BUDESONIDE AND FORMOTEROL FUMARATE DIHYDRATE 2 PUFF(S): 160; 4.5 AEROSOL RESPIRATORY (INHALATION) at 17:17

## 2017-10-25 RX ADMIN — Medication 1 GRAM(S): at 17:18

## 2017-10-25 RX ADMIN — BUDESONIDE AND FORMOTEROL FUMARATE DIHYDRATE 2 PUFF(S): 160; 4.5 AEROSOL RESPIRATORY (INHALATION) at 06:50

## 2017-10-25 RX ADMIN — Medication 29 GRAM(S): at 03:37

## 2017-10-25 RX ADMIN — Medication 1 GRAM(S): at 06:50

## 2017-10-25 RX ADMIN — ZINC SULFATE TAB 220 MG (50 MG ZINC EQUIVALENT) 220 MILLIGRAM(S): 220 (50 ZN) TAB at 11:21

## 2017-10-25 RX ADMIN — Medication 1 GRAM(S): at 11:21

## 2017-10-25 NOTE — CHART NOTE - NSCHARTNOTEFT_GEN_A_CORE
Source: Patient [x ]    Family [ ]     other [ x] Pt seen for Malnutrition follow up. 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 Pt deep asleep at time of visit, no family at bedside.    Diet : Dysphagia 1 Honey consistency, Kosher      Patient reports [ ] nausea  [ ] vomiting [ ] diarrhea [ ] constipation  [ ]chewing problems [ ] swallowing issues  [ ] other: No GI distress noted, +BM 3 days ago     PO intake:  < 50% [ ] 50-75% [ ]   % [x ]  other :     Source for PO intake [ ] Patient [ ] family [x ] chart [x ] staff [ ] other: Per PCA, pt with good po intakes, noted % po intakes per flowsheet      Current Weight:  86.9 pounds (10/22), 84.2 pounds today  % Weight Change    Pertinent Medications: MEDICATIONS  (STANDING):  buDESOnide 160 MICROgram(s)/formoterol 4.5 MICROgram(s) Inhaler 2 Puff(s) Inhalation two times a day  diltiazem    Tablet 60 milliGRAM(s) Oral every 8 hours  furosemide    Tablet 20 milliGRAM(s) Oral daily  immune globulin gamma IVPB 17.4 Gram(s) IV Intermittent every 24 hours  pantoprazole    Tablet 40 milliGRAM(s) Oral before breakfast  predniSONE   Tablet 30 milliGRAM(s) Oral daily  sucralfate suspension 1 Gram(s) Oral Before meals and at bedtime  vitamin A 89533 Unit(s) Oral daily  zinc sulfate 220 milliGRAM(s) Oral daily    MEDICATIONS  (PRN):  ALBUTerol/ipratropium for Nebulization 3 milliLiter(s) Nebulizer every 6 hours PRN Shortness of Breath and/or Wheezing    Pertinent Labs:  10-25 Na136 mmol/L Glu 113 mg/dL<H> K+ 3.8 mmol/L Cr  1.12 mg/dL BUN 38 mg/dL<H> Phos n/a   Alb n/a   PAB n/a         Skin: No edema, stage 2 right buttocks and stage 3 left buttocks pressure ulcers noted    Estimated Needs:   [ x] no change since previous assessment  [ ] recalculated:       Previous Nutrition Diagnosis:     [ ] Inadequate Energy Intake [ ]Inadequate Oral Intake [ ] Excessive Energy Intake     [ ] Underweight [ ] Increased Nutrient Needs [ ] Overweight/Obesity     [ ] Altered GI Function [ ] Unintended Weight Loss [ ] Food & Nutrition Related Knowledge Deficit [ x] Malnutrition - severe         Nutrition Diagnosis is [ x] ongoing  [ ] resolved [ ] not applicable          New Nutrition Diagnosis: [x ] not applicable      Recommend    [ ] Change Diet To:    [x ] Nutrition Supplement: Recommend to add Ensure Enlive 2 x day and José 2 x day    [ ] Nutrition Support    [x ] Other: Recommend multivitamin and vitamin C for pressure ulcers       Monitoring and Evaluation:     [x ] PO intake [x ] Tolerance to diet prescription [x ] weights [ ] follow up per protocol    [ ] other:

## 2017-10-26 LAB
ANION GAP SERPL CALC-SCNC: 8 MMOL/L — SIGNIFICANT CHANGE UP (ref 5–17)
BUN SERPL-MCNC: 32 MG/DL — HIGH (ref 7–23)
CALCIUM SERPL-MCNC: 8.5 MG/DL — SIGNIFICANT CHANGE UP (ref 8.4–10.5)
CHLORIDE SERPL-SCNC: 96 MMOL/L — SIGNIFICANT CHANGE UP (ref 96–108)
CO2 SERPL-SCNC: 32 MMOL/L — HIGH (ref 22–31)
CREAT SERPL-MCNC: 0.92 MG/DL — SIGNIFICANT CHANGE UP (ref 0.5–1.3)
GLUCOSE SERPL-MCNC: 82 MG/DL — SIGNIFICANT CHANGE UP (ref 70–99)
HCT VFR BLD CALC: 27.1 % — LOW (ref 34.5–45)
HGB BLD-MCNC: 8.2 G/DL — LOW (ref 11.5–15.5)
MCHC RBC-ENTMCNC: 26.3 PG — LOW (ref 27–34)
MCHC RBC-ENTMCNC: 30.3 GM/DL — LOW (ref 32–36)
MCV RBC AUTO: 86.9 FL — SIGNIFICANT CHANGE UP (ref 80–100)
PLATELET # BLD AUTO: 49 K/UL — LOW (ref 150–400)
POTASSIUM SERPL-MCNC: 4 MMOL/L — SIGNIFICANT CHANGE UP (ref 3.5–5.3)
POTASSIUM SERPL-SCNC: 4 MMOL/L — SIGNIFICANT CHANGE UP (ref 3.5–5.3)
RBC # BLD: 3.12 M/UL — LOW (ref 3.8–5.2)
RBC # FLD: 17.8 % — HIGH (ref 10.3–14.5)
SODIUM SERPL-SCNC: 136 MMOL/L — SIGNIFICANT CHANGE UP (ref 135–145)
WBC # BLD: 8.15 K/UL — SIGNIFICANT CHANGE UP (ref 3.8–10.5)
WBC # FLD AUTO: 8.15 K/UL — SIGNIFICANT CHANGE UP (ref 3.8–10.5)

## 2017-10-26 RX ADMIN — ZINC SULFATE TAB 220 MG (50 MG ZINC EQUIVALENT) 220 MILLIGRAM(S): 220 (50 ZN) TAB at 11:36

## 2017-10-26 RX ADMIN — BUDESONIDE AND FORMOTEROL FUMARATE DIHYDRATE 2 PUFF(S): 160; 4.5 AEROSOL RESPIRATORY (INHALATION) at 18:45

## 2017-10-26 RX ADMIN — Medication 29 GRAM(S): at 02:26

## 2017-10-26 RX ADMIN — Medication 1 GRAM(S): at 21:55

## 2017-10-26 RX ADMIN — Medication 20 MILLIGRAM(S): at 06:59

## 2017-10-26 RX ADMIN — Medication 30 MILLIGRAM(S): at 06:59

## 2017-10-26 RX ADMIN — Medication 1 GRAM(S): at 16:07

## 2017-10-26 RX ADMIN — Medication 10000 UNIT(S): at 11:36

## 2017-10-26 RX ADMIN — PANTOPRAZOLE SODIUM 40 MILLIGRAM(S): 20 TABLET, DELAYED RELEASE ORAL at 06:59

## 2017-10-26 RX ADMIN — BUDESONIDE AND FORMOTEROL FUMARATE DIHYDRATE 2 PUFF(S): 160; 4.5 AEROSOL RESPIRATORY (INHALATION) at 06:59

## 2017-10-26 RX ADMIN — Medication 1 GRAM(S): at 11:36

## 2017-10-26 RX ADMIN — Medication 1 GRAM(S): at 11:11

## 2017-10-27 DIAGNOSIS — D64.89 OTHER SPECIFIED ANEMIAS: ICD-10-CM

## 2017-10-27 LAB
HCT VFR BLD CALC: 30.3 % — LOW (ref 34.5–45)
HGB BLD-MCNC: 9.5 G/DL — LOW (ref 11.5–15.5)
MCHC RBC-ENTMCNC: 27.7 PG — SIGNIFICANT CHANGE UP (ref 27–34)
MCHC RBC-ENTMCNC: 31.4 GM/DL — LOW (ref 32–36)
MCV RBC AUTO: 88 FL — SIGNIFICANT CHANGE UP (ref 80–100)
PLATELET # BLD AUTO: 44 K/UL — LOW (ref 150–400)
PLATELET # BLD AUTO: SIGNIFICANT CHANGE UP (ref 150–400)
RBC # BLD: 3.44 M/UL — LOW (ref 3.8–5.2)
RBC # FLD: 15.8 % — HIGH (ref 10.3–14.5)
WBC # BLD: 7.3 K/UL — SIGNIFICANT CHANGE UP (ref 3.8–10.5)
WBC # FLD AUTO: 7.3 K/UL — SIGNIFICANT CHANGE UP (ref 3.8–10.5)

## 2017-10-27 RX ADMIN — BUDESONIDE AND FORMOTEROL FUMARATE DIHYDRATE 2 PUFF(S): 160; 4.5 AEROSOL RESPIRATORY (INHALATION) at 18:37

## 2017-10-27 RX ADMIN — Medication 1 GRAM(S): at 22:51

## 2017-10-27 RX ADMIN — PANTOPRAZOLE SODIUM 40 MILLIGRAM(S): 20 TABLET, DELAYED RELEASE ORAL at 06:40

## 2017-10-27 RX ADMIN — ZINC SULFATE TAB 220 MG (50 MG ZINC EQUIVALENT) 220 MILLIGRAM(S): 220 (50 ZN) TAB at 13:23

## 2017-10-27 RX ADMIN — Medication 1 GRAM(S): at 06:41

## 2017-10-27 RX ADMIN — Medication 20 MILLIGRAM(S): at 06:40

## 2017-10-27 RX ADMIN — Medication 10000 UNIT(S): at 13:23

## 2017-10-27 RX ADMIN — BUDESONIDE AND FORMOTEROL FUMARATE DIHYDRATE 2 PUFF(S): 160; 4.5 AEROSOL RESPIRATORY (INHALATION) at 06:39

## 2017-10-27 RX ADMIN — Medication 1 GRAM(S): at 13:23

## 2017-10-27 RX ADMIN — Medication 1 GRAM(S): at 18:37

## 2017-10-27 NOTE — PROVIDER CONTACT NOTE (OTHER) - ACTION/TREATMENT ORDERED:
NP Clarissa - continue to monitor pt for signs of swelling, pain, dyspnea, coughing, change in mental status.

## 2017-10-28 LAB
FERRITIN SERPL-MCNC: 77 NG/ML — SIGNIFICANT CHANGE UP (ref 15–150)
FOLATE SERPL-MCNC: 7 NG/ML — SIGNIFICANT CHANGE UP (ref 4.8–24.2)
HAPTOGLOB SERPL-MCNC: 80 MG/DL — SIGNIFICANT CHANGE UP (ref 34–200)
HCT VFR BLD CALC: 25.2 % — LOW (ref 34.5–45)
HGB BLD-MCNC: 9.7 G/DL — LOW (ref 11.5–15.5)
IRON SATN MFR SERPL: 18 % — SIGNIFICANT CHANGE UP (ref 14–50)
IRON SATN MFR SERPL: 42 UG/DL — SIGNIFICANT CHANGE UP (ref 30–160)
MCHC RBC-ENTMCNC: 33.3 PG — SIGNIFICANT CHANGE UP (ref 27–34)
MCHC RBC-ENTMCNC: 38.7 GM/DL — HIGH (ref 32–36)
MCV RBC AUTO: 86.2 FL — SIGNIFICANT CHANGE UP (ref 80–100)
PLATELET # BLD AUTO: 21 K/UL — LOW (ref 150–400)
PLATELET # BLD AUTO: 42 K/UL — LOW (ref 150–400)
PROT SERPL-MCNC: 7.6 G/DL — SIGNIFICANT CHANGE UP (ref 6–8.3)
PROT SERPL-MCNC: 7.6 G/DL — SIGNIFICANT CHANGE UP (ref 6–8.3)
RBC # BLD: 2.92 M/UL — LOW (ref 3.8–5.2)
RBC # FLD: 16.2 % — HIGH (ref 10.3–14.5)
TIBC SERPL-MCNC: 229 UG/DL — SIGNIFICANT CHANGE UP (ref 220–430)
UIBC SERPL-MCNC: 187 UG/DL — SIGNIFICANT CHANGE UP (ref 110–370)
WBC # BLD: 6.1 K/UL — SIGNIFICANT CHANGE UP (ref 3.8–10.5)
WBC # FLD AUTO: 6.1 K/UL — SIGNIFICANT CHANGE UP (ref 3.8–10.5)

## 2017-10-28 RX ADMIN — ZINC SULFATE TAB 220 MG (50 MG ZINC EQUIVALENT) 220 MILLIGRAM(S): 220 (50 ZN) TAB at 13:33

## 2017-10-28 RX ADMIN — BUDESONIDE AND FORMOTEROL FUMARATE DIHYDRATE 2 PUFF(S): 160; 4.5 AEROSOL RESPIRATORY (INHALATION) at 06:41

## 2017-10-28 RX ADMIN — Medication 1 GRAM(S): at 17:26

## 2017-10-28 RX ADMIN — Medication 1 GRAM(S): at 10:29

## 2017-10-28 RX ADMIN — Medication 10000 UNIT(S): at 13:32

## 2017-10-28 RX ADMIN — Medication 1 GRAM(S): at 06:41

## 2017-10-28 RX ADMIN — BUDESONIDE AND FORMOTEROL FUMARATE DIHYDRATE 2 PUFF(S): 160; 4.5 AEROSOL RESPIRATORY (INHALATION) at 17:26

## 2017-10-28 RX ADMIN — Medication 20 MILLIGRAM(S): at 06:41

## 2017-10-28 RX ADMIN — Medication 1 GRAM(S): at 22:18

## 2017-10-28 RX ADMIN — PANTOPRAZOLE SODIUM 40 MILLIGRAM(S): 20 TABLET, DELAYED RELEASE ORAL at 06:41

## 2017-10-29 LAB
HCT VFR BLD CALC: 31.9 % — LOW (ref 34.5–45)
HGB BLD-MCNC: 9.4 G/DL — LOW (ref 11.5–15.5)
MANUAL SMEAR VERIFICATION: SIGNIFICANT CHANGE UP
MCHC RBC-ENTMCNC: 26 PG — LOW (ref 27–34)
MCHC RBC-ENTMCNC: 29.5 GM/DL — LOW (ref 32–36)
MCV RBC AUTO: 88.1 FL — SIGNIFICANT CHANGE UP (ref 80–100)
PLAT MORPH BLD: ABNORMAL
PLATELET # BLD AUTO: 17 K/UL — CRITICAL LOW (ref 150–400)
PLATELET # BLD AUTO: SIGNIFICANT CHANGE UP (ref 150–400)
PLATELET CLUMP BLD QL SMEAR: SLIGHT
RBC # BLD: 3.62 M/UL — LOW (ref 3.8–5.2)
RBC # FLD: 17.3 % — HIGH (ref 10.3–14.5)
RBC BLD AUTO: SIGNIFICANT CHANGE UP
WBC # BLD: 6.84 K/UL — SIGNIFICANT CHANGE UP (ref 3.8–10.5)
WBC # FLD AUTO: 6.84 K/UL — SIGNIFICANT CHANGE UP (ref 3.8–10.5)

## 2017-10-29 RX ADMIN — Medication 1 GRAM(S): at 13:26

## 2017-10-29 RX ADMIN — BUDESONIDE AND FORMOTEROL FUMARATE DIHYDRATE 2 PUFF(S): 160; 4.5 AEROSOL RESPIRATORY (INHALATION) at 05:04

## 2017-10-29 RX ADMIN — Medication 1 GRAM(S): at 05:09

## 2017-10-29 RX ADMIN — PANTOPRAZOLE SODIUM 40 MILLIGRAM(S): 20 TABLET, DELAYED RELEASE ORAL at 05:09

## 2017-10-29 RX ADMIN — Medication 1 GRAM(S): at 17:07

## 2017-10-29 RX ADMIN — BUDESONIDE AND FORMOTEROL FUMARATE DIHYDRATE 2 PUFF(S): 160; 4.5 AEROSOL RESPIRATORY (INHALATION) at 17:07

## 2017-10-29 RX ADMIN — Medication 10000 UNIT(S): at 13:26

## 2017-10-29 RX ADMIN — Medication 20 MILLIGRAM(S): at 05:05

## 2017-10-29 RX ADMIN — Medication 1 GRAM(S): at 22:20

## 2017-10-29 RX ADMIN — ZINC SULFATE TAB 220 MG (50 MG ZINC EQUIVALENT) 220 MILLIGRAM(S): 220 (50 ZN) TAB at 13:26

## 2017-10-29 NOTE — PROVIDER CONTACT NOTE (OTHER) - ACTION/TREATMENT ORDERED:
NP Tammy aware. Continue to monitor for pain, swelling, SOB, change in mental status, FAST symptoms.

## 2017-10-30 VITALS
OXYGEN SATURATION: 96 % | DIASTOLIC BLOOD PRESSURE: 78 MMHG | SYSTOLIC BLOOD PRESSURE: 131 MMHG | RESPIRATION RATE: 18 BRPM | HEART RATE: 82 BPM | TEMPERATURE: 98 F

## 2017-10-30 LAB
ANION GAP SERPL CALC-SCNC: 12 MMOL/L — SIGNIFICANT CHANGE UP (ref 5–17)
BASOPHILS # BLD AUTO: 0 K/UL — SIGNIFICANT CHANGE UP (ref 0–0.2)
BASOPHILS NFR BLD AUTO: 0 % — SIGNIFICANT CHANGE UP (ref 0–2)
BUN SERPL-MCNC: 66 MG/DL — HIGH (ref 7–23)
CALCIUM SERPL-MCNC: 8.8 MG/DL — SIGNIFICANT CHANGE UP (ref 8.4–10.5)
CHLORIDE SERPL-SCNC: 95 MMOL/L — LOW (ref 96–108)
CO2 SERPL-SCNC: 32 MMOL/L — HIGH (ref 22–31)
CREAT SERPL-MCNC: 1.02 MG/DL — SIGNIFICANT CHANGE UP (ref 0.5–1.3)
EOSINOPHIL # BLD AUTO: 0.11 K/UL — SIGNIFICANT CHANGE UP (ref 0–0.5)
EOSINOPHIL NFR BLD AUTO: 1.5 % — SIGNIFICANT CHANGE UP (ref 0–6)
GLUCOSE SERPL-MCNC: 90 MG/DL — SIGNIFICANT CHANGE UP (ref 70–99)
HCT VFR BLD CALC: 30.7 % — LOW (ref 34.5–45)
HGB BLD-MCNC: 9.3 G/DL — LOW (ref 11.5–15.5)
IMM GRANULOCYTES NFR BLD AUTO: 0.4 % — SIGNIFICANT CHANGE UP (ref 0–1.5)
LYMPHOCYTES # BLD AUTO: 1.43 K/UL — SIGNIFICANT CHANGE UP (ref 1–3.3)
LYMPHOCYTES # BLD AUTO: 19 % — SIGNIFICANT CHANGE UP (ref 13–44)
MANUAL SMEAR VERIFICATION: SIGNIFICANT CHANGE UP
MCHC RBC-ENTMCNC: 25.8 PG — LOW (ref 27–34)
MCHC RBC-ENTMCNC: 30.3 GM/DL — LOW (ref 32–36)
MCV RBC AUTO: 85.3 FL — SIGNIFICANT CHANGE UP (ref 80–100)
MONOCYTES # BLD AUTO: 0.53 K/UL — SIGNIFICANT CHANGE UP (ref 0–0.9)
MONOCYTES NFR BLD AUTO: 7 % — SIGNIFICANT CHANGE UP (ref 2–14)
NEUTROPHILS # BLD AUTO: 5.42 K/UL — SIGNIFICANT CHANGE UP (ref 1.8–7.4)
NEUTROPHILS NFR BLD AUTO: 72.1 % — SIGNIFICANT CHANGE UP (ref 43–77)
PLAT MORPH BLD: NORMAL — SIGNIFICANT CHANGE UP
PLATELET # BLD AUTO: 31 K/UL — LOW (ref 150–400)
PLATELET # BLD AUTO: 45 K/UL — LOW (ref 150–400)
POTASSIUM SERPL-MCNC: 4.1 MMOL/L — SIGNIFICANT CHANGE UP (ref 3.5–5.3)
POTASSIUM SERPL-SCNC: 4.1 MMOL/L — SIGNIFICANT CHANGE UP (ref 3.5–5.3)
RBC # BLD: 3.6 M/UL — LOW (ref 3.8–5.2)
RBC # FLD: 17.3 % — HIGH (ref 10.3–14.5)
RBC BLD AUTO: SIGNIFICANT CHANGE UP
SODIUM SERPL-SCNC: 139 MMOL/L — SIGNIFICANT CHANGE UP (ref 135–145)
WBC # BLD: 7.52 K/UL — SIGNIFICANT CHANGE UP (ref 3.8–10.5)
WBC # FLD AUTO: 7.52 K/UL — SIGNIFICANT CHANGE UP (ref 3.8–10.5)

## 2017-10-30 RX ADMIN — PANTOPRAZOLE SODIUM 40 MILLIGRAM(S): 20 TABLET, DELAYED RELEASE ORAL at 06:32

## 2017-10-30 RX ADMIN — Medication 1 GRAM(S): at 06:32

## 2017-10-30 RX ADMIN — Medication 10000 UNIT(S): at 12:04

## 2017-10-30 RX ADMIN — BUDESONIDE AND FORMOTEROL FUMARATE DIHYDRATE 2 PUFF(S): 160; 4.5 AEROSOL RESPIRATORY (INHALATION) at 06:32

## 2017-10-30 RX ADMIN — Medication 1 GRAM(S): at 12:04

## 2017-10-30 RX ADMIN — Medication 20 MILLIGRAM(S): at 06:32

## 2017-10-30 RX ADMIN — ZINC SULFATE TAB 220 MG (50 MG ZINC EQUIVALENT) 220 MILLIGRAM(S): 220 (50 ZN) TAB at 12:04

## 2017-10-30 NOTE — PROGRESS NOTE ADULT - ASSESSMENT
89 yold Female  w/ hx of Afib/Aflutter (not on AC), diastolic heart failure, ITP on chronic prednisone,  (family denies), moderate AS, HTN, dementia / dysphagia (declined PEG tube in past , hx of recurrent aspiration pneumonia, sacral ulcer, COPD, severe aortic stenosis sent from rehab for low platelets, no bleeding, found to have platelets of 5 likely with thrombocytopenia likely due to acute on chronic ITP      1. Chronic ITP (idiopathic thrombocytopenia).   severe thrombocytopenia, likely due to acute on chronic exacerbation of ITP  -platelets of 5 from 50 about 1 week ago, recently treated with IVIG on steroids on last admitted. No current bleeding  -cont  steroids to 40 mg daily  cont  IVIg 400 mg/kg daily x 5 days, day 3/5 today, conts improving  -monitor cbc  -monitor for bleeding  - bone marrow bx refused by family      2. Atrial fibrillation and flutter.  Plan: HR controlled  c/w diltiazem 60mg q8  not on anticoagulation due to ITP.     3. Chronic obstructive pulmonary disease, stable  -duonebs prn  -goal 02 sat 89-94%, supplemental 02 as needed.       4. Chronic diastolic heart failure.  Plan: stage II diastolic heart failure, currently euvolemic  -c/w lasix 20mg daily  -pt is DNR/DNI    5. Aortic valve stenosis, etiology of cardiac valve disease unspecified. /severe AS per echo 10/2017  keep SBP >120  outpatient cards followup.      6. Skin ulcer of sacrum, unspecified ulcer stage.    zinc oxide and vitamin A.   wound eval    7. Dysphagia, unspecified type.  Plan: last admission was seen by speech was put on Dysphagia I with nectar thickened liquids  -pt and family refusing PEG, aware of aspiration risk  -aspiration precaution.     8. Mechanical DVT prophylaxis
89 yold Female  w/ hx of Afib/Aflutter (not on AC), diastolic heart failure, ITP on chronic prednisone,  (family denies), moderate AS, HTN, dementia / dysphagia (declined PEG tube in past , hx of recurrent aspiration pneumonia, sacral ulcer, COPD, severe aortic stenosis sent from rehab for low platelets, no bleeding, found to have platelets of 5 likely with thrombocytopenia likely due to acute on chronic ITP      1. Chronic ITP (idiopathic thrombocytopenia).   severe thrombocytopenia, likely due to acute on chronic exacerbation of ITP  -platelets of 5 from 50 about 1 week ago, recently treated with IVIG on steroids on last admitted. No current bleeding  -cont  steroids to 40 mg daily  cont  IVIg 400 mg/kg daily x 5 days, day 4/5 today, f/u Heme  -monitor cbc  -monitor for bleeding  - bone marrow bx refused by family  -d/c planning to [facility]    2. Atrial fibrillation and flutter.  Plan: HR controlled  c/w diltiazem 60mg q8  not on anticoagulation due to ITP.     3. Chronic obstructive pulmonary disease, stable  -duonebs prn  -goal 02 sat 89-94%, supplemental 02 as needed.       4. Chronic diastolic heart failure.  Plan: stage II diastolic heart failure, currently euvolemic  -c/w lasix 20mg daily  -pt is DNR/DNI    5. Aortic valve stenosis, etiology of cardiac valve disease unspecified. /severe AS per echo 10/2017  keep SBP >120  outpatient cards followup.      6. Skin ulcer of sacrum, unspecified ulcer stage - care per Wound team    7. Dysphagia, unspecified type.  Plan: last admission was seen by speech was put on Dysphagia I with nectar thickened liquids  -pt and family refusing PEG, aware of aspiration risk  -aspiration precaution.     8. Mechanical DVT prophylaxis    9. Ongoing discussion reg goal of care and palliative options, however family desires currative treatment at this time    10. Anemia - Hb at baseline for pt, no work up or transfusion required
89 yold Female  w/ hx of Afib/Aflutter (not on AC), diastolic heart failure, ITP on chronic prednisone,  (family denies), moderate AS, HTN, dementia / dysphagia (declined PEG tube in past , hx of recurrent aspiration pneumonia, sacral ulcer, COPD, severe aortic stenosis sent from rehab for low platelets, no bleeding, found to have platelets of 5 likely with thrombocytopenia likely due to acute on chronic ITP    1. Chronic ITP (idiopathic thrombocytopenia).   severe thrombocytopenia, likely due to acute on chronic exacerbation of ITP  -platelets of 5 from 50 about 1 week ago, recently treated with IVIG on steroids on last admitted. No current bleeding  -cont  steroids to 40 mg daily  cont  IVIg 400 mg/kg daily x 5 days, day 4/5 today, f/u Heme  -monitor cbc  -monitor for bleeding  - bone marrow bx refused by family  -d/c planning to [facility]    2. Atrial fibrillation and flutter.  Plan: HR controlled  c/w diltiazem 60mg q8  not on anticoagulation due to ITP.     3. Chronic obstructive pulmonary disease, stable  -duonebs prn  -goal 02 sat 89-94%, supplemental 02 as needed.       4. Chronic diastolic heart failure.  Plan: stage II diastolic heart failure, currently euvolemic  -c/w lasix 20mg daily  -pt is DNR/DNI    5. Aortic valve stenosis, etiology of cardiac valve disease unspecified. /severe AS per echo 10/2017  keep SBP >120  outpatient cards followup.      6. Skin ulcer of sacrum, unspecified ulcer stage - care per Wound team    7. Dysphagia, unspecified type.  Plan: last admission was seen by speech was put on Dysphagia I with nectar thickened liquids  -pt and family refusing PEG, aware of aspiration risk  -aspiration precaution.     8. Mechanical DVT prophylaxis    9. Ongoing discussion reg goal of care and palliative options, however family desires currative treatment at this time    10. Anemia - Hb at baseline for pt, no work up or transfusion required
89 yold Female  w/ hx of Afib/Aflutter (not on AC), diastolic heart failure, ITP on chronic prednisone,  (family denies), moderate AS, HTN, dementia / dysphagia (declined PEG tube in past , hx of recurrent aspiration pneumonia, sacral ulcer, COPD, severe aortic stenosis sent from rehab for low platelets, no bleeding, found to have platelets of 5 likely with thrombocytopenia likely due to acute on chronic ITP      1. Chronic ITP (idiopathic thrombocytopenia).   severe thrombocytopenia, likely due to acute on chronic exacerbation of ITP  -platelets of 5 from 50 about 1 week ago, recently treated with IVIG on steroids on last admitted. No current bleeding  -cont  steroids to 40 mg daily  cont  IVIg 400 mg/kg daily x 5 days, day 4/5 today, f/u Heme  -monitor cbc  -monitor for bleeding  - bone marrow bx refused by family      2. Atrial fibrillation and flutter.  Plan: HR controlled  c/w diltiazem 60mg q8  not on anticoagulation due to ITP.     3. Chronic obstructive pulmonary disease, stable  -duonebs prn  -goal 02 sat 89-94%, supplemental 02 as needed.       4. Chronic diastolic heart failure.  Plan: stage II diastolic heart failure, currently euvolemic  -c/w lasix 20mg daily  -pt is DNR/DNI    5. Aortic valve stenosis, etiology of cardiac valve disease unspecified. /severe AS per echo 10/2017  keep SBP >120  outpatient cards followup.      6. Skin ulcer of sacrum, unspecified ulcer stage - care per Wound team    7. Dysphagia, unspecified type.  Plan: last admission was seen by speech was put on Dysphagia I with nectar thickened liquids  -pt and family refusing PEG, aware of aspiration risk  -aspiration precaution.     8. Mechanical DVT prophylaxis    9. Ongoing discussion reg goal of care and palliative options, however family desires currative treatment at this time
89 yold Female  w/ hx of Afib/Aflutter (not on AC), diastolic heart failure, ITP on chronic prednisone,  (family denies), moderate AS, HTN, dementia / dysphagia (declined PEG tube in past , hx of recurrent aspiration pneumonia, sacral ulcer, COPD, severe aortic stenosis sent from rehab for low platelets, no bleeding, found to have platelets of 5 likely with thrombocytopenia likely due to acute on chronic ITP      1. Chronic ITP (idiopathic thrombocytopenia).   severe thrombocytopenia, likely due to acute on chronic exacerbation of ITP  -platelets of 5 from 50 about 1 week ago, recently treated with IVIG on steroids on last admitted. No current bleeding  -cont  steroids to 40 mg daily  cont  IVIg 400 mg/kg daily x 5 days, day 4/5 today, f/u Heme  -monitor cbc  -monitor for bleeding  - bone marrow bx refused by family  -d/c planning to [facility] if no objection from Heme    2. Atrial fibrillation and flutter.  Plan: HR controlled  c/w diltiazem 60mg q8  not on anticoagulation due to ITP.     3. Chronic obstructive pulmonary disease, stable  -duonebs prn  -goal 02 sat 89-94%, supplemental 02 as needed.       4. Chronic diastolic heart failure.  Plan: stage II diastolic heart failure, currently euvolemic  -c/w lasix 20mg daily  -pt is DNR/DNI    5. Aortic valve stenosis, etiology of cardiac valve disease unspecified. /severe AS per echo 10/2017  keep SBP >120  outpatient cards followup.      6. Skin ulcer of sacrum, unspecified ulcer stage - care per Wound team    7. Dysphagia, unspecified type.  Plan: last admission was seen by speech was put on Dysphagia I with nectar thickened liquids  -pt and family refusing PEG, aware of aspiration risk  -aspiration precaution.     8. Mechanical DVT prophylaxis    9. Ongoing discussion reg goal of care and palliative options, however family desires currative treatment at this time
89 yold Female  w/ hx of Afib/Aflutter (not on AC), diastolic heart failure, ITP on chronic prednisone,  (family denies), moderate AS, HTN, dementia / dysphagia (declined PEG tube in past , hx of recurrent aspiration pneumonia, sacral ulcer, COPD, severe aortic stenosis sent from rehab for low platelets, no bleeding, found to have platelets of 5 likely with thrombocytopenia likely due to acute on chronic ITP      Chronic ITP (idiopathic thrombocytopenia).   severe thrombocytopenia, likely due to acute on chronic exacerbation of ITP  -platelets of 5 from 50 about 1 week ago, recently treated with IVIG on steroids on last admitted. No current bleeding  -cont  steroids to 40 mg daily  cont  IVIg 400 mg/kg daily x 5 days  -monitor cbc  -monitor for bleeding  heme f/u      : Atrial fibrillation and flutter.  Plan: HR controlled  c/w diltiazem 60mg q8  not on anticoagulation due to ITP.      Chronic obstructive pulmonary disease, stable  -duonebs prn  -goal 02 sat 89-94%, supplemental 02 as needed.       : Chronic hypercapnic respiratory failure.  Plan: due to copd, stable at this time at baseline values with no sob, see plan above in COPD.      Chronic diastolic heart failure.  Plan: stage II diastolic heart failure, currently euvolemic  -c/w lasix 20mg daily  -pt is DNR/DNI    : Aortic valve stenosis, etiology of cardiac valve disease unspecified. /severe AS per echo 10/2017  keep SBP >120  outpatient cards followup.       Skin ulcer of sacrum, unspecified ulcer stage.    zinc oxide and vitamin A.   wound eval  : Dysphagia, unspecified type.  Plan: last admission was seen by speech was put on Dysphagia I with nectar thickened liquids  -pt and family refusing PEG, aware of aspiration risk  -aspiration precaution.
89 yold Female  w/ hx of Afib/Aflutter (not on AC), diastolic heart failure, ITP on chronic prednisone,  (family denies), moderate AS, HTN, dementia / dysphagia (declined PEG tube in past , hx of recurrent aspiration pneumonia, sacral ulcer, COPD, severe aortic stenosis sent from rehab for low platelets, no bleeding, found to have platelets of 5 likely with thrombocytopenia likely due to acute on chronic ITP    1. Chronic ITP (idiopathic thrombocytopenia).   severe thrombocytopenia, likely due to acute on chronic exacerbation of ITP  -platelets of 5 from 50 about 1 week ago, recently treated with IVIG on steroids on last admitted. No current bleeding  -cont  steroids to 40 mg daily  cont  IVIg 400 mg/kg daily x 5 days, day 4/5 today, f/u Heme  -monitor cbc  -monitor for bleeding  - bone marrow bx refused by family  -d/c planning to [facility]    2. Atrial fibrillation and flutter.  Plan: HR controlled  c/w diltiazem 60mg q8  not on anticoagulation due to ITP.     3. Chronic obstructive pulmonary disease, stable  -duonebs prn  -goal 02 sat 89-94%, supplemental 02 as needed.       4. Chronic diastolic heart failure.  Plan: stage II diastolic heart failure, currently euvolemic  -c/w lasix 20mg daily  -pt is DNR/DNI    5. Aortic valve stenosis, etiology of cardiac valve disease unspecified. /severe AS per echo 10/2017  keep SBP >120  outpatient cards followup.      6. Skin ulcer of sacrum, unspecified ulcer stage - care per Wound team    7. Dysphagia, unspecified type.  Plan: last admission was seen by speech was put on Dysphagia I with nectar thickened liquids  -pt and family refusing PEG, aware of aspiration risk  -aspiration precaution.     8. Mechanical DVT prophylaxis    9. Ongoing discussion reg goal of care and palliative options, however family desires currative treatment at this time    10. Anemia - Hb at baseline for pt, no work up or transfusion required
89 yold Female  w/ hx of Afib/Aflutter (not on AC), diastolic heart failure, ITP on chronic prednisone,  (family denies), moderate AS, HTN, dementia / dysphagia (declined PEG tube in past , hx of recurrent aspiration pneumonia, sacral ulcer, COPD, severe aortic stenosis sent from rehab for low platelets, no bleeding, found to have platelets of 5 likely with thrombocytopenia likely due to acute on chronic ITP    1. Chronic ITP (idiopathic thrombocytopenia).   severe thrombocytopenia, likely due to acute on chronic exacerbation of ITP  -platelets of 5 from 50 about 1 week ago, recently treated with IVIG on steroids on last admitted. No current bleeding  -cont  steroids to 40 mg daily  cont  IVIg 400 mg/kg daily x 5 days, day 4/5 today, f/u Heme  -monitor cbc  -monitor for bleeding  - bone marrow bx refused by family  -d/c planning to [facility]    2. Atrial fibrillation and flutter.  Plan: HR controlled  c/w diltiazem 60mg q8  not on anticoagulation due to ITP.     3. Chronic obstructive pulmonary disease, stable  -duonebs prn  -goal 02 sat 89-94%, supplemental 02 as needed.       4. Chronic diastolic heart failure.  Plan: stage II diastolic heart failure, currently euvolemic  -c/w lasix 20mg daily  -pt is DNR/DNI    5. Aortic valve stenosis, etiology of cardiac valve disease unspecified. /severe AS per echo 10/2017  keep SBP >120  outpatient cards followup.      6. Skin ulcer of sacrum, unspecified ulcer stage - care per Wound team    7. Dysphagia, unspecified type.  Plan: last admission was seen by speech was put on Dysphagia I with nectar thickened liquids  -pt and family refusing PEG, aware of aspiration risk  -aspiration precaution.     8. Mechanical DVT prophylaxis    9. Ongoing discussion reg goal of care and palliative options, however family desires currative treatment at this time    10. Anemia - Hb at baseline for pt, no work up or transfusion required
89 yold Female  w/ hx of Afib/Aflutter (not on AC), diastolic heart failure, ITP on chronic prednisone,  (family denies), moderate AS, HTN, dementia / dysphagia (declined PEG tube in past , hx of recurrent aspiration pneumonia, sacral ulcer, COPD, severe aortic stenosis sent from rehab for low platelets, no bleeding, found to have platelets of 5 likely with thrombocytopenia likely due to acute on chronic ITP    1. Chronic ITP (idiopathic thrombocytopenia).   severe thrombocytopenia, likely due to acute on chronic exacerbation of ITP  -platelets of 5 from 50 about 1 week ago, recently treated with IVIG on steroids on last admitted. No current bleeding  -cont  steroids to 40 mg daily  cont  IVIg 400 mg/kg daily x 5 days, day 4/5 today, f/u Heme  -monitor cbc  -monitor for bleeding  - bone marrow bx refused by family  -d/c planning to [facility]    2. Atrial fibrillation and flutter.  Plan: HR controlled  c/w diltiazem 60mg q8  not on anticoagulation due to ITP.     3. Chronic obstructive pulmonary disease, stable  -duonebs prn  -goal 02 sat 89-94%, supplemental 02 as needed.       4. Chronic diastolic heart failure.  Plan: stage II diastolic heart failure, currently euvolemic  -c/w lasix 20mg daily  -pt is DNR/DNI    5. Aortic valve stenosis, etiology of cardiac valve disease unspecified. /severe AS per echo 10/2017  keep SBP >120  outpatient cards followup.      6. Skin ulcer of sacrum, unspecified ulcer stage - care per Wound team    7. Dysphagia, unspecified type.  Plan: last admission was seen by speech was put on Dysphagia I with nectar thickened liquids  -pt and family refusing PEG, aware of aspiration risk  -aspiration precaution.     8. Mechanical DVT prophylaxis    9. Ongoing discussion reg goal of care and palliative options, however family desires currative treatment at this time    10. Anemia - Hb at baseline for pt, no work up or transfusion required
ITP, sme response to  prednisone gammaglobulins but sub optimal. the platelet count is adequate for discharge. It is likelythat without additional therapy, the platelet count will drop again in 1-2 weeks. Recommend starting Promacta with close f/u as an outpatient. Family insistent on her going to rehab. It wa sexplained to me that if she starts Promacta, she would not be accepted to rehab. based on this I feel it is OK for her to go to rehab. Will try to get authorization for Promacta as an outpatint. her platelet count should be checked 3 x a week in rehab. If falls below 30,000, should start Promacta if facility would allow the medication to be brought in from the outside supply.
ITP, some response to  prednisone gammaglobulins but sub optimal. the platelet count is adequate for discharge. It is likely that without additional therapy, the platelet count will drop again in 1-2 weeks. Recommend starting Promacta with close f/u as an outpatient. Family insistent on her going to rehab. It was explained to me that if she starts Promacta, she would not be accepted to rehab. based on this I feel it is OK for her to go to rehab. Will try to get authorization for Promacta as an outpatint. her platelet count should be checked 3 x a week in rehab. If falls below 30,000, should start Promacta if facility would allow the medication to be brought in from the outside supply.    Rehab requesting that her hgb 10. Will  proceed with an anemia  evaluation. Suspect anemia is on a nutritional basis.  Would monitor  CBC /platlets through the weekend to ensure stability before  going to  rehab. Discussed with NP, daughter.
ITP, some response to  prednisone gammaglobulins but sub optimal. the platelet count is adequate for discharge. It is likely that without additional therapy, the platelet count will drop again in 1-2 weeks. Recommend starting Promacta with close f/u as an outpatient. Family insistent on her going to rehab. It was explained to me that if she starts Promacta, she would not be accepted to rehab. based on this I feel it is OK for her to go to rehab. Will try to get authorization for Promacta as an outpatint. her platelet count should be checked 3 x a week in rehab. If falls below 30,000, should start Promacta if facility would allow the medication to be brought in from the outside supply.    Rehab requesting that her hgb 10. Will  proceed with an anemia  evaluation. Suspect anemia is on a nutritional basis.  Would monitor  CBC /platlets through the weekend to ensure stability before  going to  rehab. Discussed with NP, daughter.    Anemia evaluation unremarkable.  Platlets today 17,000 (from blue top tube)  CBC not done yet  getting discordant results with platlet count from  blue top tube  very low and  from purple top  tube  40,000 range. Blue top tube is suposed to correct for platlet clumping, not clear why   the count is lower in a blyue top tube. Will go by the CBC  result.  Would repeat a CBC if confirmed less than 30,000 will start Promacta.
ITP, some response to  prednisone gammaglobulins but sub optimal. the platelet count is adequate for discharge. It is likely that without additional therapy, the platelet count will drop again in 1-2 weeks. Recommend starting Promacta with close f/u as an outpatient. Family insistent on her going to rehab. It was explained to me that if she starts Promacta, she would not be accepted to rehab. based on this I feel it is OK for her to go to rehab. Will try to get authorization for Promacta as an outpatint. her platelet count should be checked 3 x a week in rehab. If falls below 30,000, should start Promacta if facility would allow the medication to be brought in from the outside supply.    Rehab requesting that her hgb 10. Will  proceed with an anemia  evaluation. Suspect anemia is on a nutritional basis.  Would monitor  CBC /platlets through the weekend to ensure stability before  going to  rehab. Discussed with NP, daughter.    Anemia evaluation unremarkable.  Platlets today 17,000 (from blue top tube)  CBC not done yet  getting discordant results with platlet count from  blue top tube  very low and  from purple top  tube  40,000 range. Blue top tube is suposed to correct for platlet clumping, not clear why   the count is lower in a blyue top tube. Will go by the CBC  result.  Would repeat a CBC if confirmed less than 30,000 will start Promacta.    Platelets today 42,000. Can hold off starting Promacta. OK to go to rehab. Would monitor platelets 2 x/ week while in rehab.
ITP, some response to  prednisone gammaglobulins but sub optimal. the platelet count is adequate for discharge. It is likely that without additional therapy, the platelet count will drop again in 1-2 weeks. Recommend starting Promacta with close f/u as an outpatient. Family insistent on her going to rehab. It was explained to me that if she starts Promacta, she would not be accepted to rehab. based on this I feel it is OK for her to go to rehab. Will try to get authorization for Promacta as an outpatint. her platelet count should be checked 3 x a week in rehab. If falls below 30,000, should start Promacta if facility would allow the medication to be brought in from the outside supply.    Rehab requesting that her hgb 10. Will  proceed with an anemia  evaluation. Suspect anemia is on a nutritional basis.  Would monitor  CBC /platlets through the weekend to ensure stability before  going to  rehab. Discussed with NP, daughter.    Anemia evaluation unremarkable.  Platlets today  20,000 (from blue top tube)  CBC not done (QNS)  Would repeat a CBC if confirmed less than 30,000 will start Promacta.

## 2017-10-30 NOTE — PROGRESS NOTE ADULT - PROBLEM SELECTOR PROBLEM 2
Anemia due to other cause

## 2017-10-30 NOTE — PROGRESS NOTE ADULT - PROVIDER SPECIALTY LIST ADULT
Heme/Onc
Internal Medicine
Heme/Onc

## 2017-10-30 NOTE — PROGRESS NOTE ADULT - SUBJECTIVE AND OBJECTIVE BOX
CHIEF COMPLAINT:Patient is a 89y old  Female who presents with a chief complaint of sent for low platelets (21 Oct 2017 15:23)    	  pt without complaints    PAST MEDICAL & SURGICAL HISTORY:  PNA (pneumonia)  Dysphagia  Thrombocytopenia: ITP  Atrial fibrillation and flutter: No A/C  during hospitalization in april 2014  Respiratory failure: in april 2014 was intubated  Cataract: right eye  Small bowel obstruction: s/p resection in 2010  HTN - Hypertension  S/P cholecystectomy  H/O: Hysterectomy  S/P Small Bowel Resection          REVIEW OF SYSTEMS:  UTO, no active complaints        Medications:  MEDICATIONS  (STANDING):  buDESOnide 160 MICROgram(s)/formoterol 4.5 MICROgram(s) Inhaler 2 Puff(s) Inhalation two times a day  diltiazem    Tablet 60 milliGRAM(s) Oral every 8 hours  furosemide    Tablet 20 milliGRAM(s) Oral daily  pantoprazole    Tablet 40 milliGRAM(s) Oral before breakfast  predniSONE   Tablet 20 milliGRAM(s) Oral daily  sucralfate suspension 1 Gram(s) Oral Before meals and at bedtime  vitamin A 21935 Unit(s) Oral daily  zinc sulfate 220 milliGRAM(s) Oral daily    MEDICATIONS  (PRN):  ALBUTerol/ipratropium for Nebulization 3 milliLiter(s) Nebulizer every 6 hours PRN Shortness of Breath and/or Wheezing    	    PHYSICAL EXAM:  T(C): 36.7 (10-30-17 @ 13:36), Max: 36.7 (10-30-17 @ 13:36)  HR: 82 (10-30-17 @ 13:36) (82 - 91)  BP: 131/78 (10-30-17 @ 13:36) (93/63 - 131/78)  RR: 18 (10-30-17 @ 13:36) (18 - 18)  SpO2: 96% (10-30-17 @ 13:36) (95% - 97%)  Wt(kg): --  I&O's Summary    29 Oct 2017 07:01  -  30 Oct 2017 07:00  --------------------------------------------------------  IN: 800 mL / OUT: 0 mL / NET: 800 mL    30 Oct 2017 07:01  -  30 Oct 2017 13:48  --------------------------------------------------------  IN: 240 mL / OUT: 0 mL / NET: 240 mL      Appearance: Normal	  HEENT:   Normal oral mucosa, PERRL, EOMI	  Lymphatic: No lymphadenopathy  Cardiovascular: reg irr No JVD, No murmurs, No edema  Respiratory: Lungs clear to auscultation	  Gastrointestinal:  Soft, Non-tender, + BS	  Skin: No rashes, + ecchymoses lower upper ext , No cyanosis	  Neurologic: Non-focal  Extremities: dec  range of motion, No clubbing, cyanosis or edema  Vascular: Peripheral pulses palpable /pvd    LABS:	 	    CARDIAC MARKERS:                                9.3    7.52  )-----------( 45       ( 30 Oct 2017 09:15 )             30.7     10-30    139  |  95<L>  |  66<H>  ----------------------------<  90  4.1   |  32<H>  |  1.02    Ca    8.8      30 Oct 2017 08:59      proBNP:   Lipid Profile:   HgA1c:   TSH:
CHIEF COMPLAINT:Patient is a 89y old  Female who presents with a chief complaint of sent for low platelets (21 Oct 2017 15:23)    	  pt without complaints    PAST MEDICAL & SURGICAL HISTORY:  PNA (pneumonia)  Dysphagia  Thrombocytopenia: ITP  Atrial fibrillation and flutter: No A/C  during hospitalization in april 2014  Respiratory failure: in april 2014 was intubated  Cataract: right eye  Small bowel obstruction: s/p resection in 2010  HTN - Hypertension  S/P cholecystectomy  H/O: Hysterectomy  S/P Small Bowel Resection          REVIEW OF SYSTEMS:  UTO, no active complaints        Medications:  MEDICATIONS  (STANDING):  buDESOnide 160 MICROgram(s)/formoterol 4.5 MICROgram(s) Inhaler 2 Puff(s) Inhalation two times a day  diltiazem    Tablet 60 milliGRAM(s) Oral every 8 hours  furosemide    Tablet 20 milliGRAM(s) Oral daily  pantoprazole    Tablet 40 milliGRAM(s) Oral before breakfast  predniSONE   Tablet 20 milliGRAM(s) Oral daily  sucralfate suspension 1 Gram(s) Oral Before meals and at bedtime  vitamin A 38138 Unit(s) Oral daily  zinc sulfate 220 milliGRAM(s) Oral daily    MEDICATIONS  (PRN):  ALBUTerol/ipratropium for Nebulization 3 milliLiter(s) Nebulizer every 6 hours PRN Shortness of Breath and/or Wheezing    	    PHYSICAL EXAM:  T(C): 36.7 (10-27-17 @ 12:24), Max: 36.7 (10-27-17 @ 12:24)  HR: 79 (10-27-17 @ 12:24) (67 - 79)  BP: 121/79 (10-27-17 @ 12:24) (117/62 - 148/81)  RR: 17 (10-27-17 @ 12:24) (17 - 18)  SpO2: 98% (10-27-17 @ 12:24) (96% - 98%)  Wt(kg): --  I&O's Summary    26 Oct 2017 07:01  -  27 Oct 2017 07:00  --------------------------------------------------------  IN: 150 mL / OUT: 100 mL / NET: 50 mL    27 Oct 2017 07:01  -  27 Oct 2017 13:54  --------------------------------------------------------  IN: 240 mL / OUT: 0 mL / NET: 240 mL      Appearance: Normal	  HEENT:   Normal oral mucosa, PERRL, EOMI	  Lymphatic: No lymphadenopathy  Cardiovascular: reg irr No JVD, No murmurs, No edema  Respiratory: Lungs clear to auscultation	  Gastrointestinal:  Soft, Non-tender, + BS	  Skin: No rashes, + ecchymoses lower upper ext , No cyanosis	  Neurologic: Non-focal  Extremities: dec  range of motion, No clubbing, cyanosis or edema  Vascular: Peripheral pulses palpable /pvd    LABS:	 	    CARDIAC MARKERS:                                8.2    8.15  )-----------( 49       ( 26 Oct 2017 07:51 )             27.1     10-26    136  |  96  |  32<H>  ----------------------------<  82  4.0   |  32<H>  |  0.92    Ca    8.5      26 Oct 2017 07:24      proBNP:   Lipid Profile:   HgA1c:   TSH:
CHIEF COMPLAINT:Patient is a 89y old  Female who presents with a chief complaint of sent for low platelets (21 Oct 2017 15:23)    	  pt without complaints    PAST MEDICAL & SURGICAL HISTORY:  PNA (pneumonia)  Dysphagia  Thrombocytopenia: ITP  Atrial fibrillation and flutter: No A/C  during hospitalization in april 2014  Respiratory failure: in april 2014 was intubated  Cataract: right eye  Small bowel obstruction: s/p resection in 2010  HTN - Hypertension  S/P cholecystectomy  H/O: Hysterectomy  S/P Small Bowel Resection          REVIEW OF SYSTEMS:  UTO, no active complaints        Medications:  MEDICATIONS  (STANDING):  buDESOnide 160 MICROgram(s)/formoterol 4.5 MICROgram(s) Inhaler 2 Puff(s) Inhalation two times a day  diltiazem    Tablet 60 milliGRAM(s) Oral every 8 hours  furosemide    Tablet 20 milliGRAM(s) Oral daily  pantoprazole    Tablet 40 milliGRAM(s) Oral before breakfast  predniSONE   Tablet 20 milliGRAM(s) Oral daily  sucralfate suspension 1 Gram(s) Oral Before meals and at bedtime  vitamin A 40294 Unit(s) Oral daily  zinc sulfate 220 milliGRAM(s) Oral daily    MEDICATIONS  (PRN):  ALBUTerol/ipratropium for Nebulization 3 milliLiter(s) Nebulizer every 6 hours PRN Shortness of Breath and/or Wheezing    	    PHYSICAL EXAM:  T(C): 36.4 (10-29-17 @ 14:13), Max: 37 (10-28-17 @ 22:09)  HR: 91 (10-29-17 @ 14:13) (76 - 91)  BP: 93/63 (10-29-17 @ 14:13) (93/63 - 146/78)  RR: 18 (10-29-17 @ 14:13) (18 - 18)  SpO2: 95% (10-29-17 @ 14:13) (95% - 97%)  Wt(kg): --  I&O's Summary    28 Oct 2017 07:01  -  29 Oct 2017 07:00  --------------------------------------------------------  IN: 200 mL / OUT: 0 mL / NET: 200 mL    29 Oct 2017 07:01  -  29 Oct 2017 14:27  --------------------------------------------------------  IN: 700 mL / OUT: 0 mL / NET: 700 mL      Appearance: Normal	  HEENT:   Normal oral mucosa, PERRL, EOMI	  Lymphatic: No lymphadenopathy  Cardiovascular: reg irr No JVD, No murmurs, No edema  Respiratory: Lungs clear to auscultation	  Gastrointestinal:  Soft, Non-tender, + BS	  Skin: No rashes, + ecchymoses lower upper ext , No cyanosis	  Neurologic: Non-focal  Extremities: dec  range of motion, No clubbing, cyanosis or edema  Vascular: Peripheral pulses palpable /pvd    LABS:	 	    CARDIAC MARKERS:                                x      x     )-----------( 17       ( 29 Oct 2017 07:51 )             x            TPro  7.6  /  Alb  x   /  TBili  x   /  DBili  x   /  AST  x   /  ALT  x   /  AlkPhos  x   10-28    proBNP:   Lipid Profile:   HgA1c:   TSH:
CHIEF COMPLAINT:Patient is a 89y old  Female who presents with a chief complaint of sent for low platelets (21 Oct 2017 15:23)    	  pt without complaints    PAST MEDICAL & SURGICAL HISTORY:  PNA (pneumonia)  Dysphagia  Thrombocytopenia: ITP  Atrial fibrillation and flutter: No A/C  during hospitalization in april 2014  Respiratory failure: in april 2014 was intubated  Cataract: right eye  Small bowel obstruction: s/p resection in 2010  HTN - Hypertension  S/P cholecystectomy  H/O: Hysterectomy  S/P Small Bowel Resection          REVIEW OF SYSTEMS:  UTO, no active complaints        Medications:  MEDICATIONS  (STANDING):  buDESOnide 160 MICROgram(s)/formoterol 4.5 MICROgram(s) Inhaler 2 Puff(s) Inhalation two times a day  diltiazem    Tablet 60 milliGRAM(s) Oral every 8 hours  furosemide    Tablet 20 milliGRAM(s) Oral daily  pantoprazole    Tablet 40 milliGRAM(s) Oral before breakfast  predniSONE   Tablet 20 milliGRAM(s) Oral daily  sucralfate suspension 1 Gram(s) Oral Before meals and at bedtime  vitamin A 68706 Unit(s) Oral daily  zinc sulfate 220 milliGRAM(s) Oral daily    MEDICATIONS  (PRN):  ALBUTerol/ipratropium for Nebulization 3 milliLiter(s) Nebulizer every 6 hours PRN Shortness of Breath and/or Wheezing    	    PHYSICAL EXAM:  T(C): 36.9 (10-28-17 @ 12:38), Max: 36.9 (10-28-17 @ 12:38)  HR: 74 (10-28-17 @ 12:38) (74 - 90)  BP: 105/65 (10-28-17 @ 12:38) (105/65 - 148/79)  RR: 18 (10-28-17 @ 12:38) (17 - 90)  SpO2: 96% (10-28-17 @ 12:38) (96% - 96%)  Wt(kg): --  I&O's Summary    27 Oct 2017 07:01  -  28 Oct 2017 07:00  --------------------------------------------------------  IN: 1010 mL / OUT: 200 mL / NET: 810 mL        Appearance: Normal	  HEENT:   Normal oral mucosa, PERRL, EOMI	  Lymphatic: No lymphadenopathy  Cardiovascular: reg irr No JVD, No murmurs, No edema  Respiratory: Lungs clear to auscultation	  Gastrointestinal:  Soft, Non-tender, + BS	  Skin: No rashes, + ecchymoses lower upper ext , No cyanosis	  Neurologic: Non-focal  Extremities: dec  range of motion, No clubbing, cyanosis or edema  Vascular: Peripheral pulses palpable /pvd    LABS:	 	    CARDIAC MARKERS:                                x      x     )-----------( 21       ( 28 Oct 2017 06:17 )             x            TPro  7.6  /  Alb  x   /  TBili  x   /  DBili  x   /  AST  x   /  ALT  x   /  AlkPhos  x   10-28    proBNP:   Lipid Profile:   HgA1c:   TSH:
CHIEF COMPLAINT:Patient is a 89y old  Female who presents with a chief complaint of sent for low platelets (21 Oct 2017 15:23)    	  pt without complaints    PAST MEDICAL & SURGICAL HISTORY:  PNA (pneumonia)  Dysphagia  Thrombocytopenia: ITP  Atrial fibrillation and flutter: No A/C  during hospitalization in april 2014  Respiratory failure: in april 2014 was intubated  Cataract: right eye  Small bowel obstruction: s/p resection in 2010  HTN - Hypertension  S/P cholecystectomy  H/O: Hysterectomy  S/P Small Bowel Resection          REVIEW OF SYSTEMS:  UTO, no active complaints      Medications:  MEDICATIONS  (STANDING):  buDESOnide 160 MICROgram(s)/formoterol 4.5 MICROgram(s) Inhaler 2 Puff(s) Inhalation two times a day  diltiazem    Tablet 60 milliGRAM(s) Oral every 8 hours  furosemide    Tablet 20 milliGRAM(s) Oral daily  immune globulin gamma IVPB 17.4 Gram(s) IV Intermittent every 24 hours  pantoprazole    Tablet 40 milliGRAM(s) Oral before breakfast  predniSONE   Tablet 30 milliGRAM(s) Oral daily  sucralfate suspension 1 Gram(s) Oral Before meals and at bedtime  vitamin A 68779 Unit(s) Oral daily  zinc sulfate 220 milliGRAM(s) Oral daily    MEDICATIONS  (PRN):  ALBUTerol/ipratropium for Nebulization 3 milliLiter(s) Nebulizer every 6 hours PRN Shortness of Breath and/or Wheezing    	    PHYSICAL EXAM:  T(C): 36.8 (10-24-17 @ 11:52), Max: 36.8 (10-24-17 @ 11:52)  HR: 68 (10-24-17 @ 11:52) (64 - 84)  BP: 111/62 (10-24-17 @ 11:52) (103/56 - 127/70)  RR: 18 (10-24-17 @ 11:52) (18 - 18)  SpO2: 99% (10-24-17 @ 11:52) (92% - 99%)  Wt(kg): --  I&O's Summary    23 Oct 2017 07:01  -  24 Oct 2017 07:00  --------------------------------------------------------  IN: 600 mL / OUT: 550 mL / NET: 50 mL    24 Oct 2017 07:01  -  24 Oct 2017 14:24  --------------------------------------------------------  LABS:	 	    CARDIAC MARKERS:                                8.5    7.75  )-----------( 33       ( 24 Oct 2017 07:28 )             28.1     10-24    137  |  95<L>  |  38<H>  ----------------------------<  98  4.0   |  33<H>  |  1.06    Ca    8.3<L>      24 Oct 2017 08:51      proBNP:   Lipid Profile:   HgA1c:   TSH:     	        IN: 400 mL / OUT: 600 mL / NET: -200 mL        Appearance: Normal	  HEENT:   Normal oral mucosa, PERRL, EOMI	  Lymphatic: No lymphadenopathy  Cardiovascular: reg irr No JVD, No murmurs, No edema  Respiratory: Lungs clear to auscultation	  Gastrointestinal:  Soft, Non-tender, + BS	  Skin: No rashes, + ecchymoses lower upper ext , No cyanosis	  Neurologic: Non-focal  Extremities: dec  range of motion, No clubbing, cyanosis or edema  Vascular: Peripheral pulses palpable /pvd
CHIEF COMPLAINT:Patient is a 89y old  Female who presents with a chief complaint of sent for low platelets (21 Oct 2017 15:23)    	  pt without complaints    PAST MEDICAL & SURGICAL HISTORY:  PNA (pneumonia)  Dysphagia  Thrombocytopenia: ITP  Atrial fibrillation and flutter: No A/C  during hospitalization in april 2014  Respiratory failure: in april 2014 was intubated  Cataract: right eye  Small bowel obstruction: s/p resection in 2010  HTN - Hypertension  S/P cholecystectomy  H/O: Hysterectomy  S/P Small Bowel Resection          REVIEW OF SYSTEMS:  UTO, no active complaints      Medications:  MEDICATIONS  (STANDING):  buDESOnide 160 MICROgram(s)/formoterol 4.5 MICROgram(s) Inhaler 2 Puff(s) Inhalation two times a day  diltiazem    Tablet 60 milliGRAM(s) Oral every 8 hours  furosemide    Tablet 20 milliGRAM(s) Oral daily  immune globulin gamma IVPB 17.4 Gram(s) IV Intermittent every 24 hours  pantoprazole    Tablet 40 milliGRAM(s) Oral before breakfast  predniSONE   Tablet 30 milliGRAM(s) Oral daily  sucralfate suspension 1 Gram(s) Oral Before meals and at bedtime  vitamin A 94302 Unit(s) Oral daily  zinc sulfate 220 milliGRAM(s) Oral daily    MEDICATIONS  (PRN):  ALBUTerol/ipratropium for Nebulization 3 milliLiter(s) Nebulizer every 6 hours PRN Shortness of Breath and/or Wheezing    	    PHYSICAL EXAM:  T(C): 37 (10-25-17 @ 06:45), Max: 37.1 (10-24-17 @ 21:22)  HR: 69 (10-25-17 @ 13:16) (65 - 89)  BP: 116/67 (10-25-17 @ 13:16) (116/67 - 133/76)  RR: 18 (10-25-17 @ 06:45) (18 - 18)  SpO2: 94% (10-25-17 @ 06:45) (94% - 98%)  Wt(kg): --  I&O's Summary    24 Oct 2017 07:01  -  25 Oct 2017 07:00  --------------------------------------------------------  IN: 915 mL / OUT: 1300 mL / NET: -385 mL    25 Oct 2017 07:01  -  25 Oct 2017 14:17  --------------------------------------------------------  IN: 240 mL / OUT: 0 mL / NET: 240 mL      Appearance: Normal	  HEENT:   Normal oral mucosa, PERRL, EOMI	  Lymphatic: No lymphadenopathy  Cardiovascular: reg irr No JVD, No murmurs, No edema  Respiratory: Lungs clear to auscultation	  Gastrointestinal:  Soft, Non-tender, + BS	  Skin: No rashes, + ecchymoses lower upper ext , No cyanosis	  Neurologic: Non-focal  Extremities: dec  range of motion, No clubbing, cyanosis or edema  Vascular: Peripheral pulses palpable /pvd    LABS:	 	    CARDIAC MARKERS:                                x      x     )-----------( 35       ( 25 Oct 2017 13:24 )             x        10-25    136  |  94<L>  |  38<H>  ----------------------------<  113<H>  3.8   |  32<H>  |  1.12    Ca    8.8      25 Oct 2017 07:40      proBNP:   Lipid Profile:   HgA1c:   TSH:
CHIEF COMPLAINT:Patient is a 89y old  Female who presents with a chief complaint of sent for low platelets (21 Oct 2017 15:23)    	  pt without complaints  sleeping  arousable      PAST MEDICAL & SURGICAL HISTORY:  PNA (pneumonia)  Dysphagia  Thrombocytopenia: ITP  Atrial fibrillation and flutter: No A/C  during hospitalization in april 2014  Respiratory failure: in april 2014 was intubated  Cataract: right eye  Small bowel obstruction: s/p resection in 2010  HTN - Hypertension  S/P cholecystectomy  H/O: Hysterectomy  S/P Small Bowel Resection          REVIEW OF SYSTEMS:  no cp or sob   no n/v   no hematemesis   no hematuria   no brbpr      Medications:  MEDICATIONS  (STANDING):  buDESOnide 160 MICROgram(s)/formoterol 4.5 MICROgram(s) Inhaler 2 Puff(s) Inhalation two times a day  diltiazem    Tablet 60 milliGRAM(s) Oral every 8 hours  furosemide    Tablet 20 milliGRAM(s) Oral daily  immune globulin gamma IVPB 17.4 Gram(s) IV Intermittent once  pantoprazole    Tablet 40 milliGRAM(s) Oral before breakfast  predniSONE   Tablet 40 milliGRAM(s) Oral daily  sucralfate suspension 1 Gram(s) Oral Before meals and at bedtime  vitamin A 35324 Unit(s) Oral daily  zinc sulfate 220 milliGRAM(s) Oral daily    MEDICATIONS  (PRN):  ALBUTerol/ipratropium for Nebulization 3 milliLiter(s) Nebulizer every 6 hours PRN Shortness of Breath and/or Wheezing    	    PHYSICAL EXAM:  T(C): 36.7 (10-22-17 @ 05:41), Max: 36.8 (10-22-17 @ 00:05)  HR: 85 (10-22-17 @ 05:41) (72 - 95)  BP: 135/85 (10-22-17 @ 05:41) (113/74 - 148/84)  RR: 18 (10-22-17 @ 05:41) (16 - 100)  SpO2: 94% (10-22-17 @ 05:41) (92% - 100%)  Wt(kg): --  I&O's Summary    21 Oct 2017 07:01  -  22 Oct 2017 07:00  --------------------------------------------------------  IN: 480 mL / OUT: 400 mL / NET: 80 mL        Appearance: Normal	  HEENT:   Normal oral mucosa, PERRL, EOMI	  Lymphatic: No lymphadenopathy  Cardiovascular: reg irr No JVD, No murmurs, No edema  Respiratory: Lungs clear to auscultation	  Gastrointestinal:  Soft, Non-tender, + BS	  Skin: No rashes, + ecchymoses lower upper ext , No cyanosis	  Neurologic: Non-focal  Extremities: dec  range of motion, No clubbing, cyanosis or edema  Vascular: Peripheral pulses palpable /pvd    TELEMETRY: 	    ECG:  	  RADIOLOGY:  OTHER: 	  	  LABS:	 	    CARDIAC MARKERS:                                10.0   11.3  )-----------( 5        ( 21 Oct 2017 11:11 )             31.3     10-21    141  |  96  |  28<H>  ----------------------------<  83  3.8   |  35<H>  |  0.81    Ca    8.8      21 Oct 2017 11:11    TPro  7.2  /  Alb  3.2<L>  /  TBili  0.7  /  DBili  x   /  AST  35  /  ALT  30  /  AlkPhos  62  10-21    proBNP:   Lipid Profile:   HgA1c:   TSH:
Patient is a 89y old  Female who presents with a chief complaint of sent for low platelets (21 Oct 2017 15:23)       Pt is seen and examined  pt is awake and lying in bed  pt seems comfortable ;c/o pain in sacrum from bedsore  As per Dtr at bedside, no bleeding or bruising ---       REVIEW OF SYSTEMS:    CONSTITUTIONAL: No weakness, fevers or chills  EYES/ENT: No visual changes;  No vertigo or throat pain   NECK: No pain or stiffness  RESPIRATORY: No cough, wheezing, hemoptysis; No shortness of breath  CARDIOVASCULAR: No chest pain or palpitations  GASTROINTESTINAL: No abdominal or epigastric pain. No nausea, vomiting, or hematemesis; No diarrhea or constipation. No melena or hematochezia.  GENITOURINARY: No dysuria, frequency or hematuria  NEUROLOGICAL: No numbness or weakness  SKIN: No itching, burning, rashes, or lesions     MEDICATIONS  (STANDING):  buDESOnide 160 MICROgram(s)/formoterol 4.5 MICROgram(s) Inhaler 2 Puff(s) Inhalation two times a day  diltiazem    Tablet 60 milliGRAM(s) Oral every 8 hours  furosemide    Tablet 20 milliGRAM(s) Oral daily  immune globulin gamma IVPB 17.4 Gram(s) IV Intermittent once  pantoprazole    Tablet 40 milliGRAM(s) Oral before breakfast  predniSONE   Tablet 40 milliGRAM(s) Oral daily  sucralfate suspension 1 Gram(s) Oral Before meals and at bedtime  vitamin A 43656 Unit(s) Oral daily  zinc sulfate 220 milliGRAM(s) Oral daily    MEDICATIONS  (PRN):  ALBUTerol/ipratropium for Nebulization 3 milliLiter(s) Nebulizer every 6 hours PRN Shortness of Breath and/or Wheezing      Allergies    eggs (Rash)  no drug allergy (Unknown)    Intolerances    vinegar sensitivity    family states that the patient shakes when she ingests vinegar (Other)      Vital Signs Last 24 Hrs  T(C): 36.7 (22 Oct 2017 05:41), Max: 36.8 (22 Oct 2017 00:05)  T(F): 98 (22 Oct 2017 05:41), Max: 98.2 (22 Oct 2017 00:05)  HR: 85 (22 Oct 2017 05:41) (72 - 95)  BP: 135/85 (22 Oct 2017 05:41) (113/74 - 148/84)  BP(mean): --  RR: 18 (22 Oct 2017 05:41) (16 - 100)  SpO2: 94% (22 Oct 2017 05:41) (92% - 100%)    PHYSICAL EXAM  General: adult in NAD  HEENT: clear oropharynx, anicteric sclera, pink conjunctiva  Neck: supple  CV: normal S1/S2 with no murmur rubs or gallops  Lungs: positive air movement b/l ant lungs,clear to auscultation, no wheezes, no rales  Abdomen: soft non-tender non-distended, no hepatosplenomegaly  Ext: no clubbing cyanosis or edema  Skin: no rashes and no petechiae  Neuro: alert and oriented X 4, no focal deficits  LABS:                          10.0   11.3  )-----------( 5        ( 21 Oct 2017 11:11 )             31.3         Mean Cell Volume : 89.2 fl  Mean Cell Hemoglobin : 28.6 pg  Mean Cell Hemoglobin Concentration : 32.0 gm/dL  Auto Neutrophil # : 8.6 K/uL  Auto Lymphocyte # : 1.7 K/uL  Auto Monocyte # : 0.5 K/uL  Auto Eosinophil # : 0.2 K/uL  Auto Basophil # : 0.0 K/uL      Serial CBC's  10-21 @ 11:11  Hct-31.3 / Hgb-10.0 / Plat-5 / RBC-3.51 / WBC-11.3        10-21    141  |  96  |  28<H>  ----------------------------<  83  3.8   |  35<H>  |  0.81    Ca    8.8      21 Oct 2017 11:11    TPro  7.2  /  Alb  3.2<L>  /  TBili  0.7  /  DBili  x   /  AST  35  /  ALT  30  /  AlkPhos  62  10-21      PT/INR - ( 21 Oct 2017 11:11 )   PT: 9.7 sec;   INR: 0.90 ratio         PTT - ( 21 Oct 2017 11:11 )  PTT:27.7 sec          Assessment
Pt is seen and examined  pt is awake and lying in bed  More alert today  pt seems comfortable and denies any complaints at this time        MEDICATIONS  (STANDING):  buDESOnide 160 MICROgram(s)/formoterol 4.5 MICROgram(s) Inhaler 2 Puff(s) Inhalation two times a day  diltiazem    Tablet 60 milliGRAM(s) Oral every 8 hours  furosemide    Tablet 20 milliGRAM(s) Oral daily  immune globulin gamma IVPB 17.4 Gram(s) IV Intermittent every 24 hours  pantoprazole    Tablet 40 milliGRAM(s) Oral before breakfast  predniSONE   Tablet 40 milliGRAM(s) Oral daily  sucralfate suspension 1 Gram(s) Oral Before meals and at bedtime  vitamin A 07423 Unit(s) Oral daily  zinc sulfate 220 milliGRAM(s) Oral daily    MEDICATIONS  (PRN):  ALBUTerol/ipratropium for Nebulization 3 milliLiter(s) Nebulizer every 6 hours PRN Shortness of Breath and/or Wheezing      Allergies    eggs (Rash)  no drug allergy (Unknown)    Intolerances    vinegar sensitivity    family states that the patient shakes when she ingests vinegar (Other)      Vital Signs Last 24 Hrs  T(C): 36.7 (24 Oct 2017 05:04), Max: 36.7 (24 Oct 2017 02:10)  T(F): 98 (24 Oct 2017 05:04), Max: 98 (24 Oct 2017 02:10)  HR: 70 (24 Oct 2017 05:04) (64 - 84)  BP: 116/64 (24 Oct 2017 05:04) (103/56 - 127/70)  BP(mean): --  RR: 18 (24 Oct 2017 05:04) (18 - 18)  SpO2: 98% (24 Oct 2017 05:04) (92% - 98%)    PHYSICAL EXAM  General: adult in NAD  HEENT: clear oropharynx, anicteric sclera, pink conjunctiva  Neck: supple  CV: normal S1/S2 with no murmur rubs or gallops  Lungs: positive air movement b/l ant lungs,clear to auscultation, no wheezes, no rales  Abdomen: soft non-tender non-distended, no hepatosplenomegaly  Ext: no clubbing cyanosis or edema  Skin: ecchymosis on legs unchanged.   no rashes and no petechiae  Neuro: alert and oriented X 4, no focal deficits  LABS:                        8.5    7.75  )-----------( 33       ( 24 Oct 2017 07:28 )             28.1   Mean Cell Volume : 85.9 fl  Mean Cell Hemoglobin : 26.0 pg  Mean Cell Hemoglobin Concentration : 30.2 gm/dL  Auto Neutrophil # : x  Auto Lymphocyte # : x  Auto Monocyte # : x  Auto Eosinophil # : x  Auto Basophil # : x  Auto Neutrophil % : x  Auto Lymphocyte % : x  Auto Monocyte % : x  Auto Eosinophil % : x  Auto Basophil % : x      10-24    137  |  95<L>  |  38<H>  ----------------------------<  98  4.0   |  33<H>  |  1.06    Ca    8.3<L>      24 Oct 2017 08:51
Pt is seen and examined  pt is awake and lying in bed  pt seems comfortable and denies any complaints at this time        MEDICATIONS  (STANDING):  buDESOnide 160 MICROgram(s)/formoterol 4.5 MICROgram(s) Inhaler 2 Puff(s) Inhalation two times a day  diltiazem    Tablet 60 milliGRAM(s) Oral every 8 hours  furosemide    Tablet 20 milliGRAM(s) Oral daily  pantoprazole    Tablet 40 milliGRAM(s) Oral before breakfast  predniSONE   Tablet 20 milliGRAM(s) Oral daily  sucralfate suspension 1 Gram(s) Oral Before meals and at bedtime  vitamin A 36441 Unit(s) Oral daily  zinc sulfate 220 milliGRAM(s) Oral daily    MEDICATIONS  (PRN):  ALBUTerol/ipratropium for Nebulization 3 milliLiter(s) Nebulizer every 6 hours PRN Shortness of Breath and/or Wheezing      Allergies    eggs (Rash)  no drug allergy (Unknown)    Intolerances    vinegar sensitivity    family states that the patient shakes when she ingests vinegar (Other)      Vital Signs Last 24 Hrs  T(C): 36.6 (29 Oct 2017 04:21), Max: 37 (28 Oct 2017 22:09)  T(F): 97.9 (29 Oct 2017 04:21), Max: 98.6 (28 Oct 2017 22:09)  HR: 76 (29 Oct 2017 04:21) (76 - 82)  BP: 146/78 (29 Oct 2017 04:21) (119/71 - 146/78)  BP(mean): --  RR: 18 (29 Oct 2017 04:21) (18 - 18)  SpO2: 97% (29 Oct 2017 04:21) (96% - 97%)    PHYSICAL EXAM  General: adult in NAD  Ext: no clubbing cyanosis or edema  Skin: stage 1 decubitus noted in sacral area. ecchymosis on legs unchanged.  Neuro: alert and  no focal deficits  LABS:                          x      x     )-----------( 17       ( 29 Oct 2017 07:51 )             x            Mean Cell Volume : 86.2 fl  Mean Cell Hemoglobin : 33.3 pg  Mean Cell Hemoglobin Concentration : 38.7 gm/dL  Auto Neutrophil # : x  Auto Lymphocyte # : x  Auto Monocyte # : x  Auto Eosinophil # : x  Auto Basophil # : x  Auto Neutrophil % : x  Auto Lymphocyte % : x  Auto Monocyte % : x  Auto Eosinophil % : x  Auto Basophil % : x          TPro  7.6  /  Alb  x   /  TBili  x   /  DBili  x   /  AST  x   /  ALT  x   /  AlkPhos  x   10-28          Ferritin, Serum: 77.0 ng/mL (10-28 @ 08:27)  Folate, Serum: 7.0 ng/mL (10-28 @ 08:27)  Haptoglobin, Serum: 80 mg/dL (10-28 @ 08:24)  Iron - Total Binding Capacity.: 229 ug/dL (10-28 @ 08:24)
Pt is seen and examined  pt is awake and lying in bed  weakened condition, arousible, responsvie  pt seems comfortable and denies any complaints at this time  Has sacral decubiti  In rehab has not been ambulatory        MEDICATIONS  (STANDING):  buDESOnide 160 MICROgram(s)/formoterol 4.5 MICROgram(s) Inhaler 2 Puff(s) Inhalation two times a day  diltiazem    Tablet 60 milliGRAM(s) Oral every 8 hours  furosemide    Tablet 20 milliGRAM(s) Oral daily  immune globulin gamma IVPB 17.4 Gram(s) IV Intermittent every 24 hours  pantoprazole    Tablet 40 milliGRAM(s) Oral before breakfast  predniSONE   Tablet 40 milliGRAM(s) Oral daily  sucralfate suspension 1 Gram(s) Oral Before meals and at bedtime  vitamin A 82195 Unit(s) Oral daily  zinc sulfate 220 milliGRAM(s) Oral daily    MEDICATIONS  (PRN):  ALBUTerol/ipratropium for Nebulization 3 milliLiter(s) Nebulizer every 6 hours PRN Shortness of Breath and/or Wheezing      Allergies    eggs (Rash)  no drug allergy (Unknown)    Intolerances    vinegar sensitivity    family states that the patient shakes when she ingests vinegar (Other)      Vital Signs Last 24 Hrs  T(C): 36.3 (23 Oct 2017 06:12), Max: 36.6 (22 Oct 2017 21:58)  T(F): 97.4 (23 Oct 2017 06:12), Max: 97.8 (22 Oct 2017 21:58)  HR: 73 (23 Oct 2017 06:12) (63 - 88)  BP: 136/73 (23 Oct 2017 06:12) (101/64 - 136/73)  BP(mean): --  RR: 18 (23 Oct 2017 06:12) (16 - 18)  SpO2: 94% (23 Oct 2017 06:12) (92% - 94%)    PHYSICAL EXAM  General: chronically ill adult in NAD  HEENT: , anicteric sclera, pink conjunctiva  Neck: supple  CV: normal S1/S2 with no murmur rubs or gallops  Lungs: positive air movement b/l ant lungs,clear to auscultation, no wheezes, no rales  Abdomen: soft non-tender non-distended, no hepatosplenomegaly  Ext: no clubbing cyanosis or edema  Skin: ecchymosis on legs  Neuro: arousible, no focal deficits  LABS:                          8.9    9.4   )-----------( 25       ( 22 Oct 2017 16:16 )             27.5         Mean Cell Volume : 88.6 fl  Mean Cell Hemoglobin : 28.7 pg  Mean Cell Hemoglobin Concentration : 32.4 gm/dL  Auto Neutrophil # : x  Auto Lymphocyte # : x  Auto Monocyte # : x  Auto Eosinophil # : x  Auto Basophil # : x  Auto Neutrophil % : x  Auto Lymphocyte % : x  Auto Monocyte % : x  Auto Eosinophil % : x  Auto Basophil % : x      10-21    141  |  96  |  28<H>  ----------------------------<  83  3.8   |  35<H>  |  0.81    Ca    8.8      21 Oct 2017 11:11    TPro  7.2  /  Alb  3.2<L>  /  TBili  0.7  /  DBili  x   /  AST  35  /  ALT  30  /  AlkPhos  62  10-21      PT/INR - ( 21 Oct 2017 11:11 )   PT: 9.7 sec;   INR: 0.90 ratio         PTT - ( 21 Oct 2017 11:11 )  PTT:27.7 sec
Pt is seen and examined  pt is awake and lying in bed/  Seems weaker,drowsy at the time of exam.  pt seems comfortable and unable to express symptoms        MEDICATIONS  (STANDING):  buDESOnide 160 MICROgram(s)/formoterol 4.5 MICROgram(s) Inhaler 2 Puff(s) Inhalation two times a day  diltiazem    Tablet 60 milliGRAM(s) Oral every 8 hours  furosemide    Tablet 20 milliGRAM(s) Oral daily  pantoprazole    Tablet 40 milliGRAM(s) Oral before breakfast  predniSONE   Tablet 30 milliGRAM(s) Oral daily  sucralfate suspension 1 Gram(s) Oral Before meals and at bedtime  vitamin A 44344 Unit(s) Oral daily  zinc sulfate 220 milliGRAM(s) Oral daily    MEDICATIONS  (PRN):  ALBUTerol/ipratropium for Nebulization 3 milliLiter(s) Nebulizer every 6 hours PRN Shortness of Breath and/or Wheezing      Allergies    eggs (Rash)  no drug allergy (Unknown)    Intolerances    vinegar sensitivity    family states that the patient shakes when she ingests vinegar (Other)      Vital Signs Last 24 Hrs  T(C): 36.5 (26 Oct 2017 09:45), Max: 37 (26 Oct 2017 02:43)  T(F): 97.7 (26 Oct 2017 09:45), Max: 98.6 (26 Oct 2017 02:43)  HR: 73 (26 Oct 2017 09:45) (66 - 79)  BP: 134/79 (26 Oct 2017 09:45) (114/72 - 149/80)  BP(mean): --  RR: 16 (26 Oct 2017 09:45) (16 - 18)  SpO2: 95% (26 Oct 2017 09:45) (92% - 97%)    PHYSICAL EXAM  General: adult in NAD  HEENT: clear oropharynx, anicteric sclera, pink conjunctiva  Neck: supple  CV: normal S1/S2 with no murmur rubs or gallops  Lungs: positive air movement b/l ant lungs,clear to auscultation, no wheezes, no rales  Abdomen: soft non-tender non-distended, no hepatosplenomegaly  Ext: no clubbing cyanosis or edema  Skin: ecchymosis on lower extremities unchanged.  Neuro: alert and oriented X 4, no focal deficits  LABS:                          8.2    8.15  )-----------( 49       ( 26 Oct 2017 07:51 )             27.1         Mean Cell Volume : 86.9 fl  Mean Cell Hemoglobin : 26.3 pg  Mean Cell Hemoglobin Concentration : 30.3 gm/dL  Auto Neutrophil # : x  Auto Lymphocyte # : x  Auto Monocyte # : x  Auto Eosinophil # : x  Auto Basophil # : x  Auto Neutrophil % : x  Auto Lymphocyte % : x  Auto Monocyte % : x  Auto Eosinophil % : x  Auto Basophil % : x      10-26    136  |  96  |  32<H>  ----------------------------<  82  4.0   |  32<H>  |  0.92    Ca    8.5      26 Oct 2017 07:24                    Misc. hematology 10-21 @ 14:59  --  --  --  --  --  Positive  Negative  --  Misc. hematology 10-21 @ 14:58  --  --  --  --  --  --  --  Positive                  BLOOD SMEAR INTERPRETATION:       RADIOLOGY & ADDITIONAL STUDIES:
Pt is seen and examined  pt is awake and lying in bed/  pt seems comfortable and denies any complaints at this time  Completing last dose gammaglobulins today        MEDICATIONS  (STANDING):  buDESOnide 160 MICROgram(s)/formoterol 4.5 MICROgram(s) Inhaler 2 Puff(s) Inhalation two times a day  diltiazem    Tablet 60 milliGRAM(s) Oral every 8 hours  furosemide    Tablet 20 milliGRAM(s) Oral daily  immune globulin gamma IVPB 17.4 Gram(s) IV Intermittent every 24 hours  pantoprazole    Tablet 40 milliGRAM(s) Oral before breakfast  predniSONE   Tablet 30 milliGRAM(s) Oral daily  sucralfate suspension 1 Gram(s) Oral Before meals and at bedtime  vitamin A 32344 Unit(s) Oral daily  zinc sulfate 220 milliGRAM(s) Oral daily    MEDICATIONS  (PRN):  ALBUTerol/ipratropium for Nebulization 3 milliLiter(s) Nebulizer every 6 hours PRN Shortness of Breath and/or Wheezing      Allergies    eggs (Rash)  no drug allergy (Unknown)    Intolerances    vinegar sensitivity    family states that the patient shakes when she ingests vinegar (Other)      Vital Signs Last 24 Hrs  T(C): 37 (25 Oct 2017 06:45), Max: 37.1 (24 Oct 2017 21:22)  T(F): 98.6 (25 Oct 2017 06:45), Max: 98.8 (24 Oct 2017 21:22)  HR: 72 (25 Oct 2017 06:45) (65 - 89)  BP: 133/76 (25 Oct 2017 06:45) (111/62 - 133/76)  BP(mean): --  RR: 18 (25 Oct 2017 06:45) (18 - 18)  SpO2: 94% (25 Oct 2017 06:45) (94% - 99%)    PHYSICAL EXAM  General: adult in NAD  HEENT:  anicteric sclera, pink conjunctiva  Neck: supple  CV: normal S1/S2 with no murmur rubs or gallops  Lungs: positive air movement b/l ant lungs,  clear to auscultation, no wheezes, no rales  Abdomen: soft non-tender non-distended, no hepatosplenomegaly  Ext: no clubbing cyanosis or edema  Skin: ecchymosis on legs unchanged.  no rashes and no petechiae  Neuro: alert and oriented X 4, no focal deficits  LABS:                          8.5    7.75  )-----------( 33       ( 24 Oct 2017 07:28 )             28.1         Mean Cell Volume : 85.9 fl  Mean Cell Hemoglobin : 26.0 pg  Mean Cell Hemoglobin Concentration : 30.2 gm/dL  Auto Neutrophil # : x  Auto Lymphocyte # : x  Auto Monocyte # : x  Auto Eosinophil # : x  Auto Basophil # : x  Auto Neutrophil % : x  Auto Lymphocyte % : x  Auto Monocyte % : x  Auto Eosinophil % : x  Auto Basophil % : x      10-25    136  |  94<L>  |  38<H>  ----------------------------<  113<H>  3.8   |  32<H>  |  1.12    Ca    8.8      25 Oct 2017 07:40                    Misc. hematology 10-21 @ 14:59  --  --  --  --  --  Positive  Negative  --  Misc. hematology 10-21 @ 14:58  --  --  --  --  --  --  --  Positive                  BLOOD SMEAR INTERPRETATION:       RADIOLOGY & ADDITIONAL STUDIES:
Pt is seen and examined  pt is awake and lying in bed/  pt seems comfortable and denies any complaints at this time  Weakened condition, non-ambulatory, needs assistance with feeding.        MEDICATIONS  (STANDING):  buDESOnide 160 MICROgram(s)/formoterol 4.5 MICROgram(s) Inhaler 2 Puff(s) Inhalation two times a day  diltiazem    Tablet 60 milliGRAM(s) Oral every 8 hours  furosemide    Tablet 20 milliGRAM(s) Oral daily  pantoprazole    Tablet 40 milliGRAM(s) Oral before breakfast  predniSONE   Tablet 20 milliGRAM(s) Oral daily  sucralfate suspension 1 Gram(s) Oral Before meals and at bedtime  vitamin A 98423 Unit(s) Oral daily  zinc sulfate 220 milliGRAM(s) Oral daily    MEDICATIONS  (PRN):  ALBUTerol/ipratropium for Nebulization 3 milliLiter(s) Nebulizer every 6 hours PRN Shortness of Breath and/or Wheezing      Allergies    eggs (Rash)  no drug allergy (Unknown)    Intolerances    vinegar sensitivity    family states that the patient shakes when she ingests vinegar (Other)      Vital Signs Last 24 Hrs  T(C): 36.5 (27 Oct 2017 04:11), Max: 36.5 (26 Oct 2017 20:55)  T(F): 97.7 (27 Oct 2017 04:11), Max: 97.7 (26 Oct 2017 20:55)  HR: 67 (27 Oct 2017 04:11) (67 - 75)  BP: 148/81 (27 Oct 2017 04:11) (117/62 - 148/81)  BP(mean): --  RR: 18 (27 Oct 2017 04:11) (18 - 18)  SpO2: 96% (27 Oct 2017 04:11) (96% - 97%)    PHYSICAL EXAM  General: adult in NAD  HEENT: clear oropharynx, anicteric sclera, pink conjunctiva  Neck: supple  CV: normal S1/S2 with no murmur rubs or gallops  Lungs: positive air movement b/l ant lungs,clear to auscultation, no wheezes, no rales  Abdomen: soft non-tender non-distended, no hepatosplenomegaly  Ext: no clubbing cyanosis or edema  Skin: ecchymosis on legs unchanged  Neuro: alert and oriented X 4, no focal deficits  LABS:                          8.2    8.15  )-----------( 49       ( 26 Oct 2017 07:51 )             27.1         Mean Cell Volume : 86.9 fl  Mean Cell Hemoglobin : 26.3 pg  Mean Cell Hemoglobin Concentration : 30.3 gm/dL  Auto Neutrophil # : x  Auto Lymphocyte # : x  Auto Monocyte # : x  Auto Eosinophil # : x  Auto Basophil # : x  Auto Neutrophil % : x  Auto Lymphocyte % : x  Auto Monocyte % : x  Auto Eosinophil % : x  Auto Basophil % : x      10-26    136  |  96  |  32<H>  ----------------------------<  82  4.0   |  32<H>  |  0.92    Ca    8.5      26 Oct 2017 07:24    RADIOLOGY & ADDITIONAL STUDIES:
Pt is seen and examined  pt is awake and lying in bed/o  Seems depressed, wants to go home.  pt seems comfortable and not complaining at this time.        MEDICATIONS  (STANDING):  buDESOnide 160 MICROgram(s)/formoterol 4.5 MICROgram(s) Inhaler 2 Puff(s) Inhalation two times a day  diltiazem    Tablet 60 milliGRAM(s) Oral every 8 hours  furosemide    Tablet 20 milliGRAM(s) Oral daily  pantoprazole    Tablet 40 milliGRAM(s) Oral before breakfast  predniSONE   Tablet 20 milliGRAM(s) Oral daily  sucralfate suspension 1 Gram(s) Oral Before meals and at bedtime  vitamin A 69667 Unit(s) Oral daily  zinc sulfate 220 milliGRAM(s) Oral daily    MEDICATIONS  (PRN):  ALBUTerol/ipratropium for Nebulization 3 milliLiter(s) Nebulizer every 6 hours PRN Shortness of Breath and/or Wheezing      Allergies    eggs (Rash)  no drug allergy (Unknown)    Intolerances    vinegar sensitivity    family states that the patient shakes when she ingests vinegar (Other)      Vital Signs Last 24 Hrs  T(C): 36.7 (30 Oct 2017 13:36), Max: 36.7 (30 Oct 2017 13:36)  T(F): 98 (30 Oct 2017 13:36), Max: 98 (30 Oct 2017 13:36)  HR: 82 (30 Oct 2017 13:36) (82 - 84)  BP: 131/78 (30 Oct 2017 13:36) (111/81 - 131/78)  BP(mean): --  RR: 18 (30 Oct 2017 13:36) (18 - 18)  SpO2: 96% (30 Oct 2017 13:36) (96% - 97%)    PHYSICAL EXAM  General: adult in NAD  HEENT: clear oropharynx, anicteric sclera, pink conjunctiva  Neck: supple  CV: normal S1/S2 with no murmur rubs or gallops  Lungs: positive air movement b/l ant lungs,clear to auscultation, no wheezes, no rales  Abdomen: soft non-tender non-distended, no hepatosplenomegaly  Ext: no clubbing cyanosis or edema  Skin: ecchymosis on legs unchanged  Neuro: alert and oriented X 4, no focal deficits  LABS:                          9.3    7.52  )-----------( 45       ( 30 Oct 2017 09:15 )             30.7         Mean Cell Volume : 85.3 fl  Mean Cell Hemoglobin : 25.8 pg  Mean Cell Hemoglobin Concentration : 30.3 gm/dL  Auto Neutrophil # : 5.42 K/uL  Auto Lymphocyte # : 1.43 K/uL  Auto Monocyte # : 0.53 K/uL  Auto Eosinophil # : 0.11 K/uL  Auto Basophil # : 0.00 K/uL  Auto Neutrophil % : 72.1 %  Auto Lymphocyte % : 19.0 %  Auto Monocyte % : 7.0 %  Auto Eosinophil % : 1.5 %  Auto Basophil % : 0.0 %      10-30    139  |  95<L>  |  66<H>  ----------------------------<  90  4.1   |  32<H>  |  1.02    Ca    8.8      30 Oct 2017 08:59            Ferritin, Serum: 77.0 ng/mL (10-28 @ 08:27)  Folate, Serum: 7.0 ng/mL (10-28 @ 08:27)  Haptoglobin, Serum: 80 mg/dL (10-28 @ 08:24)  Iron - Total Binding Capacity.: 229 ug/dL (10-28 @ 08:24)    RADIOLOGY & ADDITIONAL STUDIES:
CHIEF COMPLAINT:Patient is a 89y old  Female who presents with a chief complaint of sent for low platelets (21 Oct 2017 15:23)    	  pt without complaints    PAST MEDICAL & SURGICAL HISTORY:  PNA (pneumonia)  Dysphagia  Thrombocytopenia: ITP  Atrial fibrillation and flutter: No A/C  during hospitalization in april 2014  Respiratory failure: in april 2014 was intubated  Cataract: right eye  Small bowel obstruction: s/p resection in 2010  HTN - Hypertension  S/P cholecystectomy  H/O: Hysterectomy  S/P Small Bowel Resection          REVIEW OF SYSTEMS:  UTO, no active complaints      Medications:  MEDICATIONS  (STANDING):  buDESOnide 160 MICROgram(s)/formoterol 4.5 MICROgram(s) Inhaler 2 Puff(s) Inhalation two times a day  diltiazem    Tablet 60 milliGRAM(s) Oral every 8 hours  furosemide    Tablet 20 milliGRAM(s) Oral daily  pantoprazole    Tablet 40 milliGRAM(s) Oral before breakfast  predniSONE   Tablet 20 milliGRAM(s) Oral daily  sucralfate suspension 1 Gram(s) Oral Before meals and at bedtime  vitamin A 12296 Unit(s) Oral daily  zinc sulfate 220 milliGRAM(s) Oral daily    MEDICATIONS  (PRN):  ALBUTerol/ipratropium for Nebulization 3 milliLiter(s) Nebulizer every 6 hours PRN Shortness of Breath and/or Wheezing    	    PHYSICAL EXAM:  T(C): 36.5 (10-26-17 @ 20:55), Max: 37 (10-26-17 @ 02:43)  HR: 74 (10-26-17 @ 20:55) (71 - 79)  BP: 122/71 (10-26-17 @ 20:55) (117/62 - 149/80)  RR: 18 (10-26-17 @ 20:55) (16 - 18)  SpO2: 96% (10-26-17 @ 20:55) (92% - 97%)  Wt(kg): --  I&O's Summary    25 Oct 2017 07:01  -  26 Oct 2017 07:00  --------------------------------------------------------  IN: 915 mL / OUT: 700 mL / NET: 215 mL    26 Oct 2017 07:01  -  26 Oct 2017 23:03  --------------------------------------------------------  IN: 150 mL / OUT: 0 mL / NET: 150 mL      Appearance: Normal	  HEENT:   Normal oral mucosa, PERRL, EOMI	  Lymphatic: No lymphadenopathy  Cardiovascular: reg irr No JVD, No murmurs, No edema  Respiratory: Lungs clear to auscultation	  Gastrointestinal:  Soft, Non-tender, + BS	  Skin: No rashes, + ecchymoses lower upper ext , No cyanosis	  Neurologic: Non-focal  Extremities: dec  range of motion, No clubbing, cyanosis or edema  Vascular: Peripheral pulses palpable /pvd    LABS:	 	    CARDIAC MARKERS:                                8.2    8.15  )-----------( 49       ( 26 Oct 2017 07:51 )             27.1     10-26    136  |  96  |  32<H>  ----------------------------<  82  4.0   |  32<H>  |  0.92    Ca    8.5      26 Oct 2017 07:24      proBNP:   Lipid Profile:   HgA1c:   TSH:
CHIEF COMPLAINT:Patient is a 89y old  Female who presents with a chief complaint of sent for low platelets (21 Oct 2017 15:23)    	  pt without complaints    PAST MEDICAL & SURGICAL HISTORY:  PNA (pneumonia)  Dysphagia  Thrombocytopenia: ITP  Atrial fibrillation and flutter: No A/C  during hospitalization in april 2014  Respiratory failure: in april 2014 was intubated  Cataract: right eye  Small bowel obstruction: s/p resection in 2010  HTN - Hypertension  S/P cholecystectomy  H/O: Hysterectomy  S/P Small Bowel Resection          REVIEW OF SYSTEMS:  no cp or sob   no n/v   no hematemesis   no hematuria   no brbpr        Medications:  MEDICATIONS  (STANDING):  buDESOnide 160 MICROgram(s)/formoterol 4.5 MICROgram(s) Inhaler 2 Puff(s) Inhalation two times a day  diltiazem    Tablet 60 milliGRAM(s) Oral every 8 hours  furosemide    Tablet 20 milliGRAM(s) Oral daily  immune globulin gamma IVPB 17.4 Gram(s) IV Intermittent every 24 hours  pantoprazole    Tablet 40 milliGRAM(s) Oral before breakfast  predniSONE   Tablet 40 milliGRAM(s) Oral daily  sucralfate suspension 1 Gram(s) Oral Before meals and at bedtime  vitamin A 36227 Unit(s) Oral daily  zinc sulfate 220 milliGRAM(s) Oral daily    MEDICATIONS  (PRN):  ALBUTerol/ipratropium for Nebulization 3 milliLiter(s) Nebulizer every 6 hours PRN Shortness of Breath and/or Wheezing    	    PHYSICAL EXAM:  T(C): 36.3 (10-23-17 @ 11:30), Max: 36.6 (10-22-17 @ 21:58)  HR: 82 (10-23-17 @ 11:30) (63 - 88)  BP: 108/67 (10-23-17 @ 11:30) (101/64 - 136/73)  RR: 18 (10-23-17 @ 11:30) (16 - 18)  SpO2: 96% (10-23-17 @ 11:30) (92% - 96%)  Wt(kg): --  I&O's Summary    22 Oct 2017 07:01  -  23 Oct 2017 07:00  --------------------------------------------------------  IN: 895 mL / OUT: 600 mL / NET: 295 mL    23 Oct 2017 07:01  -  23 Oct 2017 14:31  --------------------------------------------------------  IN: 540 mL / OUT: 350 mL / NET: 190 mL      Appearance: Normal	  HEENT:   Normal oral mucosa, PERRL, EOMI	  Lymphatic: No lymphadenopathy  Cardiovascular: reg irr No JVD, No murmurs, No edema  Respiratory: Lungs clear to auscultation	  Gastrointestinal:  Soft, Non-tender, + BS	  Skin: No rashes, + ecchymoses lower upper ext , No cyanosis	  Neurologic: Non-focal  Extremities: dec  range of motion, No clubbing, cyanosis or edema  Vascular: Peripheral pulses palpable /pvd    LABS:	 	    CARDIAC MARKERS:                                8.5    10.60 )-----------( 39       ( 23 Oct 2017 07:26 )             27.3           proBNP:   Lipid Profile:   HgA1c:   TSH:
Pt is seen and examined  pt is awake and lying in bed/  pt seems comfortable and denies any complaints at this time        MEDICATIONS  (STANDING):  buDESOnide 160 MICROgram(s)/formoterol 4.5 MICROgram(s) Inhaler 2 Puff(s) Inhalation two times a day  diltiazem    Tablet 60 milliGRAM(s) Oral every 8 hours  furosemide    Tablet 20 milliGRAM(s) Oral daily  pantoprazole    Tablet 40 milliGRAM(s) Oral before breakfast  predniSONE   Tablet 20 milliGRAM(s) Oral daily  sucralfate suspension 1 Gram(s) Oral Before meals and at bedtime  vitamin A 22040 Unit(s) Oral daily  zinc sulfate 220 milliGRAM(s) Oral daily    MEDICATIONS  (PRN):  ALBUTerol/ipratropium for Nebulization 3 milliLiter(s) Nebulizer every 6 hours PRN Shortness of Breath and/or Wheezing      Allergies    eggs (Rash)  no drug allergy (Unknown)    Intolerances    vinegar sensitivity    family states that the patient shakes when she ingests vinegar (Other)      Vital Signs Last 24 Hrs  T(C): 36.6 (28 Oct 2017 04:41), Max: 36.8 (27 Oct 2017 21:29)  T(F): 97.9 (28 Oct 2017 04:41), Max: 98.2 (27 Oct 2017 21:29)  HR: 78 (28 Oct 2017 04:41) (78 - 90)  BP: 148/79 (28 Oct 2017 04:41) (117/66 - 148/79)  BP(mean): --  RR: 17 (28 Oct 2017 04:41) (17 - 90)  SpO2: 96% (28 Oct 2017 04:41) (96% - 96%)    PHYSICAL EXAM  General: adult in NAD  HEENT: clear oropharynx, anicteric sclera, pink conjunctiva  Neck: supple  CV: normal S1/S2 with no murmur rubs or gallops  Lungs: positive air movement b/l ant lungs,clear to auscultation, no wheezes, no rales  Abdomen: soft non-tender non-distended, no hepatosplenomegaly  Ext: no clubbing cyanosis or edema  Skin: ecchymosis on legs u nchanged.no rashes and no petechiae  Neuro: alert and oriented X 4, no focal deficits  LABS:                          x      x     )-----------( 21       ( 28 Oct 2017 06:17 )             x            Mean Cell Volume : 88.0 fl  Mean Cell Hemoglobin : 27.7 pg  Mean Cell Hemoglobin Concentration : 31.4 gm/dL  Auto Neutrophil # : x  Auto Lymphocyte # : x  Auto Monocyte # : x  Auto Eosinophil # : x  Auto Basophil # : x  Auto Neutrophil % : x  Auto Lymphocyte % : x  Auto Monocyte % : x  Auto Eosinophil % : x  Auto Basophil % : x    TPro  7.6  /  Alb  x   /  TBili  x   /  DBili  x   /  AST  x   /  ALT  x   /  AlkPhos  x   10-28  Ferritin, Serum: 77.0 ng/mL (10-28 @ 08:27)  Folate, Serum: 7.0 ng/mL (10-28 @ 08:27)  Haptoglobin, Serum: 80 mg/dL (10-28 @ 08:24)  Iron - Total Binding Capacity.: 229 ug/dL (10-28 @ 08:24)    BLOOD SMEAR INTERPRETATION:       RADIOLOGY & ADDITIONAL STUDIES:

## 2017-10-30 NOTE — PROGRESS NOTE ADULT - PROBLEM SELECTOR PLAN 2
anemia work up in progress.
anemia work up non diagnostic. H&h stable in the 9 range which is adequate for subacute rehab considering her functional level..
anemia work up non diagnostic. H&h stable in the 9 range which is adequate for subacute rehab considering her functional level..
anemia work up ordered.

## 2017-10-30 NOTE — PROGRESS NOTE ADULT - PROBLEM SELECTOR PROBLEM 1
Chronic ITP (idiopathic thrombocytopenia)

## 2017-10-30 NOTE — CHART NOTE - NSCHARTNOTEFT_GEN_A_CORE
Source: Patient [ ]    Family [x ]     other [ ]Medical records, son at bedside. Pt seen for Malnutrition follow up. 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    Diet : Dysphagia 1 Honey consistency, Kosher        Patient reports [ ] nausea  [ ] vomiting [ ] diarrhea [ ] constipation  [ ]chewing problems [ ] swallowing issues  [ ] other: No GI distress noted, +BM today     PO intake:  < 50% [ ] 50-75% [x ]   % [ ]  other :     Source for PO intake [ ] Patient [x ] family [x ] chart [ ] staff [ ] other: Son reports pt po intakes have been improving recently, family also bringing food from home (vegetables and chicken pureed). Pt enjoys drinking Ensure Enlive 2 x day, prosource mixed into the Ensure.      Current Weight: 86.9 pounds (10/22), 84.2 pounds (10/25), 90.6 pounds today  % Weight Change    Pertinent Medications: MEDICATIONS  (STANDING):  buDESOnide 160 MICROgram(s)/formoterol 4.5 MICROgram(s) Inhaler 2 Puff(s) Inhalation two times a day  diltiazem    Tablet 60 milliGRAM(s) Oral every 8 hours  furosemide    Tablet 20 milliGRAM(s) Oral daily  pantoprazole    Tablet 40 milliGRAM(s) Oral before breakfast  predniSONE   Tablet 20 milliGRAM(s) Oral daily  sucralfate suspension 1 Gram(s) Oral Before meals and at bedtime  vitamin A 16682 Unit(s) Oral daily  zinc sulfate 220 milliGRAM(s) Oral daily    MEDICATIONS  (PRN):  ALBUTerol/ipratropium for Nebulization 3 milliLiter(s) Nebulizer every 6 hours PRN Shortness of Breath and/or Wheezing    Pertinent Labs:  10-30 Na139 mmol/L Glu 90 mg/dL K+ 4.1 mmol/L Cr  1.02 mg/dL BUN 66 mg/dL<H> Phos n/a   Alb n/a   PAB n/a         Skin: No edema, stage 2 right buttocks and left buttocks IAD with partial skin loss noted    Estimated Needs:   [x ] no change since previous assessment  [ ] recalculated:       Previous Nutrition Diagnosis:     [ ] Inadequate Energy Intake [ ]Inadequate Oral Intake [ ] Excessive Energy Intake     [ ] Underweight [ ] Increased Nutrient Needs [ ] Overweight/Obesity     [ ] Altered GI Function [ ] Unintended Weight Loss [ ] Food & Nutrition Related Knowledge Deficit [ x] Malnutrition - severe         Nutrition Diagnosis is [x ] ongoing  [ ] resolved [ ] not applicable          New Nutrition Diagnosis: [ x] not applicable        Recommend    [x ] Change Diet To: Pt son thinks pt might enjoy the non-Kosher pureed foods, but will need to discuss with his sisters first before removing Kosher from current diet. RD contact information provided.     [x ] Nutrition Supplement: Recommend to increase Ensure Enlive 3 x day, ProSource 1 x day and add José 2 x day    [ ] Nutrition Support    [x ] Other: Recommend to add multivitamin and vitamin C for wound healing       Monitoring and Evaluation:     [x ] PO intake [x ] Tolerance to diet prescription [x ] weights [ ] follow up per protocol    [ ] other:

## 2017-10-30 NOTE — PROGRESS NOTE ADULT - PROBLEM SELECTOR PLAN 1
- platelets improved to 49,000 today. Completed a 5 day course of gammaglobulins.  Had only a short lived response to IV  IG and moderate dose of prednisone.   Will need to consider alternative treatment such as NPlalte or Promacta. Starting either of these would require frequent monitoring of her CBC as an outpatient.  Outpatient followup was problematic in the the past as she could only come every few months. Family now requesting she go back to rehab. Should decrease dose of prednisone to 20 mg/ day with slow tapering weekly depending on the platelet count.  Monitor CBC daily while in the hospital, check platelets in a blue top tube.    Continue Prednisone, 20 mg/ day  Will plan to start Promacta as prior response to  Prednisone , gamma globulins was only short lived.  - would hold on any platelet transfusion unless there is active bleeding
- cbc not ordered for today  - no bleeding noted - no hematochezia, melena  - cbc ordered for tomorrow  - would hold on any platelet transfusion unless there is active bleeding
- platelets improved to 25,000  Had only a short lived response to IV  IG and moderate dose of prednisone.   Will need to consider alternative treatment such as NPlalte or Promacta. Starting either of these would require frequent monitoring of her CBC as an outpatient.  Outpatient followup was problematic in the the past as she could only come every few months. Considering her deteriorated condition  I doubt this can be accomplished. Discussed at length with daughter. Explained that her debilitated condition was not related to the low platelet count and any side effects even if mild from these alternative therapies could seriously worsen her condition.. Should consider hospice.  - no bleeding noted - no hematochezia, melena    - would hold on any platelet transfusion unless there is active bleeding
- platelets improved to 33,000  Had only a short lived response to IV  IG and moderate dose of prednisone.   Will need to consider alternative treatment such as NPlalte or Promacta. Starting either of these would require frequent monitoring of her CBC as an outpatient.  Outpatient followup was problematic in the the past as she could only come every few months. Discussed  at length  with son, they feel if necessary they could bring her more frequently to the office.   Complete  5 day course of gammaglobulins  Continue Prednisone, would not give more than  30 mg.  Will plan to start Promacta as prior response to  Prednisone , gamma globulins was only short lived.  - would hold on any platelet transfusion unless there is active bleeding
- platelets improved to 33,000 yesterday. CBC pending today.  Had only a short lived response to IV  IG and moderate dose of prednisone.   Will need to consider alternative treatment such as NPlalte or Promacta. Starting either of these would require frequent monitoring of her CBC as an outpatient.  Outpatient followup was problematic in the the past as she could only come every few months. Discussed  at length  with son, they feel if necessary they could bring her more frequently to the office.   Complete  5 day course of gammaglobulins  Continue Prednisone, would not give more than  30 mg.  Will plan to start Promacta as prior response to  Prednisone , gamma globulins was only short lived.  - would hold on any platelet transfusion unless there is active bleeding
- platelets improved to 42,000 today.   Had only a short lived response to IV  IG and moderate dose of prednisone.   Will need to consider alternative treatment such as NPlalte or Promacta. Starting either of these would require frequent monitoring of her CBC as an outpatient.  Outpatient followup was problematic in the the past as she could only come every few months. Family now requesting she go back to rehab. Should decrease dose of prednisone to 20 mg/ day with slow tapering weekly depending on the platelet count.  Monitor CBC daily while in the hospital,     Continue Prednisone, 20 mg/ day  Will plan to start Promacta as prior response to  Prednisone , gamma globulins was only short lived.  - would hold on any platelet transfusion unless there is active bleeding  OK to go to rehab, recommending teziro platelets 2 x/ week with  communication between myself and the rehab  MD.
- platelets improved to 49,000 today. Completed a 5 day course of gammaglobulins.  Had only a short lived response to IV  IG and moderate dose of prednisone.   Will need to consider alternative treatment such as NPlalte or Promacta. Starting either of these would require frequent monitoring of her CBC as an outpatient.  Outpatient followup was problematic in the the past as she could only come every few months. Family now requesting she go back to rehab. Should decrease dose of prednisone to 20 mg/ day with slow tapering weekly depending on the plateelt count.    Continue Prednisone, would not give more than  30 mg.  Will plan to start Promacta as prior response to  Prednisone , gamma globulins was only short lived.  - would hold on any platelet transfusion unless there is active bleeding
- platelets improved to 49,000 today. Completed a 5 day course of gammaglobulins.  Had only a short lived response to IV  IG and moderate dose of prednisone.   Will need to consider alternative treatment such as NPlalte or Promacta. Starting either of these would require frequent monitoring of her CBC as an outpatient.  Outpatient followup was problematic in the the past as she could only come every few months. Family now requesting she go back to rehab. Should decrease dose of prednisone to 20 mg/ day with slow tapering weekly depending on the platelet count.  Monitor CBC daily while in the hospital,     Continue Prednisone, 20 mg/ day  Will plan to start Promacta as prior response to  Prednisone , gamma globulins was only short lived.  - would hold on any platelet transfusion unless there is active bleeding
- platelets improved to 49,000 today. Completed a 5 day course of gammaglobulins.  Had only a short lived response to IV  IG and moderate dose of prednisone.   Will need to consider alternative treatment such as NPlalte or Promacta. Starting either of these would require frequent monitoring of her CBC as an outpatient.  Outpatient followup was problematic in the the past as she could only come every few months. Family now requesting she go back to rehab. Should decrease dose of prednisone to 20 mg/ day with slow tapering weekly depending on the platelet count.  Monitor CBC daily while in the hospital, check platelets in a blue top tube.    Continue Prednisone, would not give more than  30 mg.  Will plan to start Promacta as prior response to  Prednisone , gamma globulins was only short lived.  - would hold on any platelet transfusion unless there is active bleeding

## 2017-10-31 LAB
% ALBUMIN: 34.8 % — SIGNIFICANT CHANGE UP
% ALPHA 1: 3.2 % — SIGNIFICANT CHANGE UP
% ALPHA 2: 7 % — SIGNIFICANT CHANGE UP
% BETA: 11.2 % — SIGNIFICANT CHANGE UP
% GAMMA: 43.8 % — SIGNIFICANT CHANGE UP
ALBUMIN SERPL ELPH-MCNC: 2.6 G/DL — LOW (ref 3.6–5.5)
ALBUMIN/GLOB SERPL ELPH: 0.5 RATIO — SIGNIFICANT CHANGE UP
ALPHA1 GLOB SERPL ELPH-MCNC: 0.2 G/DL — SIGNIFICANT CHANGE UP (ref 0.1–0.4)
ALPHA2 GLOB SERPL ELPH-MCNC: 0.5 G/DL — SIGNIFICANT CHANGE UP (ref 0.5–1)
B-GLOBULIN SERPL ELPH-MCNC: 0.9 G/DL — SIGNIFICANT CHANGE UP (ref 0.5–1)
GAMMA GLOBULIN: 3.3 G/DL — HIGH (ref 0.6–1.6)
INTERPRETATION SERPL IFE-IMP: SIGNIFICANT CHANGE UP
PROT PATTERN SERPL ELPH-IMP: SIGNIFICANT CHANGE UP

## 2017-11-14 NOTE — PROVIDER CONTACT NOTE (OTHER) - SITUATION
----- Message from Blane Briscoe sent at 11/13/2017  2:58 PM CST -----  Contact: self/home  Pt is having bilat knee and would like an appt to have an inj with Dr. Montgomery before January.    Pt pulled off IV line, when IV RN trying to place a new IV lock , pt became combative, scratched the staff and trying to bite staff. Pt keeps on taking off Venti mask

## 2017-12-19 PROCEDURE — 85027 COMPLETE CBC AUTOMATED: CPT

## 2017-12-19 PROCEDURE — 93005 ELECTROCARDIOGRAM TRACING: CPT

## 2017-12-19 PROCEDURE — 82435 ASSAY OF BLOOD CHLORIDE: CPT

## 2017-12-19 PROCEDURE — 82803 BLOOD GASES ANY COMBINATION: CPT

## 2017-12-19 PROCEDURE — 83010 ASSAY OF HAPTOGLOBIN QUANT: CPT

## 2017-12-19 PROCEDURE — 84165 PROTEIN E-PHORESIS SERUM: CPT

## 2017-12-19 PROCEDURE — 82947 ASSAY GLUCOSE BLOOD QUANT: CPT

## 2017-12-19 PROCEDURE — 85049 AUTOMATED PLATELET COUNT: CPT

## 2017-12-19 PROCEDURE — 86850 RBC ANTIBODY SCREEN: CPT

## 2017-12-19 PROCEDURE — 80053 COMPREHEN METABOLIC PANEL: CPT

## 2017-12-19 PROCEDURE — 97110 THERAPEUTIC EXERCISES: CPT

## 2017-12-19 PROCEDURE — 80048 BASIC METABOLIC PNL TOTAL CA: CPT

## 2017-12-19 PROCEDURE — 82746 ASSAY OF FOLIC ACID SERUM: CPT

## 2017-12-19 PROCEDURE — 94640 AIRWAY INHALATION TREATMENT: CPT

## 2017-12-19 PROCEDURE — 83550 IRON BINDING TEST: CPT

## 2017-12-19 PROCEDURE — 82330 ASSAY OF CALCIUM: CPT

## 2017-12-19 PROCEDURE — 84155 ASSAY OF PROTEIN SERUM: CPT

## 2017-12-19 PROCEDURE — 97602 WOUND(S) CARE NON-SELECTIVE: CPT

## 2017-12-19 PROCEDURE — 85730 THROMBOPLASTIN TIME PARTIAL: CPT

## 2017-12-19 PROCEDURE — 97161 PT EVAL LOW COMPLEX 20 MIN: CPT

## 2017-12-19 PROCEDURE — 86334 IMMUNOFIX E-PHORESIS SERUM: CPT

## 2017-12-19 PROCEDURE — 86901 BLOOD TYPING SEROLOGIC RH(D): CPT

## 2017-12-19 PROCEDURE — 86900 BLOOD TYPING SEROLOGIC ABO: CPT

## 2017-12-19 PROCEDURE — 97530 THERAPEUTIC ACTIVITIES: CPT

## 2017-12-19 PROCEDURE — 82728 ASSAY OF FERRITIN: CPT

## 2017-12-19 PROCEDURE — 84295 ASSAY OF SERUM SODIUM: CPT

## 2017-12-19 PROCEDURE — 83036 HEMOGLOBIN GLYCOSYLATED A1C: CPT

## 2017-12-19 PROCEDURE — 86922 COMPATIBILITY TEST ANTIGLOB: CPT

## 2017-12-19 PROCEDURE — 85014 HEMATOCRIT: CPT

## 2017-12-19 PROCEDURE — 71045 X-RAY EXAM CHEST 1 VIEW: CPT

## 2017-12-19 PROCEDURE — 99285 EMERGENCY DEPT VISIT HI MDM: CPT | Mod: 25

## 2017-12-19 PROCEDURE — 83605 ASSAY OF LACTIC ACID: CPT

## 2017-12-19 PROCEDURE — 85610 PROTHROMBIN TIME: CPT

## 2017-12-19 PROCEDURE — 84132 ASSAY OF SERUM POTASSIUM: CPT

## 2017-12-19 PROCEDURE — 86880 COOMBS TEST DIRECT: CPT

## 2019-02-14 NOTE — PATIENT PROFILE ADULT. - BLOOD TRANSFUSION, PREVIOUS, PROFILE
Telephone Encounter by lBanca Becerra, RN, BSN at 11/10/18 12:10 PM     Author:  Blanca Becerra RN, BSN Service:  (none) Author Type:  Registered Nurse     Filed:  11/10/18 12:10 PM Encounter Date:  11/10/2018 Status:  Signed     :  Blanca Becerra RN, BSN (Registered Nurse)       From: Marissa Wilson  To: Howie Barnett APN, CNP  Sent: 11/10/2018 11:37 AM CST  Subject: Other    Hi Howie,   Welcome back!  Colby was just in to see you and he told me that he shared   with you the issues that I have going on.  I wanted to let you know that   you should have all of that in my file.   I did come in and see Dr Cheema   while you were out on leave and the records to date we in there.  When   ever I have something done, I do ask them to send a copy to you so that   you stay up with everything.      Thanks!!  Suha Galveztoni  [phone]       Revision History        Date/Time User Provider Type Action    > 11/10/18 12:10 PM Blanca Becerra, RN, BSN Registered Nurse Sign    Attribution information within the note text is not available.            
no

## 2020-04-05 NOTE — PROGRESS NOTE ADULT - PROBLEM SELECTOR PROBLEM 4
[facility]  Nephrology Progress Note    Winsome Escobar Patient Status:  Inpatient    9/15/1953 MRN [id number]   [facility] 4SW-A Attending Anderson Rodarte MD   Hosp Day # 13 PCP None Pcp       SUBJECTIVE:    Intubated/sedated  Hi (MIRALAX) powder packet 17 g, 17 g, Oral, Daily PRN  bisacodyl (DULCOLAX) rectal suppository 10 mg, 10 mg, Rectal, Daily PRN  Chlorhexidine Gluconate (PERIDEX) 0.12 % solution 15 mL, 15 mL, Mouth/Throat, [email or web address]  sucralfate (CARAFATE) 1 GM/10ML suspe mg, 1 mg, Intravenous, Q4H PRN  Calcium Carbonate Antacid (TUMS) chewable tab 500 mg, 500 mg, Oral, Daily  acetaminophen (TYLENOL EXTRA STRENGTH) tab 1,000 mg, 1,000 mg, Oral, Q6H PRN  ondansetron HCl (ZOFRAN) injection 4 mg, 4 mg, Intravenous, Q6H PRN  zo MG  --  3.2* 2.8*       No results for input(s): ALT, AST, ALB, AMYLASE, LIPASE, LDH in the last 72 hours. Invalid input(s): ALPHOS, TBIL, DBIL, TPROT      Impression/Plan:       1.   SOB/cough- presentation c/w pneumonia and noted to be adenovirus + Chronic kidney disease, unspecified CKD stage

## 2020-05-13 NOTE — PHYSICAL THERAPY INITIAL EVALUATION ADULT - MODALITIES TREATMENT COMMENTS
L buttocks IAD and R LE shin Odomzo Counseling- I discussed with the patient the risks of Odomzo including but not limited to nausea, vomiting, diarrhea, constipation, weight loss, changes in the sense of taste, decreased appetite, muscle spasms, and hair loss.  The patient verbalized understanding of the proper use and possible adverse effects of Odomzo.  All of the patient's questions and concerns were addressed.

## 2020-06-19 NOTE — DISCHARGE NOTE ADULT - NURSING SECTION COMPLETE
History of peripheral vascular disease no leg pain at this time.  Observe for now.   Patient/Caregiver provided printed discharge information.

## 2020-08-04 NOTE — PROGRESS NOTE ADULT - ASSESSMENT
89y Female with hx of hyponatremia   Na remains wnl  creatinine improved, BUN still elevated  resume lasix ant 20mg qd  cont fluid restriction  monitor I&O's, electrolytes and renal function Complex Repair And Z Plasty Text: The defect edges were debeveled with a #15 scalpel blade.  The primary defect was closed partially with a complex linear closure.  Given the location of the remaining defect, shape of the defect and the proximity to free margins a Z plasty was deemed most appropriate for complete closure of the defect.  Using a sterile surgical marker, an appropriate advancement flap was drawn incorporating the defect and placing the expected incisions within the relaxed skin tension lines where possible.    The area thus outlined was incised deep to adipose tissue with a #15 scalpel blade.  The skin margins were undermined to an appropriate distance in all directions utilizing iris scissors.

## 2020-11-05 NOTE — ED ADULT NURSE NOTE - SKIN TEMPERATURE MOISTURE
cool Mart-1 - Negative Histology Text: MART-1 staining demonstrates a normal density and pattern of melanocytes along the dermal-epidermal junction. The surgical margins are negative for tumor cells.

## 2022-01-03 NOTE — ED ADULT NURSE NOTE - NS ED NURSE RECORD ANOTHER VITAL SIGN
Last Office Visit  -  8/11/21  Next Office Visit  -  1/26/22    Last Filled  -  11/11/20  Last UDS -    Contract - Yes

## 2022-02-22 NOTE — PROGRESS NOTE ADULT - ASSESSMENT
frail elderly women with likely aspiration pna   very lethargic  continue zosyn to [possible aspiration pna   prognosis poor  DNR   NPO.  seems slightly more responsive  we can hold vanco and continue zosyn alone Detail Level: Detailed Add 84367 Cpt? (Important Note: In 2017 The Use Of 23923 Is Being Tracked By Cms To Determine Future Global Period Reimbursement For Global Periods): yes

## 2022-11-19 NOTE — H&P ADULT - PROBLEM SELECTOR PLAN 8
No venodynes only  PPI and carafate for ppx  fall risk  prognosis guarded last admission was seen by speech was put on Dysphagia I with nectar thickened liquids  -pt and family refusing PEG, aware of aspiration risk  -aspiration precaution

## 2023-01-31 NOTE — H&P ADULT - PROBLEM SELECTOR PLAN 4
Family/Patient possibly s/t ADHF, per chart review has been chronic issue for patient intermittently   check urine/serum osm, urine lytes

## 2023-03-03 NOTE — ED ADULT NURSE NOTE - DOES PATIENT HAVE ADVANCE DIRECTIVE
No Patient seen for follow-up of bipolar, vascular dementia Chart reviewed and discussed with nursing staff. Vitals are stable, afebrile. Patient is compliant with her medications, needed prn last night. She has some quiet periods but then starts screaming. Screaming also triggered by ADL's or PT. Clearly more confused with periods of repeating numbers or calling for mother. Blood work w/o severe abnormalities. Urine sample c/w UTI. PAtient slid from gerichair witnessed by staff lowering herself to floor

## 2023-04-05 NOTE — ED ADULT NURSE REASSESSMENT NOTE - NS ED NURSE REASSESS COMMENT FT1
Surgeon:              Dr. Eleanor De La Torre. Chuck Rai, PhD                                          Tel:         [phone]                                  Fax:        [phone]    Surgery/Procedure:          Scalp reconstruction with skin graft  1.5 hours, general anesthesia, outpatient, 4/11/2023                              Hospital:  [facility]: [address] [place], [place] [address], [place], [address]           [phone]  [facility]: [address], [facility], [facility] [place]               [phone]    1. Someone will need to drive you to and from the hospital if your procedure is outpatient. 2.Do not drink alcohol or smoke 24 hours prior to your procedure. 3. Bring a picture ID and your insurance card. 4. You will be contacted by the hospital the day before to confirm the procedure time and location. 5. The hospital will also contact you approximately one week before surgery to schedule your COVID test.     6. Do not take any herbal supplements or blood thinners at least one week before your procedure/surgery. This includes NSAID's (aspirin, baby aspirin, Motrin, Ibuprofen, Aleve, Advil, Naproxen, etc), Plavix, fish oil, vitamin E, turmeric, CoQ10, or green tea supplements, etc. *TYLENOL or acetaminophen is ok to take*    7. PRE-OPERATIVE TESTING: History and physical with medical clearance is REQUIRED within 30 days of the surgery date and is mandatory per Dr. Chuck Rai. *If this is not done, your surgery will be postponed*  [address] [facility]   CBC  CMP  EKG  Cardiac clearance with perioperative instructions regarding anticoagulation     8. Please inform us if you develop any Covid-19 like symptoms, test positive or have been exposed for Covid- 19 prior to surgery.      Consent obtained Patient is resting quietly, BIPAP in progress, at 40% o2, O2 saturation is 99% and respirations are 18RR.  Cardiac monitoring is ongoing, safety maintained. Patient is resting quietly, BIPAP in progress, at 40% o2, O2 saturation is 99% and respirations are 18RR.  Cardiac monitoring is ongoing, safety maintained.    Report given to JOSSELINE Lechuga from [employer].

## 2023-08-23 NOTE — DIETITIAN INITIAL EVALUATION ADULT. - PROBLEM SELECTOR PLAN 8
Ambulatory to ED with bilateral forearm pain s/p mechanical fall while trying to get into an elevated jeep 5 days ago.  Pt states she hit both of her forearms on the jeep frame - quarter sized hematoma to left forearm.  Pt reports head injury with the fall, denies LOC or blood thinners.  Also c/o bilateral knee pain and acute on chronic lower back pain.  Pt is a poor historian.  Plavix listed on med list.  Pt states she does not believe she takes that anymore.  
-c/w IV diuresis for acute decompensated diastolic heart failure  -Troponin increasing; EKG no ischemic changes. ED called cardiology consult and will followup.   -admit to telemetry and continue to monitor cardiac enzymes

## 2023-09-14 NOTE — ED PROVIDER NOTE - CARDIAC PEDAL EDEMA
motion. Cervical back: Normal range of motion. No rigidity. No muscular tenderness. Skin:     General: Skin is warm and dry. Neurological:      General: No focal deficit present. Mental Status: He is alert and oriented to person, place, and time. Psychiatric:         Mood and Affect: Mood normal.         Behavior: Behavior normal.         Thought Content: Thought content normal.         Judgment: Judgment normal.       Audiogram from the [address] [place] [address] reviewed with the patient showing mild sensorineural hearing loss at 8000 Hz with mild to moderate sensorineural hearing loss in the left ear from 750 Hz through 8000 Hz. Word discrimination on the right was 100% at 70 dB with 96% word discrimination on the left at 80 dB. IMPRESSION/PLAN:    Emerald Bobo was seen today for hearing problem. Diagnoses and all orders for this visit:    Preop testing  -     Creatinine; Future  -     BUN; Future    Asymmetrical hearing loss  -     MRI IAC POSTERIOR FOSSA W WO CONTRAST; Future    Tinnitus of left ear  -     MRI IAC POSTERIOR FOSSA W WO CONTRAST; Future      At this time an order will be placed for an MRI of the IAC posterior fossa with and without contrast for further evaluation of the internal auditory canal to rule out possible acoustic neuroma. If negative patient will follow-up with the [address] [place] [address] for fitment of hearing aids upon clearance. BUN/creatinine are ordered prior to the exam.  He will follow-up in 4 to 6 weeks for his results. He will call for any new or worsening symptoms prior to his next appointment.       Milena Syed, MSN, FNP-C  [facility], Nose and Throat    The information contained in this note has been dictated using drug and medical speech recognition software and may contain errors NON-PITTING

## 2024-02-29 NOTE — PROVIDER CONTACT NOTE (OTHER) - REASON
Checked on bed, connected to monitor,  with unlabored respirations. Vital signs is stable.   Denied any new complaints. No current needs identified.  Gurney in low position, side rail up for pt safety. Call light within reach.    pt. has pressure Ulcer stage 2 to Sacrum

## 2025-04-07 NOTE — PROGRESS NOTE ADULT - SUBJECTIVE AND OBJECTIVE BOX
[employer] Pending    Insurance response  Prescription Drug Insurance: [employer]  Notes: EPA submitted for review.  Provider will be updated upon insurance response.    Starting weight 257lbs     Patient is a 89y Female  being evaluated for  hyponatremia/ hyperkalemia  notes reviewed  resting comfortably    Vital Signs Last 24 Hrs  T(C): 36.4 (06 Oct 2017 04:21), Max: 36.7 (05 Oct 2017 12:41)  T(F): 97.6 (06 Oct 2017 04:21), Max: 98.1 (05 Oct 2017 12:41)  HR: 79 (06 Oct 2017 04:21) (72 - 97)  BP: 118/74 (06 Oct 2017 04:21) (108/68 - 122/72)  BP(mean): --  RR: 18 (06 Oct 2017 04:21) (18 - 20)  SpO2: 97% (06 Oct 2017 04:21) (97% - 99%)  PHYSICAL EXAM:    I and O's:    10-02 @ 07:01  -  10-03 @ 07:00  --------------------------------------------------------  IN: 50 mL / OUT: 1125 mL / NET: -1075 mL    10-03 @ 07:01  -  10-03 @ 12:14  --------------------------------------------------------  IN: 60 mL / OUT: 0 mL / NET: 60 mL        Height (cm): 154.94 (10-02 @ 18:18)  Weight (kg): 43.6 (10-02 @ 18:18)  BMI (kg/m2): 18.2 (10-02 @ 18:18)    REVIEW OF SYSTEMS:  unable as patient lethargic   Allergies    eggs (Rash)  no drug allergy (Unknown)    Intolerances    vinegar sensitivity    family states that the patient shakes when she ingests vinegar (Other)    MEDICATIONS  (STANDING):  ALBUTerol/ipratropium for Nebulization 3 milliLiter(s) Nebulizer every 6 hours  diltiazem    Tablet 60 milliGRAM(s) Oral every 8 hours  ertapenem  IVPB 500 milliGRAM(s) IV Intermittent every 24 hours  furosemide   Injectable 40 milliGRAM(s) IV Push daily  haloperidol     Tablet 0.5 milliGRAM(s) Oral once  immune globulin gamma IVPB 17.4 Gram(s) IV Intermittent daily  pantoprazole  Injectable 40 milliGRAM(s) IV Push daily  sucralfate suspension 1 Gram(s) Oral Before meals and at bedtime  zinc sulfate 220 milliGRAM(s) Oral daily    MEDICATIONS  (PRN):  acetaminophen   Tablet. 650 milliGRAM(s) Oral once PRN Moderate Pain (4 - 6)    LABS:  CBC Full  -  ( 03 Oct 2017 09:30 )  WBC Count : 25.1 K/uL  Hemoglobin : 9.6 g/dL  Hematocrit : 30.8 %  Platelet Count - Automated : 9 K/uL  Mean Cell Volume : 87.5 fl  Mean Cell Hemoglobin : 27.2 pg  Mean Cell Hemoglobin Concentration : 31.1 gm/dL  Auto Neutrophil # : x  Auto Lymphocyte # : x  Auto Monocyte # : x  Auto Eosinophil # : x  Auto Basophil # : x  Auto Neutrophil % : x  Auto Lymphocyte % : x  Auto Monocyte % : x  Auto Eosinophil % : x  Auto Basophil % : x    1003    134<L>  |  88<L>  |  49<H>  ----------------------------<  157<H>  4.2   |  34<H>  |  1.85<H>    Ca    9.6      03 Oct 2017 09:29  Phos  4.4     1002  Mg     2.1     10-02    TPro  7.3  /  Alb  3.9  /  TBili  0.3  /  DBili  x   /  AST  35  /  ALT  27  /  AlkPhos  73  10-01    10    135  |  87<L>  |  50<H>  ----------------------------<  155<H>  3.4<L>   |  34<H>  |  1.68<H>    Ca    8.9      04 Oct 2017 07:05      Urine Studies:  Urinalysis Basic - ( 02 Oct 2017 17:20 )    Color: Yellow / Appearance: Slightly Turbid / S.019 / pH: x  Gluc: x / Ketone: Negative  / Bili: Negative / Urobili: Negative mg/dL   Blood: x / Protein: Trace mg/dL / Nitrite: Negative   Leuk Esterase: Large / RBC: 277 /HPF /  /HPF   Sq Epi: x / Non Sq Epi: 1 /HPF / Bacteria: Negative        Urine chemistry:   Urine Na: Sodium, Random Urine: <20 mmol/L (10-02 @ 17:25)    Urine Creatinine:   Urine Protein/Cr ratio:  Urine K:   Urine Osm: Osmolality, Random Urine: 342 mos/kg (10-03 @ 00:19)    10-05    139  |  87<L>  |  52<H>  ----------------------------<  124<H>  3.2<L>   |  37<H>  |  1.49<H>    Ca    9.1      05 Oct 2017 07:17  Mg     2.1     10-05          Imp:  89y Female